# Patient Record
Sex: MALE | Race: BLACK OR AFRICAN AMERICAN | NOT HISPANIC OR LATINO | Employment: OTHER | URBAN - NONMETROPOLITAN AREA
[De-identification: names, ages, dates, MRNs, and addresses within clinical notes are randomized per-mention and may not be internally consistent; named-entity substitution may affect disease eponyms.]

---

## 2019-09-03 ENCOUNTER — TRANSCRIBE ORDERS (OUTPATIENT)
Dept: ADMINISTRATIVE | Facility: HOSPITAL | Age: 64
End: 2019-09-03

## 2019-09-03 DIAGNOSIS — R06.09 OTHER FORMS OF DYSPNEA: Primary | ICD-10-CM

## 2019-09-11 ENCOUNTER — HOSPITAL ENCOUNTER (OUTPATIENT)
Dept: CARDIOLOGY | Facility: HOSPITAL | Age: 64
Discharge: HOME | End: 2019-09-11
Admitting: FAMILY MEDICINE

## 2019-09-11 VITALS
BODY MASS INDEX: 37.13 KG/M2 | HEIGHT: 68 IN | DIASTOLIC BLOOD PRESSURE: 89 MMHG | WEIGHT: 245 LBS | SYSTOLIC BLOOD PRESSURE: 180 MMHG

## 2019-09-11 DIAGNOSIS — R06.09 OTHER FORMS OF DYSPNEA: ICD-10-CM

## 2019-09-11 PROCEDURE — 93306 TTE W/DOPPLER COMPLETE: CPT | Performed by: INTERNAL MEDICINE

## 2019-09-11 PROCEDURE — 93306 TTE W/DOPPLER COMPLETE: CPT

## 2019-09-12 LAB
BH CV ECHO MEAS - AO MAX PG (FULL): 4.9 MMHG
BH CV ECHO MEAS - AO MAX PG: 12.5 MMHG
BH CV ECHO MEAS - AO MEAN PG (FULL): 2 MMHG
BH CV ECHO MEAS - AO MEAN PG: 6 MMHG
BH CV ECHO MEAS - AO ROOT AREA (BSA CORRECTED): 1.2
BH CV ECHO MEAS - AO ROOT AREA: 5.7 CM^2
BH CV ECHO MEAS - AO ROOT DIAM: 2.7 CM
BH CV ECHO MEAS - AO V2 MAX: 177 CM/SEC
BH CV ECHO MEAS - AO V2 MEAN: 111 CM/SEC
BH CV ECHO MEAS - AO V2 VTI: 32.3 CM
BH CV ECHO MEAS - AVA(I,A): 2.6 CM^2
BH CV ECHO MEAS - AVA(I,D): 2.6 CM^2
BH CV ECHO MEAS - AVA(V,A): 2.2 CM^2
BH CV ECHO MEAS - AVA(V,D): 2.2 CM^2
BH CV ECHO MEAS - BSA(HAYCOCK): 2.4 M^2
BH CV ECHO MEAS - BSA: 2.2 M^2
BH CV ECHO MEAS - BZI_BMI: 37.3 KILOGRAMS/M^2
BH CV ECHO MEAS - BZI_METRIC_HEIGHT: 172.7 CM
BH CV ECHO MEAS - BZI_METRIC_WEIGHT: 111.1 KG
BH CV ECHO MEAS - EDV(CUBED): 64.5 ML
BH CV ECHO MEAS - EDV(MOD-SP4): 86.6 ML
BH CV ECHO MEAS - EDV(TEICH): 70.4 ML
BH CV ECHO MEAS - EF(CUBED): 78 %
BH CV ECHO MEAS - EF(MOD-SP4): 60.6 %
BH CV ECHO MEAS - EF(TEICH): 70.8 %
BH CV ECHO MEAS - ESV(CUBED): 14.2 ML
BH CV ECHO MEAS - ESV(MOD-SP4): 34.1 ML
BH CV ECHO MEAS - ESV(TEICH): 20.6 ML
BH CV ECHO MEAS - FS: 39.7 %
BH CV ECHO MEAS - IVS/LVPW: 1.6
BH CV ECHO MEAS - IVSD: 1.6 CM
BH CV ECHO MEAS - LAT PEAK E' VEL: 8.7 CM/SEC
BH CV ECHO MEAS - LV DIASTOLIC VOL/BSA (35-75): 38.9 ML/M^2
BH CV ECHO MEAS - LV MASS(C)D: 189.4 GRAMS
BH CV ECHO MEAS - LV MASS(C)DI: 85 GRAMS/M^2
BH CV ECHO MEAS - LV MAX PG: 7.7 MMHG
BH CV ECHO MEAS - LV MEAN PG: 4 MMHG
BH CV ECHO MEAS - LV SYSTOLIC VOL/BSA (12-30): 15.3 ML/M^2
BH CV ECHO MEAS - LV V1 MAX: 138.5 CM/SEC
BH CV ECHO MEAS - LV V1 MEAN: 83.9 CM/SEC
BH CV ECHO MEAS - LV V1 VTI: 29.4 CM
BH CV ECHO MEAS - LVIDD: 4 CM
BH CV ECHO MEAS - LVIDS: 2.4 CM
BH CV ECHO MEAS - LVLD AP4: 8.2 CM
BH CV ECHO MEAS - LVLS AP4: 6.7 CM
BH CV ECHO MEAS - LVOT AREA (M): 2.8 CM^2
BH CV ECHO MEAS - LVOT AREA: 2.8 CM^2
BH CV ECHO MEAS - LVOT DIAM: 1.9 CM
BH CV ECHO MEAS - LVPWD: 1 CM
BH CV ECHO MEAS - MED PEAK E' VEL: 8.05 CM/SEC
BH CV ECHO MEAS - MR MAX PG: 125.8 MMHG
BH CV ECHO MEAS - MR MAX VEL: 560.8 CM/SEC
BH CV ECHO MEAS - MR MEAN PG: 87.5 MMHG
BH CV ECHO MEAS - MR MEAN VEL: 445 CM/SEC
BH CV ECHO MEAS - MR VTI: 154 CM
BH CV ECHO MEAS - MV A MAX VEL: 136.5 CM/SEC
BH CV ECHO MEAS - MV AREA (1 DIAM): 5.7 CM^2
BH CV ECHO MEAS - MV DEC TIME: 0.17 SEC
BH CV ECHO MEAS - MV DIAM: 2.7 CM
BH CV ECHO MEAS - MV E MAX VEL: 154 CM/SEC
BH CV ECHO MEAS - MV E/A: 1.1
BH CV ECHO MEAS - MV FLOW AREA(1DIAM): 5.7 CM^2
BH CV ECHO MEAS - SI(AO): 82.9 ML/M^2
BH CV ECHO MEAS - SI(CUBED): 22.6 ML/M^2
BH CV ECHO MEAS - SI(LVOT): 37.4 ML/M^2
BH CV ECHO MEAS - SI(MOD-SP4): 23.6 ML/M^2
BH CV ECHO MEAS - SI(TEICH): 22.4 ML/M^2
BH CV ECHO MEAS - SV(AO): 184.6 ML
BH CV ECHO MEAS - SV(CUBED): 50.3 ML
BH CV ECHO MEAS - SV(LVOT): 83.4 ML
BH CV ECHO MEAS - SV(MOD-SP4): 52.5 ML
BH CV ECHO MEAS - SV(TEICH): 49.8 ML
BH CV ECHO MEAS - TR MAX VEL: 229 CM/SEC
BH CV ECHO MEASUREMENTS AVERAGE E/E' RATIO: 18.39
LEFT ATRIUM VOLUME INDEX: 44.4 ML/M2
LEFT ATRIUM VOLUME: 99.1 CM3

## 2019-09-16 ENCOUNTER — OFFICE VISIT (OUTPATIENT)
Dept: CARDIOLOGY | Facility: CLINIC | Age: 64
End: 2019-09-16

## 2019-09-16 VITALS
OXYGEN SATURATION: 99 % | WEIGHT: 234 LBS | BODY MASS INDEX: 35.46 KG/M2 | HEIGHT: 68 IN | HEART RATE: 91 BPM | DIASTOLIC BLOOD PRESSURE: 82 MMHG | SYSTOLIC BLOOD PRESSURE: 160 MMHG

## 2019-09-16 DIAGNOSIS — I10 BENIGN ESSENTIAL HTN: ICD-10-CM

## 2019-09-16 DIAGNOSIS — E78.2 MIXED HYPERLIPIDEMIA: ICD-10-CM

## 2019-09-16 DIAGNOSIS — E66.01 SEVERE OBESITY (BMI 35.0-39.9) WITH COMORBIDITY (HCC): ICD-10-CM

## 2019-09-16 DIAGNOSIS — E11.8 CONTROLLED TYPE 2 DIABETES MELLITUS WITH COMPLICATION, WITHOUT LONG-TERM CURRENT USE OF INSULIN (HCC): Primary | ICD-10-CM

## 2019-09-16 DIAGNOSIS — R06.09 DOE (DYSPNEA ON EXERTION): ICD-10-CM

## 2019-09-16 PROCEDURE — 99204 OFFICE O/P NEW MOD 45 MIN: CPT | Performed by: INTERNAL MEDICINE

## 2019-09-16 PROCEDURE — 93000 ELECTROCARDIOGRAM COMPLETE: CPT | Performed by: INTERNAL MEDICINE

## 2019-09-16 RX ORDER — FUROSEMIDE 40 MG/1
TABLET ORAL DAILY
COMMUNITY
Start: 2019-08-29

## 2019-09-16 RX ORDER — ERGOCALCIFEROL 1.25 MG/1
CAPSULE ORAL
COMMUNITY
Start: 2019-08-02

## 2019-09-16 RX ORDER — CARVEDILOL 25 MG/1
12.5 TABLET ORAL
COMMUNITY
Start: 2019-08-15

## 2019-09-16 RX ORDER — BLOOD SUGAR DIAGNOSTIC
STRIP MISCELLANEOUS
COMMUNITY
Start: 2019-07-18

## 2019-09-16 RX ORDER — ATORVASTATIN CALCIUM 20 MG/1
TABLET, FILM COATED ORAL
COMMUNITY

## 2019-09-16 RX ORDER — HYDROCHLOROTHIAZIDE 12.5 MG/1
CAPSULE, GELATIN COATED ORAL
COMMUNITY

## 2019-09-16 RX ORDER — AMLODIPINE BESYLATE 10 MG/1
TABLET ORAL
COMMUNITY

## 2019-09-16 RX ORDER — METFORMIN HYDROCHLORIDE 500 MG/1
500 TABLET, EXTENDED RELEASE ORAL 4 TIMES DAILY
COMMUNITY
Start: 2019-08-15

## 2019-09-16 RX ORDER — LOSARTAN POTASSIUM 100 MG/1
100 TABLET ORAL DAILY
Qty: 90 TABLET | Refills: 3 | Status: SHIPPED | OUTPATIENT
Start: 2019-09-16

## 2019-09-16 RX ORDER — LOSARTAN POTASSIUM 50 MG/1
50 TABLET ORAL DAILY
COMMUNITY
End: 2019-09-16 | Stop reason: SDUPTHER

## 2019-09-16 NOTE — PROGRESS NOTES
Mauro Patel  [id number]  1955  64 y.o.  male    Referring Provider: John Robertson MD    Reason for  Visit:  Initial visit for  shortness of breath   Essential Hypertension  Type 2 diabetes mellitus   Nonspecific STT changes on ECG      Subjective    Moderate exertional shortness of breath on exertion relieved with rest  No significant cough or wheezing  Going on for several months or longer    No palpitations  No associated chest pain  No significant pedal edema    No fever or chills  No significant expectoration    No hemoptysis  No presyncope or syncope       BP elevated at home 160s/90 s mm Hg    Abnormal Echo as below        History of present illness:  Mauro Patel is a 64 y.o. yo male with history of Essential Hypertension   Type 2 diabetes mellitus who presents today for   Chief Complaint   Patient presents with   • ESTABLISH CARE     RECENT ECHO   • Leg Swelling   .    History  Past Medical History:   Diagnosis Date   • Diabetes mellitus (CMS/HCC)    • Edema    • Hyperlipidemia    • Hypertension    ,   Past Surgical History:   Procedure Laterality Date   • NO PAST SURGERIES     ,   Family History   Problem Relation Age of Onset   • No Known Problems Mother    • No Known Problems Father    ,   Social History     Tobacco Use   • Smoking status: Never Smoker   • Smokeless tobacco: Never Used   Substance Use Topics   • Alcohol use: No     Frequency: Never   • Drug use: No   ,     Medications  Current Outpatient Medications   Medication Sig Dispense Refill   • ACCU-CHEK GUIDE test strip      • amLODIPine (NORVASC) 10 MG tablet amlodipine 10 mg tablet   Take 1 tablet every day by oral route.     • ASPIRIN 81 PO aspirin 81 mg tablet,delayed release   Take 1 tablet every day by oral route.     • atorvastatin (LIPITOR) 20 MG tablet atorvastatin 20 mg tablet   Take 1 tablet every day by oral route.     • carvedilol (COREG) 25 MG tablet 12.5 mg. BID     • furosemide (LASIX) 40 MG tablet Daily.     •  "hydrochlorothiazide (MICROZIDE) 12.5 MG capsule hydrochlorothiazide 12.5 mg capsule   Take 1 capsule every day by oral route.     • losartan (COZAAR) 100 MG tablet Take 1 tablet by mouth Daily. 90 tablet 3   • metFORMIN ER (GLUCOPHAGE-XR) 500 MG 24 hr tablet Take 500 mg by mouth 4 (Four) Times a Day.     • Potassium Chloride Aneta ER (K-DUR PO) 10 mEq Daily.     • vitamin D (ERGOCALCIFEROL) 62088 units capsule capsule        No current facility-administered medications for this visit.        Allergies:  Duravent dm  [phenylephrine-dm-gg]    Review of Systems  Review of Systems   Constitution: Negative.   HENT: Negative.    Eyes: Negative.    Cardiovascular: Positive for dyspnea on exertion. Negative for chest pain, claudication, cyanosis, irregular heartbeat, leg swelling, near-syncope, orthopnea, palpitations, paroxysmal nocturnal dyspnea and syncope.   Respiratory: Negative.    Endocrine: Negative.    Hematologic/Lymphatic: Negative.    Skin: Negative.    Gastrointestinal: Negative for anorexia.   Genitourinary: Negative.    Neurological: Negative.    Psychiatric/Behavioral: Negative.        Objective     Physical Exam:  /82   Pulse 91   Ht 172.7 cm (68\")   Wt 106 kg (234 lb)   SpO2 99%   BMI 35.58 kg/m²     Physical Exam   Constitutional: He appears well-developed.   HENT:   Head: Normocephalic.   Neck: Normal carotid pulses and no JVD present. No tracheal tenderness present. Carotid bruit is not present. No tracheal deviation and no edema present.   Cardiovascular: Regular rhythm, normal heart sounds and normal pulses.   Pulmonary/Chest: Effort normal. No stridor.   Abdominal: Soft. He exhibits no distension. There is no hepatosplenomegaly. There is no tenderness.   Neurological: He is alert. He has normal strength. No cranial nerve deficit or sensory deficit.   Skin: Skin is warm.   Psychiatric: He has a normal mood and affect. His speech is normal and behavior is normal.       Results " Review:      Results for orders placed during the hospital encounter of 09/11/19   Adult Transthoracic Echo Complete W/ Cont if Necessary Per Protocol    Narrative · Left ventricular systolic function is normal. Estimated EF appears to be   in the range of 61 - 65%.  · Left ventricular diastolic dysfunction.  · Left ventricular wall thickness is consistent with mild to moderate   septal asymmetric hypertrophy.  · Mild mitral valve regurgitation is present              ECG 12 Lead  Date/Time: 9/16/2019 10:27 AM  Performed by: Kofi Meade MD  Authorized by: Kofi Meade MD   Comparison: not compared with previous ECG   Rhythm: sinus rhythm  Rate: normal  Conduction: conduction normal  ST Segments: ST segments normal  T Waves: T waves normal  QRS axis: normal  Other findings: non-specific ST-T wave changes    Clinical impression: abnormal EKG            Assessment/Plan   Mauro was seen today for establish care and leg swelling.    Diagnoses and all orders for this visit:    Controlled type 2 diabetes mellitus with complication, without long-term current use of insulin (CMS/[facility])    Benign essential HTN    Severe obesity (BMI 35.0-39.9) with comorbidity (CMS/HCC)    Mixed hyperlipidemia    MUNGUIA (dyspnea on exertion)  -     Adult Stress Echo W/ Cont or Stress Agent if Necessary Per Protocol; Future    Other orders  -     ECG 12 Lead  -     losartan (COZAAR) 100 MG tablet; Take 1 tablet by mouth Daily.           Plan    Requested Prescriptions     Signed Prescriptions Disp Refills   • losartan (COZAAR) 100 MG tablet 90 tablet 3     Sig: Take 1 tablet by mouth Daily.      Orders Placed This Encounter   Procedures   • ECG 12 Lead     This order was created via procedure documentation   • Adult Stress Echo W/ Cont or Stress Agent if Necessary Per Protocol     Standing Status:   Future     Standing Expiration Date:   9/15/2020     Order Specific Question:   What stress agent will be used?     Answer:   Dobutamine     Order  Specific Question:   Reason for Dobutamine?     Answer:   Unable to Exercise     Order Specific Question:   Reason for exam?     Answer:   Dyspnea     Order Specific Question:   Reason for exam?     Answer:   Abnormal EKG    Cannot run on treadmill due to significant  shortness of breath     Recommend evaluation for obstructive sleep apnea given snoring and daytime somnolence and fatigue by primary provider  In addition has body habitus including oopharyngeal crowding increasing the likelihood of obstructive sleep apnea   ____________________________________________________________________________________________________________________________________________  Health maintenance and recommendations      Low salt/ HTN/ Heart healthy carbohydrate restricted cardiac diet   The patient is advised to reduce or avoid caffeine or other cardiac stimulants.     Minimize or avoid  NSAID-type medications        Monitor for any signs of bleeding including red or dark stools. Fall precautions.        Advised staying uptodate with immunizations per established standard guidelines.      Offered to give patient  a copy of my notes       Questions were encouraged, asked and answered to the patient's  understanding and satisfaction. Questions if any regarding current medications and side effects, need for refills and importance of compliance to medications stressed.    Reviewed available prior notes, consults, prior visits, laboratory findings, radiology and cardiology relevant reports. Updated chart as applicable. I have reviewed the patient's medical history in detail and updated the computerized patient record as relevant.      Updated patient regarding any new or relevant abnormalities on review of records or any new findings on physical exam. Mentioned to patient about purpose of visit and desirable health short and long term goals and objectives.    Primary to monitor CBC CMP Lipid panel and TSH as  applicable    ___________________________________________________________________________________________________________________________________________          Return in about 6 weeks (around 10/28/2019).

## 2019-09-25 ENCOUNTER — HOSPITAL ENCOUNTER (OUTPATIENT)
Dept: CARDIOLOGY | Facility: HOSPITAL | Age: 64
Discharge: HOME | End: 2019-09-25
Admitting: INTERNAL MEDICINE

## 2019-09-25 VITALS
BODY MASS INDEX: 35.42 KG/M2 | HEART RATE: 101 BPM | WEIGHT: 233.69 LBS | DIASTOLIC BLOOD PRESSURE: 73 MMHG | SYSTOLIC BLOOD PRESSURE: 168 MMHG | HEIGHT: 68 IN

## 2019-09-25 DIAGNOSIS — R06.09 DOE (DYSPNEA ON EXERTION): ICD-10-CM

## 2019-09-25 LAB
BH CV STRESS BP STAGE 1: NORMAL
BH CV STRESS BP STAGE 2: NORMAL
BH CV STRESS DOSE DOBUTAMINE STAGE 1: 10
BH CV STRESS DOSE DOBUTAMINE STAGE 2: 20
BH CV STRESS DURATION MIN STAGE 1: 3
BH CV STRESS DURATION MIN STAGE 2: 3
BH CV STRESS DURATION SEC STAGE 1: 0
BH CV STRESS DURATION SEC STAGE 2: 0
BH CV STRESS ECHO POST STRESS EJECTION FRACTION EF: 65 %
BH CV STRESS HR STAGE 1: 111
BH CV STRESS HR STAGE 2: 130
BH CV STRESS PROTOCOL 1: NORMAL
BH CV STRESS RECOVERY BP: NORMAL MMHG
BH CV STRESS RECOVERY HR: 105 BPM
BH CV STRESS STAGE 1: 1
BH CV STRESS STAGE 2: 2
LV EF 2D ECHO EST: 55 %
MAXIMAL PREDICTED HEART RATE: 157 BPM
PERCENT MAX PREDICTED HR: 82.8 %
STRESS BASELINE BP: NORMAL MMHG
STRESS BASELINE HR: 103 BPM
STRESS PERCENT HR: 97 %
STRESS POST EXERCISE DUR MIN: 6 MIN
STRESS POST EXERCISE DUR SEC: 0 SEC
STRESS POST PEAK BP: NORMAL MMHG
STRESS POST PEAK HR: 130 BPM
STRESS TARGET HR: 133 BPM

## 2019-09-25 PROCEDURE — 25010000002 PERFLUTREN 6.52 MG/ML SUSPENSION: Performed by: INTERNAL MEDICINE

## 2019-09-25 PROCEDURE — 93018 CV STRESS TEST I&R ONLY: CPT | Performed by: INTERNAL MEDICINE

## 2019-09-25 PROCEDURE — 93352 ADMIN ECG CONTRAST AGENT: CPT | Performed by: INTERNAL MEDICINE

## 2019-09-25 PROCEDURE — 93017 CV STRESS TEST TRACING ONLY: CPT

## 2019-09-25 PROCEDURE — 93350 STRESS TTE ONLY: CPT | Performed by: INTERNAL MEDICINE

## 2019-09-25 PROCEDURE — 93350 STRESS TTE ONLY: CPT

## 2019-09-25 PROCEDURE — 25010000003 DOBUTAMINE PER 250 MG: Performed by: INTERNAL MEDICINE

## 2019-09-25 RX ORDER — DOBUTAMINE HYDROCHLORIDE 100 MG/100ML
10-50 INJECTION INTRAVENOUS CONTINUOUS
Status: DISCONTINUED | OUTPATIENT
Start: 2019-09-25 | End: 2019-09-26 | Stop reason: HOSPADM

## 2019-09-25 RX ADMIN — PERFLUTREN 8.48 MG: 6.52 INJECTION, SUSPENSION INTRAVENOUS at 09:11

## 2019-09-25 RX ADMIN — Medication 10 MCG/KG/MIN: at 09:12

## 2019-10-29 ENCOUNTER — OFFICE VISIT (OUTPATIENT)
Dept: CARDIOLOGY | Facility: CLINIC | Age: 64
End: 2019-10-29

## 2019-10-29 VITALS
HEART RATE: 68 BPM | SYSTOLIC BLOOD PRESSURE: 120 MMHG | OXYGEN SATURATION: 100 % | WEIGHT: 215 LBS | HEIGHT: 68 IN | BODY MASS INDEX: 32.58 KG/M2 | DIASTOLIC BLOOD PRESSURE: 74 MMHG

## 2019-10-29 DIAGNOSIS — R06.09 DOE (DYSPNEA ON EXERTION): ICD-10-CM

## 2019-10-29 DIAGNOSIS — E11.8 CONTROLLED TYPE 2 DIABETES MELLITUS WITH COMPLICATION, WITHOUT LONG-TERM CURRENT USE OF INSULIN (HCC): ICD-10-CM

## 2019-10-29 DIAGNOSIS — E78.2 MIXED HYPERLIPIDEMIA: ICD-10-CM

## 2019-10-29 DIAGNOSIS — I10 BENIGN ESSENTIAL HTN: ICD-10-CM

## 2019-10-29 DIAGNOSIS — E66.01 SEVERE OBESITY (BMI 35.0-39.9) WITH COMORBIDITY (HCC): Primary | ICD-10-CM

## 2019-10-29 PROCEDURE — 99214 OFFICE O/P EST MOD 30 MIN: CPT | Performed by: INTERNAL MEDICINE

## 2019-10-29 NOTE — PROGRESS NOTES
Mauro Patel  [id number]  1955  64 y.o.  male    Referring Provider: John Robertson MD    Reason for  Visit: Here for follow up after cardiac testing after initial   visit for  shortness of breath   Essential Hypertension  Type 2 diabetes mellitus   Nonspecific STT changes on ECG  Cardiac workup test results as below   Low risk stress test with normal LVEF        Subjective      Feels better overall as during last visit  No new events or complaints since last visit   Overall the patient feels no major change from baseline symptoms   Similar symptoms as during last visit      Moderate exertional shortness of breath on exertion relieved with rest  No significant cough or wheezing  Going on for several months or longer    No palpitations  No associated chest pain  No significant pedal edema    No fever or chills  No significant expectoration    No hemoptysis  No presyncope or syncope     Now BP well controlled at home.       Blood sugars well controlled at home     History of present illness:  Mauro Patel is a 64 y.o. yo male with history of Essential Hypertension   Type 2 diabetes mellitus who presents today for   Chief Complaint   Patient presents with   • Shortness of Breath     6 WK FU/ RESULTS    .    History  Past Medical History:   Diagnosis Date   • Diabetes mellitus (CMS/HCC)    • Edema    • Hyperlipidemia    • Hypertension    ,   Past Surgical History:   Procedure Laterality Date   • NO PAST SURGERIES     ,   History reviewed. No pertinent family history.,   Social History     Tobacco Use   • Smoking status: Never Smoker   • Smokeless tobacco: Never Used   Substance Use Topics   • Alcohol use: No     Frequency: Never   • Drug use: No   ,     Medications  Current Outpatient Medications   Medication Sig Dispense Refill   • ACCU-CHEK GUIDE test strip      • amLODIPine (NORVASC) 10 MG tablet amlodipine 10 mg tablet   Take 1 tablet every day by oral route.     • ASPIRIN 81 PO aspirin 81 mg  "tablet,delayed release   Take 1 tablet every day by oral route.     • atorvastatin (LIPITOR) 20 MG tablet atorvastatin 20 mg tablet   Take 1 tablet every day by oral route.     • carvedilol (COREG) 25 MG tablet 12.5 mg. BID     • furosemide (LASIX) 40 MG tablet Daily.     • hydrochlorothiazide (MICROZIDE) 12.5 MG capsule hydrochlorothiazide 12.5 mg capsule   Take 1 capsule every day by oral route.     • losartan (COZAAR) 100 MG tablet Take 1 tablet by mouth Daily. 90 tablet 3   • metFORMIN ER (GLUCOPHAGE-XR) 500 MG 24 hr tablet Take 500 mg by mouth 4 (Four) Times a Day.     • Potassium Chloride Aneta ER (K-DUR PO) 10 mEq Daily.     • vitamin D (ERGOCALCIFEROL) 09218 units capsule capsule        No current facility-administered medications for this visit.        Allergies:  Duravent dm  [phenylephrine-dm-gg]    Review of Systems  Review of Systems   Constitution: Negative.   HENT: Negative.    Eyes: Negative.    Cardiovascular: Positive for dyspnea on exertion. Negative for chest pain, claudication, cyanosis, irregular heartbeat, leg swelling, near-syncope, orthopnea, palpitations, paroxysmal nocturnal dyspnea and syncope.   Respiratory: Negative.    Endocrine: Negative.    Hematologic/Lymphatic: Negative.    Skin: Negative.    Gastrointestinal: Negative for anorexia.   Genitourinary: Negative.    Neurological: Negative.    Psychiatric/Behavioral: Negative.        Objective     Physical Exam:  /74   Pulse 68   Ht 172.7 cm (67.99\")   Wt 97.5 kg (215 lb)   SpO2 100%   BMI 32.70 kg/m²     Physical Exam   Constitutional: He appears well-developed.   HENT:   Head: Normocephalic.   Neck: Normal carotid pulses and no JVD present. No tracheal tenderness present. Carotid bruit is not present. No tracheal deviation and no edema present.   Cardiovascular: Regular rhythm, normal heart sounds and normal pulses.   Pulmonary/Chest: Effort normal. No stridor.   Abdominal: Soft. He exhibits no distension. There is no " hepatosplenomegaly. There is no tenderness.   Neurological: He is alert. He has normal strength. No cranial nerve deficit or sensory deficit.   Skin: Skin is warm.   Psychiatric: He has a normal mood and affect. His speech is normal and behavior is normal.       Results Review:      Results for orders placed during the hospital encounter of 19   Adult Stress Echo W/ Cont or Stress Agent if Necessary Per Protocol    Narrative · Estimated EF = 55%.  · Left ventricular systolic function is normal.  · Low risk stress test for stress induced myocardial ischemia             Mauro Patel   Echocardiogram   Order# [id number]   [place] physician: Anshul Alcantar MD Ordering physician: John Robertson MD Study date: 19   Patient Information     Patient Name  Mauro Patel MRN  [id number] Sex  Male  (Age)  1955 (64 y.o.)   Sedation Narrator Report     Sedation Narrator Report   Interpretation Summary     · Left ventricular systolic function is normal. Estimated EF appears to be in the range of 61 - 65%.  · Left ventricular diastolic dysfunction.  · Left ventricular wall thickness is consistent with mild to moderate septal asymmetric hypertrophy.  · Mild mitral valve regurgitation is present             Procedures    Assessment/Plan   Mauro was seen today for shortness of breath.    Diagnoses and all orders for this visit:    Severe obesity (BMI 35.0-39.9) with comorbidity (CMS/HCC)    Mixed hyperlipidemia    MUNGUIA (dyspnea on exertion)    Controlled type 2 diabetes mellitus with complication, without long-term current use of insulin (CMS/HCC)    Benign essential HTN           Plan      He declined EREN workup    The current medical regimen is effective;  continue present plan and medications.     Keep A1c less than 7 Primary to monitor  Keep LDL below 70 mg/dl. Monitor liver and renal functions.   Monitor CBC, CMP, TSH (as indicated) and Lipid Panel by primary      Discussed results of latest  cardiac testing with patient       ____________________________________________________________________________________________________________________________________________  Health maintenance and recommendations      Similar recommendations as last visit       Offered to give patient  a copy of my notes       Questions were encouraged, asked and answered to the patient's  understanding and satisfaction. Questions if any regarding current medications and side effects, need for refills and importance of compliance to medications stressed.    Reviewed available prior notes, consults, prior visits, laboratory findings, radiology and cardiology relevant reports. Updated chart as applicable. I have reviewed the patient's medical history in detail and updated the computerized patient record as relevant.      Updated patient regarding any new or relevant abnormalities on review of records or any new findings on physical exam. Mentioned to patient about purpose of visit and desirable health short and long term goals and objectives.    Primary to monitor CBC CMP Lipid panel and TSH as applicable    ___________________________________________________________________________________________________________________________________________          Return in about 6 months (around 4/29/2020).

## 2019-12-12 ENCOUNTER — HOSPITAL ENCOUNTER (OUTPATIENT)
Dept: LAB | Age: 64
Discharge: HOME | End: 2019-12-12
Payer: COMMERCIAL

## 2019-12-12 ENCOUNTER — HOSPITAL ENCOUNTER (OUTPATIENT)
Dept: ULTRASOUND IMAGING | Age: 64
Discharge: HOME | End: 2019-12-12
Payer: COMMERCIAL

## 2019-12-12 DIAGNOSIS — N18.30 CKD (CHRONIC KIDNEY DISEASE) STAGE 3, GFR 30-59 ML/MIN (HCC): ICD-10-CM

## 2019-12-12 LAB
ALBUMIN SERPL-MCNC: 3.5 G/DL (ref 3.5–5.2)
ALP BLD-CCNC: 145 U/L (ref 40–130)
ALT SERPL-CCNC: 5 U/L (ref 5–41)
ANION GAP SERPL CALCULATED.3IONS-SCNC: 14 MMOL/L (ref 7–19)
ANISOCYTOSIS: ABNORMAL
AST SERPL-CCNC: 8 U/L (ref 5–40)
BACTERIA: NEGATIVE /HPF
BASOPHILS ABSOLUTE: 0.1 K/UL (ref 0–0.2)
BASOPHILS RELATIVE PERCENT: 0.7 % (ref 0–1)
BILIRUB SERPL-MCNC: <0.2 MG/DL (ref 0.2–1.2)
BILIRUBIN URINE: NEGATIVE
BLOOD, URINE: NEGATIVE
BUN BLDV-MCNC: 54 MG/DL (ref 8–23)
CALCIUM SERPL-MCNC: 9.6 MG/DL (ref 8.8–10.2)
CHLORIDE BLD-SCNC: 107 MMOL/L (ref 98–111)
CLARITY: ABNORMAL
CO2: 19 MMOL/L (ref 22–29)
COLOR: YELLOW
CREAT SERPL-MCNC: 2 MG/DL (ref 0.5–1.2)
CREATININE URINE: 115.9 MG/DL (ref 4.2–622)
EOSINOPHILS ABSOLUTE: 0.2 K/UL (ref 0–0.6)
EOSINOPHILS RELATIVE PERCENT: 1.9 % (ref 0–5)
EPITHELIAL CELLS, UA: 3 /HPF (ref 0–5)
FERRITIN: 102.1 NG/ML (ref 30–400)
FOLATE: 7 NG/ML (ref 4.5–32.2)
GFR NON-AFRICAN AMERICAN: 34
GLUCOSE BLD-MCNC: 114 MG/DL (ref 74–109)
GLUCOSE URINE: NEGATIVE MG/DL
HCT VFR BLD CALC: 25.8 % (ref 42–52)
HEMOGLOBIN: 7.9 G/DL (ref 14–18)
HYALINE CASTS: 5 /HPF (ref 0–8)
HYPOCHROMIA: ABNORMAL
IMMATURE GRANULOCYTES #: 0.3 K/UL
IRON: 41 UG/DL (ref 59–158)
KETONES, URINE: NEGATIVE MG/DL
LEUKOCYTE ESTERASE, URINE: ABNORMAL
LYMPHOCYTES ABSOLUTE: 1.9 K/UL (ref 1.1–4.5)
LYMPHOCYTES RELATIVE PERCENT: 16.8 % (ref 20–40)
MAGNESIUM: 2.1 MG/DL (ref 1.6–2.4)
MCH RBC QN AUTO: 27.5 PG (ref 27–31)
MCHC RBC AUTO-ENTMCNC: 30.6 G/DL (ref 33–37)
MCV RBC AUTO: 89.9 FL (ref 80–94)
MONOCYTES ABSOLUTE: 0.7 K/UL (ref 0–0.9)
MONOCYTES RELATIVE PERCENT: 6.5 % (ref 0–10)
NEUTROPHILS ABSOLUTE: 8.1 K/UL (ref 1.5–7.5)
NEUTROPHILS RELATIVE PERCENT: 71.5 % (ref 50–65)
NITRITE, URINE: NEGATIVE
OVALOCYTES: ABNORMAL
PARATHYROID HORMONE INTACT: 41.7 PG/ML (ref 15–65)
PDW BLD-RTO: 15.3 % (ref 11.5–14.5)
PH UA: 5.5 (ref 5–8)
PHOSPHORUS: 5.3 MG/DL (ref 2.5–4.5)
PLATELET # BLD: 484 K/UL (ref 130–400)
PLATELET SLIDE REVIEW: ABNORMAL
PMV BLD AUTO: 9.4 FL (ref 9.4–12.4)
POLYCHROMASIA: ABNORMAL
POTASSIUM SERPL-SCNC: 4.4 MMOL/L (ref 3.5–5)
PROTEIN PROTEIN: 140 MG/DL (ref 15–45)
PROTEIN UA: 100 MG/DL
RBC # BLD: 2.87 M/UL (ref 4.7–6.1)
RBC UA: 1 /HPF (ref 0–4)
RETICULOCYTE ABSOLUTE COUNT: 0.03 M/UL (ref 0.03–0.12)
RETICULOCYTE COUNT PCT: 1.19 % (ref 0.5–1.5)
SODIUM BLD-SCNC: 140 MMOL/L (ref 136–145)
SODIUM URINE: 41 MMOL/L
SPECIFIC GRAVITY UA: 1.01 (ref 1–1.03)
TOTAL IRON BINDING CAPACITY: 197 UG/DL (ref 250–400)
TOTAL PROTEIN: 8.3 G/DL (ref 6.6–8.7)
URIC ACID, SERUM: 3.8 MG/DL (ref 3.4–7)
UROBILINOGEN, URINE: 0.2 E.U./DL
VITAMIN B-12: 1429 PG/ML (ref 211–946)
VITAMIN D 25-HYDROXY: 28.2 NG/ML
WBC # BLD: 11.3 K/UL (ref 4.8–10.8)
WBC UA: 25 /HPF (ref 0–5)

## 2019-12-12 PROCEDURE — 76770 US EXAM ABDO BACK WALL COMP: CPT

## 2019-12-13 LAB
ANA IGG, ELISA: NORMAL
C3 COMPLEMENT: 181 MG/DL (ref 88–201)
C4 COMPLEMENT: 58 MG/DL (ref 10–40)

## 2019-12-14 LAB
ANCA IFA: NORMAL
MYELOPEROXIDASE AB: 0 AU/ML (ref 0–19)
SERINE PROTEASE 3 AB: 3 AU/ML (ref 0–19)

## 2019-12-17 LAB
+IMM: ABNORMAL
ALBUMIN SERPL-MCNC: 3.15 G/DL (ref 3.75–5.01)
ALPHA-1-GLOBULIN: 0.59 G/DL (ref 0.19–0.46)
ALPHA-2-GLOBULIN: 1.15 G/DL (ref 0.48–1.05)
BETA GLOBULIN: 1.22 G/DL (ref 0.48–1.1)
GAMMA GLOBULIN: 1.68 G/DL (ref 0.62–1.51)
IGA: 539 MG/DL (ref 68–408)
IGG: 1800 MG/DL (ref 768–1632)
IGM: 52 MG/DL (ref 35–263)
KAPPA FREE LIGHT CHAINS QNT: 16.9 MG/DL (ref 0.33–1.94)
KAPPA/LAMBDA FREE LIGHT CHAIN RATIO: 3.66 (ref 0.26–1.65)
LAMBDA FREE LIGHT CHAINS QNT: 4.62 MG/DL (ref 0.57–2.63)
SPE/IFE INTERPRETATION: ABNORMAL
TOTAL PROTEIN: 7.8 G/DL (ref 6.3–8.2)

## 2020-01-15 NOTE — PROGRESS NOTES
OP HEMATOLOGY/ONCOLOGY CONSULTATION      Pt Name: Shae Band: 1955  MRN: [id number]  Referring provider: No ref. provider found   PCP: Inga Aguiar MD      Date of evaluation: 1/16/2020   History Obtained From:  patient, spouse, electronic medical record    CHIEF COMPLAINT:    Chief Complaint   Patient presents with    Other     CKD     HISTORY OF PRESENT ILLNESS:    Manuel Holcomb is a 62 y.o.  male referred by Dr. Srinivasan Ardon for assistance in evaluation and treatment of anemia. [place] has stage III chronic renal failure, most likely from diabetes. CBC on 12/12/2019 revealed a WBC of 11.3 with 71.5% PMN, Hgb 7.9, MCV 89.9, PLT of 484,000. He reports he did not get a transfusion. CMP 12/12/2019 revealed crt 2 with GFR 34, calcium normal at 9.6, alkaline phosphatase 145 ()    Serology 12/12/2019  Serum Fe - 41  TIBC - 197  Fe sat -   Ferritin - 102. B12 - 1429  Folate - 7    Retic -1.19% with absolute 0.92358    Serology 12/17/2019  SPEP -polyclonal increase in IgG and IgA, no monoclonal protein seen on immunofixation  QI -IgG 1800 ([phone]), IgA 539 (), IgM 52  Free serum light chains - kappa 16.9, lambda 4.62, K/L ratio 3.66    He denies any transfusions whatsoever. He denies any family history of blood dyscrasias, sickle cell etc.    He is taking ferrous sulfate daily and says he has been taking it for \"a long time\". He denies any shortness of breath whatsoever, somnolence, fatigue. He is diabetic and his metformin was recently discontinued because of his renal function, he is on Levemir and other medication may be started soon. He does have hypertension and is controlled with medication. Lisinopril was added recently to help with renal protection. He has osteoarthritis and has chronic arthralgias. He does have a history of gout and is on allopurinol.       Past Medical History:   Diagnosis Date    Diabetes mellitus ([address]     Essential hypertension     Gout     History of colonic polyps     Hyperlipidemia     Osteoarthritis        Past Surgical History:   Procedure Laterality Date    COLONOSCOPY           Current Outpatient Medications:     ASPIRIN 81 PO, aspirin 81 mg tablet,delayed release  Take 1 tablet every day by oral route., Disp: , Rfl:     allopurinol (ZYLOPRIM) 300 MG tablet, allopurinol 300 mg tablet, Disp: , Rfl:     amLODIPine (NORVASC) 10 MG tablet, amlodipine 10 mg tablet, Disp: , Rfl:     atorvastatin (LIPITOR) 20 MG tablet, atorvastatin 20 mg tablet, Disp: , Rfl:     carvedilol (COREG) 25 MG tablet, carvedilol 25 mg tablet, Disp: , Rfl:     potassium chloride (MICRO-K) 10 MEQ extended release capsule, potassium chloride ER 10 mEq tablet,extended release  Take 1 tablet every day by oral route., Disp: , Rfl:     magnesium oxide (MAG-OX) 140 MG CAPS, Magnesium-Oxide 400 mg tablet  Take 1 tablet every day by oral route., Disp: , Rfl:     ferrous gluconate (FERGON) 225 (27 Fe) MG tablet, ferrous gluconate 324 mg (38 mg iron) tablet  Take 1 tablet twice a day by oral route., Disp: , Rfl:     insulin detemir (LEVEMIR) 100 UNIT/ML injection vial, Inject 30 Units into the skin nightly, Disp: , Rfl:     lisinopril (PRINIVIL;ZESTRIL) 40 MG tablet, Take 40 mg by mouth daily, Disp: , Rfl:     SODIUM BICARBONATE PO, Take 650 mg by mouth, Disp: , Rfl:     hydrochlorothiazide (MICROZIDE) 12.5 MG capsule, Take 12.5 mg by mouth daily, Disp: , Rfl:     furosemide (LASIX) 40 MG tablet, Take 40 mg by mouth 2 times daily, Disp: , Rfl:      Allergies   Allergen Reactions    Phenylephrine-Dm-Gg Rash       Social History     Tobacco Use    Smoking status: Not on file   Substance Use Topics    Alcohol use: Not on file    Drug use: Not on file       Family History   Problem Relation Age of Onset    Other Neg Hx         He denies any family history of blood dyscrasias.        Subjective   REVIEW OF SYSTEMS:   Review of Systems Abdominal:      General: Bowel sounds are normal. There is no distension. Palpations: Abdomen is soft. There is no mass. Tenderness: There is no tenderness. Musculoskeletal: Normal range of motion. General: No tenderness or deformity. Skin:     Findings: No erythema or rash. Neurological:      Mental Status: He is alert and oriented to person, place, and time. Psychiatric:         Thought Content: Thought content normal.             VISIT DIAGNOSES  1. Normocytic hypochromic anemia        ASSESSMENT/PLAN:    1. Normocytic hypochromic anemia  2. Stage III chronic renal failure    Marika Mcnulty has stage III chronic renal failure, most likely from diabetes. CBC on 12/12/2019 revealed a WBC of 11.3 with 71.5% PMN, Hgb 7.9, MCV 89.9, PLT of 484,000. He reports he did not get a transfusion. CMP 12/12/2019 revealed crt 2 with GFR 34, calcium normal at 9.6, alkaline phosphatase 145 ()    Serology 12/12/2019  Serum Fe - 41  TIBC - 197  Fe sat -   Ferritin - 102. B12 - 1429  Folate - 7    Retic -1.19% with absolute 0.34635    Serology 12/17/2019  SPEP -polyclonal increase in IgG and IgA, no monoclonal protein seen on immunofixation  QI -IgG 1800 ([phone]), IgA 539 (), IgM 52  Free serum light chains - kappa 16.9, lambda 4.62, K/L ratio 3.66    He denies any transfusions whatsoever. He denies any family history of blood dyscrasias, sickle cell etc.    He is taking ferrous sulfate daily and says he has been taking it for \"a long time\". Serology above is reviewed. He appears to have some polyclonal abnormalities without any monoclonal proteins seen on immunofixation on SPEP. CBC today reveals a WBC of 8.51 with a relatively normal differential, Hgb 9.6, MCV 91, MCHC 30.7, PLT of 290,000. He continues taking ferrous sulfate daily. He reports he had a colonoscopy Dr. Chaim Harrington around 3 to 4 years ago with only one polyp removed.   He denies any bleeding at this time whatsoever. Iron levels above were actually okay. I did discuss anemia associated with chronic renal failure, erythropoietin physiology with Citlalli Chopra and his wife today. Citlalli Chopra reports that he does not have any issue with fatigue, shortness of breath with exertion, falling asleep easily. I discussed the role of erythropoietin to decrease transfusion need and to improve quality of life. He does not have any symptoms from his mild anemia at present. Further serology will be requested and I did discuss bone marrow aspiration and biopsy that will be recommended, he is reluctant at present. Hemoglobin today has improved from a value of 7.9 on 12/12/2019 up to 9.6 today without any transfusions being given. Orders Placed This Encounter   Procedures    Erythropoietin    Copper, Serum    Zinc    Haptoglobin    Lactate Dehydrogenase        Return in about 6 weeks (around 2/27/2020) [facility].       Kiki Grady PA-C  11:13 AM  1/16/2020

## 2020-01-16 ENCOUNTER — HOSPITAL ENCOUNTER (OUTPATIENT)
Dept: INFUSION THERAPY | Age: 65
Discharge: HOME | End: 2020-01-16
Payer: COMMERCIAL

## 2020-01-16 ENCOUNTER — OFFICE VISIT (OUTPATIENT)
Dept: HEMATOLOGY | Age: 65
End: 2020-01-16
Payer: COMMERCIAL

## 2020-01-16 VITALS
SYSTOLIC BLOOD PRESSURE: 140 MMHG | OXYGEN SATURATION: 99 % | HEART RATE: 83 BPM | BODY MASS INDEX: 32.71 KG/M2 | DIASTOLIC BLOOD PRESSURE: 74 MMHG | HEIGHT: 68 IN | WEIGHT: 215.8 LBS

## 2020-01-16 DIAGNOSIS — D50.9 NORMOCYTIC HYPOCHROMIC ANEMIA: ICD-10-CM

## 2020-01-16 PROCEDURE — 83615 LACTATE (LD) (LDH) ENZYME: CPT

## 2020-01-16 PROCEDURE — 85025 COMPLETE CBC W/AUTO DIFF WBC: CPT

## 2020-01-16 PROCEDURE — 99203 OFFICE O/P NEW LOW 30 MIN: CPT | Performed by: PHYSICIAN ASSISTANT

## 2020-01-16 PROCEDURE — 99211 OFF/OP EST MAY X REQ PHY/QHP: CPT

## 2020-01-16 RX ORDER — CARVEDILOL 25 MG/1
TABLET ORAL
COMMUNITY
Start: 2019-08-15

## 2020-01-16 RX ORDER — ATORVASTATIN CALCIUM 20 MG/1
TABLET, FILM COATED ORAL
COMMUNITY

## 2020-01-16 RX ORDER — LISINOPRIL 40 MG/1
40 TABLET ORAL DAILY
Status: ON HOLD | COMMUNITY
End: 2020-10-02 | Stop reason: HOSPADM

## 2020-01-16 RX ORDER — POTASSIUM CHLORIDE 750 MG/1
CAPSULE, EXTENDED RELEASE ORAL
Status: ON HOLD | COMMUNITY
End: 2020-10-02 | Stop reason: HOSPADM

## 2020-01-16 RX ORDER — AMLODIPINE BESYLATE 10 MG/1
TABLET ORAL
COMMUNITY

## 2020-01-16 RX ORDER — HYDROCHLOROTHIAZIDE 12.5 MG/1
12.5 CAPSULE, GELATIN COATED ORAL DAILY
Status: ON HOLD | COMMUNITY
End: 2020-10-22 | Stop reason: HOSPADM

## 2020-01-16 RX ORDER — ALLOPURINOL 300 MG/1
TABLET ORAL
COMMUNITY

## 2020-01-16 RX ORDER — FUROSEMIDE 40 MG/1
40 TABLET ORAL 2 TIMES DAILY
COMMUNITY
End: 2020-10-20

## 2020-01-16 ASSESSMENT — ENCOUNTER SYMPTOMS
EYE ITCHING: 0
VOICE CHANGE: 0
COUGH: 0
COLOR CHANGE: 0
NAUSEA: 0
PHOTOPHOBIA: 0
VOMITING: 0
TROUBLE SWALLOWING: 0
DIARRHEA: 0
SHORTNESS OF BREATH: 0
BLOOD IN STOOL: 0
WHEEZING: 0
SORE THROAT: 0
CONSTIPATION: 0
BACK PAIN: 0
ABDOMINAL DISTENTION: 0
EYE DISCHARGE: 0
ABDOMINAL PAIN: 0

## 2020-02-28 NOTE — PROGRESS NOTES
Progress Note      Pt Name: Kylah Morales: 1955  MRN: [id number]    Date of evaluation: 3/2/2020  History Obtained From:  patient, spouse, electronic medical record    CHIEF COMPLAINT:    Chief Complaint   Patient presents with    Anemia     Normocytic hypochromic anemia     Current active problems    HISTORY OF PRESENT ILLNESS:    Elena Brooks is a 62 y.o.  male with normocytic normochromic anemia and stage III chronic renal failure here to follow-up on his initial serology. He denies any new medical issues, hospitalizations, transfusions. He denies any overt fatigue, sleeping, chest pain, overt dyspnea with exertion. He is continuing on ferrous sulfate 3 times a day and tolerates it well with minimal loose stools without fecal incontinence. He is diabetic and reports that his blood sugars have been doing well. He reports that his renal function has been stable. He does have hypertension and his blood pressure has been stable on his current medication-Norvasc, lisinopril. His osteoarthritis and chronic arthralgias are stable. He has a history of gout but is on allopurinol without any exacerbation. HEMATOLOGY HISTORY:  Fadia Saenz was seen in initial hematology consultation on 2020 referred by Dr. Tom Robb for assistance in evaluation and treatment of anemia.     Fadia Saenz has stage III chronic renal failure, most likely from diabetes. CBC on 2019 revealed a WBC of 11.3 with 71.5% PMN, Hgb 7.9, MCV 89.9, PLT of 484,000.   He reports he did not get a transfusion.     CMP 2019 revealed crt 2 with GFR 34, calcium normal at 9.6, alkaline phosphatase 145 ()     Serology 2019  Serum Fe - 41  TIBC - 197  Fe sat -   Ferritin - 102  B12 - 1429  Folate - 7     Retic -1.19% with absolute 3.80852     Serology 2019  SPEP -polyclonal increase in IgG and IgA, no monoclonal protein seen on immunofixation  QI - IgG 1800 ([phone]), IgA 539 (), IgM sores, nosebleeds, sore throat, trouble swallowing and voice change. Eyes: Negative for photophobia, discharge and itching. Respiratory: Negative for cough, shortness of breath and wheezing. Cardiovascular: Negative for chest pain, palpitations and leg swelling. Gastrointestinal: Negative for abdominal distention, abdominal pain, blood in stool, constipation, diarrhea, nausea and vomiting. Endocrine: Negative for cold intolerance, heat intolerance, polydipsia and polyuria. Genitourinary: Negative for difficulty urinating, dysuria, hematuria and urgency. Musculoskeletal: Positive for arthralgias. Negative for back pain, joint swelling and myalgias. Skin: Negative for color change and rash. Neurological: Negative for dizziness, tremors, seizures, syncope and light-headedness. Hematological: Negative for adenopathy. Does not bruise/bleed easily. Psychiatric/Behavioral: Negative for behavioral problems and suicidal ideas. The patient is not nervous/anxious. All other systems reviewed and are negative. Objective   BP (!) 142/78   Pulse 83   Ht 5' 8\" (1.727 m)   Wt 223 lb (101.2 kg)   SpO2 99%   BMI 33.91 kg/m²     PHYSICAL EXAM:  Physical Exam  Vitals signs reviewed. Constitutional:       General: He is not in acute distress. Appearance: He is well-developed and overweight. HENT:      Head: Normocephalic and atraumatic. Nose: Nose normal.   Eyes:      General: No scleral icterus. Conjunctiva/sclera: Conjunctivae normal.   Neck:      Vascular: No JVD. Trachea: No tracheal deviation. Cardiovascular:      Rate and Rhythm: Normal rate and regular rhythm. Heart sounds: Normal heart sounds. No murmur. Pulmonary:      Effort: Pulmonary effort is normal. No respiratory distress. Breath sounds: Normal breath sounds. No wheezing. Abdominal:      General: Bowel sounds are normal. There is no distension. Palpations: Abdomen is soft. There is no mass.

## 2020-03-02 ENCOUNTER — OFFICE VISIT (OUTPATIENT)
Dept: HEMATOLOGY | Age: 65
End: 2020-03-02
Payer: COMMERCIAL

## 2020-03-02 ENCOUNTER — HOSPITAL ENCOUNTER (OUTPATIENT)
Dept: INFUSION THERAPY | Age: 65
Discharge: HOME | End: 2020-03-02
Payer: COMMERCIAL

## 2020-03-02 VITALS
BODY MASS INDEX: 33.8 KG/M2 | DIASTOLIC BLOOD PRESSURE: 78 MMHG | OXYGEN SATURATION: 99 % | SYSTOLIC BLOOD PRESSURE: 142 MMHG | HEART RATE: 83 BPM | HEIGHT: 68 IN | WEIGHT: 223 LBS

## 2020-03-02 DIAGNOSIS — D50.9 NORMOCYTIC HYPOCHROMIC ANEMIA: ICD-10-CM

## 2020-03-02 PROCEDURE — 83540 ASSAY OF IRON: CPT

## 2020-03-02 PROCEDURE — 83550 IRON BINDING TEST: CPT

## 2020-03-02 PROCEDURE — 85025 COMPLETE CBC W/AUTO DIFF WBC: CPT

## 2020-03-02 PROCEDURE — 82728 ASSAY OF FERRITIN: CPT

## 2020-03-02 PROCEDURE — 99212 OFFICE O/P EST SF 10 MIN: CPT

## 2020-03-02 PROCEDURE — 36415 COLL VENOUS BLD VENIPUNCTURE: CPT

## 2020-03-02 PROCEDURE — 99213 OFFICE O/P EST LOW 20 MIN: CPT | Performed by: PHYSICIAN ASSISTANT

## 2020-03-02 SDOH — HEALTH STABILITY: MENTAL HEALTH: HOW OFTEN DO YOU HAVE A DRINK CONTAINING ALCOHOL?: NEVER

## 2020-03-02 ASSESSMENT — ENCOUNTER SYMPTOMS
BLOOD IN STOOL: 0
WHEEZING: 0
EYE ITCHING: 0
NAUSEA: 0
VOMITING: 0
EYE DISCHARGE: 0
ABDOMINAL PAIN: 0
ABDOMINAL DISTENTION: 0
PHOTOPHOBIA: 0
DIARRHEA: 0
BACK PAIN: 0
VOICE CHANGE: 0
CONSTIPATION: 0
COLOR CHANGE: 0
COUGH: 0
TROUBLE SWALLOWING: 0
SORE THROAT: 0
SHORTNESS OF BREATH: 0

## 2020-03-02 NOTE — PATIENT INSTRUCTIONS
Patient Education        epoetin dulce  Pronunciation:  e DRE e tin AL fa  Brand:  Epogen, Procrit, Retacrit  What is the most important information I should know about epoetin dulce? This medicine can cause serious side effects, including heart attack or stroke. Epoetin dulce may also speed up tumor growth, or shorten remission or survival time in some people. Talk with your doctor about the risks and benefits of using epoetin dulce. You should not use this medicine if you have uncontrolled high blood pressure, or if you have ever had pure red cell aplasia (PRCA, a type of anemia) caused by using epoetin dulce or darbepoetin dulce. Call your doctor at once if you have signs of a blood clot: sudden numbness or weakness, problems with vision or speech, chest pain, trouble breathing, pain or cold feeling in an arm or leg. What is epoetin dulce? Epoetin dulce is a man-made form of a protein that helps your body produce red blood cells. This protein may be reduced when you have kidney failure or use certain medications. When fewer red blood cells are produced, you can develop a condition called anemia. Epoetin dulce is used to treat anemia caused by chemotherapy or chronic kidney disease, or anemia caused by taking zidovudine to treat HIV (human immunodeficiency virus). Epoetin dulce is also used to reduce the need for red blood cell transfusions in people having certain types of surgery. Epoetin dulce may also be used for purposes not listed in this medication guide. What should I discuss with my healthcare provider before using epoetin dulce? You should not use this medication if you are allergic to epoetin dulce or darbepoetin dulce, or if:  · you have untreated or uncontrolled high blood pressure;  · you have had pure red cell aplasia (PRCA, a type of anemia) after using darbepoetin dulce or epoetin dulce; or  · you use an epoetin dulce multi-dose vial and you are pregnant or breast-feeding.   Do not use epoetin dulce from you have questions about a medical condition or this instruction, always ask your healthcare professional. [place] [address] any warranty or liability for your use of this information.

## 2020-03-04 PROBLEM — D50.9 IRON (FE) DEFICIENCY ANEMIA: Status: ACTIVE | Noted: 2020-03-04

## 2020-03-04 RX ORDER — SODIUM CHLORIDE 0.9 % (FLUSH) 0.9 %
5 SYRINGE (ML) INJECTION PRN
Status: CANCELLED | OUTPATIENT
Start: 2020-03-10

## 2020-03-04 RX ORDER — SODIUM CHLORIDE 9 MG/ML
INJECTION, SOLUTION INTRAVENOUS CONTINUOUS
Status: CANCELLED | OUTPATIENT
Start: 2020-03-10

## 2020-03-04 RX ORDER — METHYLPREDNISOLONE SODIUM SUCCINATE 125 MG/2ML
125 INJECTION, POWDER, LYOPHILIZED, FOR SOLUTION INTRAMUSCULAR; INTRAVENOUS ONCE
Status: CANCELLED | OUTPATIENT
Start: 2020-03-10

## 2020-03-04 RX ORDER — SODIUM CHLORIDE 0.9 % (FLUSH) 0.9 %
10 SYRINGE (ML) INJECTION PRN
Status: CANCELLED | OUTPATIENT
Start: 2020-03-10

## 2020-03-04 RX ORDER — DIPHENHYDRAMINE HYDROCHLORIDE 50 MG/ML
50 INJECTION INTRAMUSCULAR; INTRAVENOUS ONCE
Status: CANCELLED | OUTPATIENT
Start: 2020-03-10

## 2020-03-04 RX ORDER — HEPARIN SODIUM (PORCINE) LOCK FLUSH IV SOLN 100 UNIT/ML 100 UNIT/ML
500 SOLUTION INTRAVENOUS PRN
Status: CANCELLED | OUTPATIENT
Start: 2020-03-10

## 2020-03-04 RX ORDER — EPINEPHRINE 1 MG/ML
0.3 INJECTION, SOLUTION, CONCENTRATE INTRAVENOUS PRN
Status: CANCELLED | OUTPATIENT
Start: 2020-03-10

## 2020-03-12 ENCOUNTER — HOSPITAL ENCOUNTER (OUTPATIENT)
Dept: INFUSION THERAPY | Age: 65
Setting detail: INFUSION SERIES
Discharge: HOME | End: 2020-03-12
Payer: COMMERCIAL

## 2020-03-12 VITALS
DIASTOLIC BLOOD PRESSURE: 76 MMHG | SYSTOLIC BLOOD PRESSURE: 152 MMHG | HEART RATE: 80 BPM | TEMPERATURE: 98.3 F | RESPIRATION RATE: 18 BRPM | OXYGEN SATURATION: 99 %

## 2020-03-12 DIAGNOSIS — D50.9 IRON DEFICIENCY ANEMIA, UNSPECIFIED IRON DEFICIENCY ANEMIA TYPE: Primary | ICD-10-CM

## 2020-03-12 PROCEDURE — 6360000002 HC RX W HCPCS

## 2020-03-12 PROCEDURE — 96365 THER/PROPH/DIAG IV INF INIT: CPT

## 2020-03-12 PROCEDURE — 2580000003 HC RX 258

## 2020-03-12 RX ORDER — SODIUM CHLORIDE 9 MG/ML
INJECTION, SOLUTION INTRAVENOUS CONTINUOUS
Status: CANCELLED | OUTPATIENT
Start: 2020-03-19

## 2020-03-12 RX ORDER — SODIUM CHLORIDE 0.9 % (FLUSH) 0.9 %
10 SYRINGE (ML) INJECTION PRN
Status: CANCELLED | OUTPATIENT
Start: 2020-03-19

## 2020-03-12 RX ORDER — EPINEPHRINE 1 MG/ML
0.3 INJECTION, SOLUTION, CONCENTRATE INTRAVENOUS PRN
Status: CANCELLED | OUTPATIENT
Start: 2020-03-19

## 2020-03-12 RX ORDER — DIPHENHYDRAMINE HYDROCHLORIDE 50 MG/ML
50 INJECTION INTRAMUSCULAR; INTRAVENOUS ONCE
Status: CANCELLED | OUTPATIENT
Start: 2020-03-19

## 2020-03-12 RX ORDER — SODIUM CHLORIDE 9 MG/ML
INJECTION, SOLUTION INTRAVENOUS CONTINUOUS
Status: ACTIVE | OUTPATIENT
Start: 2020-03-12 | End: 2020-03-12

## 2020-03-12 RX ORDER — HEPARIN SODIUM (PORCINE) LOCK FLUSH IV SOLN 100 UNIT/ML 100 UNIT/ML
500 SOLUTION INTRAVENOUS PRN
Status: CANCELLED | OUTPATIENT
Start: 2020-03-19

## 2020-03-12 RX ORDER — METHYLPREDNISOLONE SODIUM SUCCINATE 125 MG/2ML
125 INJECTION, POWDER, LYOPHILIZED, FOR SOLUTION INTRAMUSCULAR; INTRAVENOUS ONCE
Status: CANCELLED | OUTPATIENT
Start: 2020-03-19

## 2020-03-12 RX ORDER — SODIUM CHLORIDE 0.9 % (FLUSH) 0.9 %
5 SYRINGE (ML) INJECTION PRN
Status: CANCELLED | OUTPATIENT
Start: 2020-03-19

## 2020-03-12 RX ADMIN — FERUMOXYTOL 510 MG: 510 INJECTION INTRAVENOUS at 11:43

## 2020-03-12 RX ADMIN — SODIUM CHLORIDE: 9 INJECTION, SOLUTION INTRAVENOUS at 11:43

## 2020-03-19 ENCOUNTER — HOSPITAL ENCOUNTER (OUTPATIENT)
Dept: INFUSION THERAPY | Age: 65
Setting detail: INFUSION SERIES
Discharge: HOME | End: 2020-03-19
Payer: COMMERCIAL

## 2020-03-19 VITALS
RESPIRATION RATE: 18 BRPM | DIASTOLIC BLOOD PRESSURE: 75 MMHG | HEART RATE: 74 BPM | TEMPERATURE: 98 F | SYSTOLIC BLOOD PRESSURE: 150 MMHG

## 2020-03-19 DIAGNOSIS — D50.9 IRON DEFICIENCY ANEMIA, UNSPECIFIED IRON DEFICIENCY ANEMIA TYPE: Primary | ICD-10-CM

## 2020-03-19 PROCEDURE — 96365 THER/PROPH/DIAG IV INF INIT: CPT

## 2020-03-19 PROCEDURE — 2580000003 HC RX 258

## 2020-03-19 PROCEDURE — 6360000002 HC RX W HCPCS

## 2020-03-19 RX ORDER — METHYLPREDNISOLONE SODIUM SUCCINATE 125 MG/2ML
125 INJECTION, POWDER, LYOPHILIZED, FOR SOLUTION INTRAMUSCULAR; INTRAVENOUS ONCE
Status: CANCELLED | OUTPATIENT
Start: 2020-03-19

## 2020-03-19 RX ORDER — SODIUM CHLORIDE 0.9 % (FLUSH) 0.9 %
10 SYRINGE (ML) INJECTION PRN
Status: CANCELLED | OUTPATIENT
Start: 2020-03-19

## 2020-03-19 RX ORDER — SODIUM CHLORIDE 9 MG/ML
INJECTION, SOLUTION INTRAVENOUS CONTINUOUS
Status: ACTIVE | OUTPATIENT
Start: 2020-03-19 | End: 2020-03-19

## 2020-03-19 RX ORDER — SODIUM CHLORIDE 9 MG/ML
INJECTION, SOLUTION INTRAVENOUS CONTINUOUS
Status: CANCELLED | OUTPATIENT
Start: 2020-03-19

## 2020-03-19 RX ORDER — EPINEPHRINE 1 MG/ML
0.3 INJECTION, SOLUTION, CONCENTRATE INTRAVENOUS PRN
Status: CANCELLED | OUTPATIENT
Start: 2020-03-19

## 2020-03-19 RX ORDER — HEPARIN SODIUM (PORCINE) LOCK FLUSH IV SOLN 100 UNIT/ML 100 UNIT/ML
500 SOLUTION INTRAVENOUS PRN
Status: CANCELLED | OUTPATIENT
Start: 2020-03-19

## 2020-03-19 RX ORDER — SODIUM CHLORIDE 0.9 % (FLUSH) 0.9 %
5 SYRINGE (ML) INJECTION PRN
Status: CANCELLED | OUTPATIENT
Start: 2020-03-19

## 2020-03-19 RX ORDER — DIPHENHYDRAMINE HYDROCHLORIDE 50 MG/ML
50 INJECTION INTRAMUSCULAR; INTRAVENOUS ONCE
Status: CANCELLED | OUTPATIENT
Start: 2020-03-19

## 2020-03-19 RX ADMIN — FERUMOXYTOL 510 MG: 510 INJECTION INTRAVENOUS at 11:17

## 2020-10-01 ENCOUNTER — NURSE TRIAGE (OUTPATIENT)
Dept: CALL CENTER | Facility: HOSPITAL | Age: 65
End: 2020-10-01

## 2020-10-01 ENCOUNTER — HOSPITAL ENCOUNTER (INPATIENT)
Age: 65
LOS: 1 days | Discharge: HOME | DRG: 683 | End: 2020-10-02
Attending: EMERGENCY MEDICINE | Admitting: FAMILY MEDICINE
Payer: COMMERCIAL

## 2020-10-01 PROBLEM — N17.9 ACUTE KIDNEY INJURY SUPERIMPOSED ON CHRONIC KIDNEY DISEASE (HCC): Status: ACTIVE | Noted: 2020-10-01

## 2020-10-01 PROBLEM — N18.9 ACUTE KIDNEY INJURY SUPERIMPOSED ON CHRONIC KIDNEY DISEASE (HCC): Status: ACTIVE | Noted: 2020-10-01

## 2020-10-01 LAB
ALBUMIN SERPL-MCNC: 3.9 G/DL (ref 3.5–5.2)
ALP BLD-CCNC: 160 U/L (ref 40–130)
ALT SERPL-CCNC: 19 U/L (ref 5–41)
ANION GAP SERPL CALCULATED.3IONS-SCNC: 11 MMOL/L (ref 7–19)
ANION GAP SERPL CALCULATED.3IONS-SCNC: 12 MMOL/L (ref 7–19)
ANION GAP SERPL CALCULATED.3IONS-SCNC: 7 MMOL/L (ref 7–19)
AST SERPL-CCNC: 15 U/L (ref 5–40)
BACTERIA: NEGATIVE /HPF
BASOPHILS ABSOLUTE: 0.1 K/UL (ref 0–0.2)
BASOPHILS RELATIVE PERCENT: 0.7 % (ref 0–1)
BILIRUB SERPL-MCNC: <0.2 MG/DL (ref 0.2–1.2)
BILIRUBIN URINE: NEGATIVE
BLOOD, URINE: NEGATIVE
BUN BLDV-MCNC: 67 MG/DL (ref 8–23)
BUN BLDV-MCNC: 70 MG/DL (ref 8–23)
BUN BLDV-MCNC: 71 MG/DL (ref 8–23)
CALCIUM SERPL-MCNC: 10 MG/DL (ref 8.8–10.2)
CALCIUM SERPL-MCNC: 10.2 MG/DL (ref 8.8–10.2)
CALCIUM SERPL-MCNC: 9.7 MG/DL (ref 8.8–10.2)
CHLORIDE BLD-SCNC: 105 MMOL/L (ref 98–111)
CHLORIDE BLD-SCNC: 106 MMOL/L (ref 98–111)
CHLORIDE BLD-SCNC: 110 MMOL/L (ref 98–111)
CLARITY: CLEAR
CO2: 20 MMOL/L (ref 22–29)
CO2: 22 MMOL/L (ref 22–29)
CO2: 22 MMOL/L (ref 22–29)
COLOR: YELLOW
CREAT SERPL-MCNC: 2.5 MG/DL (ref 0.5–1.2)
CREAT SERPL-MCNC: 2.6 MG/DL (ref 0.5–1.2)
CREAT SERPL-MCNC: 2.7 MG/DL (ref 0.5–1.2)
CREATININE URINE: 62.5 MG/DL (ref 4.2–622)
CRYSTALS, UA: ABNORMAL /HPF
EKG P AXIS: 60 DEGREES
EKG P-R INTERVAL: 190 MS
EKG Q-T INTERVAL: 348 MS
EKG QRS DURATION: 80 MS
EKG QTC CALCULATION (BAZETT): 383 MS
EKG T AXIS: 52 DEGREES
EOSINOPHIL,URINE: NORMAL
EOSINOPHILS ABSOLUTE: 0.5 K/UL (ref 0–0.6)
EOSINOPHILS RELATIVE PERCENT: 6.2 % (ref 0–5)
EPITHELIAL CELLS, UA: 0 /HPF (ref 0–5)
GFR AFRICAN AMERICAN: 29
GFR AFRICAN AMERICAN: 30
GFR AFRICAN AMERICAN: 32
GFR NON-AFRICAN AMERICAN: 24
GFR NON-AFRICAN AMERICAN: 25
GFR NON-AFRICAN AMERICAN: 26
GLUCOSE BLD-MCNC: 197 MG/DL (ref 70–99)
GLUCOSE BLD-MCNC: 215 MG/DL (ref 74–109)
GLUCOSE BLD-MCNC: 218 MG/DL (ref 74–109)
GLUCOSE BLD-MCNC: 233 MG/DL (ref 70–99)
GLUCOSE BLD-MCNC: 268 MG/DL (ref 74–109)
GLUCOSE URINE: 500 MG/DL
HCT VFR BLD CALC: 30.5 % (ref 42–52)
HEMOGLOBIN: 9.8 G/DL (ref 14–18)
HYALINE CASTS: 1 /HPF (ref 0–8)
IMMATURE GRANULOCYTES #: 0.1 K/UL
INR BLD: 1.11 (ref 0.88–1.18)
KETONES, URINE: NEGATIVE MG/DL
LEUKOCYTE ESTERASE, URINE: NEGATIVE
LYMPHOCYTES ABSOLUTE: 1.9 K/UL (ref 1.1–4.5)
LYMPHOCYTES RELATIVE PERCENT: 24.9 % (ref 20–40)
MCH RBC QN AUTO: 29.4 PG (ref 27–31)
MCHC RBC AUTO-ENTMCNC: 32.1 G/DL (ref 33–37)
MCV RBC AUTO: 91.6 FL (ref 80–94)
MONOCYTES ABSOLUTE: 0.6 K/UL (ref 0–0.9)
MONOCYTES RELATIVE PERCENT: 7.7 % (ref 0–10)
NEUTROPHILS ABSOLUTE: 4.6 K/UL (ref 1.5–7.5)
NEUTROPHILS RELATIVE PERCENT: 59.6 % (ref 50–65)
NITRITE, URINE: NEGATIVE
OSMOLALITY URINE: 389 MOSM/KG (ref 250–1200)
PDW BLD-RTO: 13.7 % (ref 11.5–14.5)
PERFORMED ON: ABNORMAL
PERFORMED ON: ABNORMAL
PH UA: 5 (ref 5–8)
PLATELET # BLD: 238 K/UL (ref 130–400)
PMV BLD AUTO: 10.7 FL (ref 9.4–12.4)
POTASSIUM REFLEX MAGNESIUM: 5.8 MMOL/L (ref 3.5–5)
POTASSIUM REFLEX MAGNESIUM: 6.7 MMOL/L (ref 3.5–5)
POTASSIUM SERPL-SCNC: 5.8 MMOL/L (ref 3.5–5)
PROTEIN UA: 100 MG/DL
PROTHROMBIN TIME: 14.3 SEC (ref 12–14.6)
RBC # BLD: 3.33 M/UL (ref 4.7–6.1)
RBC UA: 1 /HPF (ref 0–4)
SODIUM BLD-SCNC: 137 MMOL/L (ref 136–145)
SODIUM BLD-SCNC: 139 MMOL/L (ref 136–145)
SODIUM BLD-SCNC: 139 MMOL/L (ref 136–145)
SODIUM URINE: 84 MMOL/L
SPECIFIC GRAVITY UA: 1.01 (ref 1–1.03)
TOTAL PROTEIN: 7.9 G/DL (ref 6.6–8.7)
UROBILINOGEN, URINE: 0.2 E.U./DL
WBC # BLD: 7.6 K/UL (ref 4.8–10.8)
WBC UA: 1 /HPF (ref 0–5)

## 2020-10-01 PROCEDURE — 2580000003 HC RX 258: Performed by: EMERGENCY MEDICINE

## 2020-10-01 PROCEDURE — 2500000003 HC RX 250 WO HCPCS: Performed by: INTERNAL MEDICINE

## 2020-10-01 PROCEDURE — 2580000003 HC RX 258: Performed by: INTERNAL MEDICINE

## 2020-10-01 PROCEDURE — 82570 ASSAY OF URINE CREATININE: CPT

## 2020-10-01 PROCEDURE — 6370000000 HC RX 637 (ALT 250 FOR IP): Performed by: INTERNAL MEDICINE

## 2020-10-01 PROCEDURE — 96375 TX/PRO/DX INJ NEW DRUG ADDON: CPT

## 2020-10-01 PROCEDURE — 6370000000 HC RX 637 (ALT 250 FOR IP): Performed by: FAMILY MEDICINE

## 2020-10-01 PROCEDURE — 99999 PR OFFICE/OUTPT VISIT,PROCEDURE ONLY: CPT | Performed by: EMERGENCY MEDICINE

## 2020-10-01 PROCEDURE — 1210000000 HC MED SURG R&B

## 2020-10-01 PROCEDURE — 81001 URINALYSIS AUTO W/SCOPE: CPT

## 2020-10-01 PROCEDURE — 93005 ELECTROCARDIOGRAM TRACING: CPT | Performed by: NURSE PRACTITIONER

## 2020-10-01 PROCEDURE — 85610 PROTHROMBIN TIME: CPT

## 2020-10-01 PROCEDURE — 85025 COMPLETE CBC W/AUTO DIFF WBC: CPT

## 2020-10-01 PROCEDURE — 6360000002 HC RX W HCPCS: Performed by: EMERGENCY MEDICINE

## 2020-10-01 PROCEDURE — 87205 SMEAR GRAM STAIN: CPT

## 2020-10-01 PROCEDURE — 2500000003 HC RX 250 WO HCPCS: Performed by: EMERGENCY MEDICINE

## 2020-10-01 PROCEDURE — 6360000002 HC RX W HCPCS: Performed by: FAMILY MEDICINE

## 2020-10-01 PROCEDURE — 84300 ASSAY OF URINE SODIUM: CPT

## 2020-10-01 PROCEDURE — 83935 ASSAY OF URINE OSMOLALITY: CPT

## 2020-10-01 PROCEDURE — 96374 THER/PROPH/DIAG INJ IV PUSH: CPT

## 2020-10-01 PROCEDURE — 99282 EMERGENCY DEPT VISIT SF MDM: CPT

## 2020-10-01 PROCEDURE — 36415 COLL VENOUS BLD VENIPUNCTURE: CPT

## 2020-10-01 PROCEDURE — 80053 COMPREHEN METABOLIC PANEL: CPT

## 2020-10-01 PROCEDURE — 6370000000 HC RX 637 (ALT 250 FOR IP): Performed by: EMERGENCY MEDICINE

## 2020-10-01 PROCEDURE — 6360000002 HC RX W HCPCS: Performed by: INTERNAL MEDICINE

## 2020-10-01 PROCEDURE — 2580000003 HC RX 258: Performed by: FAMILY MEDICINE

## 2020-10-01 PROCEDURE — 99281 EMR DPT VST MAYX REQ PHY/QHP: CPT

## 2020-10-01 PROCEDURE — 82947 ASSAY GLUCOSE BLOOD QUANT: CPT

## 2020-10-01 RX ORDER — NICOTINE POLACRILEX 4 MG
15 LOZENGE BUCCAL PRN
Status: DISCONTINUED | OUTPATIENT
Start: 2020-10-01 | End: 2020-10-01

## 2020-10-01 RX ORDER — SODIUM CHLORIDE, SODIUM LACTATE, POTASSIUM CHLORIDE, CALCIUM CHLORIDE 600; 310; 30; 20 MG/100ML; MG/100ML; MG/100ML; MG/100ML
1000 INJECTION, SOLUTION INTRAVENOUS ONCE
Status: COMPLETED | OUTPATIENT
Start: 2020-10-01 | End: 2020-10-01

## 2020-10-01 RX ORDER — DEXTROSE MONOHYDRATE 25 G/50ML
12.5 INJECTION, SOLUTION INTRAVENOUS PRN
Status: DISCONTINUED | OUTPATIENT
Start: 2020-10-01 | End: 2020-10-02 | Stop reason: HOSPADM

## 2020-10-01 RX ORDER — DEXTROSE MONOHYDRATE 50 MG/ML
100 INJECTION, SOLUTION INTRAVENOUS PRN
Status: DISCONTINUED | OUTPATIENT
Start: 2020-10-01 | End: 2020-10-02 | Stop reason: HOSPADM

## 2020-10-01 RX ORDER — DEXTROSE MONOHYDRATE 25 G/50ML
12.5 INJECTION, SOLUTION INTRAVENOUS PRN
Status: DISCONTINUED | OUTPATIENT
Start: 2020-10-01 | End: 2020-10-01

## 2020-10-01 RX ORDER — INSULIN GLARGINE 100 [IU]/ML
25 INJECTION, SOLUTION SUBCUTANEOUS NIGHTLY
Status: DISCONTINUED | OUTPATIENT
Start: 2020-10-01 | End: 2020-10-02 | Stop reason: HOSPADM

## 2020-10-01 RX ORDER — NICOTINE POLACRILEX 4 MG
15 LOZENGE BUCCAL PRN
Status: DISCONTINUED | OUTPATIENT
Start: 2020-10-01 | End: 2020-10-02 | Stop reason: HOSPADM

## 2020-10-01 RX ORDER — FERROUS GLUCONATE 324(37.5)
300 TABLET ORAL 2 TIMES DAILY
Status: DISCONTINUED | OUTPATIENT
Start: 2020-10-01 | End: 2020-10-02 | Stop reason: HOSPADM

## 2020-10-01 RX ORDER — SODIUM BICARBONATE 650 MG/1
650 TABLET ORAL 3 TIMES DAILY
Status: DISCONTINUED | OUTPATIENT
Start: 2020-10-01 | End: 2020-10-02

## 2020-10-01 RX ORDER — SODIUM CHLORIDE 0.9 % (FLUSH) 0.9 %
10 SYRINGE (ML) INJECTION EVERY 12 HOURS SCHEDULED
Status: DISCONTINUED | OUTPATIENT
Start: 2020-10-01 | End: 2020-10-02 | Stop reason: HOSPADM

## 2020-10-01 RX ORDER — ALLOPURINOL 300 MG/1
300 TABLET ORAL DAILY
Status: DISCONTINUED | OUTPATIENT
Start: 2020-10-01 | End: 2020-10-02 | Stop reason: HOSPADM

## 2020-10-01 RX ORDER — AMLODIPINE BESYLATE 10 MG/1
10 TABLET ORAL DAILY
Status: DISCONTINUED | OUTPATIENT
Start: 2020-10-02 | End: 2020-10-02 | Stop reason: HOSPADM

## 2020-10-01 RX ORDER — DEXTROSE MONOHYDRATE 25 G/50ML
25 INJECTION, SOLUTION INTRAVENOUS ONCE
Status: COMPLETED | OUTPATIENT
Start: 2020-10-01 | End: 2020-10-01

## 2020-10-01 RX ORDER — HYDRALAZINE HYDROCHLORIDE 20 MG/ML
5 INJECTION INTRAMUSCULAR; INTRAVENOUS EVERY 6 HOURS PRN
Status: DISCONTINUED | OUTPATIENT
Start: 2020-10-01 | End: 2020-10-02 | Stop reason: HOSPADM

## 2020-10-01 RX ORDER — DEXTROSE MONOHYDRATE 50 MG/ML
100 INJECTION, SOLUTION INTRAVENOUS PRN
Status: DISCONTINUED | OUTPATIENT
Start: 2020-10-01 | End: 2020-10-01

## 2020-10-01 RX ORDER — ACETAMINOPHEN 325 MG/1
650 TABLET ORAL EVERY 6 HOURS PRN
Status: DISCONTINUED | OUTPATIENT
Start: 2020-10-01 | End: 2020-10-02 | Stop reason: HOSPADM

## 2020-10-01 RX ORDER — ONDANSETRON 2 MG/ML
4 INJECTION INTRAMUSCULAR; INTRAVENOUS EVERY 6 HOURS PRN
Status: DISCONTINUED | OUTPATIENT
Start: 2020-10-01 | End: 2020-10-02 | Stop reason: HOSPADM

## 2020-10-01 RX ORDER — ATORVASTATIN CALCIUM 20 MG/1
20 TABLET, FILM COATED ORAL NIGHTLY
Status: DISCONTINUED | OUTPATIENT
Start: 2020-10-01 | End: 2020-10-02 | Stop reason: HOSPADM

## 2020-10-01 RX ORDER — SODIUM CHLORIDE 0.9 % (FLUSH) 0.9 %
10 SYRINGE (ML) INJECTION PRN
Status: DISCONTINUED | OUTPATIENT
Start: 2020-10-01 | End: 2020-10-02 | Stop reason: HOSPADM

## 2020-10-01 RX ORDER — FUROSEMIDE 10 MG/ML
20 INJECTION INTRAMUSCULAR; INTRAVENOUS 2 TIMES DAILY
Status: DISCONTINUED | OUTPATIENT
Start: 2020-10-01 | End: 2020-10-02

## 2020-10-01 RX ORDER — ACETAMINOPHEN 650 MG/1
650 SUPPOSITORY RECTAL EVERY 6 HOURS PRN
Status: DISCONTINUED | OUTPATIENT
Start: 2020-10-01 | End: 2020-10-02 | Stop reason: HOSPADM

## 2020-10-01 RX ORDER — HYDRALAZINE HYDROCHLORIDE 50 MG/1
50 TABLET, FILM COATED ORAL EVERY 12 HOURS SCHEDULED
Status: DISCONTINUED | OUTPATIENT
Start: 2020-10-01 | End: 2020-10-02

## 2020-10-01 RX ORDER — SODIUM POLYSTYRENE SULFONATE 15 G/60ML
30 SUSPENSION ORAL; RECTAL ONCE
Status: COMPLETED | OUTPATIENT
Start: 2020-10-01 | End: 2020-10-01

## 2020-10-01 RX ORDER — PROMETHAZINE HYDROCHLORIDE 25 MG/1
12.5 TABLET ORAL EVERY 6 HOURS PRN
Status: DISCONTINUED | OUTPATIENT
Start: 2020-10-01 | End: 2020-10-02 | Stop reason: HOSPADM

## 2020-10-01 RX ORDER — HEPARIN SODIUM 5000 [USP'U]/ML
5000 INJECTION, SOLUTION INTRAVENOUS; SUBCUTANEOUS EVERY 8 HOURS SCHEDULED
Status: DISCONTINUED | OUTPATIENT
Start: 2020-10-01 | End: 2020-10-02 | Stop reason: HOSPADM

## 2020-10-01 RX ADMIN — SODIUM BICARBONATE: 84 INJECTION, SOLUTION INTRAVENOUS at 22:32

## 2020-10-01 RX ADMIN — INSULIN LISPRO 1 UNITS: 100 INJECTION, SOLUTION INTRAVENOUS; SUBCUTANEOUS at 22:27

## 2020-10-01 RX ADMIN — ATORVASTATIN CALCIUM 20 MG: 20 TABLET, FILM COATED ORAL at 21:19

## 2020-10-01 RX ADMIN — DEXTROSE MONOHYDRATE 25 G: 25 INJECTION, SOLUTION INTRAVENOUS at 12:03

## 2020-10-01 RX ADMIN — Medication 10 ML: at 22:28

## 2020-10-01 RX ADMIN — INSULIN HUMAN 10 UNITS: 100 INJECTION, SOLUTION PARENTERAL at 12:03

## 2020-10-01 RX ADMIN — SODIUM BICARBONATE: 84 INJECTION, SOLUTION INTRAVENOUS at 13:20

## 2020-10-01 RX ADMIN — INSULIN GLARGINE 25 UNITS: 100 INJECTION, SOLUTION SUBCUTANEOUS at 22:27

## 2020-10-01 RX ADMIN — CALCIUM GLUCONATE 1 G: 98 INJECTION, SOLUTION INTRAVENOUS at 13:20

## 2020-10-01 RX ADMIN — FERROUS GLUCONATE TAB 324 MG (37.5 MG ELEMENTAL IRON) 324 MG: 324 (37.5 FE) TAB at 21:19

## 2020-10-01 RX ADMIN — SODIUM BICARBONATE 650 MG: 650 TABLET ORAL at 21:19

## 2020-10-01 RX ADMIN — ALLOPURINOL 300 MG: 300 TABLET ORAL at 18:08

## 2020-10-01 RX ADMIN — INSULIN HUMAN 10 UNITS: 100 INJECTION, SOLUTION PARENTERAL at 18:39

## 2020-10-01 RX ADMIN — SODIUM CHLORIDE, POTASSIUM CHLORIDE, SODIUM LACTATE AND CALCIUM CHLORIDE 1000 ML: 600; 310; 30; 20 INJECTION, SOLUTION INTRAVENOUS at 12:03

## 2020-10-01 RX ADMIN — HYDRALAZINE HYDROCHLORIDE 50 MG: 50 TABLET, FILM COATED ORAL at 21:19

## 2020-10-01 RX ADMIN — HEPARIN SODIUM 5000 UNITS: 5000 INJECTION INTRAVENOUS; SUBCUTANEOUS at 14:08

## 2020-10-01 RX ADMIN — DEXTROSE MONOHYDRATE 25 G: 500 INJECTION PARENTERAL at 18:39

## 2020-10-01 RX ADMIN — INSULIN LISPRO 5 UNITS: 100 INJECTION, SOLUTION INTRAVENOUS; SUBCUTANEOUS at 17:20

## 2020-10-01 RX ADMIN — FUROSEMIDE 20 MG: 10 INJECTION, SOLUTION INTRAMUSCULAR; INTRAVENOUS at 18:08

## 2020-10-01 RX ADMIN — SODIUM BICARBONATE 650 MG: 650 TABLET ORAL at 18:09

## 2020-10-01 RX ADMIN — HEPARIN SODIUM 5000 UNITS: 5000 INJECTION INTRAVENOUS; SUBCUTANEOUS at 21:18

## 2020-10-01 RX ADMIN — SODIUM POLYSTYRENE SULFONATE 30 G: 15 SUSPENSION ORAL; RECTAL at 13:20

## 2020-10-01 RX ADMIN — INSULIN LISPRO 2 UNITS: 100 INJECTION, SOLUTION INTRAVENOUS; SUBCUTANEOUS at 17:20

## 2020-10-01 ASSESSMENT — ENCOUNTER SYMPTOMS
TROUBLE SWALLOWING: 0
SHORTNESS OF BREATH: 0
VOMITING: 0
COLOR CHANGE: 0
NAUSEA: 0
SORE THROAT: 0
WHEEZING: 0
COUGH: 0

## 2020-10-01 NOTE — TELEPHONE ENCOUNTER
0350 Ade with [facility] called with critical potassium of 7.0 drawn on 09/30/2020 at 0952.  0355 Call placed to Dr. Robertson.  0359 Dr. Robertson returned call and was notified of potassium level of 7.0 drawn on 09/30/2020 at 0952.

## 2020-10-01 NOTE — CONSULTS
Renal Consult Note    Thank you to requesting provider: Dr Marylen Foy, for asking us to see Patricia Jordan    Reason for consultation:  Hyperkalemia    Chief Complaint:  Abnormal labs    History of Presenting Illness      Patient is a 59 yo pleasant  man who has HTN, DM2, and CKD. He has seen Dr Eugene Alejandro in the past. He failed to follow up. He was in his regular state of health until he had labs for his PCP. His creat was 2 (Dec 2019) and it has risen to 2.7. He was severely hyperkalemic at 6.7. Patient was subsequently admitted for JOSEE, CKD stage 3 and hyperkalemia. Renal service was consulted for that purpose. Patient denies using K suppl. He admitted poor control of his blood sugars. He has no change in his urine habits and also denied NSAIDs use. Patient admitted frequent intake of french fries. He has no dysuria.     Past Medical/Surgical History      Active Ambulatory Problems     Diagnosis Date Noted    Iron (Fe) deficiency anemia 03/04/2020     Resolved Ambulatory Problems     Diagnosis Date Noted    No Resolved Ambulatory Problems     Past Medical History:   Diagnosis Date    Chronic kidney disease     Diabetes mellitus ([facility] [address]     Essential hypertension     Gout     History of colonic polyps     Hyperlipidemia     Osteoarthritis     Other disorders of kidney and ureter in diseases classified elsewhere          Review of Systems     Constitutional:  No weight loss, no fever/chills  Eyes:  No eye pain, no eye redness  Cardiovascular:  No chest pain, no worsening of edema  Respiratory:  No hemoptysis, no stidor  Gastrointestinal:  No blood in stool, no n/v, no diarrhea  Genitoruinary:  No hematuria, no difficulty with urination  Musculoskeletal:  No joint swelling, no redness  Integumentary:  No Rash, no itching  Neurological:  No focal weakness, No new sensory deficit  Psychiatric:  No depression, no confusion  Endocrine:  No polyuria, no polydipsia       Medications        Current Facility-Administered Medications:     sodium bicarbonate 50 mEq in dextrose 5 % 1,000 mL infusion, , Intravenous, Continuous, Be Cruz MD, Last Rate: 100 mL/hr at 10/01/20 1320    sodium chloride flush 0.9 % injection 10 mL, 10 mL, Intravenous, 2 times per day, Wendy Gale MD    sodium chloride flush 0.9 % injection 10 mL, 10 mL, Intravenous, PRN, Wendy Gale MD    acetaminophen (TYLENOL) tablet 650 mg, 650 mg, Oral, Q6H PRN **OR** acetaminophen (TYLENOL) suppository 650 mg, 650 mg, Rectal, Q6H PRN, Wendy Gale MD    promethazine (PHENERGAN) tablet 12.5 mg, 12.5 mg, Oral, Q6H PRN **OR** ondansetron (ZOFRAN) injection 4 mg, 4 mg, Intravenous, Q6H PRN, Wendy Gale MD    heparin (porcine) injection 5,000 Units, 5,000 Units, Subcutaneous, 3 times per day, Wendy Gale MD, 5,000 Units at 10/01/20 1408    glucose (GLUTOSE) 40 % oral gel 15 g, 15 g, Oral, PRN, Wendy Gale MD    dextrose 50 % IV solution, 12.5 g, Intravenous, PRN, Wendy Gale MD    glucagon (rDNA) injection 1 mg, 1 mg, Intramuscular, PRN, Wendy Gale MD    dextrose 5 % solution, 100 mL/hr, Intravenous, PRN, Wendy Gale MD    insulin glargine (LANTUS) injection vial 25 Units, 25 Units, Subcutaneous, Nightly, Wendy Gale MD    insulin lispro (HUMALOG) injection vial 0.05 Units/kg, 0.05 Units/kg, Subcutaneous, TID JOSIAS, Wendy Gale MD    insulin lispro (HUMALOG) injection vial 0-6 Units, 0-6 Units, Subcutaneous, TID Wendy MD    insulin lispro (HUMALOG) injection vial 0-3 Units, 0-3 Units, Subcutaneous, Nightly, Wendy Gale MD    hydrALAZINE (APRESOLINE) injection 5 mg, 5 mg, Intravenous, Q6H PRN, Wendy Gale MD  Outpatient Medications Marked as Taking for the 10/1/20 encounter [facility] Encounter)   Medication Sig Dispense Refill    ASPIRIN 81 PO aspirin 81 mg tablet,delayed release   Take 1 tablet every day by oral route.       allopurinol (ZYLOPRIM) 300 MG tablet allopurinol 300 mg tablet      amLODIPine (NORVASC) 10 MG tablet amlodipine 10 mg tablet      atorvastatin (LIPITOR) 20 MG tablet atorvastatin 20 mg tablet      carvedilol (COREG) 25 MG tablet carvedilol 25 mg tablet      potassium chloride (MICRO-K) 10 MEQ extended release capsule potassium chloride ER 10 mEq tablet,extended release   Take 1 tablet every day by oral route.  magnesium oxide (MAG-OX) 140 MG CAPS Magnesium-Oxide 400 mg tablet   Take 1 tablet every day by oral route.  ferrous gluconate (FERGON) 225 (27 Fe) MG tablet ferrous gluconate 324 mg (38 mg iron) tablet   Take 1 tablet twice a day by oral route.  insulin detemir (LEVEMIR) 100 UNIT/ML injection vial Inject 30 Units into the skin nightly      lisinopril (PRINIVIL;ZESTRIL) 40 MG tablet Take 40 mg by mouth daily      SODIUM BICARBONATE PO Take 650 mg by mouth      hydrochlorothiazide (MICROZIDE) 12.5 MG capsule Take 12.5 mg by mouth daily      furosemide (LASIX) 40 MG tablet Take 40 mg by mouth 2 times daily         Allergies   [id number]    Family History       Family History   Problem Relation Age of Onset    Other Neg Hx         He denies any family history of blood dyscrasias. Family history negative for kidney disease.      Social History      Social History     Socioeconomic History    Marital status:      Spouse name: None    Number of children: None    Years of education: None    Highest education level: None   Occupational History    None   Social Needs    Financial resource strain: None    Food insecurity     Worry: None     Inability: None    Transportation needs     Medical: None     Non-medical: None   Tobacco Use    Smoking status: Never Smoker    Smokeless tobacco: Never Used   Substance and Sexual Activity    Alcohol use: Never     Frequency: Never    Drug use: None    Sexual activity: None   Lifestyle    Physical activity     Days per week: None     Minutes per session: None    Stress: None   Relationships    Social connections     Talks on phone: None     Gets together: None     Attends Restorationism service: None     Active member of club or organization: None     Attends meetings of clubs or organizations: None     Relationship status: None    Intimate partner violence     Fear of current or ex partner: None     Emotionally abused: None     Physically abused: None     Forced sexual activity: None   Other Topics Concern    None   Social History Narrative    None       Physical Exam     Blood pressure (!) 196/82, pulse 81, temperature 98.6 °F (37 °C), temperature source Temporal, resp. rate 18, SpO2 98 %. General:  NAD, A+Ox3, ill-appearing, normal body habitus  HEENT:  PERRL, EOMI  Neck:  Supple, normal range of movement  Chest:  CTAB, good respiratory effort, good air movement  CV:  RRR, no murmurs  Abdomen:  NTND, soft, +BS, no hepatosplenomegaly  Extremities:  No peripheral edema  Neurological:  Moving all four extremities  Lymphatics:  No palpable lymph nodes   Skin:  No rash, no jaundice  Psychiatric:  Normal insight and judgement, good recall    Data     Recent Labs     10/01/20  1102   WBC 7.6   HGB 9.8*   HCT 30.5*   MCV 91.6        Recent Labs     10/01/20  1102      K 6.7*      CO2 20*   GLUCOSE 268*   BUN 71*   CREATININE 2.7*   LABGLOM 24*   GFRAA 29*       Assessment:  1. Hyperkalemia  2. JOSEE (likely progression of his CKD)  3. CKD stage 3  4. Type 2 DM with renal disease  5. Hypertension (poorly controlled)  6. Metabolic acidosis  7. Anemia of CKD      Plan:  · Agree with urgent medical treatment for hyperkalemia and IV fluids. Will add oral alkali. Patient might have had progression of his CKD due to uncontrolled diabetes and hypertension. Will check uric acid level and PTH. Avoid nephrotoxins. Will add hydralazine. If serum K continues to be elevated, will consider adding Lokelma or Veltassa.       Thank you for asking us to participate in the management of your patient, please do not hesitate to contact me for any concerns regarding my recommendations as outlined above.    -----------------------------  Electronically signed by Maria E Montoya MD on 10/1/20 at 3:30 PM CDT

## 2020-10-01 NOTE — PLAN OF CARE
Problem: Infection:  Goal: Will remain free from infection  Description: Will remain free from infection  Outcome: Ongoing     Problem: Safety:  Goal: Free from accidental physical injury  Description: Free from accidental physical injury  Outcome: Ongoing  Goal: Free from intentional harm  Description: Free from intentional harm  Outcome: Ongoing     Problem: Daily Care:  Goal: Daily care needs are met  Description: Daily care needs are met  Outcome: Ongoing     Problem: Pain:  Goal: Patient's pain/discomfort is manageable  Description: Patient's pain/discomfort is manageable  Outcome: Ongoing     Problem: Skin Integrity:  Goal: Skin integrity will stabilize  Description: Skin integrity will stabilize  Outcome: Ongoing     Problem: Discharge Planning:  Goal: Patients continuum of care needs are met  Description: Patients continuum of care needs are met  Outcome: Ongoing     Problem: Fluid Volume:  Goal: Ability to achieve a balanced intake and output will improve  Description: Ability to achieve a balanced intake and output will improve  Outcome: Ongoing     Problem: Physical Regulation:  Goal: Ability to maintain clinical measurements within normal limits will improve  Description: Ability to maintain clinical measurements within normal limits will improve  Outcome: Ongoing  Goal: Will show no signs and symptoms of electrolyte imbalance  Description: Will show no signs and symptoms of electrolyte imbalance  Outcome: Ongoing     Problem: Falls - Risk of:  Goal: Will remain free from falls  Description: Will remain free from falls  Outcome: Ongoing  Goal: Absence of physical injury  Description: Absence of physical injury  Outcome: Ongoing

## 2020-10-01 NOTE — PROGRESS NOTES
Bala Francisco arrived to room # 318. Presented with: JOSEE, hyperkalemia  Mental Status: Patient is oriented, alert and coherent. Vitals:    10/01/20 1335   BP: (!) 196/82   Pulse: 81   Resp: 18   Temp: 98.6 °F (37 °C)   SpO2: 98%     Patient safety contract and falls prevention contract reviewed with patient Yes. Oriented Patient and Family to room. Call light within reach. Yes.   Needs, issues or concerns expressed at this time: no.      Electronically signed by Get Meyer RN on 10/1/2020 at 1:39 PM

## 2020-10-01 NOTE — PROGRESS NOTES
4 Eyes Skin Assessment    Kinga Gomez is being assessed upon: Admission    I agree that Blaze Malik, along with Amada Gonzalez (either 2 RN's or 1 LPN and 1 RN) have performed a thorough Head to Toe Skin Assessment on the patient. ALL assessment sites listed below have been assessed. Areas assessed by both nurses:     [x]   Head, Face, and Ears   [x]   Shoulders, Back, and Chest  [x]   Arms, Elbows, and Hands   [x]   Coccyx, Sacrum, and Ischium  [x]   Legs, Feet, and Heels    Does the Patient have Skin Breakdown?  No    Royer Prevention initiated: No  Wound Care Orders initiated: No    WOC nurse consulted for Pressure Injury (Stage 3,4, Unstageable, DTI, NWPT, and Complex wounds) and New or Established Ostomies: NA        Primary Nurse eSignature: Yamilet Rincon RN on 10/1/2020 at 1:39 PM      Co-Signer eSignature: Electronically signed by Trish Terrell RN on 10/1/20 at 2:22 PM CDT

## 2020-10-01 NOTE — H&P
HISTORY AND PHYSICAL             Date: 10/1/2020        Patient Name: Cheryl Barr     YOB: 1955      Age:  60 y.o. Chief Complaint     Chief Complaint   Patient presents with    Other     sent here per Dr Leslie Garnica for high protein levels from labs drawn yesterday        History Obtained From   patient, electronic medical record, emergency room provider    History of Present Illness   Patient is a 50-year-old -American male known past medical history of CKD stage III, type 2 diabetes insulin-dependent, hypertension, hyperlipidemia, history of gout presenting to the emergency room on advice of primary care provider due to elevated potassium level, repeat lab values in the emergency room revealed patient to have acute kidney injury superimposed on chronic CKD stage III, accompanied by hyperkalemia. Noted to T waves on EKG in the emergency room. Patient accompanied by spouse at the bedside states he is truly taken better concern for his diabetic diet, denies taking any over-the-counter NSAID medications, usually takes Tylenol. Has limited his intake of soda. No acute distress, no recent illnesses nor antibiotic use. No sick contacts, no skin breakdown currently. Corrective measures for potassium is been ordered, patient to be admitted to medical floor telemetry monitoring, nephrology consultation and ongoing medical treatment. Patient currently denying any recent changes in urinary habits, headache, change in vision, chest pain, abdominal pain, nausea vomiting, fevers or chills.     Past Medical History     Past Medical History:   Diagnosis Date    Diabetes mellitus ([address]     Essential hypertension     Gout     History of colonic polyps     Hyperlipidemia     Osteoarthritis         Past Surgical History     Past Surgical History:   Procedure Laterality Date    COLONOSCOPY          Medications Prior to Admission     Prior to Admission medications    Medication Sig Start Date End Date Taking? Authorizing Provider   ASPIRIN 81 PO aspirin 81 mg tablet,delayed release   Take 1 tablet every day by oral route. Historical Provider, MD   allopurinol (ZYLOPRIM) 300 MG tablet allopurinol 300 mg tablet    Historical Provider, MD   amLODIPine (NORVASC) 10 MG tablet amlodipine 10 mg tablet    Historical Provider, MD   atorvastatin (LIPITOR) 20 MG tablet atorvastatin 20 mg tablet    Historical Provider, MD   carvedilol (COREG) 25 MG tablet carvedilol 25 mg tablet 8/15/19   Historical Provider, MD   potassium chloride (MICRO-K) 10 MEQ extended release capsule potassium chloride ER 10 mEq tablet,extended release   Take 1 tablet every day by oral route. Historical Provider, MD   magnesium oxide (MAG-OX) 140 MG CAPS Magnesium-Oxide 400 mg tablet   Take 1 tablet every day by oral route. Historical Provider, MD   ferrous gluconate (FERGON) 225 (27 Fe) MG tablet ferrous gluconate 324 mg (38 mg iron) tablet   Take 1 tablet twice a day by oral route.     Historical Provider, MD   insulin detemir (LEVEMIR) 100 UNIT/ML injection vial Inject 30 Units into the skin nightly    Historical Provider, MD   lisinopril (PRINIVIL;ZESTRIL) 40 MG tablet Take 40 mg by mouth daily    Historical Provider, MD   SODIUM BICARBONATE PO Take 650 mg by mouth    Historical Provider, MD   hydrochlorothiazide (MICROZIDE) 12.5 MG capsule Take 12.5 mg by mouth daily    Historical Provider, MD   furosemide (LASIX) 40 MG tablet Take 40 mg by mouth 2 times daily    Historical Provider, MD        Allergies   Phenylephrine-dm-gg    Social History     Social History     Tobacco History     Smoking Status  Never Smoker    Smokeless Tobacco Use  Never Used          Alcohol History     Alcohol Use Status  Never          Drug Use     Drug Use Status  Not Asked          Sexual Activity     Sexually Active  Not Asked                Family History     Family History   Problem Relation Age of Onset    Other Neg Hx         He denies any family history of blood dyscrasias. Review of Systems   Review of Systems   Constitutional: Negative for activity change, fatigue and fever. HENT: Negative for sore throat and trouble swallowing. Respiratory: Negative for cough, shortness of breath and wheezing. Cardiovascular: Positive for palpitations. Negative for chest pain and leg swelling. Gastrointestinal: Negative for nausea and vomiting. Genitourinary: Negative for difficulty urinating. Skin: Negative for color change, rash and wound. Neurological: Negative for weakness, light-headedness and headaches. Psychiatric/Behavioral: Negative for agitation and confusion. Physical Exam   BP (!) 181/86   Pulse 84   Resp 16   SpO2 97%     Physical Exam  Vitals signs reviewed. Constitutional:       General: He is not in acute distress. Appearance: He is not ill-appearing or toxic-appearing. HENT:      Head: Normocephalic and atraumatic. Nose: Nose normal.   Eyes:      General:         Right eye: No discharge. Left eye: No discharge. Conjunctiva/sclera: Conjunctivae normal.   Cardiovascular:      Rate and Rhythm: Normal rate and regular rhythm. Pulmonary:      Effort: Pulmonary effort is normal.      Breath sounds: No wheezing or rales. Abdominal:      General: Bowel sounds are normal.      Tenderness: There is no abdominal tenderness. There is no guarding or rebound. Musculoskeletal: Normal range of motion. Right lower leg: No edema. Left lower leg: No edema. Skin:     General: Skin is warm. Capillary Refill: Capillary refill takes less than 2 seconds. Neurological:      General: No focal deficit present. Mental Status: He is alert and oriented to person, place, and time. Mental status is at baseline.    Psychiatric:         Mood and Affect: Mood normal.         Labs      Recent Results (from the past 24 hour(s))   CBC Auto Differential    Collection Time: 10/01/20 11:02 AM   Result Value Ref Range    WBC 7.6 4.8 - 10.8 K/uL    RBC 3.33 (L) 4.70 - 6.10 M/uL    Hemoglobin 9.8 (L) 14.0 - 18.0 g/dL    Hematocrit 30.5 (L) 42.0 - 52.0 %    MCV 91.6 80.0 - 94.0 fL    MCH 29.4 27.0 - 31.0 pg    MCHC 32.1 (L) 33.0 - 37.0 g/dL    RDW 13.7 11.5 - 14.5 %    Platelets 944 391 - 721 K/uL    MPV 10.7 9.4 - 12.4 fL    Neutrophils % 59.6 50.0 - 65.0 %    Lymphocytes % 24.9 20.0 - 40.0 %    Monocytes % 7.7 0.0 - 10.0 %    Eosinophils % 6.2 (H) 0.0 - 5.0 %    Basophils % 0.7 0.0 - 1.0 %    Neutrophils Absolute 4.6 1.5 - 7.5 K/uL    Immature Granulocytes # 0.1 K/uL    Lymphocytes Absolute 1.9 1.1 - 4.5 K/uL    Monocytes Absolute 0.60 0.00 - 0.90 K/uL    Eosinophils Absolute 0.50 0.00 - 0.60 K/uL    Basophils Absolute 0.10 0.00 - 0.20 K/uL   Comprehensive Metabolic Panel w/ Reflex to MG    Collection Time: 10/01/20 11:02 AM   Result Value Ref Range    Sodium 137 136 - 145 mmol/L    Potassium reflex Magnesium 6.7 (HH) 3.5 - 5.0 mmol/L    Chloride 110 98 - 111 mmol/L    CO2 20 (L) 22 - 29 mmol/L    Anion Gap 7 7 - 19 mmol/L    Glucose 268 (H) 74 - 109 mg/dL    BUN 71 (H) 8 - 23 mg/dL    CREATININE 2.7 (H) 0.5 - 1.2 mg/dL    GFR Non-African American 24 (A) >60    GFR  29 (L) >59    Calcium 9.7 8.8 - 10.2 mg/dL    Total Protein 7.9 6.6 - 8.7 g/dL    Alb 3.9 3.5 - 5.2 g/dL    Total Bilirubin <0.2 0.2 - 1.2 mg/dL    Alkaline Phosphatase 160 (H) 40 - 130 U/L    ALT 19 5 - 41 U/L    AST 15 5 - 40 U/L   Protime-INR    Collection Time: 10/01/20 11:02 AM   Result Value Ref Range    Protime 14.3 12.0 - 14.6 sec    INR 1.11 0.88 - 1.18   Urinalysis Reflex to Culture    Collection Time: 10/01/20 11:50 AM    Specimen: Urine, clean catch   Result Value Ref Range    Color, UA YELLOW Straw/Yellow    Clarity, UA Clear Clear    Glucose, Ur 500 (A) Negative mg/dL    Bilirubin Urine Negative Negative    Ketones, Urine Negative Negative mg/dL    Specific Gravity, UA 1.012 1.005 - 1.030    Blood, Urine Negative Negative    pH, UA 5.0 5.0 - 8.0    Protein,  (A) Negative mg/dL    Urobilinogen, Urine 0.2 <2.0 E.U./dL    Nitrite, Urine Negative Negative    Leukocyte Esterase, Urine Negative Negative   Microscopic Urinalysis    Collection Time: 10/01/20 11:50 AM   Result Value Ref Range    Bacteria, UA NEGATIVE (A) None Seen /HPF    Crystals, UA NEG (A) None Seen /HPF    Hyaline Casts, UA 1 0 - 8 /HPF    WBC, UA 1 0 - 5 /HPF    RBC, UA 1 0 - 4 /HPF    Epithelial Cells, UA 0 0 - 5 /HPF        Imaging/Diagnostics Last 24 Hours   No results found.     Assessment      Hospital Problems           Last Modified POA    Acute kidney injury superimposed on chronic kidney disease ([facility] [address] 10/1/2020 Yes          Plan     Acute kidney injury superimposed on CKD stage III 30 to 59 mL/min/hyperkalemia, peak T waves on EKG  -Corrective measures hyperkalemia in place  -We will recheck BMP this evening  -Nephrology consultation has been placed, appreciate recommendations  -Avoid nephrotoxic agents, renally dose medications  -We will hold lisinopril therapy currently  -Urinalysis reveals 500 glucose, protein 100  -Continue to monitor and correct electrolytes PRN      Type 2 diabetes insulin-dependent  -Chronic condition, hold oral antihyperglycemic's  -Continue with basal Lantus dosing, pre-meal insulin, sliding scale insulin  -Accu-Cheks q. before meals at bedtime, hypoglycemic protocol    Essential hypertension-chronic condition, continue routine medications,, hold lisinopril therapy currently, routine vitals    Dyslipidemia   --Chronic condition, continue with statin therapy    History of gout-continue allopurinol    Anemia of chronic disease-review of electronic medical record records reveals recent work-up by hematology services, no evidence seen of hemolytic process, patient given option for bone marrow aspiration/biopsy however declined.  -We will continue to monitor daily CBC, transfuse for hemoglobin less than 7      Consultations Ordered:  IP CONSULT TO NEPHROLOGY    EMR Dragon/Transcription disclaimer:   Much of this encounter note is an electronic transcription/translation of spoken language to printed text.  The electronic translation of spoken language may permit erroneous, or at times, nonsensical words or phrases to be inadvertently transcribed; although attempts have made to review the note for such errors, some may still exist.    Electronically signed by   Aisha Mckenzie   Internal Medicine Hospitalist  On 10/1/2020  At 12:35 PM

## 2020-10-01 NOTE — ED PROVIDER NOTES
[facility] 3 DIRK/VAS/MED  eMERGENCY dEPARTMENT eNCOUnter      Pt Name: Saeed Millard  MRN: [id number]  Birthdate 1955  Date of evaluation: 10/1/2020  Provider: SUE Chan    CHIEF COMPLAINT       Chief Complaint   Patient presents with    Other     sent here per Dr Richard Weinstein for high protein levels from labs drawn yesterday         HISTORY OF PRESENT ILLNESS   (Location/Symptom, Timing/Onset, Context/Setting, Quality, Duration, Modifying Factors, Severity)  Note limiting factors. Saeed iMllard is a 60 y.o. male who presents to the emergency department after a routine office visit with his pcp. Pt has chronic anemia and CKD as well as diabetes. Sent because protein levels were high. Pt denies any fatigue or SOB. No chest pain. Urinating fine. SAys his blood sugars have been higher than usual.     The history is provided by the patient. Other   This is a new problem. Pertinent negatives include no chest pain and no shortness of breath. He has tried nothing for the symptoms. Nursing Notes were reviewed. REVIEW OF SYSTEMS    (2-9 systems for level 4, 10 or more for level 5)     Review of Systems   Constitutional: Negative for fatigue and fever. Respiratory: Negative for shortness of breath. Cardiovascular: Negative for chest pain. Neurological: Negative for weakness. Except as noted above the remainder of the review ofsystems was reviewed and negative.        PAST MEDICAL HISTORY     Past Medical History:   Diagnosis Date    Chronic kidney disease     Diabetes mellitus ([address]     Essential hypertension     Gout     History of colonic polyps     Hyperlipidemia     Osteoarthritis     Other disorders of kidney and ureter in diseases classified elsewhere          SURGICAL HISTORY       Past Surgical History:   Procedure Laterality Date    COLONOSCOPY           CURRENT MEDICATIONS       Current Discharge Medication List      CONTINUE these medications which have NOT CHANGED Details   ASPIRIN 81 PO aspirin 81 mg tablet,delayed release   Take 1 tablet every day by oral route. allopurinol (ZYLOPRIM) 300 MG tablet allopurinol 300 mg tablet      amLODIPine (NORVASC) 10 MG tablet amlodipine 10 mg tablet      atorvastatin (LIPITOR) 20 MG tablet atorvastatin 20 mg tablet      carvedilol (COREG) 25 MG tablet carvedilol 25 mg tablet      potassium chloride (MICRO-K) 10 MEQ extended release capsule potassium chloride ER 10 mEq tablet,extended release   Take 1 tablet every day by oral route.      magnesium oxide (MAG-OX) 140 MG CAPS Magnesium-Oxide 400 mg tablet   Take 1 tablet every day by oral route. ferrous gluconate (FERGON) 225 (27 Fe) MG tablet ferrous gluconate 324 mg (38 mg iron) tablet   Take 1 tablet twice a day by oral route. insulin detemir (LEVEMIR) 100 UNIT/ML injection vial Inject 30 Units into the skin nightly      lisinopril (PRINIVIL;ZESTRIL) 40 MG tablet Take 40 mg by mouth daily      SODIUM BICARBONATE PO Take 650 mg by mouth      hydrochlorothiazide (MICROZIDE) 12.5 MG capsule Take 12.5 mg by mouth daily      furosemide (LASIX) 40 MG tablet Take 40 mg by mouth 2 times daily             ALLERGIES     Phenylephrine-dm-gg    FAMILY HISTORY       Family History   Problem Relation Age of Onset    Other Neg Hx         He denies any family history of blood dyscrasias.           SOCIAL HISTORY       Social History     Socioeconomic History    Marital status:      Spouse name: None    Number of children: None    Years of education: None    Highest education level: None   Occupational History    None   Social Needs    Financial resource strain: None    Food insecurity     Worry: None     Inability: None    Transportation needs     Medical: None     Non-medical: None   Tobacco Use    Smoking status: Never Smoker    Smokeless tobacco: Never Used   Substance and Sexual Activity    Alcohol use: Never     Frequency: Never    Drug use: None    Sexual Hematocrit 30.5 (*)     MCHC 32.1 (*)     Eosinophils % 6.2 (*)     All other components within normal limits   COMPREHENSIVE METABOLIC PANEL W/ REFLEX TO MG FOR LOW K - Abnormal; Notable for the following components:    Potassium reflex Magnesium 6.7 (*)     CO2 20 (*)     Glucose 268 (*)     BUN 71 (*)     CREATININE 2.7 (*)     GFR Non- 24 (*)     GFR African American 29 (*)     Alkaline Phosphatase 160 (*)     All other components within normal limits    Narrative:     CALL  [facility] tel. ,  Chemistry results called to and read back by GAURANG Griffin in ED, 10/01/2020  11:39, by NORA   URINE RT REFLEX TO CULTURE - Abnormal; Notable for the following components:    Glucose, Ur 500 (*)     Protein,  (*)     All other components within normal limits   MICROSCOPIC URINALYSIS - Abnormal; Notable for the following components:    Bacteria, UA NEGATIVE (*)     Crystals, UA NEG (*)     All other components within normal limits   PROTIME-INR   BASIC METABOLIC PANEL   BASIC METABOLIC PANEL W/ REFLEX TO MG FOR LOW K   BASIC METABOLIC PANEL W/ REFLEX TO MG FOR LOW K   POCT GLUCOSE   POCT GLUCOSE     EKG sinus rhythm 80 with some peaked T waves in the anterior leads read at 1151 by Dr. Clay Stearns and DIFFERENTIAL DIAGNOSIS/MDM:   Vitals:    Vitals:    10/01/20 0901 10/01/20 1335   BP: (!) 181/86 (!) 196/82   Pulse: 84 81   Resp: 16 18   Temp:  98.6 °F (37 °C)   TempSrc:  Temporal   SpO2: 97% 98%           MDM Dr. Dr. Fariba Ambrosio the hospitalist for admission for patient      CONSULTS:  [address] [place] [address] Avenue:  Unless otherwise noted below, none     Procedures    FINAL IMPRESSION      1. Hyperkalemia    2. Acute on chronic renal insufficiency          DISPOSITION/PLAN   DISPOSITION Admitted    PATIENT REFERRED TO:  No follow-up provider specified.     DISCHARGE MEDICATIONS:  Current Discharge Medication List             (Please notethat portions of this note were completed with a voice recognition program.  Efforts were made to edit the dictations but occasionally words are mis-transcribed.)    SUE Cross (electronically signed)          SUE Cross  10/01/20 0604

## 2020-10-01 NOTE — ED NOTES
Hospitalist contacted.      Trang Lcoo  10/01/20 9438 Patient Education     Uncertain Causes of Diarrhea (Adult)    Diarrhea is when stools are loose and watery. This can be caused by:  · Viral infections  · Bacterial infections  · Food poisoning  · Parasites  · Irritable bowel syndrome (IBS)  · Inflammatory bowel diseases such as ulcerative colitis, Crohn's disease, and celiac disease  · Food intolerance, such as to lactose, the sugar found in milk and milk products  · Reaction to medicines like antibiotics, laxatives, cancer drugs, and antacids  Along with diarrhea, you may also have:  · Abdominal pain and cramping  · Nausea and vomiting  · Loss of bowel control  · Fever and chills  · Bloody stools  In some cases, antibiotics may help to treat diarrhea. You may have a stool sample test. This is done to see what is causing your diarrhea, and if antibiotics will help treat it. The results of a stool sample test may take up to 2 days. The healthcare provider may not give you antibiotics until he or she has the stool test results.  Diarrhea can cause dehydration. This is the loss of too much water and other fluids from the body. When this occurs, body fluid must be replaced. This can be done with oral rehydration solutions. Oral rehydration solutions are available at drugstores and grocery stores without a prescription.  Home care  Follow all instructions given by your healthcare provider. Rest at home for the next 24 hours, or until you feel better. Avoid caffeine, tobacco, and alcohol. These can make diarrhea, cramping, and pain worse.  If taking medicines:  · Don’t take over-the-counter diarrhea or nausea medicines unless your healthcare provider tells you to.  · You may use acetaminophen or NSAID medicines like ibuprofen or naproxen to reduce pain and fever. Don’t use these if you have chronic liver or kidney disease, or ever had a stomach ulcer or gastrointestinal bleeding. Don't use NSAID medicines if you are already taking one for another condition (like  arthritis) or are on daily aspirin therapy (such as for heart disease or after a stroke). Talk with your healthcare provider first.  · If antibiotics were prescribed, be sure you take them until they are finished. Don’t stop taking them even when you feel better. Antibiotics must be taken as a full course.  To prevent the spread of illness:  · Remember that washing with soap and water and using alcohol-based  is the best way to prevent the spread of infection.  · Clean the toilet after each use.  · Wash your hands before eating.  · Wash your hands before and after preparing food. Keep in mind that people with diarrhea or vomiting should not prepare food for others.  · Wash your hands after using cutting boards, countertops, and knives that have been in contact with raw foods.  · Wash and then peel fruits and vegetables.  · Keep uncooked meats away from cooked and ready-to-eat foods.  · Use a food thermometer when cooking. Cook poultry to at least 165°F (74°C). Cook ground meat (beef, veal, pork, lamb) to at least 160°F (71°C). Cook fresh beef, veal, lamb, and pork to at least 145°F (63°C).  · Don’t eat raw or undercooked eggs (poached or jacquelyn side up), poultry, meat, or unpasteurized milk and juices.  Food and drinks  The main goal while treating vomiting or diarrhea is to prevent dehydration. This is done by taking small amounts of liquids often.  · Keep in mind that liquids are more important than food right now.  · Drink only small amounts of liquids at a time.  · Don’t force yourself to eat, especially if you are having cramping, vomiting, or diarrhea. Don’t eat large amounts at a time, even if you are hungry.  · If you eat, avoid fatty, greasy, spicy, or fried foods.  · Don’t eat dairy foods or drink milk if you have diarrhea. These can make diarrhea worse.  During the first 24 hours you can try:  · Oral rehydration solutions. Do not use sports drinks. They have too much sugar and not enough  electrolytes.  · Soft drinks without caffeine  · Ginger ale  · Water (plain or flavored)  · Decaf tea or coffee  · Clear broth, consommé, or bouillon  · Gelatin, popsicles, or frozen fruit juice bars  The second 24 hours, if you are feeling better, you can add:  · Hot cereal, plain toast, bread, rolls, or crackers  · Plain noodles, rice, mashed potatoes, chicken noodle soup, or rice soup  · Unsweetened canned fruit (no pineapple)  · Bananas  As you recover:  · Limit fat intake to less than 15 grams per day. Don’t eat margarine, butter, oils, mayonnaise, sauces, gravies, fried foods, peanut butter, meat, poultry, or fish.  · Limit fiber. Don’t eat raw or cooked vegetables, fresh fruits except bananas, or bran cereals.  · Limit caffeine and chocolate.  · Limit dairy.  · Don’t use spices or seasonings except salt.  · Go back to your normal diet over time, as you feel better and your symptoms improve.  · If the symptoms come back, go back to a simple diet or clear liquids.  Follow-up care  Follow up with your healthcare provider, or as advised. If a stool sample was taken or cultures were done, call the healthcare provider for the results as instructed.  Call 911  Call 911 if you have any of these symptoms:  · Trouble breathing  · Confusion  · Extreme drowsiness or trouble walking  · Loss of consciousness  · Rapid heart rate  · Chest pain  · Stiff neck  · Seizure  When to seek medical advice  Call your healthcare provider right away if any of these occur:  · Abdominal pain that gets worse  · Constant lower right abdominal pain  · Continued vomiting and inability to keep liquids down  · Diarrhea more than 5 times a day  · Blood in vomit or stool  · Dark urine or no urine for 8 hours, dry mouth and tongue, tiredness, weakness, or dizziness  · Drowsiness  · New rash  · You don’t get better in 2 to 3 days  · Fever of 100.4°F (38°C) or higher that doesn’t get lower with medicine  © 6337-3958 [employer] [address]  [place] [address]. All rights reserved. This information is not intended as a substitute for professional medical care. Always follow your healthcare professional's instructions.        Diet as tolerated avoiding any dairy products or fried or spicy foods. Return stool sample when you have collected it. Drink plenty of water. Good handwashing and try to use 1 specific toilet until the test results are back.          Patient Education   Home  Back    Clostridium difficile and C. difficile toxin testing  Also known as:C. difficile; C. diff  Formal name:Clostridium difficile culture; C. difficile toxin, A and B; C. difficile cytotoxin assay; Glutamate dehydrogenase test  Related tests: Stool Culture, O&P  At a Glance  Why Get Tested?  To detect the presence of an infection caused by toxin-producing Clostridium difficile bacteria.  When to Get Tested?  When a person has mild, moderate, or severe diarrhea that persists for several days with abdominal pain, loss of appetite, and fever, following antibiotic therapy.  Sample Required?  A fresh or refrigerated liquid or unformed stool sample that has not been contaminated with urine or water.  Test Preparation Needed?  None.  The Test Sample  What is being tested?  These tests detect an infection of the digestive tract by toxin-producing Clostridium difficile. These are bacteria that may be present as part of the normal bacterial nader in the digestive tract; they are present in up to 65% of healthy infants and 3% of healthy adults. Sometimes, when broad-spectrum antibiotics are used to treat infections, the balance of the normal nader in the digestive tract is disrupted. Normal bacterial nader that are susceptible to the antibiotic are eliminated from the digestive tract, while C. difficile that are resistant to the antibiotic remain and begin to overgrow, or new types (strains) of C. difficile are acquired.  The strain of C. difficile that is present  may produce two toxins -- toxin A and toxin B. The resulting combination of decreased normal nader, overgrowth of C. difficile, and toxin production can damage the lining of the lower portion of the digestive tract (colon) and lead to severe inflammation of the colon and prolonged diarrhea. Dead tissue, fibrin, and numerous white blood cells can form a lining over the surface of the inflamed bowel (a pseudomembrane), a condition which is referred to as pseudomembranous colitis.  Production of toxin by C. difficile is the most common cause of diarrhea in people who develop diarrheal symptoms while hospitalized. C. difficile toxin is detected in the stools of up to 20 to 30% of those with antibiotic-associated diarrhea and greater than 95% of those with pseudomembranous colitis. While the organism is frequently carried by infants, it does not usually cause diarrhea in this population. The risk of being symptomatic increases with age and increases in those who have weakened immune systems, have acute or chronic colon conditions, have been previously affected by C. difficile, or who have had recent gastrointestinal surgery or chemotherapy. C. difficile-associated diarrhea usually occurs in people who have been taking antibiotics for several days, but it can also occur several weeks after treatment is completed.  C. difficile-associated disease is a spectrum of illness ranging from mild diarrhea to a more severe colitis, or to toxic megacolon or perforated bowel, which can result in sepsis and death. Signs and symptoms may include frequent loose stools, abdominal pain and cramps, nausea, fever, dehydration, fatigue, and high white blood cell count (leukocytosis). Treatment typically involves discontinuing use of the original antibiotic and administering specific oral antibiotic therapy to which the C. difficile is susceptible. Most people improve as the normal nader re-establish, but about 12-24% of those affected may  have a second episode within 2 months.  How is the sample collected for testing?  A fresh liquid or unformed stool sample is collected in a sterile container. The stool sample should not be contaminated with urine or water. Once it has been collected, the stool should be taken to the laboratory immediately or refrigerated and taken to the lab as soon as possible.  Is any test preparation needed to ensure the quality of the sample?  No test preparation is needed.  The Test  How is it used?  Clostridium difficile toxin tests are used to diagnose antibiotic-associated diarrhea and other conditions and complications caused by toxin-producing C. difficile. Conditions resulting from this infection include pseudomembranous colitis in which dead tissue, fibrous protein, and numerous white blood cells form a lining over the surface of the inflamed bowel, toxic megacolon, and perforated bowel. Testing may also be ordered to detect recurrent disease.  There are a number of tests that are available to detect the infection and to determine if the strain that is present produces toxin. Some tests are very sensitive but take some days to complete, while other tests are rapid (several hours) but are not considered to be very sensitive or specific. Therefore, a combination of tests may be used to help make a diagnosis.  Some of the commonly used tests include:  · C. difficile toxin B,or toxins A and B, by enzyme immunoassay (EIA) tests are some of the most common tests used by laboratories.  Results are typically available within 1 to 4 hours. Though these tests are rapid and widely available, they are not sensitive enough to detect many infections; they miss up to 30% of cases. Therefore, they are not recommended for use by some professional organizations.  · A glutamate dehydrogenase (GDH) test detects an antigen that is produced in high amounts by C. difficile, both toxin and non-toxin producing strains.  It may be used as a  first step to rule out an infection with C. difficile, but it should not be used alone. Since it is not very specific for toxin-producing C. difficile, it is often used in combination with a test for toxin by EIA or a cytotoxicity culture.  · Tissue culture to detect the C. difficile toxin is a test that looks for the effects of the cytotoxin on human cells grown in culture. It is a more sensitive method to detect toxin, but it requires 24 to 48 hours to get the test result.  · A relatively new molecular PCR (polymerase chain reaction) test can rapidly detect the C. difficile toxin B gene (tcdB) in a stool sample.  This test is sensitive but is not widely available.  · Toxigenic stool culture, which requires growing the bacteria in a culture and detecting the presence of the toxins) is the most sensitive test for C. difficile,and it is still considered to be the gold standard. However, it can take 2 to 3 days for results. A culture will also not distinguish between C. difficile colonization and overgrowth/infection.  Currently, there is no one test that is rapid, widely available, and sufficiently sensitive and specific. Until the development of such a test, the Society for Healthcare Epidemiology of [place] (SHEA) and the Infectious Diseases Society of [place] (IDSA) recommend a two-step testing process:  · Perform an initial screen on stool samples using a test for glutamate dehydrogenase (GDH). This test is considered to be very sensitive.  · Follow up positive screening results with a specific test for the toxin or the gene that codes for the toxin.  When is it ordered?  Tests for C. difficile toxin may be ordered when a hospitalized patient has frequent watery stools, abdominal pain, fever, and/or nausea during or following a course of antibiotics or following a recent gastrointestinal surgery. It may be ordered when someone develops these symptoms within 6-8 weeks after taking antibiotics, several days after  chemotherapy, or when a person has a chronic gastrointestinal disorder that the doctor suspects is being worsened by a C. difficile infection. Tests for C. difficile toxin may be ordered to help diagnose the cause of diarrhea when no other discernable cause (such as parasites or pathogenic bacteria) has been detected.  When a person treated for antibiotic-associated diarrhea or colitis relapses and symptoms re-emerge, C. difficile toxin testing may be ordered to confirm the presence of the toxin. Testing should not be  ordered to monitor the effectiveness of treatment, or on those who are asymptomatic. A subsiding of symptoms (diarrhea ceases and stools are formed) indicates a cure from infection.  What does the test result mean?  If tests for C. difficile toxin are positive, it is likely that the person's diarrhea and related symptoms are due to an overgrowth of toxin-producing C. difficile.  Negative test results may mean that the diarrhea and other symptoms are being caused by something other than C. difficile. Since the toxin breaks down at room temperature within 2 hours, a negative result may also indicate that the sample was not transported, stored, or processed promptly.   If there is a concern that a stool specimen has not been collected and processed properly, a repeat test may be performed.  Is there anything else I should know?  If a person has positive C. difficile test results, the doctor will typically discontinue any antibiotics that the person is taking and prescribe an appropriate treatment of oral antibiotic, such as metronidazole or vancomycin to eliminate the C. difficile bacteria.  An endoscopic procedure can be used to diagnose C. difficile colitis. A specialist (gastroenterologist) can examine the colon and biopsy any characteristic pseudomembranous lesions that may be present.  Common Questions  What else can cause diarrhea?  Diarrhea can be due to a pathogenic bacterial infection (commonly  caused by Salmonella, Shigella, Campylobacter or E. coli), a viral infection, a parasitic infection, food intolerance, certain medications, chronic bowel disorders such as irritable bowel syndrome (IBS), or malabsorption disorders (such as celiac disease). Diarrhea may also be caused or worsened by psychological stresses.  Why must the stool sample be fresh?  For C. difficile toxin testing, the sample must be fresh because the toxin will break down in one to two hours and may result in a false negative test.  Why shouldn't I take an over the counter anti-diarrhea medicine when I have diarrhea caused by C. difficile?  Anti-diarrhea medication can slow down the passage of stool through the digestive tract, increasing the length of time that the colon is exposed to the toxin and increasing tissue damage and inflammation.  Once I've had a C. difficile infection, can I be re-infected?  Yes, if symptoms reoccur shortly after the initial infection,  it is generally a case of recurrence of overgrowth and toxin production by the same strain rather than an infection with a new strain; this happens because the normal nader has not reestablished itself fully yet. A person who has had C. difficile diarrhea may also be at an increased risk of developing a new infection with future courses of antibiotics.  Are some antibiotics more likely to cause antibiotic-related diarrhea?  Almost any antibiotic may lead to diarrhea since the drugs alter the normal population of good bacteria in the bowel. Broad-spectrum antibiotics, which kill many different types of bacteria, are more likely to wipe out normal bowel nader and allow C. difficile to overgrow and produce toxin.  Content last reviewed in July 2011.  this page was last modified in October 2011.    ©0518-6433 [employer]

## 2020-10-02 VITALS
WEIGHT: 228.5 LBS | SYSTOLIC BLOOD PRESSURE: 141 MMHG | TEMPERATURE: 97.9 F | OXYGEN SATURATION: 97 % | RESPIRATION RATE: 18 BRPM | DIASTOLIC BLOOD PRESSURE: 78 MMHG | BODY MASS INDEX: 34.74 KG/M2 | HEART RATE: 81 BPM

## 2020-10-02 LAB
ALBUMIN SERPL-MCNC: 3.7 G/DL (ref 3.5–5.2)
ALP BLD-CCNC: 151 U/L (ref 40–130)
ALT SERPL-CCNC: 15 U/L (ref 5–41)
ANION GAP SERPL CALCULATED.3IONS-SCNC: 12 MMOL/L (ref 7–19)
AST SERPL-CCNC: 13 U/L (ref 5–40)
BILIRUB SERPL-MCNC: <0.2 MG/DL (ref 0.2–1.2)
BUN BLDV-MCNC: 60 MG/DL (ref 8–23)
CALCIUM SERPL-MCNC: 8.9 MG/DL (ref 8.8–10.2)
CHLORIDE BLD-SCNC: 102 MMOL/L (ref 98–111)
CO2: 20 MMOL/L (ref 22–29)
CREAT SERPL-MCNC: 2.3 MG/DL (ref 0.5–1.2)
GFR AFRICAN AMERICAN: 35
GFR NON-AFRICAN AMERICAN: 29
GLUCOSE BLD-MCNC: 216 MG/DL (ref 74–109)
GLUCOSE BLD-MCNC: 278 MG/DL (ref 70–99)
GLUCOSE BLD-MCNC: 286 MG/DL (ref 70–99)
HCT VFR BLD CALC: 29.8 % (ref 42–52)
HEMOGLOBIN: 9.7 G/DL (ref 14–18)
MCH RBC QN AUTO: 29.4 PG (ref 27–31)
MCHC RBC AUTO-ENTMCNC: 32.6 G/DL (ref 33–37)
MCV RBC AUTO: 90.3 FL (ref 80–94)
PARATHYROID HORMONE INTACT: 40.9 PG/ML (ref 15–65)
PDW BLD-RTO: 13.7 % (ref 11.5–14.5)
PERFORMED ON: ABNORMAL
PERFORMED ON: ABNORMAL
PLATELET # BLD: 251 K/UL (ref 130–400)
PMV BLD AUTO: 11.5 FL (ref 9.4–12.4)
POTASSIUM REFLEX MAGNESIUM: 5 MMOL/L (ref 3.5–5)
RBC # BLD: 3.3 M/UL (ref 4.7–6.1)
SODIUM BLD-SCNC: 134 MMOL/L (ref 136–145)
TOTAL PROTEIN: 7 G/DL (ref 6.6–8.7)
URIC ACID, SERUM: 3.3 MG/DL (ref 3.4–7)
WBC # BLD: 8 K/UL (ref 4.8–10.8)

## 2020-10-02 PROCEDURE — 85027 COMPLETE CBC AUTOMATED: CPT

## 2020-10-02 PROCEDURE — 36415 COLL VENOUS BLD VENIPUNCTURE: CPT

## 2020-10-02 PROCEDURE — 2580000003 HC RX 258: Performed by: INTERNAL MEDICINE

## 2020-10-02 PROCEDURE — 2500000003 HC RX 250 WO HCPCS: Performed by: INTERNAL MEDICINE

## 2020-10-02 PROCEDURE — 6370000000 HC RX 637 (ALT 250 FOR IP): Performed by: FAMILY MEDICINE

## 2020-10-02 PROCEDURE — 6360000002 HC RX W HCPCS: Performed by: INTERNAL MEDICINE

## 2020-10-02 PROCEDURE — 84550 ASSAY OF BLOOD/URIC ACID: CPT

## 2020-10-02 PROCEDURE — 82947 ASSAY GLUCOSE BLOOD QUANT: CPT

## 2020-10-02 PROCEDURE — 80053 COMPREHEN METABOLIC PANEL: CPT

## 2020-10-02 PROCEDURE — 6370000000 HC RX 637 (ALT 250 FOR IP): Performed by: INTERNAL MEDICINE

## 2020-10-02 PROCEDURE — 83970 ASSAY OF PARATHORMONE: CPT

## 2020-10-02 PROCEDURE — 6360000002 HC RX W HCPCS: Performed by: FAMILY MEDICINE

## 2020-10-02 RX ORDER — CARVEDILOL 25 MG/1
25 TABLET ORAL 2 TIMES DAILY WITH MEALS
Status: DISCONTINUED | OUTPATIENT
Start: 2020-10-02 | End: 2020-10-02 | Stop reason: HOSPADM

## 2020-10-02 RX ORDER — SODIUM BICARBONATE 650 MG/1
1300 TABLET ORAL 3 TIMES DAILY
Status: DISCONTINUED | OUTPATIENT
Start: 2020-10-02 | End: 2020-10-02 | Stop reason: HOSPADM

## 2020-10-02 RX ORDER — HYDRALAZINE HYDROCHLORIDE 50 MG/1
50 TABLET, FILM COATED ORAL 2 TIMES DAILY
Qty: 90 TABLET | Refills: 3 | Status: SHIPPED | OUTPATIENT
Start: 2020-10-02

## 2020-10-02 RX ORDER — SODIUM BICARBONATE 650 MG/1
1300 TABLET ORAL 3 TIMES DAILY
Qty: 180 TABLET | Refills: 0 | Status: SHIPPED | OUTPATIENT
Start: 2020-10-02 | End: 2020-11-01

## 2020-10-02 RX ORDER — HYDRALAZINE HYDROCHLORIDE 50 MG/1
50 TABLET, FILM COATED ORAL EVERY 8 HOURS SCHEDULED
Status: DISCONTINUED | OUTPATIENT
Start: 2020-10-02 | End: 2020-10-02 | Stop reason: HOSPADM

## 2020-10-02 RX ADMIN — SODIUM BICARBONATE 650 MG: 650 TABLET ORAL at 08:16

## 2020-10-02 RX ADMIN — AMLODIPINE BESYLATE 10 MG: 10 TABLET ORAL at 08:16

## 2020-10-02 RX ADMIN — INSULIN LISPRO 10 UNITS: 100 INJECTION, SOLUTION INTRAVENOUS; SUBCUTANEOUS at 08:18

## 2020-10-02 RX ADMIN — FUROSEMIDE 20 MG: 10 INJECTION, SOLUTION INTRAMUSCULAR; INTRAVENOUS at 08:16

## 2020-10-02 RX ADMIN — ALLOPURINOL 300 MG: 300 TABLET ORAL at 08:16

## 2020-10-02 RX ADMIN — INSULIN LISPRO 3 UNITS: 100 INJECTION, SOLUTION INTRAVENOUS; SUBCUTANEOUS at 08:21

## 2020-10-02 RX ADMIN — SODIUM BICARBONATE: 84 INJECTION, SOLUTION INTRAVENOUS at 07:29

## 2020-10-02 RX ADMIN — HEPARIN SODIUM 5000 UNITS: 5000 INJECTION INTRAVENOUS; SUBCUTANEOUS at 06:14

## 2020-10-02 RX ADMIN — HYDRALAZINE HYDROCHLORIDE 50 MG: 50 TABLET, FILM COATED ORAL at 08:16

## 2020-10-02 RX ADMIN — FERROUS GLUCONATE TAB 324 MG (37.5 MG ELEMENTAL IRON) 324 MG: 324 (37.5 FE) TAB at 08:16

## 2020-10-02 RX ADMIN — CARVEDILOL 25 MG: 25 TABLET, FILM COATED ORAL at 08:16

## 2020-10-02 NOTE — DISCHARGE SUMMARY
Discharge Summary  Date:10/2/2020        Patient Ana Richmond     YOB: 1955     Age:65 y.o. Admit Date:10/1/2020   Admission Condition:poor   Discharged Condition:fair  Discharge Date: 10/02/20     Discharge Diagnoses   Active Problems:    Acute kidney injury superimposed on chronic kidney disease ([facility], [place] [address]  Resolved Problems:    * No resolved hospital problems. [facility] AND [facility] Stay   Narrative of Hospital Course: Patient was admitted 10/1, presented to the emergency room after abnormal lab value revealed hyperkalemia for primary care provider's office. Upon arrival noted to have potassium elevated at 6.7, corrective modalities placed such as insulin dextrose, sodium bicarb, Kayexalate, this is trended down to 5.0 on date of discharge 10/2/2020. Nephrology services were consulted patient placed on bicarb fluids. Hydralazine addition to medical regimen for hypertension which has been uncontrolled, lisinopril has been discontinued. Increased dosing of bicarb p.o. upon DC. Patient to follow-up with primary care provider in 1 to 2 weeks for hospital discharge as well as ongoing coordination of care, discontinue potassium supplementation until lab values have been rechecked. Follow-up with renal services as scheduled. Education modalities attached after visit summary. Consultants:   IP CONSULT TO NEPHROLOGY    Time Spent on Discharge:  45  minutes were spent in patient examination, evaluation, counseling as well as medication reconciliation, prescriptions for required medications, discharge plan and follow up.       Surgeries/Procedures Performed:       Treatments:   analgesia: acetaminophen, cardiac meds: Amlodipine, Lipitor, Coreg, hydralazine, anticoagulation: heparin, insulin: Humalog, respiratory therapy: O2 and electrolyte stabilization    Significant Diagnostic Studies:   Recent Labs:  CBC:   Lab Results   Component Value Date    WBC 8.0 10/02/2020    RBC 3.30 10/02/2020    HGB 9.7 10/02/2020    HCT 29.8 10/02/2020    MCV 90.3 10/02/2020    MCH 29.4 10/02/2020    MCHC 32.6 10/02/2020    RDW 13.7 10/02/2020     10/02/2020     BMP:    Lab Results   Component Value Date    GLUCOSE 216 10/02/2020     10/02/2020    K 5.0 10/02/2020     10/02/2020    CO2 20 10/02/2020    ANIONGAP 12 10/02/2020    BUN 60 10/02/2020    CREATININE 2.3 10/02/2020    CALCIUM 8.9 10/02/2020    LABGLOM 29 10/02/2020    GFRAA 35 10/02/2020     HFP:    Lab Results   Component Value Date    PROT 7.0 10/02/2020     CMP:    Lab Results   Component Value Date    GLUCOSE 216 10/02/2020     10/02/2020    K 5.0 10/02/2020     10/02/2020    CO2 20 10/02/2020    BUN 60 10/02/2020    CREATININE 2.3 10/02/2020    ANIONGAP 12 10/02/2020    ALKPHOS 151 10/02/2020    ALT 15 10/02/2020    AST 13 10/02/2020    BILITOT <0.2 10/02/2020    LABALBU 3.7 10/02/2020    LABGLOM 29 10/02/2020    GFRAA 35 10/02/2020    PROT 7.0 10/02/2020    CALCIUM 8.9 10/02/2020     PT/INR:    Lab Results   Component Value Date    PROTIME 14.3 10/01/2020    INR 1.11 10/01/2020     PTT: No results found for: APTT  FLP:  No results found for: CHOL, TRIG, HDL  U/A:    Lab Results   Component Value Date    COLORU YELLOW 10/01/2020    SPECGRAV 1.012 10/01/2020    PHUR 5.0 10/01/2020    PROTEINU 100 10/01/2020    GLUCOSEU 500 10/01/2020    KETUA Negative 10/01/2020    BILIRUBINUR Negative 10/01/2020    UROBILINOGEN 0.2 10/01/2020    NITRU Negative 10/01/2020    LEUKOCYTESUR Negative 10/01/2020     TSH:  No results found for: TSH    Radiology Last 7 Days:  No results found. Physical Exam  Vitals signs reviewed. Constitutional:       General: He is not in acute distress. Appearance: He is not ill-appearing or toxic-appearing. HENT:      Head: Normocephalic and atraumatic. Nose: Nose normal.   Eyes:      General:         Right eye: No discharge. Left eye: No discharge.       Conjunctiva/sclera: Conjunctivae normal. Cardiovascular:      Rate and Rhythm: Normal rate and regular rhythm. Pulmonary:      Effort: Pulmonary effort is normal.      Breath sounds: No wheezing or rales. Abdominal:      General: Bowel sounds are normal.      Tenderness: There is no abdominal tenderness. There is no guarding or rebound. Musculoskeletal: Normal range of motion. Right lower leg: No edema. Left lower leg: No edema. Skin:     General: Skin is warm. Capillary Refill: Capillary refill takes less than 2 seconds. Neurological:      General: No focal deficit present. Mental Status: He is alert and oriented to person, place, and time. Mental status is at baseline.    Psychiatric:         Mood and Affect: Mood normal.        Discharge Plan   Disposition: Home    Provider Follow-Up:   Radha Marques MD  [address] [place] [address] [phone] [id number]  [phone]    Go on 10/9/2020  Hospital follow-up appointment on 10/9/20 @ 11:00am.     SUE Álvarez  [address]    Go on 10/15/2020  Follow-up appointmemt on 10/15/20 @ 3:20pm.          Patient Instructions   Diet: diabetic diet    Activity: activity as tolerated      Discharge Medications         Medication List      START taking these medications    hydrALAZINE 50 MG tablet  Commonly known as:  APRESOLINE  Take 1 tablet by mouth 2 times daily        CHANGE how you take these medications    sodium bicarbonate 650 MG tablet  Take 2 tablets by mouth 3 times daily  What changed:    · medication strength  · how much to take  · when to take this        CONTINUE taking these medications    allopurinol 300 MG tablet  Commonly known as:  ZYLOPRIM     amLODIPine 10 MG tablet  Commonly known as:  NORVASC     ASPIRIN 81 PO     atorvastatin 20 MG tablet  Commonly known as:  LIPITOR     carvedilol 25 MG tablet  Commonly known as:  COREG     ferrous gluconate 225 (27 Fe) MG tablet  Commonly known as:  [employer]

## 2020-10-02 NOTE — PROGRESS NOTES
Nephrology ([facility]) Progress Note    Patient:  Erwin Graves  YOB: 1955  Date of Service: 10/2/2020  MRN: Juel Holstein: [de-identified]   Primary Care Physician: Noah Peralta MD  Advance Directive: Full Code  Admit Date: 10/1/2020       Hospital Day: 1  Referring Provider: Renetta Guadalupe MD    Patient Seen, Chart, Consults notes, Labs, Radiology studies reviewed. Subjective:  Patient is a 59 yo pleasant  man who has HTN, DM2, and CKD. He has seen Dr Taylor Castro in the past. He failed to follow up. He was in his regular state of health until he had labs for his PCP. His creat was 2 (Dec 2019) and it has risen to 2.7. He was severely hyperkalemic at 6.7. Patient was subsequently admitted for JOSEE, CKD stage 3 and hyperkalemia. Renal service was consulted for that purpose. Patient denies using K suppl. He admitted poor control of his blood sugars. He has no change in his urine habits and also denied NSAIDs use. Patient admitted frequent intake of french fries. He has no dysuria. Today, he was doing well with no new complaints.      Allergies:  [id number]    Medicines:  Current Facility-Administered Medications   Medication Dose Route Frequency Provider Last Rate Last Dose    insulin lispro (HUMALOG) injection vial 10 Units  10 Units Subcutaneous TID JOSIAS Guadalupe MD   10 Units at 10/02/20 0818    carvedilol (COREG) tablet 25 mg  25 mg Oral BID JOSIAS Guadalupe MD   25 mg at 10/02/20 0816    sodium bicarbonate 50 mEq in dextrose 5 % 1,000 mL infusion   Intravenous Continuous Rosalina Haley  mL/hr at 10/02/20 0729      sodium chloride flush 0.9 % injection 10 mL  10 mL Intravenous 2 times per day Renetta Guadalupe MD   10 mL at 10/01/20 2228    sodium chloride flush 0.9 % injection 10 mL  10 mL Intravenous PRN Renetta Guadalupe MD        acetaminophen (TYLENOL) tablet 650 mg  650 mg Oral Q6H PRN Renetta Guadalupe MD Or    acetaminophen (TYLENOL) suppository 650 mg  650 mg Rectal Q6H PRN Olga Lidia Lui MD        promethazine (PHENERGAN) tablet 12.5 mg  12.5 mg Oral Q6H PRN Olga Lidia Lui MD        Or    [facility] injection 4 mg  4 mg Intravenous Q6H PRN Olga Lidia Lui MD        heparin (porcine) injection 5,000 Units  5,000 Units Subcutaneous 3 times per day Olga Lidia Lui MD   5,000 Units at 10/02/20 8399    insulin glargine (LANTUS) injection vial 25 Units  25 Units Subcutaneous Nightly Olga Lidia Lui MD   25 Units at 10/01/20 2227    insulin lispro (HUMALOG) injection vial 0-6 Units  0-6 Units Subcutaneous TID WC Olga Lidia Lui MD   3 Units at 10/02/20 3388    insulin lispro (HUMALOG) injection vial 0-3 Units  0-3 Units Subcutaneous Nightly Olga Lidia Lui MD   1 Units at 10/01/20 2227    hydrALAZINE (APRESOLINE) injection 5 mg  5 mg Intravenous Q6H PRN Olga Lidia Lui MD        sodium bicarbonate tablet 650 mg  650 mg Oral TID Catrina Baldwin MD   650 mg at 10/02/20 0816    furosemide (LASIX) injection 20 mg  20 mg Intravenous BID Catrina Baldwin MD   20 mg at 10/02/20 0816    hydrALAZINE (APRESOLINE) tablet 50 mg  50 mg Oral 2 times per day Catrina Baldwin MD   50 mg at 10/02/20 0816    allopurinol (ZYLOPRIM) tablet 300 mg  300 mg Oral Daily Olga Lidia Lui MD   300 mg at 10/02/20 0816    amLODIPine (NORVASC) tablet 10 mg  10 mg Oral Daily Olga Lidia Lui MD   10 mg at 10/02/20 0816    atorvastatin (LIPITOR) tablet 20 mg  20 mg Oral Nightly Olga Lidia Lui MD   20 mg at 10/01/20 2119    ferrous gluconate 324 (37.5 Fe) MG tablet 324 mg  324 mg Oral BID Olga Lidia Lui MD   324 mg at 10/02/20 0816    glucose (GLUTOSE) 40 % oral gel 15 g  15 g Oral PRN Olga Lidia Lui MD        dextrose 50 % IV solution  12.5 g Intravenous PRN Olga Lidia Lui MD        glucagon (rDNA) injection 1 mg  1 mg Intramuscular PRN Olga Lidia Lui MD        dextrose 5 % solution  100 mL/hr Intravenous PRN Suyapa Lawrence MD           Past Medical History:  Past Medical History:   Diagnosis Date    Chronic kidney disease     Diabetes mellitus ([facility] [address]     Essential hypertension     Gout     History of colonic polyps     Hyperlipidemia     Osteoarthritis     Other disorders of kidney and ureter in diseases classified elsewhere        Past Surgical History:  Past Surgical History:   Procedure Laterality Date    COLONOSCOPY         Family History  Family History   Problem Relation Age of Onset    Other Neg Hx         He denies any family history of blood dyscrasias.        Social History  Social History     Socioeconomic History    Marital status:      Spouse name: Not on file    Number of children: Not on file    Years of education: Not on file    Highest education level: Not on file   Occupational History    Not on file   Social Needs    Financial resource strain: Not on file    Food insecurity     Worry: Not on file     Inability: Not on file    Transportation needs     Medical: Not on file     Non-medical: Not on file   Tobacco Use    Smoking status: Never Smoker    Smokeless tobacco: Never Used   Substance and Sexual Activity    Alcohol use: Never     Frequency: Never    Drug use: Not on file    Sexual activity: Not on file   Lifestyle    Physical activity     Days per week: Not on file     Minutes per session: Not on file    Stress: Not on file   Relationships    Social connections     Talks on phone: Not on file     Gets together: Not on file     Attends [employer] service: Not on file     Active member of club or organization: Not on file     Attends meetings of clubs or organizations: Not on file     Relationship status: Not on file    Intimate partner violence     Fear of current or ex partner: Not on file     Emotionally abused: Not on file     Physically abused: Not on file     Forced sexual activity: Not on file   Other Topics Concern    Not on file   Social History Narrative    Not on file         Review of Systems:  History obtained from chart review and the patient  General ROS: No fever or chills  Respiratory ROS: No cough, shortness of breath, wheezing  Cardiovascular ROS: no chest pain or dyspnea on exertion  Gastrointestinal ROS: No abdominal pain or melena  Genito-Urinary ROS: No dysuria or hematuria  Musculoskeletal ROS: No joint pain or swelling         Objective:  Blood pressure (!) 172/86, pulse 88, temperature 97.4 °F (36.3 °C), temperature source Temporal, resp. rate 18, weight 228 lb 8 oz (103.6 kg), SpO2 97 %. Intake/Output Summary (Last 24 hours) at 10/2/2020 1012  Last data filed at 10/2/2020 0955  Gross per 24 hour   Intake 1498 ml   Output 1325 ml   Net 173 ml     General: alert and oriented x3   Chest:  clear to auscultation bilaterally without respiratory distress  CVS: regular rate and rhythm  Abdominal: soft, nontender, normal bowel sounds  Extremities: no cyanosis or edema  Skin: warm and dry without rash    Labs:  BMP:   Recent Labs     10/01/20  1102 10/01/20  1606 10/02/20  0419    139  139 134*   K 6.7* 5.8*  5.8* 5.0    105  106 102   CO2 20* 22  22 20*   BUN 71* 70*  67* 60*   CREATININE 2.7* 2.5*  2.6* 2.3*   CALCIUM 9.7 10.0  10.2 8.9     CBC:   Recent Labs     10/01/20  1102 10/02/20  0419   WBC 7.6 8.0   HGB 9.8* 9.7*   HCT 30.5* 29.8*   MCV 91.6 90.3    251     LIVER PROFILE:   Recent Labs     10/01/20  1102 10/02/20  0419   AST 15 13   ALT 19 15   BILITOT <0.2 <0.2   ALKPHOS 160* 151*     PT/INR:   Recent Labs     10/01/20  1102   PROTIME 14.3   INR 1.11     APTT: No results for input(s): APTT in the last 72 hours. BNP:  No results for input(s): BNP in the last 72 hours. Ionized Calcium:No results for input(s): IONCA in the last 72 hours. Magnesium:No results for input(s): MG in the last 72 hours. Phosphorus:No results for input(s): PHOS in the last 72 hours.   HgbA1C: No results for input(s): LABA1C in the last 72 hours. Hepatic:   Recent Labs     10/01/20  1102 10/02/20  0419   ALKPHOS 160* 151*   ALT 19 15   AST 15 13   PROT 7.9 7.0   BILITOT <0.2 <0.2   LABALBU 3.9 3.7     Lactic Acid: No results for input(s): LACTA in the last 72 hours. Troponin: No results for input(s): CKTOTAL, CKMB, TROPONINT in the last 72 hours. ABGs: No results for input(s): PH, PCO2, PO2, HCO3, O2SAT in the last 72 hours. CRP:  No results for input(s): CRP in the last 72 hours. Sed Rate:  No results for input(s): SEDRATE in the last 72 hours. Cultures:   No results for input(s): CULTURE in the last 72 hours. Radiology reports as per the Radiologist  Radiology: No results found. Assessment     1. Hyperkalemia- better  2. JOSEE (likely progression of his CKD)- irmproving  3. CKD stage 3  4. Type 2 DM with renal disease  5. Hypertension (poorly controlled)  6. Metabolic acidosis  7. Anemia of CKD      Plan:  Continue hydration by mouth. Follow low K diet, Continue sodium bicarbonate. [id number] Mabel Galvin for discharge form renal standpoint. Follow up at the renal clinic.

## 2020-10-02 NOTE — PLAN OF CARE
Problem: Infection:  Goal: Will remain free from infection  Description: Will remain free from infection  10/2/2020 0230 by Marcella Holder RN  Outcome: Ongoing  10/1/2020 1402 by Theresa Barber RN  Outcome: Ongoing     Problem: Safety:  Goal: Free from accidental physical injury  Description: Free from accidental physical injury  10/2/2020 0230 by Marcella Holder RN  Outcome: Ongoing  10/1/2020 1402 by Theersa Barber RN  Outcome: Ongoing  Goal: Free from intentional harm  Description: Free from intentional harm  10/2/2020 0230 by Marcella Holder RN  Outcome: Ongoing  10/1/2020 1402 by Theresa Barber RN  Outcome: Ongoing     Problem: Daily Care:  Goal: Daily care needs are met  Description: Daily care needs are met  10/2/2020 0230 by Marcella Holder RN  Outcome: Ongoing  10/1/2020 1402 by Theresa Barber RN  Outcome: Ongoing     Problem: Pain:  Goal: Patient's pain/discomfort is manageable  Description: Patient's pain/discomfort is manageable  10/2/2020 0230 by Marcella Holder RN  Outcome: Ongoing  10/1/2020 1402 by Theresa Barber RN  Outcome: Ongoing     Problem: Skin Integrity:  Goal: Skin integrity will stabilize  Description: Skin integrity will stabilize  10/2/2020 0230 by Marcella Holder RN  Outcome: Ongoing  10/1/2020 1402 by Theresa Barber RN  Outcome: Ongoing     Problem: Discharge Planning:  Goal: Patients continuum of care needs are met  Description: Patients continuum of care needs are met  10/2/2020 0230 by Marcella Holder RN  Outcome: Ongoing  10/1/2020 1402 by Theresa Barber RN  Outcome: Ongoing     Problem: Fluid Volume:  Goal: Ability to achieve a balanced intake and output will improve  Description: Ability to achieve a balanced intake and output will improve  10/2/2020 0230 by Marcella Holder RN  Outcome: Ongoing  10/1/2020 1402 by Theresa Barber RN  Outcome: Ongoing     Problem: Physical Regulation:  Goal: Ability to maintain clinical measurements within normal limits

## 2020-10-20 ENCOUNTER — HOSPITAL ENCOUNTER (INPATIENT)
Age: 65
LOS: 2 days | Discharge: HOME | DRG: 683 | End: 2020-10-22
Attending: PEDIATRICS | Admitting: INTERNAL MEDICINE
Payer: COMMERCIAL

## 2020-10-20 PROBLEM — N17.9 AKI (ACUTE KIDNEY INJURY) (HCC): Status: ACTIVE | Noted: 2020-10-20

## 2020-10-20 LAB
ALBUMIN SERPL-MCNC: 4.1 G/DL (ref 3.5–5.2)
ALP BLD-CCNC: 128 U/L (ref 40–130)
ALT SERPL-CCNC: 8 U/L (ref 5–41)
ANION GAP SERPL CALCULATED.3IONS-SCNC: 11 MMOL/L (ref 7–19)
AST SERPL-CCNC: 10 U/L (ref 5–40)
BACTERIA: NEGATIVE /HPF
BASOPHILS ABSOLUTE: 0.1 K/UL (ref 0–0.2)
BASOPHILS RELATIVE PERCENT: 0.5 % (ref 0–1)
BILIRUB SERPL-MCNC: 0.3 MG/DL (ref 0.2–1.2)
BILIRUBIN URINE: NEGATIVE
BLOOD, URINE: NEGATIVE
BUN BLDV-MCNC: 79 MG/DL (ref 8–23)
CALCIUM SERPL-MCNC: 9.7 MG/DL (ref 8.8–10.2)
CHLORIDE BLD-SCNC: 91 MMOL/L (ref 98–111)
CLARITY: CLEAR
CO2: 31 MMOL/L (ref 22–29)
COLOR: YELLOW
CREAT SERPL-MCNC: 4 MG/DL (ref 0.5–1.2)
CREATININE URINE: 91 MG/DL (ref 4.2–622)
CRYSTALS, UA: ABNORMAL /HPF
EOSINOPHIL,URINE: NORMAL
EOSINOPHILS ABSOLUTE: 0.5 K/UL (ref 0–0.6)
EOSINOPHILS RELATIVE PERCENT: 5.6 % (ref 0–5)
EPITHELIAL CELLS, UA: 0 /HPF (ref 0–5)
GFR AFRICAN AMERICAN: 18
GFR NON-AFRICAN AMERICAN: 15
GLUCOSE BLD-MCNC: 249 MG/DL (ref 70–99)
GLUCOSE BLD-MCNC: 254 MG/DL (ref 74–109)
GLUCOSE URINE: NEGATIVE MG/DL
HBA1C MFR BLD: 10.7 % (ref 4–6)
HCT VFR BLD CALC: 29.2 % (ref 42–52)
HEMOGLOBIN: 9.7 G/DL (ref 14–18)
HYALINE CASTS: 1 /HPF (ref 0–8)
IMMATURE GRANULOCYTES #: 0.1 K/UL
KETONES, URINE: NEGATIVE MG/DL
LEUKOCYTE ESTERASE, URINE: NEGATIVE
LYMPHOCYTES ABSOLUTE: 2.1 K/UL (ref 1.1–4.5)
LYMPHOCYTES RELATIVE PERCENT: 21.9 % (ref 20–40)
MCH RBC QN AUTO: 30 PG (ref 27–31)
MCHC RBC AUTO-ENTMCNC: 33.2 G/DL (ref 33–37)
MCV RBC AUTO: 90.4 FL (ref 80–94)
MONOCYTES ABSOLUTE: 0.8 K/UL (ref 0–0.9)
MONOCYTES RELATIVE PERCENT: 8.2 % (ref 0–10)
NEUTROPHILS ABSOLUTE: 5.9 K/UL (ref 1.5–7.5)
NEUTROPHILS RELATIVE PERCENT: 63.3 % (ref 50–65)
NITRITE, URINE: NEGATIVE
OSMOLALITY URINE: 300 MOSM/KG (ref 250–1200)
PARATHYROID HORMONE INTACT: 93.1 PG/ML (ref 15–65)
PDW BLD-RTO: 13.5 % (ref 11.5–14.5)
PERFORMED ON: ABNORMAL
PH UA: 7.5 (ref 5–8)
PLATELET # BLD: 292 K/UL (ref 130–400)
PMV BLD AUTO: 10.1 FL (ref 9.4–12.4)
POTASSIUM REFLEX MAGNESIUM: 4.4 MMOL/L (ref 3.5–5)
PROTEIN PROTEIN: 39 MG/DL (ref 15–45)
PROTEIN UA: 30 MG/DL
RBC # BLD: 3.23 M/UL (ref 4.7–6.1)
RBC UA: 0 /HPF (ref 0–4)
SODIUM BLD-SCNC: 133 MMOL/L (ref 136–145)
SODIUM URINE: 61 MMOL/L
SPECIFIC GRAVITY UA: 1.01 (ref 1–1.03)
TOTAL PROTEIN: 8 G/DL (ref 6.6–8.7)
UREA NITROGEN, UR: 438 MG/DL
URIC ACID, SERUM: 4.6 MG/DL (ref 3.4–7)
UROBILINOGEN, URINE: 1 E.U./DL
VITAMIN D 25-HYDROXY: 26.6 NG/ML
WBC # BLD: 9.4 K/UL (ref 4.8–10.8)
WBC UA: 0 /HPF (ref 0–5)

## 2020-10-20 PROCEDURE — 87205 SMEAR GRAM STAIN: CPT

## 2020-10-20 PROCEDURE — 84300 ASSAY OF URINE SODIUM: CPT

## 2020-10-20 PROCEDURE — 1210000000 HC MED SURG R&B

## 2020-10-20 PROCEDURE — 99283 EMERGENCY DEPT VISIT LOW MDM: CPT

## 2020-10-20 PROCEDURE — 82570 ASSAY OF URINE CREATININE: CPT

## 2020-10-20 PROCEDURE — 82947 ASSAY GLUCOSE BLOOD QUANT: CPT

## 2020-10-20 PROCEDURE — 84156 ASSAY OF PROTEIN URINE: CPT

## 2020-10-20 PROCEDURE — 2580000003 HC RX 258: Performed by: INTERNAL MEDICINE

## 2020-10-20 PROCEDURE — 83036 HEMOGLOBIN GLYCOSYLATED A1C: CPT

## 2020-10-20 PROCEDURE — 83970 ASSAY OF PARATHORMONE: CPT

## 2020-10-20 PROCEDURE — 83935 ASSAY OF URINE OSMOLALITY: CPT

## 2020-10-20 PROCEDURE — 84540 ASSAY OF URINE/UREA-N: CPT

## 2020-10-20 PROCEDURE — 6370000000 HC RX 637 (ALT 250 FOR IP): Performed by: INTERNAL MEDICINE

## 2020-10-20 PROCEDURE — 82306 VITAMIN D 25 HYDROXY: CPT

## 2020-10-20 PROCEDURE — 36415 COLL VENOUS BLD VENIPUNCTURE: CPT

## 2020-10-20 PROCEDURE — 84550 ASSAY OF BLOOD/URIC ACID: CPT

## 2020-10-20 PROCEDURE — 85025 COMPLETE CBC W/AUTO DIFF WBC: CPT

## 2020-10-20 PROCEDURE — 81001 URINALYSIS AUTO W/SCOPE: CPT

## 2020-10-20 PROCEDURE — 80053 COMPREHEN METABOLIC PANEL: CPT

## 2020-10-20 PROCEDURE — 6360000002 HC RX W HCPCS: Performed by: INTERNAL MEDICINE

## 2020-10-20 PROCEDURE — 99999 PR OFFICE/OUTPT VISIT,PROCEDURE ONLY: CPT | Performed by: PEDIATRICS

## 2020-10-20 RX ORDER — ONDANSETRON 2 MG/ML
4 INJECTION INTRAMUSCULAR; INTRAVENOUS EVERY 6 HOURS PRN
Status: DISCONTINUED | OUTPATIENT
Start: 2020-10-20 | End: 2020-10-22 | Stop reason: HOSPADM

## 2020-10-20 RX ORDER — PROMETHAZINE HYDROCHLORIDE 25 MG/1
12.5 TABLET ORAL EVERY 6 HOURS PRN
Status: DISCONTINUED | OUTPATIENT
Start: 2020-10-20 | End: 2020-10-22 | Stop reason: HOSPADM

## 2020-10-20 RX ORDER — SODIUM CHLORIDE 0.9 % (FLUSH) 0.9 %
10 SYRINGE (ML) INJECTION EVERY 12 HOURS SCHEDULED
Status: DISCONTINUED | OUTPATIENT
Start: 2020-10-20 | End: 2020-10-22 | Stop reason: HOSPADM

## 2020-10-20 RX ORDER — AMLODIPINE BESYLATE 10 MG/1
10 TABLET ORAL DAILY
Status: DISCONTINUED | OUTPATIENT
Start: 2020-10-21 | End: 2020-10-22 | Stop reason: HOSPADM

## 2020-10-20 RX ORDER — HYDRALAZINE HYDROCHLORIDE 50 MG/1
50 TABLET, FILM COATED ORAL 2 TIMES DAILY
Status: DISCONTINUED | OUTPATIENT
Start: 2020-10-20 | End: 2020-10-22 | Stop reason: HOSPADM

## 2020-10-20 RX ORDER — INSULIN GLARGINE 100 [IU]/ML
25 INJECTION, SOLUTION SUBCUTANEOUS NIGHTLY
Status: DISCONTINUED | OUTPATIENT
Start: 2020-10-20 | End: 2020-10-22 | Stop reason: HOSPADM

## 2020-10-20 RX ORDER — POTASSIUM CHLORIDE 20 MEQ/1
40 TABLET, EXTENDED RELEASE ORAL PRN
Status: DISCONTINUED | OUTPATIENT
Start: 2020-10-20 | End: 2020-10-22 | Stop reason: HOSPADM

## 2020-10-20 RX ORDER — POTASSIUM CHLORIDE 7.45 MG/ML
10 INJECTION INTRAVENOUS PRN
Status: DISCONTINUED | OUTPATIENT
Start: 2020-10-20 | End: 2020-10-22 | Stop reason: HOSPADM

## 2020-10-20 RX ORDER — HEPARIN SODIUM 5000 [USP'U]/ML
5000 INJECTION, SOLUTION INTRAVENOUS; SUBCUTANEOUS EVERY 8 HOURS SCHEDULED
Status: DISCONTINUED | OUTPATIENT
Start: 2020-10-20 | End: 2020-10-22 | Stop reason: HOSPADM

## 2020-10-20 RX ORDER — DEXTROSE MONOHYDRATE 50 MG/ML
100 INJECTION, SOLUTION INTRAVENOUS PRN
Status: DISCONTINUED | OUTPATIENT
Start: 2020-10-20 | End: 2020-10-22 | Stop reason: HOSPADM

## 2020-10-20 RX ORDER — SODIUM CHLORIDE 9 MG/ML
INJECTION, SOLUTION INTRAVENOUS CONTINUOUS
Status: ACTIVE | OUTPATIENT
Start: 2020-10-20 | End: 2020-10-21

## 2020-10-20 RX ORDER — DEXTROSE MONOHYDRATE 25 G/50ML
12.5 INJECTION, SOLUTION INTRAVENOUS PRN
Status: DISCONTINUED | OUTPATIENT
Start: 2020-10-20 | End: 2020-10-22 | Stop reason: HOSPADM

## 2020-10-20 RX ORDER — ACETAMINOPHEN 325 MG/1
650 TABLET ORAL EVERY 6 HOURS PRN
Status: DISCONTINUED | OUTPATIENT
Start: 2020-10-20 | End: 2020-10-22 | Stop reason: HOSPADM

## 2020-10-20 RX ORDER — ACETAMINOPHEN 650 MG/1
650 SUPPOSITORY RECTAL EVERY 6 HOURS PRN
Status: DISCONTINUED | OUTPATIENT
Start: 2020-10-20 | End: 2020-10-22 | Stop reason: HOSPADM

## 2020-10-20 RX ORDER — CARVEDILOL 12.5 MG/1
12.5 TABLET ORAL 2 TIMES DAILY
Status: DISCONTINUED | OUTPATIENT
Start: 2020-10-20 | End: 2020-10-22 | Stop reason: HOSPADM

## 2020-10-20 RX ORDER — ATORVASTATIN CALCIUM 20 MG/1
20 TABLET, FILM COATED ORAL NIGHTLY
Status: DISCONTINUED | OUTPATIENT
Start: 2020-10-20 | End: 2020-10-22 | Stop reason: HOSPADM

## 2020-10-20 RX ORDER — NICOTINE POLACRILEX 4 MG
15 LOZENGE BUCCAL PRN
Status: DISCONTINUED | OUTPATIENT
Start: 2020-10-20 | End: 2020-10-22 | Stop reason: HOSPADM

## 2020-10-20 RX ORDER — SODIUM CHLORIDE 0.9 % (FLUSH) 0.9 %
10 SYRINGE (ML) INJECTION PRN
Status: DISCONTINUED | OUTPATIENT
Start: 2020-10-20 | End: 2020-10-22 | Stop reason: HOSPADM

## 2020-10-20 RX ADMIN — Medication 25 UNITS: at 23:43

## 2020-10-20 RX ADMIN — CARVEDILOL 12.5 MG: 12.5 TABLET, FILM COATED ORAL at 23:42

## 2020-10-20 RX ADMIN — INSULIN LISPRO 2 UNITS: 100 INJECTION, SOLUTION INTRAVENOUS; SUBCUTANEOUS at 23:43

## 2020-10-20 RX ADMIN — ATORVASTATIN CALCIUM 20 MG: 20 TABLET, FILM COATED ORAL at 23:42

## 2020-10-20 RX ADMIN — Medication 10 ML: at 23:47

## 2020-10-20 RX ADMIN — HEPARIN SODIUM 5000 UNITS: 5000 INJECTION INTRAVENOUS; SUBCUTANEOUS at 23:42

## 2020-10-20 RX ADMIN — HYDRALAZINE HYDROCHLORIDE 50 MG: 50 TABLET, FILM COATED ORAL at 23:42

## 2020-10-20 RX ADMIN — SODIUM CHLORIDE: 9 INJECTION, SOLUTION INTRAVENOUS at 23:52

## 2020-10-20 ASSESSMENT — ENCOUNTER SYMPTOMS
VOMITING: 0
NAUSEA: 0
EYE DISCHARGE: 0
COLOR CHANGE: 0
SHORTNESS OF BREATH: 0
RHINORRHEA: 0
COUGH: 0
ABDOMINAL PAIN: 0
BACK PAIN: 0

## 2020-10-20 NOTE — ED PROVIDER NOTES
[facility]  eMERGENCY dEPARTMENT eNCOUnter      Pt Name: Kinga Gomez  MRN: [id number]  Birthdate 1955  Date of evaluation: 10/20/2020  Provider: Dodie Zaldivar MD    [address] [place] [address]       Chief Complaint   Patient presents with    Abnormal Lab     Called by Juan Pride to come in after abnormal labs         HISTORY OF PRESENT ILLNESS   (Location/Symptom, Timing/Onset,Context/Setting, Quality, Duration, Modifying Factors, Severity)  Note limiting factors. Kinga Gomez is a 60 y.o. male who presents to the emergency department with abnormal labs. Patient states he had blood drawn yesterday at Dr. Aurelia Lesches office. Patient states that he sees the nephrologist secondary to chronic kidney disease. Patient and wife give history. They state that Dr. Juan Pride called patient today and told him to come to the emergency department because his kidney function was worsened. Patient states he is urinating without difficulty. Patient denies other symptoms. Patient was not told specific numbers of abnormal labs. Patient states that he is very careful to take an adequate amount of fluids daily. HPI    NursingNotes were reviewed. REVIEW OF SYSTEMS    (2-9 systems for level 4, 10 or more for level 5)     Review of Systems   Constitutional: Negative for chills and fever. HENT: Negative for congestion and rhinorrhea. Eyes: Negative for discharge. Respiratory: Negative for cough and shortness of breath. Cardiovascular: Negative for chest pain and palpitations. Gastrointestinal: Negative for abdominal pain, nausea and vomiting. Genitourinary: Negative for difficulty urinating and dysuria. Musculoskeletal: Negative for back pain and neck pain. Skin: Negative for color change and pallor. Neurological: Negative for syncope and light-headedness. Psychiatric/Behavioral: Negative for agitation and confusion. All other systems reviewed and are negative.            PAST MEDICALHISTORY     Past Medical History:   Diagnosis Date    Chronic kidney disease     Diabetes mellitus ([facility] [address]     Essential hypertension     Gout     History of colonic polyps     Hyperlipidemia     Osteoarthritis     Other disorders of kidney and ureter in diseases classified elsewhere          SURGICAL HISTORY       Past Surgical History:   Procedure Laterality Date    COLONOSCOPY           CURRENT MEDICATIONS     Previous Medications    ALLOPURINOL (ZYLOPRIM) 300 MG TABLET    allopurinol 300 mg tablet    AMLODIPINE (NORVASC) 10 MG TABLET    amlodipine 10 mg tablet    ASPIRIN 81 PO    aspirin 81 mg tablet,delayed release   Take 1 tablet every day by oral route. ATORVASTATIN (LIPITOR) 20 MG TABLET    atorvastatin 20 mg tablet    CARVEDILOL (COREG) 25 MG TABLET    carvedilol 25 mg tablet    FERROUS GLUCONATE (FERGON) 225 (27 FE) MG TABLET    ferrous gluconate 324 mg (38 mg iron) tablet   Take 1 tablet twice a day by oral route. HYDRALAZINE (APRESOLINE) 50 MG TABLET    Take 1 tablet by mouth 2 times daily    HYDROCHLOROTHIAZIDE (MICROZIDE) 12.5 MG CAPSULE    Take 12.5 mg by mouth daily    INSULIN DETEMIR (LEVEMIR) 100 UNIT/ML INJECTION VIAL    Inject 30 Units into the skin nightly    MAGNESIUM OXIDE (MAG-OX) 140 MG CAPS    Magnesium-Oxide 400 mg tablet   Take 1 tablet every day by oral route. SODIUM BICARBONATE 650 MG TABLET    Take 2 tablets by mouth 3 times daily       ALLERGIES     Phenylephrine-dm-gg    FAMILY HISTORY       Family History   Problem Relation Age of Onset    Other Neg Hx         He denies any family history of blood dyscrasias.           SOCIAL HISTORY       Social History     Socioeconomic History    Marital status:      Spouse name: Not on file    Number of children: Not on file    Years of education: Not on file    Highest education level: Not on file   Occupational History    Not on file   Social Needs    Financial resource strain: Not on file   [employer] insecurity     Worry: Not on file     Inability: Not on file    Transportation needs     Medical: Not on file     Non-medical: Not on file   Tobacco Use    Smoking status: Never Smoker    Smokeless tobacco: Never Used   Substance and Sexual Activity    Alcohol use: Never     Frequency: Never    Drug use: Never    Sexual activity: Not on file   Lifestyle    Physical activity     Days per week: Not on file     Minutes per session: Not on file    Stress: Not on file   Relationships    Social connections     Talks on phone: Not on file     Gets together: Not on file     Attends Congregational service: Not on file     Active member of club or organization: Not on file     Attends meetings of clubs or organizations: Not on file     Relationship status: Not on file    Intimate partner violence     Fear of current or ex partner: Not on file     Emotionally abused: Not on file     Physically abused: Not on file     Forced sexual activity: Not on file   Other Topics Concern    Not on file   Social History Narrative    Not on file       SCREENINGS             PHYSICAL EXAM    (up to 7 for level 4, 8 or more for level 5)     ED Triage Vitals [10/20/20 1439]   BP Temp Temp Source Pulse Resp SpO2 Height Weight   (!) 140/70 98.7 °F (37.1 °C) Temporal 83 16 98 % 5' 8\" (1.727 m) 228 lb (103.4 kg)       Physical Exam  Vitals signs and nursing note reviewed. Constitutional:       General: He is not in acute distress. Appearance: Normal appearance. He is obese. HENT:      Head: Normocephalic and atraumatic. Right Ear: External ear normal.      Left Ear: External ear normal.      Nose: Nose normal.      Mouth/Throat:      Mouth: Mucous membranes are moist.      Pharynx: Oropharynx is clear. No oropharyngeal exudate. Eyes:      General: No scleral icterus. Conjunctiva/sclera: Conjunctivae normal.      Pupils: Pupils are equal, round, and reactive to light. Neck:      Musculoskeletal: Neck supple.  No neck rigidity. Cardiovascular:      Rate and Rhythm: Normal rate and regular rhythm. Pulses: Normal pulses. Heart sounds: Normal heart sounds. Pulmonary:      Effort: Pulmonary effort is normal.      Breath sounds: Normal breath sounds. Abdominal:      General: Bowel sounds are normal.      Palpations: Abdomen is soft. Tenderness: There is no abdominal tenderness. There is no guarding. Musculoskeletal:         General: No tenderness or deformity. Skin:     General: Skin is warm and dry. Capillary Refill: Capillary refill takes less than 2 seconds. Coloration: Skin is not jaundiced. Neurological:      General: No focal deficit present. Mental Status: He is alert and oriented to person, place, and time. Mental status is at baseline.       Coordination: Coordination normal.   Psychiatric:         Mood and Affect: Mood normal.         Behavior: Behavior normal.         DIAGNOSTIC RESULTS     RADIOLOGY:  Non-plain film images such as CT, Ultrasound and MRI are read by the radiologist. Plain radiographic images are visualized and preliminarily interpreted bythe emergency physician with the below findings:          No orders to display           LABS:  Labs Reviewed   CBC WITH AUTO DIFFERENTIAL - Abnormal; Notable for the following components:       Result Value    RBC 3.23 (*)     Hemoglobin 9.7 (*)     Hematocrit 29.2 (*)     Eosinophils % 5.6 (*)     All other components within normal limits   COMPREHENSIVE METABOLIC PANEL W/ REFLEX TO MG FOR LOW K - Abnormal; Notable for the following components:    Sodium 133 (*)     Chloride 91 (*)     CO2 31 (*)     Glucose 254 (*)     BUN 79 (*)     CREATININE 4.0 (*)     GFR Non- 15 (*)     GFR  18 (*)     All other components within normal limits   URINE RT REFLEX TO CULTURE - Abnormal; Notable for the following components:    Protein, UA 30 (*)     All other components within normal limits   MICROSCOPIC URINALYSIS - Abnormal; Notable for the following components:    Bacteria, UA NEGATIVE (*)     Crystals, UA NEG (*)     All other components within normal limits       All other labs were within normal range or not returned as of this dictation. EMERGENCY DEPARTMENT COURSE and DIFFERENTIAL DIAGNOSIS/MDM:   Vitals:    Vitals:    10/20/20 1439 10/20/20 1552 10/20/20 1700 10/20/20 1733   BP: (!) 140/70 136/68 (!) 164/73 (!) 161/70   Pulse: 83 88  84   Resp: 16 16  16   Temp: 98.7 °F (37.1 °C)      TempSrc: Temporal      SpO2: 98% 98% 95% 93%   Weight: 228 lb (103.4 kg)      Height: 5' 8\" (1.727 m)          MDM  67-year-old male with history of chronic kidney disease presents secondary to worsening kidney disease. Patient's nephrologist, Dr. Lester Perez called and told patient to come to the hospital.  Lab results reviewed. Discussed with Dr. Juju Kaba, hospitalist, who will admit patient for further evaluation and treatment. CONSULTS:  IP CONSULT TO SOCIAL WORK  IP CONSULT TO NEPHROLOGY    PROCEDURES:  Unless otherwise noted below, none     Procedures    FINAL IMPRESSION      1. Acute renal failure superimposed on chronic kidney disease, unspecified CKD stage, unspecified acute renal failure type [facility])          DISPOSITION/PLAN   DISPOSITION Admitted 10/20/2020 06:09:21 PM      PATIENT REFERRED TO:  No follow-up provider specified.     DISCHARGE MEDICATIONS:  New Prescriptions    No medications on file          (Please note that portions of this note were completed with a voice recognition program.  Efforts were made to edit thedictations but occasionally words are mis-transcribed.)    Pepper Villa MD (electronically signed)  Attending Emergency Physician          Pepper Villa MD  10/20/20 0131

## 2020-10-20 NOTE — H&P
[facility]      History & Physical    [id number]  PCP: Valdez Lu MD    Date of Admission:10/20/2020    Patient:  Patricia Jordan  MRN: [id number]    Date of Service: Pt seen/examined on 10/20/2020 and Admitted to Inpatient with expected LOS greater than two midnights due to medical therapy. CHIEF COMPLAINT:     Chief Complaint   Patient presents with    Abnormal Lab     Called by Eugene Alejandro to come in after abnormal labs       History Obtained From:  patient, electronic medical record  Primary Care Physician: Valdez Lu MD    HISTORY OF PRESENT ILLNESS:    Mr. Ingrid Christianson, a 60 y.o. male with a history of DMT2, CKD, HLD, HTN, sent by his nephrologist, on account of worsening renal function. Patient denies any symptoms at this time. No changes seen medications or diet. No recent travels. Currently not on any chronic NSAIDs. No changes in urination. Initial work-up significant for creatinine of 4.0. Patient admitted for further work-up and management. PAST MEDICAL & SURGICAL HISTORY    Past Medical History:      Diagnosis Date    Chronic kidney disease     Diabetes mellitus ([address]     Essential hypertension     Gout     History of colonic polyps     Hyperlipidemia     Osteoarthritis     Other disorders of kidney and ureter in diseases classified elsewhere          Past Surgical History:      Procedure Laterality Date    COLONOSCOPY            SOCIAL & FAMILY HISTORY:    Social History:   TOBACCO:   reports that he has never smoked. He has never used smokeless tobacco.  ETOH:   reports no history of alcohol use. Family History:       Problem Relation Age of Onset    Other Neg Hx         He denies any family history of blood dyscrasias. MEDICATIONS:    Medications Prior to Admission:    Prior to Admission medications    Medication Sig Start Date End Date Taking?  Authorizing Provider   sodium bicarbonate 650 MG tablet Take 2 tablets by mouth 3 times daily 10/2/20 11/1/20 Yes Christopher Sow MD   hydrALAZINE (APRESOLINE) 50 MG tablet Take 1 tablet by mouth 2 times daily 10/2/20  Yes Christopher Sow MD   ASPIRIN 81 PO aspirin 81 mg tablet,delayed release   Take 1 tablet every day by oral route. Yes Historical Provider, MD   amLODIPine (NORVASC) 10 MG tablet amlodipine 10 mg tablet   Yes Historical Provider, MD   atorvastatin (LIPITOR) 20 MG tablet atorvastatin 20 mg tablet   Yes Historical Provider, MD   carvedilol (COREG) 25 MG tablet carvedilol 25 mg tablet 8/15/19  Yes Historical Provider, MD   magnesium oxide (MAG-OX) 140 MG CAPS Magnesium-Oxide 400 mg tablet   Take 1 tablet every day by oral route. Yes Historical Provider, MD   ferrous gluconate (FERGON) 225 (27 Fe) MG tablet ferrous gluconate 324 mg (38 mg iron) tablet   Take 1 tablet twice a day by oral route. Yes Historical Provider, MD   insulin detemir (LEVEMIR) 100 UNIT/ML injection vial Inject 30 Units into the skin nightly   Yes Historical Provider, MD   hydrochlorothiazide (MICROZIDE) 12.5 MG capsule Take 12.5 mg by mouth daily   Yes Historical Provider, MD   allopurinol (ZYLOPRIM) 300 MG tablet allopurinol 300 mg tablet    Historical Provider, MD        ALLERGIES:    Allergies:  Phenylephrine-dm-gg       REVIEW OF SYSTEMS :    Review of Systems  14 point review of systems is negative except as specifically addressed above. PHYSICAL EXAM:    Physical Exam:    Vitals:   BP (!) 167/76   Pulse 88   Temp 97.8 °F (36.6 °C) (Temporal)   Resp 16   Ht 5' 8\" (1.727 m)   Wt 228 lb (103.4 kg)   SpO2 97%   BMI 34.67 kg/m²     Physical Exam  Vitals signs and nursing note reviewed. Constitutional:       General: He is not in acute distress. Appearance: Normal appearance. He is not ill-appearing, toxic-appearing or diaphoretic. HENT:      Head: Normocephalic and atraumatic.       Right Ear: External ear normal.      Left Ear: External ear normal.      Nose: Nose normal. No congestion or rhinorrhea. Eyes:      General: No scleral icterus. Right eye: No discharge. Left eye: No discharge. Extraocular Movements: Extraocular movements intact. Conjunctiva/sclera: Conjunctivae normal.      Pupils: Pupils are equal, round, and reactive to light. Neck:      Musculoskeletal: Normal range of motion and neck supple. No neck rigidity or muscular tenderness. Vascular: No carotid bruit. Cardiovascular:      Rate and Rhythm: Normal rate and regular rhythm. Pulses: Normal pulses. Heart sounds: Normal heart sounds. No murmur. No friction rub. No gallop. Pulmonary:      Effort: Pulmonary effort is normal. No respiratory distress. Breath sounds: Normal breath sounds. No stridor. No wheezing, rhonchi or rales. Chest:      Chest wall: No tenderness. Abdominal:      General: Bowel sounds are normal. There is no distension. Palpations: Abdomen is soft. Tenderness: There is no abdominal tenderness. There is no guarding or rebound. Musculoskeletal: Normal range of motion. General: No swelling, tenderness, deformity or signs of injury. Right lower leg: No edema. Left lower leg: No edema. Skin:     General: Skin is warm and dry. Capillary Refill: Capillary refill takes less than 2 seconds. Coloration: Skin is not jaundiced or pale. Findings: No bruising, erythema, lesion or rash. Neurological:      General: No focal deficit present. Mental Status: He is alert and oriented to person, place, and time. Cranial Nerves: No cranial nerve deficit. Sensory: No sensory deficit. Motor: No weakness. Coordination: Coordination normal.   Psychiatric:         Mood and Affect: Mood normal.         Behavior: Behavior normal.         Thought Content: Thought content normal.         Judgment: Judgment normal.               DIAGNOSTIC STUDIES:    I have reviewedLaboratory and Imaging data report today.     Recent Labs     10/20/20  1550   WBC 9.4   HGB 9.7*        Recent Labs     10/20/20  1550   *   K 4.4   CL 91*   CO2 31*   BUN 79*   CREATININE 4.0*   GLUCOSE 254*   AST 10   ALT 8   BILITOT 0.3   ALKPHOS 128     Troponin T: No results for input(s): TROPONINI in the last 72 hours. Pro-BNP: No results found for: BNP  Lactate: No results found for: LACTA      ABGs: No results found for: PHART, PO2ART, GUW7SPG  INR: No results for input(s): INR in the last 72 hours. TSH: No results found for: TSH, TSHREFLEX  URINALYSIS:  Recent Labs     10/20/20  1642   COLORU YELLOW   PHUR 7.5   WBCUA 0   RBCUA 0   BACTERIA NEGATIVE*   CLARITYU Clear   SPECGRAV 1.008   LEUKOCYTESUR Negative   UROBILINOGEN 1.0   BILIRUBINUR Negative   BLOODU Negative   GLUCOSEU Negative          RADIOLOGY / IMAGING REPORTS:     No results found. ASSESSMENT / IMPRESSION & PLAN:          Hospital Problems           Last Modified POA    * (Principal) JOSEE (acute kidney injury) ([facility] [address] 10/20/2020 Yes    Hyperlipidemia 10/20/2020 Yes    Essential hypertension 10/20/2020 Yes    Diabetes mellitus ([facility] [address] 10/20/2020 Yes    Chronic kidney disease 10/20/2020 Yes          Principal Problem:    JOSEE (acute kidney injury) ([facility] [address]  Active Problems:    Hyperlipidemia    Essential hypertension    Diabetes mellitus ([facility] [address]    Chronic kidney disease  Resolved Problems:    * No resolved hospital problems.  *      JOSEE on CKD stage III  · -Creatinine level on presentation 4.0  · -IV hydration    -Urine studies  · --Eosinophils, Urine  · --Urea nitrogen, Urine  · --Creatinine, urine  · --Sodium, urine    · -Renal ultrasound complete  · -Check serum uric acid level  · -Check serum intact PTH level   · -Check vitamin D 25-hydroxy  · -Avoid hypotension and nephrotoxins  · -Nephrology consult  ·     History of insulin-dependent Diabetes Mellitus II   Uncontrolled   Hemoglobin A1C      Inpatient Regimens to include;  o - Insulin Glargine (Lantus) 25 units subcu nightly  o - Insulin Lispro (Humalog) 7 units subcu pre-meal 3 times a day  o - Insulin Lispro (Humalog) on a Medium dose sliding scale   Monitor POC glucose, and adjust insulin regimen accordingly based on daily insulin requirement. Continue management of other chronic medical conditions - Please see orders above         CONSULTS:    IP CONSULT TO SOCIAL WORK  IP CONSULT TO NEPHROLOGY        INPATIENT CHECKLIST:      Nutrition: DIET CARDIAC;    Prophylaxis Orders:   VTE - Heparin     CODE STATUS: Full Code  ISOLATION:       DISCHARGE PLAN: tbd     Total face-to-face time spent with this patient, time spent reviewing medical records, and in coordination of care with the emergency department physician, nursing staff, in the examination, evaluation/assessment, counseling, review of medications and plan, was more than 50 mins . Electronically signed by   Kvng Esteban MD, MPH  Internal Medicine Hospitalist   10/20/2020 7:48 PM      EMR Dragon/Transcription disclaimer:   Much of this encounter note is an electronic transcription/translation of spoken language to printed text.  The electronic translation of spoken language may permit erroneous, or at times, nonsensical words or phrases to be inadvertently transcribed; although attempts have made to review the note for such errors, some may still exist.

## 2020-10-21 ENCOUNTER — APPOINTMENT (OUTPATIENT)
Dept: ULTRASOUND IMAGING | Age: 65
DRG: 683 | End: 2020-10-21
Payer: COMMERCIAL

## 2020-10-21 LAB
ANION GAP SERPL CALCULATED.3IONS-SCNC: 13 MMOL/L (ref 7–19)
BASOPHILS ABSOLUTE: 0.1 K/UL (ref 0–0.2)
BASOPHILS RELATIVE PERCENT: 0.6 % (ref 0–1)
BUN BLDV-MCNC: 81 MG/DL (ref 8–23)
CALCIUM SERPL-MCNC: 9.1 MG/DL (ref 8.8–10.2)
CHLORIDE BLD-SCNC: 97 MMOL/L (ref 98–111)
CO2: 25 MMOL/L (ref 22–29)
CREAT SERPL-MCNC: 3.7 MG/DL (ref 0.5–1.2)
EOSINOPHILS ABSOLUTE: 0.5 K/UL (ref 0–0.6)
EOSINOPHILS RELATIVE PERCENT: 5.9 % (ref 0–5)
GFR AFRICAN AMERICAN: 20
GFR NON-AFRICAN AMERICAN: 17
GLUCOSE BLD-MCNC: 102 MG/DL (ref 70–99)
GLUCOSE BLD-MCNC: 163 MG/DL (ref 70–99)
GLUCOSE BLD-MCNC: 174 MG/DL (ref 74–109)
GLUCOSE BLD-MCNC: 188 MG/DL (ref 70–99)
GLUCOSE BLD-MCNC: 260 MG/DL (ref 70–99)
HCT VFR BLD CALC: 25.7 % (ref 42–52)
HEMOGLOBIN: 8.3 G/DL (ref 14–18)
IMMATURE GRANULOCYTES #: 0 K/UL
LYMPHOCYTES ABSOLUTE: 2 K/UL (ref 1.1–4.5)
LYMPHOCYTES RELATIVE PERCENT: 24.7 % (ref 20–40)
MAGNESIUM: 2.2 MG/DL (ref 1.6–2.4)
MCH RBC QN AUTO: 29.3 PG (ref 27–31)
MCHC RBC AUTO-ENTMCNC: 32.3 G/DL (ref 33–37)
MCV RBC AUTO: 90.8 FL (ref 80–94)
MONOCYTES ABSOLUTE: 0.7 K/UL (ref 0–0.9)
MONOCYTES RELATIVE PERCENT: 8 % (ref 0–10)
NEUTROPHILS ABSOLUTE: 5 K/UL (ref 1.5–7.5)
NEUTROPHILS RELATIVE PERCENT: 60.3 % (ref 50–65)
PDW BLD-RTO: 13.4 % (ref 11.5–14.5)
PERFORMED ON: ABNORMAL
PHOSPHORUS: 3.9 MG/DL (ref 2.5–4.5)
PLATELET # BLD: 256 K/UL (ref 130–400)
PMV BLD AUTO: 11.1 FL (ref 9.4–12.4)
POTASSIUM REFLEX MAGNESIUM: 3.9 MMOL/L (ref 3.5–5)
RBC # BLD: 2.83 M/UL (ref 4.7–6.1)
SODIUM BLD-SCNC: 135 MMOL/L (ref 136–145)
VITAMIN D 25-HYDROXY: 26.5 NG/ML
WBC # BLD: 8.3 K/UL (ref 4.8–10.8)

## 2020-10-21 PROCEDURE — 2580000003 HC RX 258: Performed by: INTERNAL MEDICINE

## 2020-10-21 PROCEDURE — 36415 COLL VENOUS BLD VENIPUNCTURE: CPT

## 2020-10-21 PROCEDURE — 1210000000 HC MED SURG R&B

## 2020-10-21 PROCEDURE — 6360000002 HC RX W HCPCS: Performed by: INTERNAL MEDICINE

## 2020-10-21 PROCEDURE — 83735 ASSAY OF MAGNESIUM: CPT

## 2020-10-21 PROCEDURE — 84100 ASSAY OF PHOSPHORUS: CPT

## 2020-10-21 PROCEDURE — 6370000000 HC RX 637 (ALT 250 FOR IP): Performed by: INTERNAL MEDICINE

## 2020-10-21 PROCEDURE — 76770 US EXAM ABDO BACK WALL COMP: CPT

## 2020-10-21 PROCEDURE — 82306 VITAMIN D 25 HYDROXY: CPT

## 2020-10-21 PROCEDURE — 82947 ASSAY GLUCOSE BLOOD QUANT: CPT

## 2020-10-21 PROCEDURE — 85025 COMPLETE CBC W/AUTO DIFF WBC: CPT

## 2020-10-21 PROCEDURE — 80048 BASIC METABOLIC PNL TOTAL CA: CPT

## 2020-10-21 RX ADMIN — INSULIN LISPRO 2 UNITS: 100 INJECTION, SOLUTION INTRAVENOUS; SUBCUTANEOUS at 08:47

## 2020-10-21 RX ADMIN — SODIUM CHLORIDE: 9 INJECTION, SOLUTION INTRAVENOUS at 11:12

## 2020-10-21 RX ADMIN — HEPARIN SODIUM 5000 UNITS: 5000 INJECTION INTRAVENOUS; SUBCUTANEOUS at 23:35

## 2020-10-21 RX ADMIN — CARVEDILOL 12.5 MG: 12.5 TABLET, FILM COATED ORAL at 23:38

## 2020-10-21 RX ADMIN — INSULIN LISPRO 1 UNITS: 100 INJECTION, SOLUTION INTRAVENOUS; SUBCUTANEOUS at 23:34

## 2020-10-21 RX ADMIN — CARVEDILOL 12.5 MG: 12.5 TABLET, FILM COATED ORAL at 08:47

## 2020-10-21 RX ADMIN — Medication 10 ML: at 08:49

## 2020-10-21 RX ADMIN — HEPARIN SODIUM 5000 UNITS: 5000 INJECTION INTRAVENOUS; SUBCUTANEOUS at 06:18

## 2020-10-21 RX ADMIN — INSULIN LISPRO 6 UNITS: 100 INJECTION, SOLUTION INTRAVENOUS; SUBCUTANEOUS at 12:18

## 2020-10-21 RX ADMIN — Medication 10 ML: at 23:50

## 2020-10-21 RX ADMIN — ATORVASTATIN CALCIUM 20 MG: 20 TABLET, FILM COATED ORAL at 23:38

## 2020-10-21 RX ADMIN — Medication 25 UNITS: at 23:37

## 2020-10-21 RX ADMIN — INSULIN LISPRO 7 UNITS: 100 INJECTION, SOLUTION INTRAVENOUS; SUBCUTANEOUS at 12:18

## 2020-10-21 RX ADMIN — HEPARIN SODIUM 5000 UNITS: 5000 INJECTION INTRAVENOUS; SUBCUTANEOUS at 14:07

## 2020-10-21 RX ADMIN — AMLODIPINE BESYLATE 10 MG: 10 TABLET ORAL at 08:47

## 2020-10-21 RX ADMIN — HYDRALAZINE HYDROCHLORIDE 50 MG: 50 TABLET, FILM COATED ORAL at 23:38

## 2020-10-21 RX ADMIN — HYDRALAZINE HYDROCHLORIDE 50 MG: 50 TABLET, FILM COATED ORAL at 08:47

## 2020-10-21 ASSESSMENT — PAIN SCALES - GENERAL: PAINLEVEL_OUTOF10: 0

## 2020-10-21 NOTE — CONSULTS
Renal Consult Note    Thank you to requesting provider: Dr Esvin Pereyra, for asking us to see Kassandraotilio Davis    Reason for consultation:  JOSEE    Chief Complaint:  Abnormal labs    History of Presenting Illness      Mr. Tad Panchal, a 60 y.o. male with a history of DMT2, CKD, HLD, HTN, sent from the nephrology office for worsening renal function. His baseline serum creatinine ranges 2-2.7 and it was found at 4. Patient denied any recent change in meds, has no nausea, vomiting or diarrhea. He also denies change to his urine habits. He denies NSAIDs use.       Past Medical/Surgical History      Active Ambulatory Problems     Diagnosis Date Noted    Iron (Fe) deficiency anemia 03/04/2020    Acute kidney injury superimposed on chronic kidney disease ([facility] [address] 10/01/2020     Resolved Ambulatory Problems     Diagnosis Date Noted    No Resolved Ambulatory Problems     Past Medical History:   Diagnosis Date    Chronic kidney disease     Diabetes mellitus ([facility] [address]     Essential hypertension     Gout     History of colonic polyps     Hyperlipidemia     Osteoarthritis     Other disorders of kidney and ureter in diseases classified elsewhere          Review of Systems     Constitutional:  No weight loss, no fever/chills  Eyes:  No eye pain, no eye redness  Cardiovascular:  No chest pain, no worsening of edema  Respiratory:  No hemoptysis, no stidor  Gastrointestinal:  No blood in stool, no n/v, no diarrhea  Genitoruinary:  No hematuria, no difficulty with urination  Musculoskeletal:  No joint swelling, no redness  Integumentary:  No Rash, no itching  Neurological:  No focal weakness, No new sensory deficit  Psychiatric:  No depression, no confusion  Endocrine:  No polyuria, no polydipsia       Medications        Current Facility-Administered Medications:     amLODIPine (NORVASC) tablet 10 mg, 10 mg, Oral, Daily, Patricia Bonds MD, 10 mg at 10/21/20 0847    atorvastatin (LIPITOR) tablet 20 mg, 20 mg, Oral, Nightly, Loyd Manzano Betsy Oconnor MD, 20 mg at 10/20/20 2342    carvedilol (COREG) tablet 12.5 mg, 12.5 mg, Oral, BID, Guicho Messina MD, 12.5 mg at 10/21/20 0847    hydrALAZINE (APRESOLINE) tablet 50 mg, 50 mg, Oral, BID, Guicho Messina MD, 50 mg at 10/21/20 0847    sodium chloride flush 0.9 % injection 10 mL, 10 mL, Intravenous, 2 times per day, Guicho Messina MD, 10 mL at 10/21/20 0849    sodium chloride flush 0.9 % injection 10 mL, 10 mL, Intravenous, PRN, Guicho Messina MD    potassium chloride (KLOR-CON M) extended release tablet 40 mEq, 40 mEq, Oral, PRN **OR** potassium bicarb-citric acid (EFFER-K) effervescent tablet 40 mEq, 40 mEq, Oral, PRN **OR** potassium chloride 10 mEq/100 mL IVPB (Peripheral Line), 10 mEq, Intravenous, PRN, Guicho Messina MD    acetaminophen (TYLENOL) tablet 650 mg, 650 mg, Oral, Q6H PRN **OR** acetaminophen (TYLENOL) suppository 650 mg, 650 mg, Rectal, Q6H PRN, Guicho Messina MD    magnesium hydroxide (MILK OF MAGNESIA) 400 MG/5ML suspension 30 mL, 30 mL, Oral, Daily PRN, Guicho Messina MD    promethazine (PHENERGAN) tablet 12.5 mg, 12.5 mg, Oral, Q6H PRN **OR** ondansetron (ZOFRAN) injection 4 mg, 4 mg, Intravenous, Q6H PRN, Guicho Messina MD    glucose (GLUTOSE) 40 % oral gel 15 g, 15 g, Oral, PRN, Guicho Messina MD    dextrose 50 % IV solution, 12.5 g, Intravenous, PRN, Guicho Messina MD    glucagon (rDNA) injection 1 mg, 1 mg, Intramuscular, PRN, Guicho Messina MD    dextrose 5 % solution, 100 mL/hr, Intravenous, PRN, Guicho Messina MD    0.9 % sodium chloride infusion, , Intravenous, Continuous, Guicho Messina MD, Last Rate: 100 mL/hr at 10/20/20 2352    heparin (porcine) injection 5,000 Units, 5,000 Units, Subcutaneous, 3 times per day, Guicho Messina MD, 5,000 Units at 10/21/20 0618    insulin glargine (LANTUS) injection vial 25 Units, 25 Units, Subcutaneous, Nightly, Guicho Messina MD, 25 Units at 10/20/20 2343    insulin lispro (HUMALOG) injection vial 7 Units, 7 Units, Subcutaneous, TID Joaquín FERRARA MD    insulin lispro (HUMALOG) injection vial 0-12 Units, 0-12 Units, Subcutaneous, TID Joaquín FERRARA MD, 2 Units at 10/21/20 0847    insulin lispro (HUMALOG) injection vial 0-6 Units, 0-6 Units, Subcutaneous, Nightly, Joaquín Bray MD, 2 Units at 10/20/20 2343  Outpatient Medications Marked as Taking for the 10/20/20 encounter [facility] Encounter)   Medication Sig Dispense Refill    sodium bicarbonate 650 MG tablet Take 2 tablets by mouth 3 times daily 180 tablet 0    hydrALAZINE (APRESOLINE) 50 MG tablet Take 1 tablet by mouth 2 times daily 90 tablet 3    ASPIRIN 81 PO aspirin 81 mg tablet,delayed release   Take 1 tablet every day by oral route.  amLODIPine (NORVASC) 10 MG tablet amlodipine 10 mg tablet      atorvastatin (LIPITOR) 20 MG tablet atorvastatin 20 mg tablet      carvedilol (COREG) 25 MG tablet carvedilol 25 mg tablet      magnesium oxide (MAG-OX) 140 MG CAPS Magnesium-Oxide 400 mg tablet   Take 1 tablet every day by oral route.  ferrous gluconate (FERGON) 225 (27 Fe) MG tablet ferrous gluconate 324 mg (38 mg iron) tablet   Take 1 tablet twice a day by oral route.  insulin detemir (LEVEMIR) 100 UNIT/ML injection vial Inject 30 Units into the skin nightly      hydrochlorothiazide (MICROZIDE) 12.5 MG capsule Take 12.5 mg by mouth daily         Allergies   Phenylephrine-dm-gg    Family History       Family History   Problem Relation Age of Onset    Other Neg Hx         He denies any family history of blood dyscrasias. Family history negative for kidney disease.      Social History      Social History     Socioeconomic History    Marital status:      Spouse name: None    Number of children: None    Years of education: None    Highest education level: None   Occupational History    None   Social Needs    Financial resource strain: None   [place] insecurity     Worry: None     Inability: None    Transportation needs     Medical: None     Non-medical: None   Tobacco Use    Smoking status: Never Smoker    Smokeless tobacco: Never Used   Substance and Sexual Activity    Alcohol use: Never     Frequency: Never    Drug use: Never    Sexual activity: None   Lifestyle    Physical activity     Days per week: None     Minutes per session: None    Stress: None   Relationships    Social connections     Talks on phone: None     Gets together: None     Attends Jainism service: None     Active member of club or organization: None     Attends meetings of clubs or organizations: None     Relationship status: None    Intimate partner violence     Fear of current or ex partner: None     Emotionally abused: None     Physically abused: None     Forced sexual activity: None   Other Topics Concern    None   Social History Narrative    None       Physical Exam     Blood pressure (!) 152/71, pulse 82, temperature 97.7 °F (36.5 °C), resp. rate 18, height 5' 8\" (1.727 m), weight 228 lb (103.4 kg), SpO2 96 %. General:  NAD, A+Ox3, ill-appearing, normal body habitus  HEENT:  PERRL, EOMI  Neck:  Supple, normal range of movement  Chest:  CTAB, good respiratory effort, good air movement  CV:  RRR, no murmurs or rubs  Abdomen:  NTND, soft, +BS, no hepatosplenomegaly  Extremities:  No peripheral edema  Neurological:  Moving all four extremities,  Lymphatics:  No palpable lymph nodes   Skin:  No rash, no jaundice  Psychiatric:  Normal insight and judgement, good recall    Data     Recent Labs     10/20/20  1550 10/21/20  0427   WBC 9.4 8.3   HGB 9.7* 8.3*   HCT 29.2* 25.7*   MCV 90.4 90.8    256     Recent Labs     10/20/20  1550 10/21/20  0427   * 135*   K 4.4 3.9   CL 91* 97*   CO2 31* 25   GLUCOSE 254* 174*   PHOS  --  3.9   MG  --  2.2   BUN 79* 81*   CREATININE 4.0* 3.7*   LABGLOM 15* 17*   GFRAA 18* 20*       Assessment:  1.  JOSEE (pre renal azotemia vs ATN)  2. CKD stage 3b  3. Hyponatremia  4. Type 2 DM weith renal disease  5. Hypertension  6. Anemia of CKD    Plan:  · I agree with gentle IV hydration for now. Hold sodium bicarbonate. Obtain a workup for anemia. Will check uric acid and PTH levels. Avoid hypotension and nephrotoxins.      Thank you for asking us to participate in the management of your patient, please do not hesitate to contact me for any concerns regarding my recommendations as outlined above.    -----------------------------  Electronically signed by Roseline Callejas MD on 10/21/20 at 9:43 AM CDT

## 2020-10-21 NOTE — CARE COORDINATION
Date / Time of Evaluation: 10/21/2020 4:57 PM  Assessment Completed by: Saira Davis    Patient Admission Status: Inpatient [101]      Current PCP:  Abdirahman Donato MD  PCP verified? yes    Initial Assessment Completed at bedside with:  Pt and spouse, at bedside    Emergency Contacts:  Extended Emergency Contact Information  Primary Emergency Contact: Amrita Blankenship  [facility] [address] Phone: [phone]  Relation: Spouse    Advance Directives: Code Status:  Full Code    Financial:  Payor: Lincoln Tejeda / Plan: Melia Eason / Product Type: *No Product type* /     Pre-Cert required for SNF:  yes    Have Long Term Care Insurance:  no    [facility] [address] [address]  Phone: [phone] Fax: [phone]      Potential assistance purchasing medications? no    ADLS:  Support System:  Spouse/Significant Other    Current Home Environment:  Home with spouse  Steps:  no    Plans to RETURN to current housing: Yes  Barriers to RETURNING to current housing:  none    Currently ACTIVE with Home Health CARE:  no  [address]:  no    DME Provider:  none    Had HOME OXYGEN prior to admission:  no      Active with HD/PD prior to admission: no  Nephrologist:  no  HD Center:  no    Transition Plan:  pta home with spouse; Readmit; prev hosp 10/1-2/2020 ([facility])    Transportation PLAN for Discharge:  spouse    Factors facilitating achievement of predicted outcomes: Pt has support from family/spouse    Barriers to discharge:  none      Additional CM/SW Notes: Pt and spouse very pleasant; cooperative; plan is to return home; no current [place] or DME          Ender and/or his family were provided with choice of provider.         Timothy Oates [address] [place] [address]  Care Management Department  Ph:  [phone]   Fax: [phone]  Electronically signed by BARBARA Oates on 10/21/2020 at 4:58 PM

## 2020-10-21 NOTE — PROGRESS NOTES
[facility]    Patient:  Jaydon Davis  YOB: 1955  Date of Service: 10/21/2020  MRN: Jhoana Emmanuel: [de-identified]   Primary Care Physician: Allan Damian MD  Advance Directive: Full Code  Admit Date: 10/20/2020       Hospital Day: 1      CHIEF COMPLAINT:     Chief Complaint   Patient presents with    Abnormal Lab     Called by Don More to come in after abnormal labs       10/21/2020 7:02 AM  Subjective / Interval History:   10/21/2020  Doing well. Creatinine slightly improved. Laying comfortably in bed in no apparent acute distress. Denies any acute complaints or distress at this time. Family at bedside. No acute changes or acute overnight event reported. Brief History:   Mr. Tad Panchal, a 60 y.o. male with a history of DMT2, CKD, HLD, HTN, sent by his nephrologist, on account of worsening renal function.     Patient denies any symptoms at this time. No changes seen medications or diet. No recent travels. Currently not on any chronic NSAIDs. No changes in urination.     Initial work-up significant for creatinine of 4.0.     Patient admitted for further work-up and management. Review of Systems:   Review of Systems  ROS: 14 point review of systems is negative except as specifically addressed above. DIET CARDIAC;     Intake/Output Summary (Last 24 hours) at 10/21/2020 2068  Last data filed at 10/20/2020 2055  Gross per 24 hour   Intake --   Output 100 ml   Net -100 ml       Medications:   dextrose      sodium chloride 100 mL/hr at 10/20/20 2352     Current Facility-Administered Medications   Medication Dose Route Frequency Provider Last Rate Last Dose    amLODIPine (NORVASC) tablet 10 mg  10 mg Oral Daily Patricia Bonds MD        atorvastatin (LIPITOR) tablet 20 mg  20 mg Oral Nightly Patricia Bonds MD   20 mg at 10/20/20 2342    carvedilol (COREG) tablet 12.5 mg  12.5 mg Oral BID Patricia Bonds MD   12.5 mg at 10/20/20 2342    hydrALAZINE (APRESOLINE) tablet 50 mg  50 mg Oral BID Wicho Pacheco MD   50 mg at 10/20/20 2342    sodium chloride flush 0.9 % injection 10 mL  10 mL Intravenous 2 times per day Wicho Pacheco MD   10 mL at 10/20/20 2347    sodium chloride flush 0.9 % injection 10 mL  10 mL Intravenous PRN Wicho Pacheco MD        potassium chloride (KLOR-CON M) extended release tablet 40 mEq  40 mEq Oral PRN Wicho Pacheco MD        Or    potassium bicarb-citric acid (EFFER-K) effervescent tablet 40 mEq  40 mEq Oral PRN Wicho Pacheco MD        Or    potassium chloride 10 mEq/100 mL IVPB (Peripheral Line)  10 mEq Intravenous PRN Wicho Pacheco MD        acetaminophen (TYLENOL) tablet 650 mg  650 mg Oral Q6H PRN Wicho Pacheco MD        Or   [facility] acetaminophen (TYLENOL) suppository 650 mg  650 mg Rectal Q6H PRN Wicho Pacheco MD        magnesium hydroxide (MILK OF MAGNESIA) 400 MG/5ML suspension 30 mL  30 mL Oral Daily PRN Wicho Pacheco MD        promethazine (PHENERGAN) tablet 12.5 mg  12.5 mg Oral Q6H PRN Wicho Pacheco MD        Or    [facility]ron [facility] injection 4 mg  4 mg Intravenous Q6H PRN Wicho Pacheco MD        glucose (GLUTOSE) 40 % oral gel 15 g  15 g Oral PRN Wicho Pacheco MD        dextrose 50 % IV solution  12.5 g Intravenous PRN Wicho Pacheco MD        glucagon (rDNA) injection 1 mg  1 mg Intramuscular PRN Wicho Pacheco MD        dextrose 5 % solution  100 mL/hr Intravenous PRN Wicho Pacheco MD        0.9 % sodium chloride infusion   Intravenous Continuous Wicho Pacheco  mL/hr at 10/20/20 2352      heparin (porcine) injection 5,000 Units  5,000 Units Subcutaneous 3 times per day Wicho Pacheco MD   5,000 Units at 10/21/20 0618    insulin glargine (LANTUS) injection vial 25 Units  25 Units Subcutaneous Nightly Wicho Pacheco MD   25 Units at 10/20/20 2343    insulin lispro (HUMALOG) injection vial 7 Units  7 Units Subcutaneous TID  Meg Wilson MD        insulin lispro (HUMALOG) injection vial 0-12 Units  0-12 Units Subcutaneous TID  Meg Wilson MD        insulin lispro (HUMALOG) injection vial 0-6 Units  0-6 Units Subcutaneous Nightly Meg Wilson MD   2 Units at 10/20/20 2343         dextrose      sodium chloride 100 mL/hr at 10/20/20 2352      amLODIPine  10 mg Oral Daily    atorvastatin  20 mg Oral Nightly    carvedilol  12.5 mg Oral BID    hydrALAZINE  50 mg Oral BID    sodium chloride flush  10 mL Intravenous 2 times per day    heparin (porcine)  5,000 Units Subcutaneous 3 times per day    insulin glargine  25 Units Subcutaneous Nightly    insulin lispro  7 Units Subcutaneous TID     insulin lispro  0-12 Units Subcutaneous TID     insulin lispro  0-6 Units Subcutaneous Nightly     sodium chloride flush, potassium chloride **OR** potassium alternative oral replacement **OR** potassium chloride, acetaminophen **OR** acetaminophen, magnesium hydroxide, promethazine **OR** ondansetron, glucose, dextrose, glucagon (rDNA), dextrose  DIET CARDIAC;       Labs:   CBC with DIFF:  Recent Labs     10/20/20  1550 10/21/20  0427   WBC 9.4 8.3   RBC 3.23* 2.83*   HGB 9.7* 8.3*   HCT 29.2* 25.7*   MCV 90.4 90.8   MCH 30.0 29.3   MCHC 33.2 32.3*   RDW 13.5 13.4    256   MPV 10.1 11.1   NEUTOPHILPCT 63.3 60.3   LYMPHOPCT 21.9 24.7   MONOPCT 8.2 8.0   EOSRELPCT 5.6* 5.9*   BASOPCT 0.5 0.6   NEUTROABS 5.9 5.0   LYMPHSABS 2.1 2.0   MONOSABS 0.80 0.70   EOSABS 0.50 0.50   BASOSABS 0.10 0.10       CMP/BMP:  Recent Labs     10/20/20  1550 10/21/20  0427   * 135*   K 4.4 3.9   CL 91* 97*   CO2 31* 25   ANIONGAP 11 13   GLUCOSE 254* 174*   BUN 79* 81*   CREATININE 4.0* 3.7*   LABGLOM 15* 17*   CALCIUM 9.7 9.1   PROT 8.0  --    LABALBU 4.1  --    BILITOT 0.3  --    ALKPHOS 128  --    ALT 8  --    AST 10  --          CRP:  No results for input(s): CRP in the last 72 normal    in size, shape, contour and position. The cortical thickness is    normal, with preservation of the corticomedullary differentiation. The    central echo complex is compact with no evidence for hydronephrosis. No nephrolithiasis or abnormal perinephric fluid collections are seen. No hydroureter. Scanning through the pelvis reveals the bladder to be mildly distended    with anechoic urine. The wall thickness and contour are normal. There    is no surrounding ascites.         Impression    1. Unremarkable renal ultrasound. Signed by Dr Kierra Vora on 10/21/2020 7:18 AM                 Objective:   Vitals:   BP (!) 142/72   Pulse 85   Temp 97.6 °F (36.4 °C) (Temporal)   Resp 16   Ht 5' 8\" (1.727 m)   Wt 228 lb (103.4 kg)   SpO2 94%   BMI 34.67 kg/m²       Patient Vitals for the past 24 hrs:   BP Temp Temp src Pulse Resp SpO2 Height Weight   10/21/20 0050 (!) 142/72 97.6 °F (36.4 °C) Temporal 85 16 94 % -- --   10/20/20 1918 (!) 167/76 97.8 °F (36.6 °C) Temporal 88 16 97 % -- --   10/20/20 1830 (!) 166/69 -- -- 78 17 95 % -- --   10/20/20 1800 (!) 153/66 -- -- -- -- 95 % -- --   10/20/20 1733 (!) 161/70 -- -- 84 16 93 % -- --   10/20/20 1700 (!) 164/73 -- -- -- -- 95 % -- --   10/20/20 1552 136/68 -- -- 88 16 98 % -- --   10/20/20 1439 (!) 140/70 98.7 °F (37.1 °C) Temporal 83 16 98 % 5' 8\" (1.727 m) 228 lb (103.4 kg)       24HR INTAKE/OUTPUT:      Intake/Output Summary (Last 24 hours) at 10/21/2020 4351  Last data filed at 10/20/2020 2055  Gross per 24 hour   Intake --   Output 100 ml   Net -100 ml       Physical Exam  Vitals signs and nursing note reviewed. Constitutional:       General: He is not in acute distress. Appearance: Normal appearance. He is not ill-appearing, toxic-appearing or diaphoretic. HENT:      Head: Normocephalic and atraumatic. Right Ear: External ear normal.      Left Ear: External ear normal.      Nose: Nose normal. No congestion or rhinorrhea.    Eyes: General: No scleral icterus. Right eye: No discharge. Left eye: No discharge. Extraocular Movements: Extraocular movements intact. Conjunctiva/sclera: Conjunctivae normal.      Pupils: Pupils are equal, round, and reactive to light. Neck:      Musculoskeletal: Normal range of motion and neck supple. No neck rigidity or muscular tenderness. Vascular: No carotid bruit. Cardiovascular:      Rate and Rhythm: Normal rate and regular rhythm. Pulses: Normal pulses. Heart sounds: Normal heart sounds. No murmur. No friction rub. No gallop. Pulmonary:      Effort: Pulmonary effort is normal. No respiratory distress. Breath sounds: Normal breath sounds. No stridor. No wheezing, rhonchi or rales. Chest:      Chest wall: No tenderness. Abdominal:      General: Bowel sounds are normal. There is no distension. Tenderness: There is no abdominal tenderness. There is no guarding or rebound. Musculoskeletal: Normal range of motion. General: No swelling, tenderness, deformity or signs of injury. Right lower leg: No edema. Left lower leg: No edema. Skin:     General: Skin is warm and dry. Capillary Refill: Capillary refill takes less than 2 seconds. Coloration: Skin is not jaundiced or pale. Findings: No bruising, erythema, lesion or rash. Neurological:      General: No focal deficit present. Mental Status: He is alert and oriented to person, place, and time. Cranial Nerves: No cranial nerve deficit. Sensory: No sensory deficit. Motor: No weakness. Coordination: Coordination normal.   Psychiatric:         Mood and Affect: Mood normal.         Behavior: Behavior normal.         Thought Content:  Thought content normal.         Judgment: Judgment normal.         Assessment/plan:     Patient Active Problem List    Diagnosis Date Noted    [facility] 10/[facility]2020     Priority: High    Hyperlipidemia     Essential hypertension     [facility]     Chronic kidney disease     Acute kidney injury superimposed on chronic kidney disease ([facility] 10/01/2020    Iron (Fe) deficiency anemia 03/04/2020       Hospital Problems           Last Modified POA    * (Principal) JOSEE (acute kidney injury) ([facility] 10/20/2020 Yes    Hyperlipidemia 10/20/2020 Yes    Essential hypertension 10/20/2020 Yes    Diabetes mellitus ([facility] 10/20/2020 Yes    Chronic kidney disease 10/20/2020 Yes          Principal Problem:    JOSEE (acute kidney injury) ([facility]  Active Problems:    Hyperlipidemia    Essential hypertension    Diabetes mellitus ([facility]    Chronic kidney disease  Resolved Problems:    * No resolved hospital problems. *        JOSEE on CKD stage III  · -Creatinine level on presentation 4.0  · -Creatinine improving  · -IV hydration  · -Renal ultrasound complete (10/21/2020): Impression:Unremarkable renal ultrasound  · -Avoid hypotension and nephrotoxins  · -Nephrology consulted  · Further management as per nephrology recommendation.       History of insulin-dependent Diabetes Mellitus II  · Uncontrolled  · Hemoglobin A1C: 10.7% (10/20/2020)     · Inpatient Regimens to include;  ? - Insulin Glargine (Lantus) 25 units subcu nightly  ? - Insulin Lispro (Humalog) 7 units subcu pre-meal 3 times a day  ? - Insulin Lispro (Humalog) on a Medium dose sliding scale  · Monitor POC glucose, and adjust insulin regimen accordingly based on daily insulin requirement.     Continue management of other chronic medical conditions - see above and orders. Advance Directive: Full Code    DIET CARDIAC;     DVT prophylaxis: Heparin    Consults Made:   IP CONSULT TO SOCIAL WORK  IP CONSULT TO NEPHROLOGY    Discharge planning: tbd    Time Spent is 25 mins in the examination, evaluation, counseling and review of medications, assessment and plan.      Electronically signed by   Christopher Burnett MD, MPH,   Internal Medicine

## 2020-10-21 NOTE — PROGRESS NOTES
4 Eyes Skin Assessment    Logan Coffey is being assessed upon: Admission    I agree that  Must, along with Jose Gallardo (either 2 RN's or 1 LPN and 1 RN) have performed a thorough Head to Toe Skin Assessment on the patient. ALL assessment sites listed below have been assessed. Areas assessed by both nurses:     [x]   Head, Face, and Ears   [x]   Shoulders, Back, and Chest  [x]   Arms, Elbows, and Hands   [x]   Coccyx, Sacrum, and Ischium  [x]   Legs, Feet, and Heels    Does the Patient have Skin Breakdown?  No    Royer Prevention initiated: No  Wound Care Orders initiated: No    [facility] nurse consulted for Pressure Injury (Stage 3,4, Unstageable, DTI, NWPT, and Complex wounds) and New or Established Ostomies: No        Primary Nurse eSignature: Hector Saenz RN on 10/21/2020 at 5:23 AM      Co-Signer eSignature: Electronically signed by Alpa Au RN on 10/21/20 at 6:09 AM CDT

## 2020-10-22 VITALS
WEIGHT: 228 LBS | HEART RATE: 82 BPM | SYSTOLIC BLOOD PRESSURE: 141 MMHG | HEIGHT: 68 IN | BODY MASS INDEX: 34.56 KG/M2 | OXYGEN SATURATION: 95 % | RESPIRATION RATE: 16 BRPM | DIASTOLIC BLOOD PRESSURE: 63 MMHG | TEMPERATURE: 97.4 F

## 2020-10-22 LAB
ANION GAP SERPL CALCULATED.3IONS-SCNC: 12 MMOL/L (ref 7–19)
BASOPHILS ABSOLUTE: 0 K/UL (ref 0–0.2)
BASOPHILS RELATIVE PERCENT: 0.5 % (ref 0–1)
BUN BLDV-MCNC: 75 MG/DL (ref 8–23)
CALCIUM SERPL-MCNC: 9 MG/DL (ref 8.8–10.2)
CHLORIDE BLD-SCNC: 101 MMOL/L (ref 98–111)
CO2: 24 MMOL/L (ref 22–29)
CREAT SERPL-MCNC: 3.4 MG/DL (ref 0.5–1.2)
EOSINOPHILS ABSOLUTE: 0.4 K/UL (ref 0–0.6)
EOSINOPHILS RELATIVE PERCENT: 4.5 % (ref 0–5)
FOLATE: 9 NG/ML (ref 4.5–32.2)
GFR AFRICAN AMERICAN: 22
GFR NON-AFRICAN AMERICAN: 18
GLUCOSE BLD-MCNC: 108 MG/DL (ref 70–99)
GLUCOSE BLD-MCNC: 153 MG/DL (ref 74–109)
GLUCOSE BLD-MCNC: 161 MG/DL (ref 70–99)
HCT VFR BLD CALC: 25.8 % (ref 42–52)
HEMOGLOBIN: 8.2 G/DL (ref 14–18)
IMMATURE GRANULOCYTES #: 0.1 K/UL
IRON SATURATION: 40 % (ref 14–50)
IRON: 68 UG/DL (ref 59–158)
LYMPHOCYTES ABSOLUTE: 2.3 K/UL (ref 1.1–4.5)
LYMPHOCYTES RELATIVE PERCENT: 27 % (ref 20–40)
MCH RBC QN AUTO: 29.6 PG (ref 27–31)
MCHC RBC AUTO-ENTMCNC: 31.8 G/DL (ref 33–37)
MCV RBC AUTO: 93.1 FL (ref 80–94)
MONOCYTES ABSOLUTE: 0.8 K/UL (ref 0–0.9)
MONOCYTES RELATIVE PERCENT: 8.9 % (ref 0–10)
NEUTROPHILS ABSOLUTE: 5 K/UL (ref 1.5–7.5)
NEUTROPHILS RELATIVE PERCENT: 58.3 % (ref 50–65)
PDW BLD-RTO: 13.5 % (ref 11.5–14.5)
PERFORMED ON: ABNORMAL
PERFORMED ON: ABNORMAL
PLATELET # BLD: 268 K/UL (ref 130–400)
PMV BLD AUTO: 10.7 FL (ref 9.4–12.4)
POTASSIUM REFLEX MAGNESIUM: 4.3 MMOL/L (ref 3.5–5)
RBC # BLD: 2.77 M/UL (ref 4.7–6.1)
SODIUM BLD-SCNC: 137 MMOL/L (ref 136–145)
TOTAL IRON BINDING CAPACITY: 171 UG/DL (ref 250–400)
URIC ACID, SERUM: 4.5 MG/DL (ref 3.4–7)
VITAMIN B-12: 1247 PG/ML (ref 211–946)
WBC # BLD: 8.6 K/UL (ref 4.8–10.8)

## 2020-10-22 PROCEDURE — 85025 COMPLETE CBC W/AUTO DIFF WBC: CPT

## 2020-10-22 PROCEDURE — 82746 ASSAY OF FOLIC ACID SERUM: CPT

## 2020-10-22 PROCEDURE — 6370000000 HC RX 637 (ALT 250 FOR IP): Performed by: INTERNAL MEDICINE

## 2020-10-22 PROCEDURE — 82947 ASSAY GLUCOSE BLOOD QUANT: CPT

## 2020-10-22 PROCEDURE — 80048 BASIC METABOLIC PNL TOTAL CA: CPT

## 2020-10-22 PROCEDURE — 83540 ASSAY OF IRON: CPT

## 2020-10-22 PROCEDURE — 83550 IRON BINDING TEST: CPT

## 2020-10-22 PROCEDURE — 84550 ASSAY OF BLOOD/URIC ACID: CPT

## 2020-10-22 PROCEDURE — 82607 VITAMIN B-12: CPT

## 2020-10-22 PROCEDURE — 36415 COLL VENOUS BLD VENIPUNCTURE: CPT

## 2020-10-22 PROCEDURE — 2580000003 HC RX 258: Performed by: INTERNAL MEDICINE

## 2020-10-22 PROCEDURE — 6360000002 HC RX W HCPCS: Performed by: INTERNAL MEDICINE

## 2020-10-22 RX ADMIN — CARVEDILOL 12.5 MG: 12.5 TABLET, FILM COATED ORAL at 08:54

## 2020-10-22 RX ADMIN — AMLODIPINE BESYLATE 10 MG: 10 TABLET ORAL at 08:54

## 2020-10-22 RX ADMIN — Medication 10 ML: at 08:54

## 2020-10-22 RX ADMIN — INSULIN LISPRO 2 UNITS: 100 INJECTION, SOLUTION INTRAVENOUS; SUBCUTANEOUS at 12:58

## 2020-10-22 RX ADMIN — HYDRALAZINE HYDROCHLORIDE 50 MG: 50 TABLET, FILM COATED ORAL at 08:54

## 2020-10-22 RX ADMIN — HEPARIN SODIUM 5000 UNITS: 5000 INJECTION INTRAVENOUS; SUBCUTANEOUS at 06:27

## 2020-10-22 RX ADMIN — INSULIN LISPRO 7 UNITS: 100 INJECTION, SOLUTION INTRAVENOUS; SUBCUTANEOUS at 12:57

## 2020-10-22 NOTE — PROGRESS NOTES
Nephrology ([facility]) Progress Note    Patient:  Erwin Graves  YOB: 1955  Date of Service: 10/22/2020  MRN: Juel Holstein: [de-identified]   Primary Care Physician: Noah Peralta MD  Advance Directive: Full Code  Admit Date: 10/20/2020       Hospital Day: 2  Referring Provider: Keven Rojas MD    Patient Seen, Chart, Consults notes, Labs, Radiology studies reviewed. Subjective:  Mr. Jayla Ortega 60 y. o. male with a history of DMT2, CKD, HLD, HTN, sent from the nephrology office for worsening renal function. His baseline serum creatinine ranges 2-2.7 and it was found at 4. Patient denied any recent change in meds, has no nausea, vomiting or diarrhea. He also denies change to his urine habits. He denies NSAIDs use. Today, patient offers no new complaints.      Allergies:  [id number]    Medicines:  Current Facility-Administered Medications   Medication Dose Route Frequency Provider Last Rate Last Dose    amLODIPine (NORVASC) tablet 10 mg  10 mg Oral Daily Keven Rojas MD   10 mg at 10/22/20 0854    atorvastatin (LIPITOR) tablet 20 mg  20 mg Oral Nightly Keven Rojas MD   20 mg at 10/21/20 2338    carvedilol (COREG) tablet 12.5 mg  12.5 mg Oral BID Keven Rojas MD   12.5 mg at 10/22/20 7033    hydrALAZINE (APRESOLINE) tablet 50 mg  50 mg Oral BID Keven Rojas MD   50 mg at 10/22/20 2223    sodium chloride flush 0.9 % injection 10 mL  10 mL Intravenous 2 times per day Keven Rojas MD   10 mL at 10/22/20 0854    sodium chloride flush 0.9 % injection 10 mL  10 mL Intravenous PRN Keven Rojas MD        potassium chloride (KLOR-CON M) extended release tablet 40 mEq  40 mEq Oral PRN Keven Rojas MD        Or    potassium bicarb-citric acid (EFFER-K) effervescent tablet 40 mEq  40 mEq Oral PRN Keven Rojas MD        Or    potassium chloride 10 mEq/100 mL IVPB (Peripheral Line)  10 mEq Intravenous PRN Tabatha Chavez MD        acetaminophen (TYLENOL) tablet 650 mg  650 mg Oral Q6H PRN Tabatha Chavez MD        Or    acetaminophen (TYLENOL) suppository 650 mg  650 mg Rectal Q6H PRN Tabatha Chavez MD        magnesium hydroxide (MILK OF MAGNESIA) 400 MG/5ML suspension 30 mL  30 mL Oral Daily PRN Tabatha Chavez MD        promethazine (PHENERGAN) tablet 12.5 mg  12.5 mg Oral Q6H PRN Tabatha Chavez MD        Or    ondansetron [facility]) injection 4 mg  4 mg Intravenous Q6H PRN Tabatha Chavez MD        glucose (GLUTOSE) 40 % oral gel 15 g  15 g Oral PRN Tabatha Chavez MD        dextrose 50 % IV solution  12.5 g Intravenous PRN Tabatha Chavez MD        glucagon (rDNA) injection 1 mg  1 mg Intramuscular PRN Tabatha Chavez MD        dextrose 5 % solution  100 mL/hr Intravenous PRN Tabatha Chavez MD        heparin (porcine) injection 5,000 Units  5,000 Units Subcutaneous 3 times per day Tabatha Chavez MD   5,000 Units at 10/22/20 7319    insulin glargine (LANTUS) injection vial 25 Units  25 Units Subcutaneous Nightly Tabatha Chavez MD   25 Units at 10/21/20 2337    insulin lispro (HUMALOG) injection vial 7 Units  7 Units Subcutaneous TID JOSIAS Chavez MD   7 Units at 10/21/20 1218    insulin lispro (HUMALOG) injection vial 0-12 Units  0-12 Units Subcutaneous TID JOSIAS Chavez MD   6 Units at 10/21/20 1218    insulin lispro (HUMALOG) injection vial 0-6 Units  0-6 Units Subcutaneous Nightly Tabatha Chavez MD   1 Units at 10/21/20 2334       Past Medical History:  Past Medical History:   Diagnosis Date    Chronic kidney disease     Diabetes mellitus ([address]     Essential hypertension     Gout     History of colonic polyps     Hyperlipidemia     Osteoarthritis     Other disorders of kidney and ureter in diseases classified elsewhere        Past Surgical History:  Past Surgical History:   Procedure Laterality Date    COLONOSCOPY         Family History  Family History   Problem Relation Age of Onset    Other Neg Hx         He denies any family history of blood dyscrasias.        Social History  Social History     Socioeconomic History    Marital status:      Spouse name: Not on file    Number of children: Not on file    Years of education: Not on file    Highest education level: Not on file   Occupational History    Not on file   Social Needs    Financial resource strain: Not on file    Food insecurity     Worry: Not on file     Inability: Not on file    Transportation needs     Medical: Not on file     Non-medical: Not on file   Tobacco Use    Smoking status: Never Smoker    Smokeless tobacco: Never Used   Substance and Sexual Activity    Alcohol use: Never     Frequency: Never    Drug use: Never    Sexual activity: Not on file   Lifestyle    Physical activity     Days per week: Not on file     Minutes per session: Not on file    Stress: Not on file   Relationships    Social connections     Talks on phone: Not on file     Gets together: Not on file     Attends Restoration service: Not on file     Active member of club or organization: Not on file     Attends meetings of clubs or organizations: Not on file     Relationship status: Not on file    Intimate partner violence     Fear of current or ex partner: Not on file     Emotionally abused: Not on file     Physically abused: Not on file     Forced sexual activity: Not on file   Other Topics Concern    Not on file   Social History Narrative    Not on file         Review of Systems:  History obtained from chart review and the patient  General ROS: No fever or chills  Respiratory ROS: No cough, shortness of breath, wheezing  Cardiovascular ROS: no chest pain or dyspnea on exertion  Gastrointestinal ROS: No abdominal pain or melena  Genito-Urinary ROS: No dysuria or hematuria  Musculoskeletal ROS: No joint pain or swelling         Objective:  Blood pressure (!) 141/63, pulse 82, temperature 97.4 °F (36.3 °C), temperature source Temporal, resp. rate 16, height 5' 8\" (1.727 m), weight 228 lb (103.4 kg), SpO2 95 %. Intake/Output Summary (Last 24 hours) at 10/22/2020 1050  Last data filed at 10/22/2020 0800  Gross per 24 hour   Intake 690 ml   Output --   Net 690 ml     General: alert and oriented x3   Chest:  clear to auscultation bilaterally without respiratory distress  CVS: regular rate and rhythm  Abdominal: soft, nontender, normal bowel sounds  Extremities: no cyanosis or edema  Skin: warm and dry without rash    Labs:  BMP:   Recent Labs     10/20/20  1550 10/21/20  0427 10/22/20  0245   * 135* 137   K 4.4 3.9 4.3   CL 91* 97* 101   CO2 31* 25 24   PHOS  --  3.9  --    BUN 79* 81* 75*   CREATININE 4.0* 3.7* 3.4*   CALCIUM 9.7 9.1 9.0     CBC:   Recent Labs     10/20/20  1550 10/21/20  0427 10/22/20  0245   WBC 9.4 8.3 8.6   HGB 9.7* 8.3* 8.2*   HCT 29.2* 25.7* 25.8*   MCV 90.4 90.8 93.1    256 268     LIVER PROFILE:   Recent Labs     10/20/20  1550   AST 10   ALT 8   BILITOT 0.3   ALKPHOS 128     PT/INR: No results for input(s): PROTIME, INR in the last 72 hours. APTT: No results for input(s): APTT in the last 72 hours. BNP:  No results for input(s): BNP in the last 72 hours. Ionized Calcium:No results for input(s): IONCA in the last 72 hours. Magnesium:  Recent Labs     10/21/20  0427   MG 2.2     Phosphorus:  Recent Labs     10/21/20  0427   PHOS 3.9     HgbA1C:   Recent Labs     10/20/20  1932   LABA1C 10.7*     Hepatic:   Recent Labs     10/20/20  1550   ALKPHOS 128   ALT 8   AST 10   PROT 8.0   BILITOT 0.3   LABALBU 4.1     Lactic Acid: No results for input(s): LACTA in the last 72 hours. Troponin: No results for input(s): CKTOTAL, CKMB, TROPONINT in the last 72 hours. ABGs: No results for input(s): PH, PCO2, PO2, HCO3, O2SAT in the last 72 hours. CRP:  No results for input(s): CRP in the last 72 hours.   Sed Rate:  No results for input(s): SEDRATE in the last 72 hours. Cultures:   No results for input(s): CULTURE in the last 72 hours. Radiology reports as per the Radiologist  Radiology: Us Renal Complete    Result Date: 10/21/2020  EXAMINATION: US RENAL COMPLETE 10/21/2020 7:15 AM HISTORY: US RENAL COMPLETE 10/21/2020 5:50 AM HISTORY: JOSEE COMPARISON: None  TECHNIQUE: Multiple longitudinal and transverse realtime sonographic images of the kidneys and urinary bladder are obtained. FINDINGS: The right kidney measures 6 x 6.1 x 10.9 cm. The right kidney is normal in size, shape, contour and position. The cortical thickness is normal, with preservation of the corticomedullary differentiation. The central echo complex is compact with no evidence for hydronephrosis. No nephrolithiasis or abnormal perinephric fluid collections are seen. No hydroureter. The left kidney measures 5.3 x 5.6 x 9.4 cm. The left kidney is normal in size, shape, contour and position. The cortical thickness is normal, with preservation of the corticomedullary differentiation. The central echo complex is compact with no evidence for hydronephrosis. No nephrolithiasis or abnormal perinephric fluid collections are seen. No hydroureter. Scanning through the pelvis reveals the bladder to be mildly distended with anechoic urine. The wall thickness and contour are normal. There is no surrounding ascites. 1. Unremarkable renal ultrasound. Signed by Dr Nellie Rosario on 10/21/2020 7:18 AM       Assessment     1. JOSEE (pre renal azotemia)  2. CKD stage 3b  3. Hyponatremia  4. Type 2 DM weith renal disease  5. Hypertension  6. Anemia of CKD      Plan:  May continue hydration by mouth. Follow up at the renal clinic after discharge.

## 2020-10-22 NOTE — DISCHARGE SUMMARY
hours.  INR: No results for input(s): INR in the last 72 hours. ABGs: No results found for: PHART, PO2ART, XVN3CEX  UA:  Recent Labs     10/20/20  1642   COLORU YELLOW   PHUR 7.5   WBCUA 0   RBCUA 0   BACTERIA NEGATIVE*   CLARITYU Clear   SPECGRAV 1.008   LEUKOCYTESUR Negative   UROBILINOGEN 1.0   BILIRUBINUR Negative   BLOODU Negative   GLUCOSEU Negative         Culture Results:    No results for input(s): CXSURG in the last 720 hours. Blood Culture Recent: No results for input(s): BC in the last 720 hours. Cultures:   No results for input(s): CULTURE in the last 72 hours. No results for input(s): BC, April  in the last 72 hours. No results for input(s): CXSURG in the last 72 hours. Recent Labs     10/21/20  0427   MG 2.2   PHOS 3.9     Recent Labs     10/20/20  1550   AST 10   ALT 8   BILITOT 0.3   ALKPHOS 128           Significant Diagnostic Studies:   Us Renal Complete    Result Date: 10/21/2020  EXAMINATION: US RENAL COMPLETE 10/21/2020 7:15 AM HISTORY: US RENAL COMPLETE 10/21/2020 5:50 AM HISTORY: JOSEE COMPARISON: None  TECHNIQUE: Multiple longitudinal and transverse realtime sonographic images of the kidneys and urinary bladder are obtained. FINDINGS: The right kidney measures 6 x 6.1 x 10.9 cm. The right kidney is normal in size, shape, contour and position. The cortical thickness is normal, with preservation of the corticomedullary differentiation. The central echo complex is compact with no evidence for hydronephrosis. No nephrolithiasis or abnormal perinephric fluid collections are seen. No hydroureter. The left kidney measures 5.3 x 5.6 x 9.4 cm. The left kidney is normal in size, shape, contour and position. The cortical thickness is normal, with preservation of the corticomedullary differentiation. The central echo complex is compact with no evidence for hydronephrosis. No nephrolithiasis or abnormal perinephric fluid collections are seen. No hydroureter.  Scanning through the pelvis reveals the bladder to be mildly distended with anechoic urine. The wall thickness and contour are normal. There is no surrounding ascites. 1. Unremarkable renal ultrasound. Signed by Dr Marquez Gant on 10/21/2020 7:18 AM          Hospital Course:   Mr. Kendra Workman 60 y. o. male with a history of DMT2, CKD, HLD, HTN, sent by his nephrologist, on account of worsening renal function.     Patient denies any symptoms at this time.  No changes seen medications or diet.  No recent travels.  Currently not on any chronic NSAIDs.  No changes in urination.     Initial work-up significant for creatinine of 4.0.     Patient admitted for further work-up and management.     JOSEE on CKD stage III  · -Likely prerenal azotemia. · Creatinine level on presentation 4.0  · -Creatinine improved  · Cr: 3.4  · -IV hydration  · -Renal ultrasound complete (10/21/2020): Impression:Unremarkable renal ultrasound  · -Avoided hypotension and nephrotoxins  · -Seen by Nephrology   · Okay for discharge from nephrology standpoint  · Encourage p.o. intake on discharge. · Patient to follow-up with nephrology outpatient.        History of insulin-dependent Diabetes Mellitus II  · Uncontrolled  · Hemoglobin A1C: 10.7% (10/20/2020)     · Inpatient Regimens to included;  ? - Insulin Glargine (Lantus) 25 units subcu nightly  ? - Insulin Lispro (Humalog) 7 units subcu pre-meal 3 times a day  ? - Insulin Lispro (Humalog) on a Medium dose sliding scale  · Monitored POC glucose, and adjusted insulin regimen accordingly based on daily insulin requirement. · Resume home diabetic regimen upon discharge.     Continued management of other chronic medical conditions     Physical Exam:  Vital Signs: BP (!) 141/63   Pulse 82   Temp 97.4 °F (36.3 °C) (Temporal)   Resp 16   Ht 5' 8\" (1.727 m)   Wt 228 lb (103.4 kg)   SpO2 95%   BMI 34.67 kg/m²   Physical Exam  Vitals signs and nursing note reviewed.    Constitutional:       General: He is not in acute and time. Cranial Nerves: No cranial nerve deficit. Sensory: No sensory deficit. Motor: No weakness. Coordination: Coordination normal.   Psychiatric:         Mood and Affect: Mood normal.         Behavior: Behavior normal.         Thought Content: Thought content normal.         Judgment: Judgment normal.         Discharge Medications:       Gary Sherman   Home Medication Instructions [facility]    Printed on:10/22/20 4061   Medication Information                      allopurinol (ZYLOPRIM) 300 MG tablet  allopurinol 300 mg tablet             amLODIPine (NORVASC) 10 MG tablet  amlodipine 10 mg tablet             ASPIRIN 81 PO  aspirin 81 mg tablet,delayed release   Take 1 tablet every day by oral route. atorvastatin (LIPITOR) 20 MG tablet  atorvastatin 20 mg tablet             carvedilol (COREG) 25 MG tablet  carvedilol 25 mg tablet             ferrous gluconate (FERGON) 225 (27 Fe) MG tablet  ferrous gluconate 324 mg (38 mg iron) tablet   Take 1 tablet twice a day by oral route. hydrALAZINE (APRESOLINE) 50 MG tablet  Take 1 tablet by mouth 2 times daily             insulin detemir (LEVEMIR) 100 UNIT/ML injection vial  Inject 30 Units into the skin nightly             magnesium oxide (MAG-OX) 140 MG CAPS  Magnesium-Oxide 400 mg tablet   Take 1 tablet every day by oral route.             sodium bicarbonate 650 MG tablet  Take 2 tablets by mouth 3 times daily                 Discharge Instructions: Follow up with Alberta Fermin MD in 7 days. Take medications as directed. Resume activity as tolerated.     Diet: DIET CARDIAC;        DISCHARGE STATUS:    Condition: Good  Disposition: Patient is medically stable and will be discharged Home    Extended Emergency Contact Information  Primary Emergency Contact: SHAANJIN  Address: [place] [address], [address] [place] [address]. [address] Phone: [phone]  Relation: Spouse       Time Spent

## 2020-10-22 NOTE — PROGRESS NOTES
[facility] Progress Note    Patient:  Taj Gutierrez  YOB: 1955  Date of Service: 10/22/2020  MRN: [id number] Roles: [de-identified]   Primary Care Physician: Balbina Floyd MD  Advance Directive: Full Code  Admit Date: 10/20/2020       Hospital Day: 2      CHIEF COMPLAINT:     Chief Complaint   Patient presents with    Abnormal Lab     Called by Elliott Askew to come in after abnormal labs       10/22/2020 7:52 AM  Subjective / Interval History:   ***    10/21/2020  Doing well. Creatinine slightly improved. Laying comfortably in bed in no apparent acute distress. Denies any acute complaints or distress at this time. Family at bedside. No acute changes or acute overnight event reported. Brief History:   Mr. Valdemar Badillo, a 60 y.o. male with a history of DMT2, CKD, HLD, HTN, sent by his nephrologist, on account of worsening renal function.     Patient denies any symptoms at this time. No changes seen medications or diet. No recent travels. Currently not on any chronic NSAIDs. No changes in urination.     Initial work-up significant for creatinine of 4.0.     Patient admitted for further work-up and management. Review of Systems:   Review of Systems  ROS: 14 point review of systems is negative except as specifically addressed above. DIET CARDIAC;     Intake/Output Summary (Last 24 hours) at 10/22/2020 0752  Last data filed at 10/21/2020 2246  Gross per 24 hour   Intake 1247 ml   Output --   Net 1247 ml       Medications:   dextrose       Current Facility-Administered Medications   Medication Dose Route Frequency Provider Last Rate Last Dose    amLODIPine (NORVASC) tablet 10 mg  10 mg Oral Daily Bony Moe MD   10 mg at 10/21/20 0847    atorvastatin (LIPITOR) tablet 20 mg  20 mg Oral Nightly Bony Moe MD   20 mg at 10/21/20 2338    carvedilol (COREG) tablet 12.5 mg  12.5 mg Oral BID Bony Moe MD   12.5 mg at 10/21/20 2338    hydrALAZINE (APRESOLINE) tablet 50 mg  50 mg Oral BID Guicho Messina MD   50 mg at 10/21/20 2338    sodium chloride flush 0.9 % injection 10 mL  10 mL Intravenous 2 times per day Guicho Messina MD   10 mL at 10/21/20 2350    sodium chloride flush 0.9 % injection 10 mL  10 mL Intravenous PRN Guicho Messina MD        potassium chloride (KLOR-CON M) extended release tablet 40 mEq  40 mEq Oral PRN Guicho Messina MD        Or    potassium bicarb-citric acid (EFFER-K) effervescent tablet 40 mEq  40 mEq Oral PRN Guicho Messina MD        Or    potassium chloride 10 mEq/100 mL IVPB (Peripheral Line)  10 mEq Intravenous PRN Guicho Messina MD        acetaminophen (TYLENOL) tablet 650 mg  650 mg Oral Q6H PRN Guicho Messina MD        Or   Lilyan Montane acetaminophen (TYLENOL) suppository 650 mg  650 mg Rectal Q6H PRN Guicho Messina MD        magnesium hydroxide (MILK OF MAGNESIA) 400 MG/5ML suspension 30 mL  30 mL Oral Daily PRN Guicho Messina MD        promethazine (PHENERGAN) tablet 12.5 mg  12.5 mg Oral Q6H PRN Guicho Messnia MD        Or    ondansetron [facility]) injection 4 mg  4 mg Intravenous Q6H PRN Guicho Messina MD        glucose (GLUTOSE) 40 % oral gel 15 g  15 g Oral PRN Guicho Messina MD        dextrose 50 % IV solution  12.5 g Intravenous PRN Guicho Messina MD        glucagon (rDNA) injection 1 mg  1 mg Intramuscular PRN Guicho Messina MD        dextrose 5 % solution  100 mL/hr Intravenous PRN Guicho Messina MD        heparin (porcine) injection 5,000 Units  5,000 Units Subcutaneous 3 times per day Guicho Messina MD   5,000 Units at 10/22/20 4524    insulin glargine (LANTUS) injection vial 25 Units  25 Units Subcutaneous Nightly Guicho Messina MD   25 Units at 10/21/20 2337    insulin lispro (HUMALOG) injection vial 7 Units  7 Units Subcutaneous TID WC Guicho Messina MD   7 Units at 10/21/20 1218    insulin lispro (HUMALOG) injection vial 0-12 Units  0-12 Units Subcutaneous TID  Pako Gómez MD   6 Units at 10/21/20 1218    insulin lispro (HUMALOG) injection vial 0-6 Units  0-6 Units Subcutaneous Nightly Pako Gómez MD   1 Units at 10/21/20 2334         dextrose        amLODIPine  10 mg Oral Daily    atorvastatin  20 mg Oral Nightly    carvedilol  12.5 mg Oral BID    hydrALAZINE  50 mg Oral BID    sodium chloride flush  10 mL Intravenous 2 times per day    heparin (porcine)  5,000 Units Subcutaneous 3 times per day    insulin glargine  25 Units Subcutaneous Nightly    insulin lispro  7 Units Subcutaneous TID     insulin lispro  0-12 Units Subcutaneous TID WC    insulin lispro  0-6 Units Subcutaneous Nightly     sodium chloride flush, potassium chloride **OR** potassium alternative oral replacement **OR** potassium chloride, acetaminophen **OR** acetaminophen, magnesium hydroxide, promethazine **OR** ondansetron, glucose, dextrose, glucagon (rDNA), dextrose  DIET CARDIAC;       Labs:   CBC with DIFF:  Recent Labs     10/20/20  1550 10/21/20  0427 10/22/20  0245   WBC 9.4 8.3 8.6   RBC 3.23* 2.83* 2.77*   HGB 9.7* 8.3* 8.2*   HCT 29.2* 25.7* 25.8*   MCV 90.4 90.8 93.1   MCH 30.0 29.3 29.6   MCHC 33.2 32.3* 31.8*   RDW 13.5 13.4 13.5    256 268   MPV 10.1 11.1 10.7   NEUTOPHILPCT 63.3 60.3 58.3   LYMPHOPCT 21.9 24.7 27.0   MONOPCT 8.2 8.0 8.9   EOSRELPCT 5.6* 5.9* 4.5   BASOPCT 0.5 0.6 0.5   NEUTROABS 5.9 5.0 5.0   LYMPHSABS 2.1 2.0 2.3   MONOSABS 0.80 0.70 0.80   EOSABS 0.50 0.50 0.40   BASOSABS 0.10 0.10 0.00       CMP/BMP:  Recent Labs     10/20/20  1550 10/21/20  0427 10/22/20  0245   * 135* 137   K 4.4 3.9 4.3   CL 91* 97* 101   CO2 31* 25 24   ANIONGAP 11 13 12   GLUCOSE 254* 174* 153*   BUN 79* 81* 75*   CREATININE 4.0* 3.7* 3.4*   LABGLOM 15* 17* 18*   CALCIUM 9.7 9.1 9.0   PROT 8.0  --   --    LABALBU 4.1  --   --    BILITOT 0.3  --   --    ALKPHOS 128  --   --    ALT 8  --   --    AST 10  -- --          CRP:  No results for input(s): CRP in the last 72 hours. Sed Rate:  No results for input(s): SEDRATE in the last 72 hours. HgBA1c:  No components found for: HGBA1C  FLP:  No results found for: TRIG, HDL, LDLCALC, LDLDIRECT, LABVLDL  TSH:  No results found for: TSH  Troponin T: No results for input(s): TROPONINI in the last 72 hours. Pro-BNP: No results for input(s): BNP in the last 72 hours. INR: No results for input(s): INR in the last 72 hours. ABGs: No results found for: PHART, PO2ART, FMS3BHW  UA:  Recent Labs     10/20/20  1642   COLORU YELLOW   PHUR 7.5   WBCUA 0   RBCUA 0   BACTERIA NEGATIVE*   CLARITYU Clear   SPECGRAV 1.008   LEUKOCYTESUR Negative   UROBILINOGEN 1.0   BILIRUBINUR Negative   BLOODU Negative   GLUCOSEU Negative         Culture Results:    No results for input(s): CXSURG in the last 720 hours. Blood Culture Recent: No results for input(s): BC in the last 720 hours. Cultures:   No results for input(s): CULTURE in the last 72 hours. No results for input(s): BC, Winda Rockwell in the last 72 hours. No results for input(s): CXSURG in the last 72 hours. Recent Labs     10/21/20  0427   MG 2.2   PHOS 3.9     Recent Labs     10/20/20  1550   AST 10   ALT 8   BILITOT 0.3   ALKPHOS 128         RAD:   Narrative    EXAMINATION: US RENAL COMPLETE 10/21/2020 7:15 AM    HISTORY: US RENAL COMPLETE 10/21/2020 5:50 AM    HISTORY: JOSEE    COMPARISON: None      TECHNIQUE: Multiple longitudinal and transverse realtime sonographic    images of the kidneys and urinary bladder are obtained. FINDINGS:    The right kidney measures 6 x 6.1 x 10.9 cm. The right kidney is    normal in size, shape, contour and position. The cortical thickness is    normal, with preservation of the corticomedullary differentiation. The    central echo complex is compact with no evidence for hydronephrosis. No nephrolithiasis or abnormal perinephric fluid collections are seen. No hydroureter.     The left kidney measures 5.3 x 5.6 x 9.4 cm. The left kidney is normal    in size, shape, contour and position. The cortical thickness is    normal, with preservation of the corticomedullary differentiation. The    central echo complex is compact with no evidence for hydronephrosis. No nephrolithiasis or abnormal perinephric fluid collections are seen. No hydroureter. Scanning through the pelvis reveals the bladder to be mildly distended    with anechoic urine. The wall thickness and contour are normal. There    is no surrounding ascites.         Impression    1. Unremarkable renal ultrasound. Signed by Dr Kiet Cabrera on 10/21/2020 7:18 AM                 Objective:   Vitals:   BP (!) 141/63   Pulse 82   Temp 97.4 °F (36.3 °C) (Temporal)   Resp 16   Ht 5' 8\" (1.727 m)   Wt 228 lb (103.4 kg)   SpO2 95%   BMI 34.67 kg/m²       Patient Vitals for the past 24 hrs:   BP Temp Temp src Pulse Resp SpO2   10/22/20 0642 (!) 141/63 97.4 °F (36.3 °C) Temporal 82 16 95 %   10/22/20 0116 (!) 130/58 96.4 °F (35.8 °C) Temporal 87 18 92 %   10/21/20 1859 (!) 172/76 97.1 °F (36.2 °C) Temporal 88 18 92 %   10/21/20 1223 (!) 149/69 97.7 °F (36.5 °C) -- 82 16 95 %       24HR INTAKE/OUTPUT:      Intake/Output Summary (Last 24 hours) at 10/22/2020 3193  Last data filed at 10/21/2020 2246  Gross per 24 hour   Intake 1247 ml   Output --   Net 1247 ml       Physical Exam  Vitals signs and nursing note reviewed. Constitutional:       General: He is not in acute distress. Appearance: Normal appearance. He is not ill-appearing, toxic-appearing or diaphoretic. HENT:      Head: Normocephalic and atraumatic. Right Ear: External ear normal.      Left Ear: External ear normal.      Nose: Nose normal. No congestion or rhinorrhea. Eyes:      General: No scleral icterus. Right eye: No discharge. Left eye: No discharge. Extraocular Movements: Extraocular movements intact.       Conjunctiva/sclera: Conjunctivae normal.      Pupils: Pupils are equal, round, and reactive to light. Neck:      Musculoskeletal: Normal range of motion and neck supple. No neck rigidity or muscular tenderness. Vascular: No carotid bruit. Cardiovascular:      Rate and Rhythm: Normal rate and regular rhythm. Pulses: Normal pulses. Heart sounds: Normal heart sounds. No murmur. No friction rub. No gallop. Pulmonary:      Effort: Pulmonary effort is normal. No respiratory distress. Breath sounds: Normal breath sounds. No stridor. No wheezing, rhonchi or rales. Chest:      Chest wall: No tenderness. Abdominal:      General: Bowel sounds are normal. There is no distension. Tenderness: There is no abdominal tenderness. There is no guarding or rebound. Musculoskeletal: Normal range of motion. General: No swelling, tenderness, deformity or signs of injury. Right lower leg: No edema. Left lower leg: No edema. Skin:     General: Skin is warm and dry. Capillary Refill: Capillary refill takes less than 2 seconds. Coloration: Skin is not jaundiced or pale. Findings: No bruising, erythema, lesion or rash. Neurological:      General: No focal deficit present. Mental Status: He is alert and oriented to person, place, and time. Cranial Nerves: No cranial nerve deficit. Sensory: No sensory deficit. Motor: No weakness. Coordination: Coordination normal.   Psychiatric:         Mood and Affect: Mood normal.         Behavior: Behavior normal.         Thought Content:  Thought content normal.         Judgment: Judgment normal.         Assessment/plan:     Patient Active Problem List    Diagnosis Date Noted    JOSEE (acute kidney injury) ([facility] [address] 10/20/2020     Priority: High    Hyperlipidemia     Essential hypertension     Diabetes mellitus ([facility] [address]     Chronic kidney disease     Acute kidney injury superimposed on chronic kidney disease ([facility] [address] 10/01/2020    Iron

## 2021-03-28 ENCOUNTER — IMMUNIZATION (OUTPATIENT)
Age: 66
End: 2021-03-28
Payer: COMMERCIAL

## 2021-03-28 PROCEDURE — 91300 COVID-19, PFIZER VACCINE 30MCG/0.3ML DOSE: CPT | Performed by: FAMILY MEDICINE

## 2021-03-28 PROCEDURE — 0001A COVID-19, PFIZER VACCINE 30MCG/0.3ML DOSE: CPT | Performed by: FAMILY MEDICINE

## 2021-04-18 ENCOUNTER — IMMUNIZATION (OUTPATIENT)
Age: 66
End: 2021-04-18
Payer: MEDICARE

## 2021-04-18 PROCEDURE — 0002A PR IMM ADMN SARSCOV2 30MCG/0.3ML DIL RECON 2ND DOSE: CPT | Performed by: FAMILY MEDICINE

## 2021-04-18 PROCEDURE — 91300 COVID-19, PFIZER VACCINE 30MCG/0.3ML DOSE: CPT | Performed by: FAMILY MEDICINE

## 2021-06-14 ENCOUNTER — HOSPITAL ENCOUNTER (OUTPATIENT)
Dept: GENERAL RADIOLOGY | Age: 66
Discharge: HOME | End: 2021-06-14
Payer: MEDICARE

## 2021-06-14 DIAGNOSIS — R33.9 RETENTION OF URINE, UNSPECIFIED: ICD-10-CM

## 2021-06-14 PROCEDURE — 74018 RADEX ABDOMEN 1 VIEW: CPT

## 2021-10-04 ENCOUNTER — TELEPHONE (OUTPATIENT)
Dept: UROLOGY | Facility: CLINIC | Age: 66
End: 2021-10-04

## 2021-10-04 NOTE — TELEPHONE ENCOUNTER
Called and spoke to dr walker's office and asked that they fax any other PSA results for this pt.

## 2022-10-10 ENCOUNTER — TRANSCRIBE ORDERS (OUTPATIENT)
Dept: ADMINISTRATIVE | Facility: HOSPITAL | Age: 67
End: 2022-10-10

## 2022-10-10 DIAGNOSIS — R06.00 DYSPNEA, UNSPECIFIED TYPE: Primary | ICD-10-CM

## 2022-12-22 ENCOUNTER — TRANSCRIBE ORDERS (OUTPATIENT)
Dept: ADMINISTRATIVE | Facility: HOSPITAL | Age: 67
End: 2022-12-22

## 2022-12-22 DIAGNOSIS — R22.41 LOCALIZED SWELLING, MASS AND LUMP, LOWER LIMB, RIGHT: Primary | ICD-10-CM

## 2022-12-27 ENCOUNTER — HOSPITAL ENCOUNTER (OUTPATIENT)
Dept: ULTRASOUND IMAGING | Facility: HOSPITAL | Age: 67
Discharge: HOME | End: 2022-12-27
Admitting: FAMILY MEDICINE

## 2022-12-27 DIAGNOSIS — R22.41 LOCALIZED SWELLING, MASS AND LUMP, LOWER LIMB, RIGHT: ICD-10-CM

## 2022-12-27 PROCEDURE — 93971 EXTREMITY STUDY: CPT | Performed by: SURGERY

## 2022-12-27 PROCEDURE — 93971 EXTREMITY STUDY: CPT

## 2022-12-29 ENCOUNTER — TRANSCRIBE ORDERS (OUTPATIENT)
Dept: ADMINISTRATIVE | Facility: HOSPITAL | Age: 67
End: 2022-12-29
Payer: MEDICARE

## 2022-12-29 DIAGNOSIS — I20.8 ANGINA DECUBITUS: Primary | ICD-10-CM

## 2023-01-06 ENCOUNTER — HOSPITAL ENCOUNTER (OUTPATIENT)
Dept: CARDIOLOGY | Facility: HOSPITAL | Age: 68
Discharge: HOME | End: 2023-01-06
Payer: MEDICARE

## 2023-01-06 VITALS
HEIGHT: 69 IN | WEIGHT: 189.6 LBS | HEART RATE: 79 BPM | SYSTOLIC BLOOD PRESSURE: 110 MMHG | BODY MASS INDEX: 28.08 KG/M2 | DIASTOLIC BLOOD PRESSURE: 85 MMHG

## 2023-01-06 PROCEDURE — 93017 CV STRESS TEST TRACING ONLY: CPT

## 2023-01-06 PROCEDURE — 93018 CV STRESS TEST I&R ONLY: CPT | Performed by: INTERNAL MEDICINE

## 2023-01-06 PROCEDURE — A9502 TC99M TETROFOSMIN: HCPCS | Performed by: FAMILY MEDICINE

## 2023-01-06 PROCEDURE — 0 TECHNETIUM TETROFOSMIN KIT: Performed by: FAMILY MEDICINE

## 2023-01-06 PROCEDURE — 78452 HT MUSCLE IMAGE SPECT MULT: CPT

## 2023-01-06 PROCEDURE — 78452 HT MUSCLE IMAGE SPECT MULT: CPT | Performed by: INTERNAL MEDICINE

## 2023-01-06 PROCEDURE — 25010000002 REGADENOSON 0.4 MG/5ML SOLUTION: Performed by: INTERNAL MEDICINE

## 2023-01-06 RX ADMIN — TETROFOSMIN 1 DOSE: 1.38 INJECTION, POWDER, LYOPHILIZED, FOR SOLUTION INTRAVENOUS at 07:40

## 2023-01-06 RX ADMIN — REGADENOSON 0.4 MG: 0.08 INJECTION, SOLUTION INTRAVENOUS at 08:56

## 2023-01-06 RX ADMIN — TETROFOSMIN 1 DOSE: 1.38 INJECTION, POWDER, LYOPHILIZED, FOR SOLUTION INTRAVENOUS at 08:58

## 2023-01-09 LAB
BH CV NUCLEAR PRIOR STUDY: 3
BH CV REST NUCLEAR ISOTOPE DOSE: 10.3 MCI
BH CV STRESS BP STAGE 1: NORMAL
BH CV STRESS COMMENTS STAGE 1: NORMAL
BH CV STRESS DOSE REGADENOSON STAGE 1: 0.4
BH CV STRESS DURATION MIN STAGE 1: 0
BH CV STRESS DURATION SEC STAGE 1: 10
BH CV STRESS HR STAGE 1: 95
BH CV STRESS NUCLEAR ISOTOPE DOSE: 34 MCI
BH CV STRESS PROTOCOL 1: NORMAL
BH CV STRESS RECOVERY BP: NORMAL MMHG
BH CV STRESS RECOVERY HR: 86 BPM
BH CV STRESS STAGE 1: 1
LV EF NUC BP: 62 %
MAXIMAL PREDICTED HEART RATE: 154 BPM
PERCENT MAX PREDICTED HR: 61.69 %
STRESS BASELINE BP: NORMAL MMHG
STRESS BASELINE HR: 79 BPM
STRESS PERCENT HR: 73 %
STRESS POST EXERCISE DUR MIN: 0 MIN
STRESS POST EXERCISE DUR SEC: 10 SEC
STRESS POST PEAK BP: NORMAL MMHG
STRESS POST PEAK HR: 95 BPM
STRESS TARGET HR: 131 BPM

## 2023-05-15 ENCOUNTER — NURSE TRIAGE (OUTPATIENT)
Dept: CALL CENTER | Facility: HOSPITAL | Age: 68
End: 2023-05-15
Payer: MEDICARE

## 2023-05-15 NOTE — TELEPHONE ENCOUNTER
"    Reason for Disposition  • [1] Caller requests to speak ONLY to PCP AND [2] NON-URGENT question    Additional Information  • Negative: Lab calling with strep throat test results and triager can call in prescription  • Negative: Lab calling with urinalysis test results and triager can call in prescription  • Negative: Medication questions  • Negative: Medication renewal and refill questions  • Negative: Pre-operative or pre-procedural questions  • Negative: ED call to PCP (i.e., primary care provider; doctor, NP, or PA)  • Negative: Doctor (or NP/PA) call to PCP  • Negative: Call about patient who is currently hospitalized  • Negative: Lab or radiology calling with CRITICAL test results  • Negative: [1] Follow-up call from patient regarding patient's clinical status AND [2] information urgent  • Negative: [1] Caller requests to speak ONLY to PCP AND [2] URGENT question  • Negative: [1] Caller requests to speak to PCP now AND [2] won't tell us reason for call  (Exception: If 10 pm to 6 am, caller must first discuss reason for the call.)  • Negative: Notification of hospital admission  • Negative: Notification of death  • Negative: Caller requesting lab results  (Exception: Routine or non-urgent lab result.)  • Negative: Lab or radiology calling with test results  • Negative: [1] Follow-up call from patient regarding patient's clinical status AND [2] information NON-URGENT    Answer Assessment - Initial Assessment Questions  1. REASON FOR CALL or QUESTION: \"What is your reason for calling today?\" or \"How can I best  help you?\" or \"What question do you have that I can help answer?\"      Wishes to speak with DR. Robertson's office, declined assistance from the nurse call center.   2. CALLER: Document the source of call. (e.g., laboratory, patient).      Patient is the caller    Protocols used: PCP CALL - NO TRIAGE-ADULT-      "

## 2024-02-14 ENCOUNTER — APPOINTMENT (OUTPATIENT)
Dept: CARDIOLOGY | Facility: HOSPITAL | Age: 69
End: 2024-02-14
Payer: MEDICARE

## 2024-02-14 ENCOUNTER — HOSPITAL ENCOUNTER (INPATIENT)
Facility: HOSPITAL | Age: 69
LOS: 7 days | Discharge: HOME | End: 2024-02-21
Attending: EMERGENCY MEDICINE | Admitting: HOSPITALIST
Payer: MEDICARE

## 2024-02-14 ENCOUNTER — APPOINTMENT (OUTPATIENT)
Dept: ULTRASOUND IMAGING | Facility: HOSPITAL | Age: 69
End: 2024-02-14
Payer: MEDICARE

## 2024-02-14 ENCOUNTER — APPOINTMENT (OUTPATIENT)
Dept: GENERAL RADIOLOGY | Facility: HOSPITAL | Age: 69
End: 2024-02-14
Payer: MEDICARE

## 2024-02-14 DIAGNOSIS — D64.9 CHRONIC ANEMIA: ICD-10-CM

## 2024-02-14 DIAGNOSIS — N18.9 CHRONIC RENAL IMPAIRMENT, UNSPECIFIED CKD STAGE: ICD-10-CM

## 2024-02-14 DIAGNOSIS — I50.9 ACUTE CONGESTIVE HEART FAILURE, UNSPECIFIED HEART FAILURE TYPE: ICD-10-CM

## 2024-02-14 DIAGNOSIS — I16.0 HYPERTENSIVE URGENCY: Primary | ICD-10-CM

## 2024-02-14 PROBLEM — N17.9 ACUTE RENAL FAILURE: Status: ACTIVE | Noted: 2024-02-14

## 2024-02-14 LAB
ALBUMIN SERPL-MCNC: 3.2 G/DL (ref 3.5–5.2)
ALBUMIN/GLOB SERPL: 0.8 G/DL
ALP SERPL-CCNC: 119 U/L (ref 39–117)
ALT SERPL W P-5'-P-CCNC: 6 U/L (ref 1–41)
ANION GAP SERPL CALCULATED.3IONS-SCNC: 11 MMOL/L (ref 5–15)
AST SERPL-CCNC: 12 U/L (ref 1–40)
BASOPHILS # BLD AUTO: 0.05 10*3/MM3 (ref 0–0.2)
BASOPHILS NFR BLD AUTO: 0.7 % (ref 0–1.5)
BH CV ECHO MEAS - AO MAX PG: 28.3 MMHG
BH CV ECHO MEAS - AO MEAN PG: 15 MMHG
BH CV ECHO MEAS - AO ROOT DIAM: 2.5 CM
BH CV ECHO MEAS - AO V2 MAX: 266 CM/SEC
BH CV ECHO MEAS - AO V2 VTI: 43 CM
BH CV ECHO MEAS - AVA(I,D): 1.68 CM2
BH CV ECHO MEAS - EDV(CUBED): 153.1 ML
BH CV ECHO MEAS - EDV(MOD-SP4): 78.5 ML
BH CV ECHO MEAS - EF(MOD-SP4): 61.1 %
BH CV ECHO MEAS - ESV(CUBED): 22.2 ML
BH CV ECHO MEAS - ESV(MOD-SP4): 30.5 ML
BH CV ECHO MEAS - FS: 47.5 %
BH CV ECHO MEAS - IVS/LVPW: 0.82 CM
BH CV ECHO MEAS - IVSD: 1.07 CM
BH CV ECHO MEAS - LA DIMENSION: 4.8 CM
BH CV ECHO MEAS - LAT PEAK E' VEL: 7.9 CM/SEC
BH CV ECHO MEAS - LV DIASTOLIC VOL/BSA (35-75): 38.7 CM2
BH CV ECHO MEAS - LV MASS(C)D: 256 GRAMS
BH CV ECHO MEAS - LV MAX PG: 9.1 MMHG
BH CV ECHO MEAS - LV MEAN PG: 5 MMHG
BH CV ECHO MEAS - LV SYSTOLIC VOL/BSA (12-30): 15 CM2
BH CV ECHO MEAS - LV V1 MAX: 151 CM/SEC
BH CV ECHO MEAS - LV V1 VTI: 25.5 CM
BH CV ECHO MEAS - LVIDD: 5.4 CM
BH CV ECHO MEAS - LVIDS: 2.8 CM
BH CV ECHO MEAS - LVOT AREA: 2.8 CM2
BH CV ECHO MEAS - LVOT DIAM: 1.9 CM
BH CV ECHO MEAS - LVPWD: 1.3 CM
BH CV ECHO MEAS - MED PEAK E' VEL: 7.7 CM/SEC
BH CV ECHO MEAS - MR MAX PG: 162.8 MMHG
BH CV ECHO MEAS - MR MAX VEL: 638 CM/SEC
BH CV ECHO MEAS - MR MEAN PG: 99.5 MMHG
BH CV ECHO MEAS - MR MEAN VEL: 464.5 CM/SEC
BH CV ECHO MEAS - MR VTI: 165.5 CM
BH CV ECHO MEAS - MV A MAX VEL: 126 CM/SEC
BH CV ECHO MEAS - MV DEC TIME: 0.15 SEC
BH CV ECHO MEAS - MV E MAX VEL: 155 CM/SEC
BH CV ECHO MEAS - MV E/A: 1.23
BH CV ECHO MEAS - RAP SYSTOLE: 5 MMHG
BH CV ECHO MEAS - RVSP: 36.4 MMHG
BH CV ECHO MEAS - SI(MOD-SP4): 23.6 ML/M2
BH CV ECHO MEAS - SV(LVOT): 72.3 ML
BH CV ECHO MEAS - SV(MOD-SP4): 48 ML
BH CV ECHO MEAS - TAPSE (>1.6): 2.49 CM
BH CV ECHO MEAS - TR MAX PG: 31.4 MMHG
BH CV ECHO MEAS - TR MAX VEL: 280 CM/SEC
BH CV ECHO MEASUREMENTS AVERAGE E/E' RATIO: 19.87
BILIRUB SERPL-MCNC: 0.4 MG/DL (ref 0–1.2)
BUN SERPL-MCNC: 52 MG/DL (ref 8–23)
BUN/CREAT SERPL: 15.5 (ref 7–25)
CALCIUM SPEC-SCNC: 8.8 MG/DL (ref 8.6–10.5)
CHLORIDE SERPL-SCNC: 104 MMOL/L (ref 98–107)
CO2 SERPL-SCNC: 27 MMOL/L (ref 22–29)
CREAT SERPL-MCNC: 3.35 MG/DL (ref 0.76–1.27)
D DIMER PPP FEU-MCNC: 1.36 MCGFEU/ML (ref 0–0.67)
DEPRECATED RDW RBC AUTO: 49.9 FL (ref 37–54)
EGFRCR SERPLBLD CKD-EPI 2021: 19.3 ML/MIN/1.73
EOSINOPHIL # BLD AUTO: 0.17 10*3/MM3 (ref 0–0.4)
EOSINOPHIL NFR BLD AUTO: 2.2 % (ref 0.3–6.2)
ERYTHROCYTE [DISTWIDTH] IN BLOOD BY AUTOMATED COUNT: 14.4 % (ref 12.3–15.4)
FLUAV RNA RESP QL NAA+PROBE: NOT DETECTED
FLUBV RNA RESP QL NAA+PROBE: NOT DETECTED
GEN 5 2HR TROPONIN T REFLEX: 78 NG/L
GLOBULIN UR ELPH-MCNC: 4.1 GM/DL
GLUCOSE BLDC GLUCOMTR-MCNC: 227 MG/DL (ref 70–130)
GLUCOSE SERPL-MCNC: 165 MG/DL (ref 65–99)
HCT VFR BLD AUTO: 26.8 % (ref 37.5–51)
HGB BLD-MCNC: 8.5 G/DL (ref 13–17.7)
IMM GRANULOCYTES # BLD AUTO: 0.03 10*3/MM3 (ref 0–0.05)
IMM GRANULOCYTES NFR BLD AUTO: 0.4 % (ref 0–0.5)
IRON 24H UR-MRATE: 29 MCG/DL (ref 59–158)
IRON SATN MFR SERPL: 14 % (ref 20–50)
LEFT ATRIUM VOLUME INDEX: 38.2 ML/M2
LEFT ATRIUM VOLUME: 77.5 ML
LYMPHOCYTES # BLD AUTO: 1.71 10*3/MM3 (ref 0.7–3.1)
LYMPHOCYTES NFR BLD AUTO: 22.5 % (ref 19.6–45.3)
MCH RBC QN AUTO: 30.2 PG (ref 26.6–33)
MCHC RBC AUTO-ENTMCNC: 31.7 G/DL (ref 31.5–35.7)
MCV RBC AUTO: 95.4 FL (ref 79–97)
MONOCYTES # BLD AUTO: 0.58 10*3/MM3 (ref 0.1–0.9)
MONOCYTES NFR BLD AUTO: 7.6 % (ref 5–12)
NEUTROPHILS NFR BLD AUTO: 5.07 10*3/MM3 (ref 1.7–7)
NEUTROPHILS NFR BLD AUTO: 66.6 % (ref 42.7–76)
NRBC BLD AUTO-RTO: 0 /100 WBC (ref 0–0.2)
NT-PROBNP SERPL-MCNC: 8179 PG/ML (ref 0–900)
PLATELET # BLD AUTO: 221 10*3/MM3 (ref 140–450)
PMV BLD AUTO: 9.5 FL (ref 6–12)
POTASSIUM SERPL-SCNC: 3.9 MMOL/L (ref 3.5–5.2)
PROT SERPL-MCNC: 7.3 G/DL (ref 6–8.5)
PTH-INTACT SERPL-MCNC: 96 PG/ML (ref 15–65)
RBC # BLD AUTO: 2.81 10*6/MM3 (ref 4.14–5.8)
RSV RNA RESP QL NAA+PROBE: NOT DETECTED
SARS-COV-2 RNA RESP QL NAA+PROBE: NOT DETECTED
SODIUM SERPL-SCNC: 142 MMOL/L (ref 136–145)
TIBC SERPL-MCNC: 215 MCG/DL (ref 298–536)
TRANSFERRIN SERPL-MCNC: 144 MG/DL (ref 200–360)
TROPONIN T DELTA: 1 NG/L
TROPONIN T SERPL HS-MCNC: 77 NG/L
URATE SERPL-MCNC: 6.7 MG/DL (ref 3.4–7)
WBC NRBC COR # BLD AUTO: 7.61 10*3/MM3 (ref 3.4–10.8)

## 2024-02-14 PROCEDURE — 85379 FIBRIN DEGRADATION QUANT: CPT | Performed by: EMERGENCY MEDICINE

## 2024-02-14 PROCEDURE — 25010000002 ENOXAPARIN PER 10 MG: Performed by: FAMILY MEDICINE

## 2024-02-14 PROCEDURE — 93306 TTE W/DOPPLER COMPLETE: CPT

## 2024-02-14 PROCEDURE — 25810000003 SODIUM CHLORIDE 0.9 % SOLUTION: Performed by: INTERNAL MEDICINE

## 2024-02-14 PROCEDURE — 83880 ASSAY OF NATRIURETIC PEPTIDE: CPT | Performed by: EMERGENCY MEDICINE

## 2024-02-14 PROCEDURE — 87637 SARSCOV2&INF A&B&RSV AMP PRB: CPT | Performed by: EMERGENCY MEDICINE

## 2024-02-14 PROCEDURE — 83970 ASSAY OF PARATHORMONE: CPT | Performed by: INTERNAL MEDICINE

## 2024-02-14 PROCEDURE — 25810000003 SODIUM CHLORIDE 0.9 % SOLUTION: Performed by: EMERGENCY MEDICINE

## 2024-02-14 PROCEDURE — 82948 REAGENT STRIP/BLOOD GLUCOSE: CPT

## 2024-02-14 PROCEDURE — 83540 ASSAY OF IRON: CPT | Performed by: INTERNAL MEDICINE

## 2024-02-14 PROCEDURE — 76775 US EXAM ABDO BACK WALL LIM: CPT

## 2024-02-14 PROCEDURE — 84466 ASSAY OF TRANSFERRIN: CPT | Performed by: INTERNAL MEDICINE

## 2024-02-14 PROCEDURE — 93005 ELECTROCARDIOGRAM TRACING: CPT | Performed by: EMERGENCY MEDICINE

## 2024-02-14 PROCEDURE — 85025 COMPLETE CBC W/AUTO DIFF WBC: CPT | Performed by: EMERGENCY MEDICINE

## 2024-02-14 PROCEDURE — 25010000002 HYDRALAZINE PER 20 MG: Performed by: FAMILY MEDICINE

## 2024-02-14 PROCEDURE — 84550 ASSAY OF BLOOD/URIC ACID: CPT | Performed by: INTERNAL MEDICINE

## 2024-02-14 PROCEDURE — 25810000003 SODIUM CHLORIDE 0.9 % SOLUTION: Performed by: FAMILY MEDICINE

## 2024-02-14 PROCEDURE — 71045 X-RAY EXAM CHEST 1 VIEW: CPT

## 2024-02-14 PROCEDURE — 93010 ELECTROCARDIOGRAM REPORT: CPT | Performed by: INTERNAL MEDICINE

## 2024-02-14 PROCEDURE — 63710000001 INSULIN LISPRO (HUMAN) PER 5 UNITS: Performed by: FAMILY MEDICINE

## 2024-02-14 PROCEDURE — 36415 COLL VENOUS BLD VENIPUNCTURE: CPT

## 2024-02-14 PROCEDURE — 80053 COMPREHEN METABOLIC PANEL: CPT | Performed by: EMERGENCY MEDICINE

## 2024-02-14 PROCEDURE — 99285 EMERGENCY DEPT VISIT HI MDM: CPT

## 2024-02-14 PROCEDURE — 87040 BLOOD CULTURE FOR BACTERIA: CPT | Performed by: EMERGENCY MEDICINE

## 2024-02-14 PROCEDURE — 84484 ASSAY OF TROPONIN QUANT: CPT | Performed by: EMERGENCY MEDICINE

## 2024-02-14 PROCEDURE — 93306 TTE W/DOPPLER COMPLETE: CPT | Performed by: EMERGENCY MEDICINE

## 2024-02-14 PROCEDURE — 25010000002 NICARDIPINE 2.5 MG/ML SOLUTION: Performed by: FAMILY MEDICINE

## 2024-02-14 PROCEDURE — 25010000002 NICARDIPINE 2.5 MG/ML SOLUTION: Performed by: EMERGENCY MEDICINE

## 2024-02-14 RX ORDER — CHLORHEXIDINE GLUCONATE 500 MG/1
1 CLOTH TOPICAL ONCE
Status: DISCONTINUED | OUTPATIENT
Start: 2024-02-14 | End: 2024-02-16

## 2024-02-14 RX ORDER — TAMSULOSIN HYDROCHLORIDE 0.4 MG/1
1 CAPSULE ORAL DAILY
COMMUNITY

## 2024-02-14 RX ORDER — INSULIN LISPRO 100 [IU]/ML
2-7 INJECTION, SOLUTION INTRAVENOUS; SUBCUTANEOUS
Status: DISCONTINUED | OUTPATIENT
Start: 2024-02-14 | End: 2024-02-21 | Stop reason: HOSPADM

## 2024-02-14 RX ORDER — ROSUVASTATIN CALCIUM 10 MG/1
10 TABLET, COATED ORAL NIGHTLY
COMMUNITY

## 2024-02-14 RX ORDER — BISACODYL 10 MG
10 SUPPOSITORY, RECTAL RECTAL DAILY PRN
Status: DISCONTINUED | OUTPATIENT
Start: 2024-02-14 | End: 2024-02-21 | Stop reason: HOSPADM

## 2024-02-14 RX ORDER — CARVEDILOL 6.25 MG/1
12.5 TABLET ORAL 2 TIMES DAILY
Status: DISCONTINUED | OUTPATIENT
Start: 2024-02-14 | End: 2024-02-14

## 2024-02-14 RX ORDER — CLONIDINE HYDROCHLORIDE 0.1 MG/1
0.1 TABLET ORAL ONCE
Status: COMPLETED | OUTPATIENT
Start: 2024-02-14 | End: 2024-02-14

## 2024-02-14 RX ORDER — SODIUM BICARBONATE 650 MG/1
1300 TABLET ORAL 3 TIMES DAILY
COMMUNITY

## 2024-02-14 RX ORDER — ATORVASTATIN CALCIUM 10 MG/1
20 TABLET, FILM COATED ORAL DAILY
Status: DISCONTINUED | OUTPATIENT
Start: 2024-02-14 | End: 2024-02-14

## 2024-02-14 RX ORDER — SODIUM CHLORIDE 0.9 % (FLUSH) 0.9 %
10 SYRINGE (ML) INJECTION AS NEEDED
Status: DISCONTINUED | OUTPATIENT
Start: 2024-02-14 | End: 2024-02-21 | Stop reason: HOSPADM

## 2024-02-14 RX ORDER — AMLODIPINE BESYLATE 10 MG/1
10 TABLET ORAL
Status: DISCONTINUED | OUTPATIENT
Start: 2024-02-14 | End: 2024-02-14

## 2024-02-14 RX ORDER — BISACODYL 5 MG/1
5 TABLET, DELAYED RELEASE ORAL DAILY PRN
Status: DISCONTINUED | OUTPATIENT
Start: 2024-02-14 | End: 2024-02-21 | Stop reason: HOSPADM

## 2024-02-14 RX ORDER — CARVEDILOL 25 MG/1
25 TABLET ORAL 2 TIMES DAILY
Status: DISCONTINUED | OUTPATIENT
Start: 2024-02-14 | End: 2024-02-21 | Stop reason: HOSPADM

## 2024-02-14 RX ORDER — FERROUS SULFATE 325(65) MG
325 TABLET ORAL 2 TIMES DAILY WITH MEALS
COMMUNITY

## 2024-02-14 RX ORDER — ROSUVASTATIN CALCIUM 10 MG/1
10 TABLET, COATED ORAL NIGHTLY
Status: DISCONTINUED | OUTPATIENT
Start: 2024-02-14 | End: 2024-02-21 | Stop reason: HOSPADM

## 2024-02-14 RX ORDER — LISINOPRIL 40 MG/1
40 TABLET ORAL DAILY
COMMUNITY
End: 2024-02-21 | Stop reason: HOSPADM

## 2024-02-14 RX ORDER — ONDANSETRON 2 MG/ML
4 INJECTION INTRAMUSCULAR; INTRAVENOUS EVERY 6 HOURS PRN
Status: DISCONTINUED | OUTPATIENT
Start: 2024-02-14 | End: 2024-02-19 | Stop reason: SDUPTHER

## 2024-02-14 RX ORDER — DEXTROSE MONOHYDRATE 25 G/50ML
25 INJECTION, SOLUTION INTRAVENOUS
Status: DISCONTINUED | OUTPATIENT
Start: 2024-02-14 | End: 2024-02-21 | Stop reason: HOSPADM

## 2024-02-14 RX ORDER — INSULIN LISPRO 100 [IU]/ML
5 INJECTION, SOLUTION INTRAVENOUS; SUBCUTANEOUS 2 TIMES DAILY
COMMUNITY

## 2024-02-14 RX ORDER — SODIUM CHLORIDE 9 MG/ML
75 INJECTION, SOLUTION INTRAVENOUS CONTINUOUS
Status: DISCONTINUED | OUTPATIENT
Start: 2024-02-14 | End: 2024-02-15

## 2024-02-14 RX ORDER — TADALAFIL 5 MG/1
5 TABLET ORAL DAILY PRN
COMMUNITY
End: 2024-02-21 | Stop reason: HOSPADM

## 2024-02-14 RX ORDER — SODIUM CHLORIDE 0.9 % (FLUSH) 0.9 %
10 SYRINGE (ML) INJECTION AS NEEDED
Status: DISCONTINUED | OUTPATIENT
Start: 2024-02-14 | End: 2024-02-19 | Stop reason: SDUPTHER

## 2024-02-14 RX ORDER — HYDRALAZINE HYDROCHLORIDE 20 MG/ML
10 INJECTION INTRAMUSCULAR; INTRAVENOUS EVERY 4 HOURS PRN
Status: DISCONTINUED | OUTPATIENT
Start: 2024-02-14 | End: 2024-02-21 | Stop reason: HOSPADM

## 2024-02-14 RX ORDER — FINERENONE 10 MG/1
10 TABLET, FILM COATED ORAL DAILY
COMMUNITY
End: 2024-02-21 | Stop reason: HOSPADM

## 2024-02-14 RX ORDER — POLYETHYLENE GLYCOL 3350 17 G/17G
17 POWDER, FOR SOLUTION ORAL DAILY PRN
Status: DISCONTINUED | OUTPATIENT
Start: 2024-02-14 | End: 2024-02-21 | Stop reason: HOSPADM

## 2024-02-14 RX ORDER — SODIUM CHLORIDE 9 MG/ML
40 INJECTION, SOLUTION INTRAVENOUS AS NEEDED
Status: DISCONTINUED | OUTPATIENT
Start: 2024-02-14 | End: 2024-02-21 | Stop reason: HOSPADM

## 2024-02-14 RX ORDER — ENOXAPARIN SODIUM 100 MG/ML
30 INJECTION SUBCUTANEOUS DAILY
Status: DISCONTINUED | OUTPATIENT
Start: 2024-02-14 | End: 2024-02-21 | Stop reason: HOSPADM

## 2024-02-14 RX ORDER — NICOTINE POLACRILEX 4 MG
15 LOZENGE BUCCAL
Status: DISCONTINUED | OUTPATIENT
Start: 2024-02-14 | End: 2024-02-21 | Stop reason: HOSPADM

## 2024-02-14 RX ORDER — ALLOPURINOL 100 MG/1
100 TABLET ORAL DAILY
COMMUNITY
End: 2024-02-21 | Stop reason: HOSPADM

## 2024-02-14 RX ORDER — SODIUM CHLORIDE 0.9 % (FLUSH) 0.9 %
10 SYRINGE (ML) INJECTION EVERY 12 HOURS SCHEDULED
Status: DISCONTINUED | OUTPATIENT
Start: 2024-02-14 | End: 2024-02-21 | Stop reason: HOSPADM

## 2024-02-14 RX ORDER — CHLORHEXIDINE GLUCONATE 500 MG/1
1 CLOTH TOPICAL EVERY 24 HOURS
Status: DISCONTINUED | OUTPATIENT
Start: 2024-02-15 | End: 2024-02-16

## 2024-02-14 RX ORDER — AMOXICILLIN 250 MG
2 CAPSULE ORAL 2 TIMES DAILY
Status: DISCONTINUED | OUTPATIENT
Start: 2024-02-14 | End: 2024-02-21 | Stop reason: HOSPADM

## 2024-02-14 RX ORDER — MAGNESIUM OXIDE 400 MG/1
400 TABLET ORAL DAILY
COMMUNITY

## 2024-02-14 RX ORDER — ASPIRIN 81 MG/1
81 TABLET ORAL DAILY
Status: DISCONTINUED | OUTPATIENT
Start: 2024-02-14 | End: 2024-02-21 | Stop reason: HOSPADM

## 2024-02-14 RX ORDER — LABETALOL HYDROCHLORIDE 5 MG/ML
10 INJECTION, SOLUTION INTRAVENOUS EVERY 4 HOURS PRN
Status: DISCONTINUED | OUTPATIENT
Start: 2024-02-14 | End: 2024-02-21 | Stop reason: HOSPADM

## 2024-02-14 RX ADMIN — INSULIN LISPRO 3 UNITS: 100 INJECTION, SOLUTION INTRAVENOUS; SUBCUTANEOUS at 20:42

## 2024-02-14 RX ADMIN — Medication 10 ML: at 20:01

## 2024-02-14 RX ADMIN — HYDRALAZINE HYDROCHLORIDE 10 MG: 20 INJECTION INTRAMUSCULAR; INTRAVENOUS at 21:24

## 2024-02-14 RX ADMIN — NICARDIPINE HYDROCHLORIDE 10 MG/HR: 25 INJECTION, SOLUTION INTRAVENOUS at 13:54

## 2024-02-14 RX ADMIN — CARVEDILOL 25 MG: 25 TABLET, FILM COATED ORAL at 20:00

## 2024-02-14 RX ADMIN — ASPIRIN 81 MG: 81 TABLET, COATED ORAL at 15:29

## 2024-02-14 RX ADMIN — AMLODIPINE BESYLATE 10 MG: 10 TABLET ORAL at 15:29

## 2024-02-14 RX ADMIN — SODIUM CHLORIDE 75 ML/HR: 9 INJECTION, SOLUTION INTRAVENOUS at 20:02

## 2024-02-14 RX ADMIN — ENOXAPARIN SODIUM 30 MG: 30 INJECTION SUBCUTANEOUS at 15:30

## 2024-02-14 RX ADMIN — CLONIDINE HYDROCHLORIDE 0.1 MG: 0.1 TABLET ORAL at 09:28

## 2024-02-14 RX ADMIN — NICARDIPINE HYDROCHLORIDE 5 MG/HR: 25 INJECTION, SOLUTION INTRAVENOUS at 11:18

## 2024-02-14 RX ADMIN — ROSUVASTATIN CALCIUM 10 MG: 10 TABLET, COATED ORAL at 20:01

## 2024-02-14 RX ADMIN — ATORVASTATIN CALCIUM 20 MG: 10 TABLET, FILM COATED ORAL at 15:29

## 2024-02-14 RX ADMIN — DOCUSATE SODIUM 50 MG AND SENNOSIDES 8.6 MG 2 TABLET: 8.6; 5 TABLET, FILM COATED ORAL at 20:00

## 2024-02-14 RX ADMIN — Medication 10 ML: at 15:29

## 2024-02-14 RX ADMIN — Medication 1 APPLICATION: at 17:27

## 2024-02-14 NOTE — ED PROVIDER NOTES
Subjective   History of Present Illness  Patient is a 68-year-old male with a history of diabetes and hypertension who presents to the ER with shortness of air.  Patient states he has had shortness of air for the last week.  Patient states that his shortness of air got worse last night.  He has had a mild cough.  He denies any fever, chest pain, abdominal pain, nausea vomiting diarrhea, urinary changes, neurologic changes, leg pain or swelling.      Review of Systems   Constitutional: Negative.    HENT: Negative.     Eyes: Negative.    Respiratory:  Positive for cough and shortness of breath.    Cardiovascular: Negative.    Gastrointestinal: Negative.    Endocrine: Negative.    Genitourinary: Negative.    Musculoskeletal: Negative.    Skin: Negative.    Allergic/Immunologic: Negative.    Neurological: Negative.    Hematological: Negative.    Psychiatric/Behavioral: Negative.     All other systems reviewed and are negative.      Past Medical History:   Diagnosis Date    Diabetes mellitus     Edema     Hyperlipidemia     Hypertension        Allergies   Allergen Reactions    Duravent Dm  [Phenylephrine-Dm-Gg] Rash       Past Surgical History:   Procedure Laterality Date    NO PAST SURGERIES         No family history on file.    Social History     Socioeconomic History    Marital status:    Tobacco Use    Smoking status: Never    Smokeless tobacco: Never   Substance and Sexual Activity    Alcohol use: No    Drug use: No    Sexual activity: Defer           Objective   Physical Exam  Vitals and nursing note reviewed.   Constitutional:       Appearance: He is well-developed.   HENT:      Head: Normocephalic and atraumatic.   Eyes:      Conjunctiva/sclera: Conjunctivae normal.      Pupils: Pupils are equal, round, and reactive to light.   Cardiovascular:      Rate and Rhythm: Normal rate and regular rhythm.      Heart sounds: Normal heart sounds.   Pulmonary:      Effort: Pulmonary effort is normal.      Breath  sounds: Normal breath sounds.   Abdominal:      Palpations: Abdomen is soft.      Tenderness: There is no abdominal tenderness.   Musculoskeletal:         General: No deformity. Normal range of motion.      Cervical back: Normal range of motion.   Skin:     General: Skin is warm.      Comments: Trace edema bilateral lower extremities   Neurological:      Mental Status: He is alert and oriented to person, place, and time.   Psychiatric:         Behavior: Behavior normal.         Procedures           ED Course      EKG as interpreted by me: Normal sinus rhythm with a rate of 90 with a sinus arrhythmia, no acute ischemia or infarction      Lab Results (last 24 hours)       Procedure Component Value Units Date/Time    COVID-19, FLU A/B, RSV PCR 1 HR TAT - Swab, Nasopharynx [[id number]]  (Normal) Collected: 02/14/24 0912    Specimen: Swab from Nasopharynx Updated: 02/14/24 1032     COVID19 Not Detected     Influenza A PCR Not Detected     Influenza B PCR Not Detected     RSV, PCR Not Detected    Narrative:      Fact sheet for providers: https://www.fda.gov/media/210237/download    Fact sheet for patients: https://www.fda.gov/media/960379/download    Test performed by PCR.    CBC & Differential [[id number]]  (Abnormal) Collected: 02/14/24 0915    Specimen: Blood Updated: 02/14/24 0934    Narrative:      The following orders were created for panel order CBC & Differential.  Procedure                               Abnormality         Status                     ---------                               -----------         ------                     CBC Auto Differential[[id number]]        Abnormal            Final result                 Please view results for these tests on the individual orders.    Comprehensive Metabolic Panel [[id number]]  (Abnormal) Collected: 02/14/24 0915    Specimen: Blood Updated: 02/14/24 0959     Glucose 165 mg/dL      BUN 52 mg/dL      Creatinine 3.35 mg/dL      Sodium 142 mmol/L      Potassium 3.9  "mmol/L      Chloride 104 mmol/L      CO2 27.0 mmol/L      Calcium 8.8 mg/dL      Total Protein 7.3 g/dL      Albumin 3.2 g/dL      ALT (SGPT) 6 U/L      AST (SGOT) 12 U/L      Alkaline Phosphatase 119 U/L      Total Bilirubin 0.4 mg/dL      Globulin 4.1 gm/dL      A/G Ratio 0.8 g/dL      BUN/Creatinine Ratio 15.5     Anion Gap 11.0 mmol/L      eGFR 19.3 mL/min/1.73     Narrative:      GFR Normal >60  Chronic Kidney Disease <60  Kidney Failure <15      D-dimer, Quantitative [[id number]]  (Abnormal) Collected: 02/14/24 0915    Specimen: Blood Updated: 02/14/24 0943     D-Dimer, Quantitative 1.36 MCGFEU/mL     Narrative:      According to the assay 's published package insert, a normal (<0.50 MCGFEU/mL) D-dimer result in conjunction with a non-high clinical probability assessment, excludes deep vein thrombosis (DVT) and pulmonary embolism (PE) with high sensitivity.    D-dimer values increase with age and this can make VTE exclusion of an older population difficult. To address this, the [employer], based on best available evidence and recent guidelines, recommends that clinicians use age-adjusted D-dimer thresholds in patients greater than 50 years of age with: a) a low probability of PE who do not meet all Pulmonary Embolism Rule Out Criteria, or b) in those with intermediate probability of PE.   The formula for an age-adjusted D-dimer cut-off is \"age/100\".  For example, a 60 year old patient would have an age-adjusted cut-off of 0.60 MCGFEU/mL and an 80 year old 0.80 MCGFEU/mL.    BNP [[id number]]  (Abnormal) Collected: 02/14/24 0915    Specimen: Blood Updated: 02/14/24 0954     proBNP 8,179.0 pg/mL     Narrative:      This assay is used as an aid in the diagnosis of individuals suspected of having heart failure. It can be used as an aid in the diagnosis of acute decompensated heart failure (ADHF) in patients presenting with signs and symptoms of ADHF to the emergency department (ED). In " addition, NT-proBNP of <300 pg/mL indicates ADHF is not likely.    Age Range Result Interpretation  NT-proBNP Concentration (pg/mL:      <50             Positive            >450                   Gray                 300-450                    Negative             <300    50-75           Positive            >900                  Gray                300-900                  Negative            <300      >75             Positive            >1800                  Gray                300-1800                  Negative            <300    Blood Culture - Blood, Hand, Digit Left [[id number]] Collected: 02/14/24 0915    Specimen: Blood from Hand, Digit Left Updated: 02/14/24 0932    CBC Auto Differential [[id number]]  (Abnormal) Collected: 02/14/24 0915    Specimen: Blood Updated: 02/14/24 0934     WBC 7.61 10*3/mm3      RBC 2.81 10*6/mm3      Hemoglobin 8.5 g/dL      Hematocrit 26.8 %      MCV 95.4 fL      MCH 30.2 pg      MCHC 31.7 g/dL      RDW 14.4 %      RDW-SD 49.9 fl      MPV 9.5 fL      Platelets 221 10*3/mm3      Neutrophil % 66.6 %      Lymphocyte % 22.5 %      Monocyte % 7.6 %      Eosinophil % 2.2 %      Basophil % 0.7 %      Immature Grans % 0.4 %      Neutrophils, Absolute 5.07 10*3/mm3      Lymphocytes, Absolute 1.71 10*3/mm3      Monocytes, Absolute 0.58 10*3/mm3      Eosinophils, Absolute 0.17 10*3/mm3      Basophils, Absolute 0.05 10*3/mm3      Immature Grans, Absolute 0.03 10*3/mm3      nRBC 0.0 /100 WBC     High Sensitivity Troponin T [[id number]]  (Abnormal) Collected: 02/14/24 0915    Specimen: Blood Updated: 02/14/24 1113     HS Troponin T 77 ng/L     Narrative:      High Sensitive Troponin T Reference Range:  <14.0 ng/L- Negative Female for AMI  <22.0 ng/L- Negative Male for AMI  >=14 - Abnormal Female indicating possible myocardial injury.  >=22 - Abnormal Male indicating possible myocardial injury.   Clinicians would have to utilize clinical acumen, EKG, Troponin, and serial changes to determine if it  is an Acute Myocardial Infarction or myocardial injury due to an underlying chronic condition.         Blood Culture - Blood, Arm, Left [[id number]] Collected: 02/14/24 0924    Specimen: Blood from Arm, Left Updated: 02/14/24 0932           XR Chest 1 View   Final Result   Shallow inspiration, with mild cardiomegaly. No evidence of   overt CHF. No focal pulmonary infiltrates.       This report was signed and finalized on 2/14/2024 9:47 AM by Dr. Hemanth Barclay MD.                       Medical Decision Making  Patient is a 68-year-old male with a history of diabetes and hypertension who presents to the ER with shortness of air.  Patient states he has had shortness of air for the last week.  Patient states that his shortness of air got worse last night.  He has had a mild cough.  He denies any fever, chest pain, abdominal pain, nausea vomiting diarrhea, urinary changes, neurologic changes, leg pain or swelling.    Differential diagnosis: Pneumonia, CHF, viral syndrome    Patient arrived hypertensive.  He was given clonidine.  Blood pressure remained significantly elevated.  Patient was started on a Cardene drip.  Concern for hypertensive emergency.  Labs showed hyperglycemia, an elevated BUN and creatinine consistent with the patient's chronic renal insufficiency and chronic anemia.  D-dimer, BNP and troponin were also elevated.  CT PE protocol could not be performed due to the elevated creatinine and and decreased GFR.  Chest x-ray showed mild cardiomegaly but no evidence of pneumonia or pulmonary edema.  Workup most consistent with hypertensive emergency with chronic renal insufficiency and congestive heart failure.  I discussed the case with Dr. Maguire and Dr. Osborne with the hospitalist service and patient was admitted to Dr. Osborne's service for further treatment.                             Problems Addressed:  Acute congestive heart failure, unspecified heart failure type: complicated acute illness or  injury  Chronic anemia: chronic illness or injury  Chronic renal impairment, unspecified CKD stage: chronic illness or injury  Hypertensive urgency: complicated acute illness or injury    Amount and/or Complexity of Data Reviewed  Labs: ordered. Decision-making details documented in ED Course.  Radiology: ordered. Decision-making details documented in ED Course.  ECG/medicine tests: ordered. Decision-making details documented in ED Course.  Discussion of management or test interpretation with external provider(s): Dr Maguire and Dr Blanco with the hospitalist group    Risk  Prescription drug management.  Decision regarding hospitalization.        Final diagnoses:   Hypertensive urgency   Chronic renal impairment, unspecified CKD stage   Acute congestive heart failure, unspecified heart failure type   Chronic anemia       ED Disposition  ED Disposition       ED Disposition   Decision to Admit    Condition   --    Comment   Level of Care: Critical Care [6]   Diagnosis: Hypertensive urgency [944419]   Admitting Physician: ASHLEY BLANCO [[id number]]   Attending Physician: ASHLEY BLANCO [[id number]]   Certification: I Certify That Inpatient Hospital Services Are Medically Necessary For Greater Than 2 Midnights                 No follow-up provider specified.       Medication List      No changes were made to your prescriptions during this visit.            Zully Santamaria MD  02/14/24 [id number]

## 2024-02-14 NOTE — PAYOR COMM NOTE
"REF:   [id number]      [facility]  BETTY,  [phone]  OR  FAX  [phone]    Mauro Cat (68 y.o. Male)       Date of Birth   1955    Social Security Number       Address   [address] [place] [id number]    Home Phone   [phone]    MRN   [id number]       Yarsani   Judaism    Marital Status                               Admission Date   2/14/24    Admission Type   Emergency    Admitting Provider   David Osborne MD    Attending Provider   David Osborne MD    Department, Room/Bed   [facility] CARDIAC CARE, C011/1       Discharge Date       Discharge Disposition       Discharge Destination                                 Attending Provider: David Osborne MD    Allergies: Duravent Dm  [Phenylephrine-dm-gg]    Isolation: None   Infection: COVID (rule out) (02/14/24)   Code Status: CPR    Ht: 170.2 cm (67\")   Wt: 91.9 kg (202 lb 8 oz)    Admission Cmt: None   Principal Problem: Hypertensive urgency [I16.0]                   Active Insurance as of 2/14/2024       Primary Coverage       Payor Plan Insurance Group Employer/Plan Group    ANTHEM MEDICARE REPLACEMENT ANTHEM MEDICARE ADVANTAGE [id number]       Payor Plan Address Payor Plan Phone Number Payor Plan Fax Number Effective Dates    [address] [phone]  1/1/2022 - None Entered    [facility] [id number]         Subscriber Name Subscriber Birth Date Member ID       MAURO CAT 1955 [id number]                     Emergency Contacts        (Rel.) Home Phone Work Phone Mobile Phone    osbaldo cat (Spouse) [phone] -- --          2/14/2024 Event Details User   08:44 Patient arrival  Natasha Dunham   08:44 Acuity/Destination Acuity/Destination  Patient Acuity: 2  ED Destination: ED Beds Sal Conde RN   08:44:17 Arrival Complaint Chest Pain    08:44:57 Trigger for Triage Start  Sal Conde, RN   08:44:57 Triage Started  Sal Conde, RN " "  08:44:57 Chief Complaints Updated Chest Pain Sal Conde RN   08:45 Vitals Assessment  Natasha Dunham   08:45 Vital Signs  Vital Signs  Temp: 98.1 °F (36.7 °C)  Temp src: Oral  Heart Rate: 96  Heart Rate Source: Monitor  Resp: 16  Resp Rate Source: Visual  BP: 212/88 Abnormal   BP Location: Right arm  BP Method: Automatic  Patient Position: Sitting  BMI (Calculated): 31.7  Oxygen Therapy  SpO2: 95 %  Pulse Oximetry Type: Intermittent  Device (Oxygen Therapy): room air  Vitals Timer  Restart Vitals Timer: Yes  Height and Weight  Height: 170.2 cm (67\")  Height Method: Stated  Weight: 91.9 kg (202 lb 8 oz)  Weight Method: Standing scale  Other flowsheet entries  Ideal Body Weight k.1 Natasha Dunham     08:45 HPI HPI (Adult)  Stated Reason for Visit: Pt states that he started feeling SOA last night and he was unable to sleep due to it. Sal Conde RN     09:11 Vital Signs  Vital Signs  Heart Rate: 85 (Device Time: :11:00)  BP: 216/102 Abnormal  (Device Time: ::00)  Noninvasive MAP (mmHg): 132 (Device Time: ::00)  Oxygen Therapy  SpO2: 97 % (Device Time: ::00) Tasha Sierra RN     09:16 Vital Signs  Vital Signs  Heart Rate: 89 (Device Time: ::00)  BP: 215/99 Abnormal  (Device Time: :16:00)  Noninvasive MAP (mmHg): 133 (Device Time: :16:00)  Oxygen Therapy  SpO2: 97 % (Device Time: :16:00) Tasha Sierra RN     09:31 Vital Signs  Vital Signs  Heart Rate: 88 (Device Time: ::00)  BP: 225/93 Abnormal  (Device Time: ::00)  Noninvasive MAP (mmHg): 131 (Device Time: ::00)  Oxygen Therapy  SpO2: 97 % (Device Time: ::00) Tasha Sierra RN     09:32:25 Respiratory Respiratory (Adult)  Airway WDL: WDL  Respiratory WDL  Respiratory WDL: .WDL except; rhythm/pattern (patient reports only symptom he is experiencing is SOB. Appearance is relaxed with respirations even and nonlabored.)  Rhythm/Pattern, Respiratory: shortness of breath Tasha Sierra RN     11:17 Vital " Signs  Vital Signs  Heart Rate: 85 (Device Time: 11:17:00)  BP: 203/94 Abnormal  (Device Time: 11:17:00)  Noninvasive MAP (mmHg): 126 (Device Time: 11:17:00)  Oxygen Therapy  SpO2: 96 % (Device Time: 11:17:00) Tasha Sierra RN   11:18 Medication New Bag niCARdipine (CARDENE) 25 mg in 250 mL NS infusion kit - Dose: 5 mg/hr ; Rate: 50 mL/hr ; Route: Intravenous ; Line: Peripheral IV 02/14/24 0925 Left Antecubital ; Scheduled Time: 1113 Tasha Sierra RN     11:31 Vital Signs  Vital Signs  Heart Rate: 83 (Device Time: 11:31:00)  BP: 191/84 Abnormal  (Device Time: 11:31:00)  Noninvasive MAP (mmHg): 115 (Device Time: 11:31:00)  Oxygen Therapy  SpO2: 94 % (Device Time: 11:31:00) Tasha Sierra RN   11:33:27 ED Provider Notes Note filed at this time Zully Santamaria MD   11:33:28 ED Note Filed ED Prov Note filed by Zully Santamaria MD Rains, Allison C, MD   11:36 Medication Rate/Dose Change niCARdipine (CARDENE) 25 mg in 250 mL NS infusion kit - Dose: 10 mg/hr ; Rate: 100 mL/hr ; Route: Intravenous ; Line: Peripheral IV 02/14/24 0925 Left Antecubital ; Scheduled Time: 1027 Tasha Sierra RN   11:45 Sepsis Predictive Model Early Detection of Sepsis PA score  Early Detection of Sepsis PA score: 2.83 Inpatient, Batch Job   11:46 Vital Signs  Vital Signs  Heart Rate: 97 (Device Time: 11:46:00)  BP: 196/90 Abnormal  (Device Time: 11:46:00)  Noninvasive MAP (mmHg): 117 (Device Time: 11:46:00)  Oxygen Therapy  SpO2: 96 % (Device Time: 11:46:00) Tasha Sierra RN        History & Physical        David Osborne MD at 02/14/24 [facility]9              [facility] Medicine Services  HISTORY AND PHYSICAL    Date of Admission: 2/14/2024  Primary Care Physician: John Robertson MD    Subjective   Primary Historian: Patient.    Chief Complaint: Hypertensive urgency/acute renal failure    History of Present Illness  Patient is a 68-year-old black male presented to ER complaining of shortness of breath.   Patient has been having shortness of breath symptom for about a week off-and-on, is been constant for the last 3 days, progressively getting worse.  Patient denies any chest pain.Patient also complain of mild cough symptoms.  Patient denies any fever night sweats chills.  Upon evaluation patient blood pressure is 200/100, patient was given clonidine on resolved, currently requiring Cardene drip.  Creatinine is 3 .    Patient has a past medical history of stage IIIb renal failure, diabetes, edema, hyperlipidemia, hypertension.    Review of Systems   Constitutional:  Positive for activity change and appetite change. Negative for chills and fever.   HENT:  Negative for hearing loss, nosebleeds, tinnitus and trouble swallowing.    Eyes:  Negative for visual disturbance.   Respiratory:  Positive for cough and shortness of breath. Negative for chest tightness and wheezing.    Cardiovascular:  Negative for chest pain, palpitations and leg swelling.   Gastrointestinal:  Negative for abdominal distention, abdominal pain, blood in stool, constipation, diarrhea, nausea and vomiting.   Endocrine: Negative for cold intolerance, heat intolerance, polydipsia, polyphagia and polyuria.   Genitourinary:  Negative for decreased urine volume, difficulty urinating, dysuria, flank pain, frequency and hematuria.   Musculoskeletal:  Negative for arthralgias, joint swelling and myalgias.   Skin:  Negative for rash.   Allergic/Immunologic: Negative for immunocompromised state.   Neurological:  Positive for weakness. Negative for dizziness, syncope, light-headedness and headaches.   Hematological:  Negative for adenopathy. Does not bruise/bleed easily.   Psychiatric/Behavioral:  Negative for confusion and sleep disturbance. The patient is not nervous/anxious.         Otherwise complete ROS reviewed and negative except as mentioned in the HPI.    Past Medical History:   Past Medical History:   Diagnosis Date    Diabetes mellitus     Edema      Hyperlipidemia     Hypertension      Past Surgical History:  Past Surgical History:   Procedure Laterality Date    NO PAST SURGERIES       Social History:  reports that he has never smoked. He has never used smokeless tobacco. He reports that he does not drink alcohol and does not use drugs.    Family History: family history includes Diabetes in his mother; No Known Problems in his father.       Allergies:  Allergies   Allergen Reactions    Duravent Dm  [Phenylephrine-Dm-Gg] Rash       Medications:  Prior to Admission medications    Medication Sig Start Date End Date Taking? Authorizing Provider   ACCU-CHEK GUIDE test strip  7/18/19   Tristan Barkley MD   amLODIPine (NORVASC) 10 MG tablet amlodipine 10 mg tablet   Take 1 tablet every day by oral route.    Tristan Barkley MD   ASPIRIN 81 PO aspirin 81 mg tablet,delayed release   Take 1 tablet every day by oral route.    Tristan Barkley MD   atorvastatin (LIPITOR) 20 MG tablet atorvastatin 20 mg tablet   Take 1 tablet every day by oral route.    Tristan Barkley MD   carvedilol (COREG) 25 MG tablet 12.5 mg. BID 8/15/19   Tristan Barkley MD   furosemide (LASIX) 40 MG tablet Daily. 8/29/19   Tristan Barkley MD   hydrochlorothiazide (MICROZIDE) 12.5 MG capsule hydrochlorothiazide 12.5 mg capsule   Take 1 capsule every day by oral route.    Tristan Barkley MD   losartan (COZAAR) 100 MG tablet Take 1 tablet by mouth Daily. 9/16/19   Kofi Meade MD   metFORMIN ER (GLUCOPHAGE-XR) 500 MG 24 hr tablet Take 500 mg by mouth 4 (Four) Times a Day. 8/15/19   Tristan Barkley MD   Potassium Chloride Aneta ER (K-DUR PO) 10 mEq Daily.    Tristan Barkley MD   vitamin D (ERGOCALCIFEROL) 94348 units capsule capsule  8/2/19   Tristan Barkley MD     I have utilized all available immediate resources to obtain, update, or review the patient's current medications (including all prescriptions, over-the-counter products, herbals,  "cannabis/cannabidiol products, and vitamin/mineral/dietary (nutritional) supplements).    Objective     Vital Signs: BP (!) 196/90   Pulse 97   Temp 98.1 °F (36.7 °C) (Oral)   Resp 16   Ht 170.2 cm (67\")   Wt 91.9 kg (202 lb 8 oz)   SpO2 96%   BMI 31.72 kg/m²   Physical Exam  Vitals and nursing note reviewed.   HENT:      Head: Normocephalic.   Eyes:      Conjunctiva/sclera: Conjunctivae normal.      Pupils: Pupils are equal, round, and reactive to light.   Cardiovascular:      Rate and Rhythm: Normal rate and regular rhythm.      Heart sounds: Normal heart sounds.   Pulmonary:      Effort: No respiratory distress.      Comments: Diminished breath of bilateral, clear, on room air.  Patient no acute distress.  Abdominal:      General: Bowel sounds are normal. There is no distension.      Palpations: Abdomen is soft.      Tenderness: There is no abdominal tenderness.      Comments: Obesity .   Musculoskeletal:         General: No swelling.      Cervical back: Neck supple.   Skin:     General: Skin is warm and dry.      Capillary Refill: Capillary refill takes 2 to 3 seconds.      Findings: No rash.   Neurological:      General: No focal deficit present.      Mental Status: He is alert and oriented to person, place, and time.   Psychiatric:         Mood and Affect: Mood normal.         Behavior: Behavior normal.         Thought Content: Thought content normal.              Results Reviewed:  Lab Results (last 24 hours)       Procedure Component Value Units Date/Time    High Sensitivity Troponin T 2Hr [[id number]]  (Abnormal) Collected: 02/14/24 1126    Specimen: Blood Updated: 02/14/24 1201     HS Troponin T 78 ng/L      Troponin T Delta 1 ng/L     Narrative:      High Sensitive Troponin T Reference Range:  <14.0 ng/L- Negative Female for AMI  <22.0 ng/L- Negative Male for AMI  >=14 - Abnormal Female indicating possible myocardial injury.  >=22 - Abnormal Male indicating possible myocardial injury.   Clinicians " would have to utilize clinical acumen, EKG, Troponin, and serial changes to determine if it is an Acute Myocardial Infarction or myocardial injury due to an underlying chronic condition.         High Sensitivity Troponin T [[id number]]  (Abnormal) Collected: 02/14/24 0915    Specimen: Blood Updated: 02/14/24 1113     HS Troponin T 77 ng/L     Narrative:      High Sensitive Troponin T Reference Range:  <14.0 ng/L- Negative Female for AMI  <22.0 ng/L- Negative Male for AMI  >=14 - Abnormal Female indicating possible myocardial injury.  >=22 - Abnormal Male indicating possible myocardial injury.   Clinicians would have to utilize clinical acumen, EKG, Troponin, and serial changes to determine if it is an Acute Myocardial Infarction or myocardial injury due to an underlying chronic condition.         COVID-19, FLU A/B, RSV PCR 1 HR TAT - Swab, Nasopharynx [[id number]]  (Normal) Collected: 02/14/24 0912    Specimen: Swab from Nasopharynx Updated: 02/14/24 1032     COVID19 Not Detected     Influenza A PCR Not Detected     Influenza B PCR Not Detected     RSV, PCR Not Detected    Narrative:      Fact sheet for providers: https://www.fda.gov/media/889231/download    Fact sheet for patients: https://www.fda.gov/media/453580/download    Test performed by PCR.    Comprehensive Metabolic Panel [[id number]]  (Abnormal) Collected: 02/14/24 0915    Specimen: Blood Updated: 02/14/24 0959     Glucose 165 mg/dL      BUN 52 mg/dL      Creatinine 3.35 mg/dL      Sodium 142 mmol/L      Potassium 3.9 mmol/L      Chloride 104 mmol/L      CO2 27.0 mmol/L      Calcium 8.8 mg/dL      Total Protein 7.3 g/dL      Albumin 3.2 g/dL      ALT (SGPT) 6 U/L      AST (SGOT) 12 U/L      Alkaline Phosphatase 119 U/L      Total Bilirubin 0.4 mg/dL      Globulin 4.1 gm/dL      A/G Ratio 0.8 g/dL      BUN/Creatinine Ratio 15.5     Anion Gap 11.0 mmol/L      eGFR 19.3 mL/min/1.73     Narrative:      GFR Normal >60  Chronic Kidney Disease <60  Kidney Failure  "<15      BNP [[id number]]  (Abnormal) Collected: 02/14/24 0915    Specimen: Blood Updated: 02/14/24 0954     proBNP 8,179.0 pg/mL     Narrative:      This assay is used as an aid in the diagnosis of individuals suspected of having heart failure. It can be used as an aid in the diagnosis of acute decompensated heart failure (ADHF) in patients presenting with signs and symptoms of ADHF to the emergency department (ED). In addition, NT-proBNP of <300 pg/mL indicates ADHF is not likely.    Age Range Result Interpretation  NT-proBNP Concentration (pg/mL:      <50             Positive            >450                   Gray                 300-450                    Negative             <300    50-75           Positive            >900                  Gray                300-900                  Negative            <300      >75             Positive            >1800                  Gray                300-1800                  Negative            <300    D-dimer, Quantitative [[id number]]  (Abnormal) Collected: 02/14/24 0915    Specimen: Blood Updated: 02/14/24 0943     D-Dimer, Quantitative 1.36 MCGFEU/mL     Narrative:      According to the assay 's published package insert, a normal (<0.50 MCGFEU/mL) D-dimer result in conjunction with a non-high clinical probability assessment, excludes deep vein thrombosis (DVT) and pulmonary embolism (PE) with high sensitivity.    D-dimer values increase with age and this can make VTE exclusion of an older population difficult. To address this, the American College of Physicians, based on best available evidence and recent guidelines, recommends that clinicians use age-adjusted D-dimer thresholds in patients greater than 50 years of age with: a) a low probability of PE who do not meet all Pulmonary Embolism Rule Out Criteria, or b) in those with intermediate probability of PE.   The formula for an age-adjusted D-dimer cut-off is \"age/100\".  For example, a 60 year old " patient would have an age-adjusted cut-off of 0.60 MCGFEU/mL and an 80 year old 0.80 MCGFEU/mL.    CBC & Differential [[id number]]  (Abnormal) Collected: 02/14/24 0915    Specimen: Blood Updated: 02/14/24 0934    Narrative:      The following orders were created for panel order CBC & Differential.  Procedure                               Abnormality         Status                     ---------                               -----------         ------                     CBC Auto Differential[[id number]]        Abnormal            Final result                 Please view results for these tests on the individual orders.    CBC Auto Differential [[id number]]  (Abnormal) Collected: 02/14/24 0915    Specimen: Blood Updated: 02/14/24 0934     WBC 7.61 10*3/mm3      RBC 2.81 10*6/mm3      Hemoglobin 8.5 g/dL      Hematocrit 26.8 %      MCV 95.4 fL      MCH 30.2 pg      MCHC 31.7 g/dL      RDW 14.4 %      RDW-SD 49.9 fl      MPV 9.5 fL      Platelets 221 10*3/mm3      Neutrophil % 66.6 %      Lymphocyte % 22.5 %      Monocyte % 7.6 %      Eosinophil % 2.2 %      Basophil % 0.7 %      Immature Grans % 0.4 %      Neutrophils, Absolute 5.07 10*3/mm3      Lymphocytes, Absolute 1.71 10*3/mm3      Monocytes, Absolute 0.58 10*3/mm3      Eosinophils, Absolute 0.17 10*3/mm3      Basophils, Absolute 0.05 10*3/mm3      Immature Grans, Absolute 0.03 10*3/mm3      nRBC 0.0 /100 WBC     Blood Culture - Blood, Hand, Digit Left [[id number]] Collected: 02/14/24 0915    Specimen: Blood from Hand, Digit Left Updated: 02/14/24 0932    Blood Culture - Blood, Arm, Left [[id number]] Collected: 02/14/24 0924    Specimen: Blood from Arm, Left Updated: 02/14/24 0932          Imaging Results (Last 24 Hours)       Procedure Component Value Units Date/Time    XR Chest 1 View [[id number]] Collected: 02/14/24 0944     Updated: 02/14/24 0950    Narrative:      EXAMINATION: XR CHEST 1 VW- 2/14/2024 9:44 AM     HISTORY: Shortness of breath.     COMPARISON: There  are no correlative imaging studies for comparison.     REPORT:  The lungs are hypoaerated, no focal infiltrate or pulmonary  consolidation is identified. No pneumothorax or effusion is identified.  Mild cardiomegaly is present. The osseous structures show no acute  findings.       Impression:      Shallow inspiration, with mild cardiomegaly. No evidence of  overt CHF. No focal pulmonary infiltrates.     This report was signed and finalized on 2/14/2024 9:47 AM by Dr. Hemanth Barclay MD.             I have personally reviewed and interpreted the radiology studies and ECG obtained at time of admission.     Assessment / Plan   Assessment:   Active Hospital Problems    Diagnosis     **Hypertensive urgency     Acute on chronic renal failure     Controlled type 2 diabetes mellitus with complication, without long-term current use of insulin     Benign essential HTN     Mixed hyperlipidemia        Treatment Plan  The patient will be admitted to my service here at [facility].     Hypertensive urgency/hyperlipidemia/history of hypertension/cardiomegaly.  Continue Cardene drip.  Patient will need to go unit for now.  Norvasc.  Aspirin . Lipitor.  Coreg.  Hold Lasix due to renal failure.  Hold hydrochlorothiazide due to renal failure.  Hold losartan due to renal failure.  Hydralazine as needed.  Labetalol as needed.  Echocardiogram .    Acute on chronic renal failure stage IIIb..  Baseline creatinine 10/1/2020 2.5.  Creatinine today is 3.35.  Nephrology consult.    Ultrasound the kidneys.    Anemia.  Probably secondary to chronic renal failure.  No sign of GI bleed.    Shortness of breath.  Chest x-ray-Shallow inspiration, with mild cardiomegaly, No evidence of overt CHF. No focal pulmonary infiltrates.  Patient is on room air    Elevated D-dimer.  Unable to do CTA of the chest due to renal failure.  Will start prophylaxis Lovenox for now.  Patient denies any chest pain.    Diabetes.  Hold metformin due to renal  failure.  Low sliding scale for now.  Hemoglobin A1 C.    Nausea.  Zofran as needed.    Obesity.  BMI is 32.    Nutrition.  Cardiac/diabetic diet.    Blood cultures pending.  COVID-19-negative.  Flu screen negative.  RSV-negative.    Medical Decision Making  Number and Complexity of problems: Hypertensive urgency/acute on chronic renal failure/anemia/shortness of breath/diabetes  Differential Diagnosis: None    Conditions and Status        Condition is unchanged.     [facility] Data  External documents reviewed: ER notes  Cardiac tracing (EKG, telemetry) interpretation: Sinus  Radiology interpretation: Chest x-ray  Labs reviewed: Laboratory  Any tests that were considered but not ordered: Laboratory in AM     Decision rules/scores evaluated (example [id number], Wells, etc): None     Discussed with: Patient     Care Planning  Shared decision making: Patient and wife  Code status and discussions: Full code    Disposition  Social Determinants of Health that impact treatment or disposition: From home  Estimated length of stay is 2 to 5 days.     I confirmed that the patient's advanced care plan is present, code status is documented, and a surrogate decision maker is listed in the patient's medical record.     The patient's surrogate decision maker is wife.     The patient was seen and examined by me on 2/14/2024 at 1140.    Electronically signed by David Osborne MD, 02/14/24, 12:02 CST.              Electronically signed by David Osborne MD at 02/14/24 1204          Emergency Department Notes        Tasha Sierra, RN at 02/14/24 1154          Nursing report ED to floor  Mauro TAY Amanda  68 y.o.  male    HPI:   Chief Complaint   Patient presents with    Shortness of Breath       Admitting doctor:   David Osborne MD    Consulting provider(s):  Consults       No orders found from 1/16/2024 to 2/15/2024.             Admitting diagnosis:   The primary encounter diagnosis was Hypertensive urgency. Diagnoses of Chronic renal  impairment, unspecified CKD stage, Acute congestive heart failure, unspecified heart failure type, and Chronic anemia were also pertinent to this visit.    Code status:   Current Code Status       Date Active Code Status Order ID Comments User Context       Not on file            Allergies:   Duravent dm  [phenylephrine-dm-gg]    Intake and Output  No intake or output data in the 24 hours ending 02/14/24 1154    Weight:       02/14/24  0845   Weight: 91.9 kg (202 lb 8 oz)       Most recent vitals:   Vitals:    02/14/24 0931 02/14/24 1117 02/14/24 1131 02/14/24 1146   BP: (!) 225/93 (!) 203/94 (!) 191/84 (!) 196/90   BP Location:       Patient Position:       Pulse: 88 85 83 97   Resp:       Temp:       TempSrc:       SpO2: 97% 96% 94% 96%   Weight:       Height:         Oxygen Therapy: .    Active LDAs/IV Access:   Lines, Drains & Airways       Active LDAs       Name Placement date Placement time Site Days    Peripheral IV 02/14/24 0925 Left Antecubital 02/14/24  0925  Antecubital  less than 1                    Labs (abnormal labs have a star):   Labs Reviewed   COMPREHENSIVE METABOLIC PANEL - Abnormal; Notable for the following components:       Result Value    Glucose 165 (*)     BUN 52 (*)     Creatinine 3.35 (*)     Albumin 3.2 (*)     Alkaline Phosphatase 119 (*)     eGFR 19.3 (*)     All other components within normal limits    Narrative:     GFR Normal >60  Chronic Kidney Disease <60  Kidney Failure <15     D-DIMER, QUANTITATIVE - Abnormal; Notable for the following components:    D-Dimer, Quantitative 1.36 (*)     All other components within normal limits    Narrative:     According to the assay 's published package insert, a normal (<0.50 MCGFEU/mL) D-dimer result in conjunction with a non-high clinical probability assessment, excludes deep vein thrombosis (DVT) and pulmonary embolism (PE) with high sensitivity.    D-dimer values increase with age and this can make VTE exclusion of an older  "population difficult. To address this, the [employer], based on best available evidence and recent guidelines, recommends that clinicians use age-adjusted D-dimer thresholds in patients greater than 50 years of age with: a) a low probability of PE who do not meet all Pulmonary Embolism Rule Out Criteria, or b) in those with intermediate probability of PE.   The formula for an age-adjusted D-dimer cut-off is \"age/100\".  For example, a 60 year old patient would have an age-adjusted cut-off of 0.60 MCGFEU/mL and an 80 year old 0.80 MCGFEU/mL.   BNP (IN-HOUSE) - Abnormal; Notable for the following components:    proBNP 8,179.0 (*)     All other components within normal limits    Narrative:     This assay is used as an aid in the diagnosis of individuals suspected of having heart failure. It can be used as an aid in the diagnosis of acute decompensated heart failure (ADHF) in patients presenting with signs and symptoms of ADHF to the emergency department (ED). In addition, NT-proBNP of <300 pg/mL indicates ADHF is not likely.    Age Range Result Interpretation  NT-proBNP Concentration (pg/mL:      <50             Positive            >450                   Gray                 300-450                    Negative             <300    50-75           Positive            >900                  Gray                300-900                  Negative            <300      >75             Positive            >1800                  Gray                300-1800                  Negative            <300   CBC WITH AUTO DIFFERENTIAL - Abnormal; Notable for the following components:    RBC 2.81 (*)     Hemoglobin 8.5 (*)     Hematocrit 26.8 (*)     All other components within normal limits   TROPONIN - Abnormal; Notable for the following components:    HS Troponin T 77 (*)     All other components within normal limits    Narrative:     High Sensitive Troponin T Reference Range:  <14.0 ng/L- Negative Female for " AMI  <22.0 ng/L- Negative Male for AMI  >=14 - Abnormal Female indicating possible myocardial injury.  >=22 - Abnormal Male indicating possible myocardial injury.   Clinicians would have to utilize clinical acumen, EKG, Troponin, and serial changes to determine if it is an Acute Myocardial Infarction or myocardial injury due to an underlying chronic condition.        COVID-19/FLUA&B/RSV, NP SWAB IN TRANSPORT MEDIA 1 HR TAT - Normal    Narrative:     Fact sheet for providers: https://www.fda.gov/media/929912/download    Fact sheet for patients: https://www.fda.gov/media/896028/download    Test performed by PCR.   BLOOD CULTURE   BLOOD CULTURE   HIGH SENSITIVITIY TROPONIN T 2HR   CBC AND DIFFERENTIAL    Narrative:     The following orders were created for panel order CBC & Differential.  Procedure                               Abnormality         Status                     ---------                               -----------         ------                     CBC Auto Differential[[id number]]        Abnormal            Final result                 Please view results for these tests on the individual orders.       Meds given in ED:   Medications   sodium chloride 0.9 % flush 10 mL (has no administration in time range)   niCARdipine (CARDENE) 25 mg in 250 mL NS infusion kit (10 mg/hr Intravenous Rate/Dose Change 2/14/24 1136)   cloNIDine (CATAPRES) tablet 0.1 mg (0.1 mg Oral Given 2/14/24 0928)     niCARdipine, 5-15 mg/hr, Last Rate: 10 mg/hr (02/14/24 1136)         NIH Stroke Scale:       Isolation/Infection(s):  No active isolations   COVID (rule out)     COVID Testing  Collected .  Resulted .    Nursing report ED to floor:  Mental status: A/O x 4.  Ambulatory status: .  Precautions: .    ED nurse phone extentsion- ..      Electronically signed by Tasha Sierra RN at 02/14/24 9703       Zully Santamaria MD at 02/14/24 0914          Subjective   History of Present Illness  Patient is a 68-year-old male with a history  of diabetes and hypertension who presents to the ER with shortness of air.  Patient states he has had shortness of air for the last week.  Patient states that his shortness of air got worse last night.  He has had a mild cough.  He denies any fever, chest pain, abdominal pain, nausea vomiting diarrhea, urinary changes, neurologic changes, leg pain or swelling.      Review of Systems   Constitutional: Negative.    HENT: Negative.     Eyes: Negative.    Respiratory:  Positive for cough and shortness of breath.    Cardiovascular: Negative.    Gastrointestinal: Negative.    Endocrine: Negative.    Genitourinary: Negative.    Musculoskeletal: Negative.    Skin: Negative.    Allergic/Immunologic: Negative.    Neurological: Negative.    Hematological: Negative.    Psychiatric/Behavioral: Negative.     All other systems reviewed and are negative.      Past Medical History:   Diagnosis Date    Diabetes mellitus     Edema     Hyperlipidemia     Hypertension        Allergies   Allergen Reactions    Duravent Dm  [Phenylephrine-Dm-Gg] Rash       Past Surgical History:   Procedure Laterality Date    NO PAST SURGERIES         No family history on file.    Social History     Socioeconomic History    Marital status:    Tobacco Use    Smoking status: Never    Smokeless tobacco: Never   Substance and Sexual Activity    Alcohol use: No    Drug use: No    Sexual activity: Defer           Objective   Physical Exam  Vitals and nursing note reviewed.   Constitutional:       Appearance: He is well-developed.   HENT:      Head: Normocephalic and atraumatic.   Eyes:      Conjunctiva/sclera: Conjunctivae normal.      Pupils: Pupils are equal, round, and reactive to light.   Cardiovascular:      Rate and Rhythm: Normal rate and regular rhythm.      Heart sounds: Normal heart sounds.   Pulmonary:      Effort: Pulmonary effort is normal.      Breath sounds: Normal breath sounds.   Abdominal:      Palpations: Abdomen is soft.       Tenderness: There is no abdominal tenderness.   Musculoskeletal:         General: No deformity. Normal range of motion.      Cervical back: Normal range of motion.   Skin:     General: Skin is warm.      Comments: Trace edema bilateral lower extremities   Neurological:      Mental Status: He is alert and oriented to person, place, and time.   Psychiatric:         Behavior: Behavior normal.         Procedures          ED Course      EKG as interpreted by me: Normal sinus rhythm with a rate of 90 with a sinus arrhythmia, no acute ischemia or infarction      Lab Results (last 24 hours)       Procedure Component Value Units Date/Time    COVID-19, FLU A/B, RSV PCR 1 HR TAT - Swab, Nasopharynx [[id number]]  (Normal) Collected: 02/14/24 0912    Specimen: Swab from Nasopharynx Updated: 02/14/24 1032     COVID19 Not Detected     Influenza A PCR Not Detected     Influenza B PCR Not Detected     RSV, PCR Not Detected    Narrative:      Fact sheet for providers: https://www.fda.gov/media/630357/download    Fact sheet for patients: https://www.fda.gov/media/488541/download    Test performed by PCR.    CBC & Differential [[id number]]  (Abnormal) Collected: 02/14/24 0915    Specimen: Blood Updated: 02/14/24 0934    Narrative:      The following orders were created for panel order CBC & Differential.  Procedure                               Abnormality         Status                     ---------                               -----------         ------                     CBC Auto Differential[[id number]]        Abnormal            Final result                 Please view results for these tests on the individual orders.    Comprehensive Metabolic Panel [[id number]]  (Abnormal) Collected: 02/14/24 0915    Specimen: Blood Updated: 02/14/24 0959     Glucose 165 mg/dL      BUN 52 mg/dL      Creatinine 3.35 mg/dL      Sodium 142 mmol/L      Potassium 3.9 mmol/L      Chloride 104 mmol/L      CO2 27.0 mmol/L      Calcium 8.8 mg/dL      Total  "Protein 7.3 g/dL      Albumin 3.2 g/dL      ALT (SGPT) 6 U/L      AST (SGOT) 12 U/L      Alkaline Phosphatase 119 U/L      Total Bilirubin 0.4 mg/dL      Globulin 4.1 gm/dL      A/G Ratio 0.8 g/dL      BUN/Creatinine Ratio 15.5     Anion Gap 11.0 mmol/L      eGFR 19.3 mL/min/1.73     Narrative:      GFR Normal >60  Chronic Kidney Disease <60  Kidney Failure <15      D-dimer, Quantitative [[id number]]  (Abnormal) Collected: 02/14/24 0915    Specimen: Blood Updated: 02/14/24 0943     D-Dimer, Quantitative 1.36 MCGFEU/mL     Narrative:      According to the assay 's published package insert, a normal (<0.50 MCGFEU/mL) D-dimer result in conjunction with a non-high clinical probability assessment, excludes deep vein thrombosis (DVT) and pulmonary embolism (PE) with high sensitivity.    D-dimer values increase with age and this can make VTE exclusion of an older population difficult. To address this, the [employer], based on best available evidence and recent guidelines, recommends that clinicians use age-adjusted D-dimer thresholds in patients greater than 50 years of age with: a) a low probability of PE who do not meet all Pulmonary Embolism Rule Out Criteria, or b) in those with intermediate probability of PE.   The formula for an age-adjusted D-dimer cut-off is \"age/100\".  For example, a 60 year old patient would have an age-adjusted cut-off of 0.60 MCGFEU/mL and an 80 year old 0.80 MCGFEU/mL.    BNP [[id number]]  (Abnormal) Collected: 02/14/24 0915    Specimen: Blood Updated: 02/14/24 0954     proBNP 8,179.0 pg/mL     Narrative:      This assay is used as an aid in the diagnosis of individuals suspected of having heart failure. It can be used as an aid in the diagnosis of acute decompensated heart failure (ADHF) in patients presenting with signs and symptoms of ADHF to the emergency department (ED). In addition, NT-proBNP of <300 pg/mL indicates ADHF is not likely.    Age Range Result " Interpretation  NT-proBNP Concentration (pg/mL:      <50             Positive            >450                   Gray                 300-450                    Negative             <300    50-75           Positive            >900                  Gray                300-900                  Negative            <300      >75             Positive            >1800                  Gray                300-1800                  Negative            <300    Blood Culture - Blood, Hand, Digit Left [[id number]] Collected: 02/14/24 0915    Specimen: Blood from Hand, Digit Left Updated: 02/14/24 0932    CBC Auto Differential [[id number]]  (Abnormal) Collected: 02/14/24 0915    Specimen: Blood Updated: 02/14/24 0934     WBC 7.61 10*3/mm3      RBC 2.81 10*6/mm3      Hemoglobin 8.5 g/dL      Hematocrit 26.8 %      MCV 95.4 fL      MCH 30.2 pg      MCHC 31.7 g/dL      RDW 14.4 %      RDW-SD 49.9 fl      MPV 9.5 fL      Platelets 221 10*3/mm3      Neutrophil % 66.6 %      Lymphocyte % 22.5 %      Monocyte % 7.6 %      Eosinophil % 2.2 %      Basophil % 0.7 %      Immature Grans % 0.4 %      Neutrophils, Absolute 5.07 10*3/mm3      Lymphocytes, Absolute 1.71 10*3/mm3      Monocytes, Absolute 0.58 10*3/mm3      Eosinophils, Absolute 0.17 10*3/mm3      Basophils, Absolute 0.05 10*3/mm3      Immature Grans, Absolute 0.03 10*3/mm3      nRBC 0.0 /100 WBC     High Sensitivity Troponin T [[id number]]  (Abnormal) Collected: 02/14/24 0915    Specimen: Blood Updated: 02/14/24 1113     HS Troponin T 77 ng/L     Narrative:      High Sensitive Troponin T Reference Range:  <14.0 ng/L- Negative Female for AMI  <22.0 ng/L- Negative Male for AMI  >=14 - Abnormal Female indicating possible myocardial injury.  >=22 - Abnormal Male indicating possible myocardial injury.   Clinicians would have to utilize clinical acumen, EKG, Troponin, and serial changes to determine if it is an Acute Myocardial Infarction or myocardial injury due to an underlying chronic  condition.         Blood Culture - Blood, Arm, Left [[id number]] Collected: 02/14/24 0924    Specimen: Blood from Arm, Left Updated: 02/14/24 0932           XR Chest 1 View   Final Result   Shallow inspiration, with mild cardiomegaly. No evidence of   overt CHF. No focal pulmonary infiltrates.       This report was signed and finalized on 2/14/2024 9:47 AM by Dr. Hemanth Barclay MD.                       Medical Decision Making  Patient is a 68-year-old male with a history of diabetes and hypertension who presents to the ER with shortness of air.  Patient states he has had shortness of air for the last week.  Patient states that his shortness of air got worse last night.  He has had a mild cough.  He denies any fever, chest pain, abdominal pain, nausea vomiting diarrhea, urinary changes, neurologic changes, leg pain or swelling.    Differential diagnosis: Pneumonia, CHF, viral syndrome    Patient arrived hypertensive.  He was given clonidine.  Blood pressure remained significantly elevated.  Patient was started on a Cardene drip.  Concern for hypertensive emergency.  Labs showed hyperglycemia, an elevated BUN and creatinine consistent with the patient's chronic renal insufficiency and chronic anemia.  D-dimer, BNP and troponin were also elevated.  CT PE protocol could not be performed due to the elevated creatinine and and decreased GFR.  Chest x-ray showed mild cardiomegaly but no evidence of pneumonia or pulmonary edema.  Workup most consistent with hypertensive emergency with chronic renal insufficiency and congestive heart failure.  I discussed the case with Dr. Maguire and Dr. Osborne with the hospitalist service and patient was admitted to Dr. Osborne's service for further treatment.                             Problems Addressed:  Acute congestive heart failure, unspecified heart failure type: complicated acute illness or injury  Chronic anemia: chronic illness or injury  Chronic renal impairment, unspecified  CKD stage: chronic illness or injury  Hypertensive urgency: complicated acute illness or injury    Amount and/or Complexity of Data Reviewed  Labs: ordered. Decision-making details documented in ED Course.  Radiology: ordered. Decision-making details documented in ED Course.  ECG/medicine tests: ordered. Decision-making details documented in ED Course.  Discussion of management or test interpretation with external provider(s): Dr Maguire and Dr Blanco with the hospitalist group    Risk  Prescription drug management.  Decision regarding hospitalization.        Final diagnoses:   Hypertensive urgency   Chronic renal impairment, unspecified CKD stage   Acute congestive heart failure, unspecified heart failure type   Chronic anemia       ED Disposition  ED Disposition       ED Disposition   Decision to Admit    Condition   --    Comment   Level of Care: Critical Care [6]   Diagnosis: Hypertensive urgency [716643]   Admitting Physician: ASHLEY BLANCO [[id number]]   Attending Physician: ASHLEY BLANCO [[id number]]   Certification: I Certify That Inpatient Hospital Services Are Medically Necessary For Greater Than 2 Midnights                 No follow-up provider specified.       Medication List      No changes were made to your prescriptions during this visit.            Zully Santamaria MD  02/14/24 1133      Electronically signed by Zully Santamaria MD at 02/14/24 1133       Vital Signs (last day)       Date/Time Temp Temp src Pulse Resp BP Patient Position SpO2    02/14/24 1201 -- -- 97 -- 186/76 -- 96    02/14/24 1146 -- -- 97 -- 196/90 -- 96    02/14/24 1131 -- -- 83 -- 191/84 -- 94    02/14/24 1117 -- -- 85 -- 203/94 -- 96    02/14/24 0931 -- -- 88 -- 225/93 -- 97    02/14/24 0916 -- -- 89 -- 215/99 -- 97    02/14/24 0911 -- -- 85 -- 216/102 -- 97    02/14/24 0845 98.1 (36.7) Oral 96 16 212/88 Sitting 95          Oxygen Therapy (last day)       Date/Time SpO2 Device (Oxygen Therapy) Flow (L/min) Oxygen Concentration (%)  ETCO2 (mmHg)    02/14/24 1201 96 -- -- -- --    02/14/24 1146 96 -- -- -- --    02/14/24 1131 94 -- -- -- --    02/14/24 1117 96 -- -- -- --    02/14/24 0931 97 -- -- -- --    02/14/24 0916 97 -- -- -- --    02/14/24 0911 97 -- -- -- --    02/14/24 0845 95 room air -- -- --          Intake & Output (last day)       None          Facility-Administered Medications as of 2/14/2024   Medication Dose Route Frequency Provider Last Rate Last Admin    amLODIPine (NORVASC) tablet 10 mg  10 mg Oral Q24H David Osborne MD        aspirin EC tablet 81 mg  81 mg Oral Daily David Osborne MD        atorvastatin (LIPITOR) tablet 20 mg  20 mg Oral Daily David Osborne MD        sennosides-docusate (PERICOLACE) 8.6-50 MG per tablet 2 tablet  2 tablet Oral BID David Osborne MD        And    polyethylene glycol (MIRALAX) packet 17 g  17 g Oral Daily PRN David Osborne MD        And    bisacodyl (DULCOLAX) EC tablet 5 mg  5 mg Oral Daily PRN David Osborne MD        And    bisacodyl (DULCOLAX) suppository 10 mg  10 mg Rectal Daily PRN David Osborne MD        carvedilol (COREG) tablet 12.5 mg  12.5 mg Oral BID David Osborne MD        [COMPLETED] cloNIDine (CATAPRES) tablet 0.1 mg  0.1 mg Oral Once Zully Santamaria MD   0.1 mg at 02/14/24 0928    dextrose (D50W) (25 g/50 mL) IV injection 25 g  25 g Intravenous Q15 Min PRN David Osborne MD        dextrose (GLUTOSE) oral gel 15 g  15 g Oral Q15 Min PRN David Osborne MD        Enoxaparin Sodium (LOVENOX) syringe 30 mg  30 mg Subcutaneous Daily David Osborne MD        glucagon (GLUCAGEN) injection 1 mg  1 mg Intramuscular Q15 Min PRN David Osborne MD        hydrALAZINE (APRESOLINE) injection 10 mg  10 mg Intravenous Q4H PRN David Osborne MD        Insulin Lispro (humaLOG) injection 2-7 Units  2-7 Units Subcutaneous 4x Daily AC & at Bedtime David Osborne MD        labetalol (NORMODYNE,TRANDATE) injection 10 mg  10 mg Intravenous Q4H PRN David Osborne MD         niCARdipine (CARDENE) 25 mg in 250 mL NS infusion kit  5-15 mg/hr Intravenous Titrated David Osborne  mL/hr at 02/14/24 1136 10 mg/hr at 02/14/24 1136    ondansetron (ZOFRAN) injection 4 mg  4 mg Intravenous Q6H PRN David Osborne MD        sodium chloride 0.9 % flush 10 mL  10 mL Intravenous PRN David Osborne MD        sodium chloride 0.9 % flush 10 mL  10 mL Intravenous Q12H David Osborne MD        sodium chloride 0.9 % flush 10 mL  10 mL Intravenous PRN David Osborne MD        sodium chloride 0.9 % infusion 40 mL  40 mL Intravenous PRN David Osborne MD         Orders (last 24 hrs)        Start     Ordered    02/15/24 0600  CBC Auto Differential  Morning Draw         02/14/24 1255    02/15/24 0600  Comprehensive Metabolic Panel  Morning Draw         02/14/24 1255    02/15/24 0600  Hemoglobin A1c  Morning Draw         02/14/24 1255    02/15/24 0600  TSH  Morning Draw         02/14/24 1255    02/15/24 0600  Lipid Panel  Morning Draw         02/14/24 1255    02/14/24 2100  carvedilol (COREG) tablet 12.5 mg  2 Times Daily         02/14/24 1255    02/14/24 2100  sennosides-docusate (PERICOLACE) 8.6-50 MG per tablet 2 tablet  2 Times Daily        See Hyperspace for full Linked Orders Report.    02/14/24 1255    02/14/24 1800  Oral Care  2 Times Daily       02/14/24 1255    02/14/24 1730  Insulin Lispro (humaLOG) injection 2-7 Units  4 Times Daily Before Meals & Nightly         02/14/24 1255    02/14/24 1700  POC Glucose 4x Daily Before Meals & at Bedtime  4 Times Daily Before Meals & at Bedtime      Comments: Complete no more than 45 minutes prior to patient eating      02/14/24 1255    02/14/24 1600  Vital Signs  Every 4 Hours       02/14/24 1255    02/14/24 1345  amLODIPine (NORVASC) tablet 10 mg  Every 24 Hours Scheduled         02/14/24 1255    02/14/24 1345  aspirin EC tablet 81 mg  Daily         02/14/24 1255    02/14/24 1345  atorvastatin (LIPITOR) tablet 20 mg  Daily         02/14/24 1255     02/14/24 1345  sodium chloride 0.9 % flush 10 mL  Every 12 Hours Scheduled         02/14/24 1255    02/14/24 1345  Enoxaparin Sodium (LOVENOX) syringe 30 mg  Daily         02/14/24 1255    02/14/24 1300  Strict Intake & Output  Every Hour       02/14/24 1255    02/14/24 1256  Intake & Output  Every Shift       02/14/24 1255    02/14/24 1256  Weigh Patient  Once         02/14/24 1255    02/14/24 1256  Insert Peripheral IV  Once         02/14/24 1255    02/14/24 1256  Saline Lock & Maintain IV Access  Continuous         02/14/24 1255    02/14/24 1256  Daily Weights  Daily       02/14/24 1255    02/14/24 1256  Adult Transthoracic Echo Complete W/ Cont if Necessary Per Protocol  Once         02/14/24 1255    02/14/24 1256  US Renal Bilateral  1 Time Imaging         02/14/24 1255    02/14/24 1256  Diet: Regular/House Diet, Cardiac Diets, Diabetic Diets; Healthy Heart (2-3 Na+); Consistent Carbohydrate; Texture: Regular Texture (IDDSI 7); Fluid Consistency: Thin (IDDSI 0)  Diet Effective Now         02/14/24 1255    02/14/24 1256  Inpatient Nephrology Consult  Once        Specialty:  Nephrology  Provider:  (Not yet assigned)    02/14/24 1255    02/14/24 1255  polyethylene glycol (MIRALAX) packet 17 g  Daily PRN        See Hyperspace for full Linked Orders Report.    02/14/24 1255    02/14/24 1255  bisacodyl (DULCOLAX) EC tablet 5 mg  Daily PRN        See Hyperspace for full Linked Orders Report.    02/14/24 1255    02/14/24 1255  bisacodyl (DULCOLAX) suppository 10 mg  Daily PRN        See Hyperspace for full Linked Orders Report.    02/14/24 1255    02/14/24 1255  sodium chloride 0.9 % flush 10 mL  As Needed         02/14/24 1255    02/14/24 1255  sodium chloride 0.9 % infusion 40 mL  As Needed         02/14/24 1255    02/14/24 1255  ondansetron (ZOFRAN) injection 4 mg  Every 6 Hours PRN         02/14/24 1255    02/14/24 1255  dextrose (GLUTOSE) oral gel 15 g  Every 15 Minutes PRN         02/14/24 1255    02/14/24 1255   dextrose (D50W) (25 g/50 mL) IV injection 25 g  Every 15 Minutes PRN         02/14/24 1255    02/14/24 1255  glucagon (GLUCAGEN) injection 1 mg  Every 15 Minutes PRN         02/14/24 1255    02/14/24 1255  labetalol (NORMODYNE,TRANDATE) injection 10 mg  Every 4 Hours PRN         02/14/24 1255    02/14/24 1255  hydrALAZINE (APRESOLINE) injection 10 mg  Every 4 Hours PRN         02/14/24 1255    02/14/24 1155  Code Status and Medical Interventions:  Continuous         02/14/24 1202    02/14/24 1115  High Sensitivity Troponin T 2Hr  PROCEDURE ONCE         02/14/24 1113    02/14/24 1115  Inpatient Admission  Once         02/14/24 1114    02/14/24 1115  Cardiac Monitoring  Continuous        Comments: Follow Standing Orders As Outlined in Process Instructions (Open Order Report to View Full Instructions)    02/14/24 1114    02/14/24 1051  High Sensitivity Troponin T  Once         02/14/24 1050    02/14/24 1027  niCARdipine (CARDENE) 25 mg in 250 mL NS infusion kit  Titrated         02/14/24 1011    02/14/24 0930  cloNIDine (CATAPRES) tablet 0.1 mg  Once         02/14/24 0914    02/14/24 0910  Insert Peripheral IV  Once        See Hyperspace for full Linked Orders Report.    02/14/24 0909    02/14/24 0910  COVID-19, FLU A/B, RSV PCR 1 HR TAT - Swab, Nasopharynx  Once         02/14/24 0909    02/14/24 0909  sodium chloride 0.9 % flush 10 mL  As Needed        See Hyperspace for full Linked Orders Report.    02/14/24 0909    02/14/24 0909  CBC & Differential  Once         02/14/24 0909    02/14/24 0909  Comprehensive Metabolic Panel  Once         02/14/24 0909    02/14/24 0909  D-dimer, Quantitative  Once         02/14/24 0909    02/14/24 0909  BNP  Once         02/14/24 0909    02/14/24 0909  Blood Culture - Blood, Arm, Left  Once         02/14/24 0909    02/14/24 0909  Blood Culture - Blood, Hand, Digit Left  Once         02/14/24 0909    02/14/24 0909  XR Chest 1 View  1 Time Imaging         02/14/24 0909    02/14/24 0909   CBC Auto Differential  PROCEDURE ONCE         02/14/24 0909    02/14/24 0848  ECG 12 Lead Dyspnea  Once         02/14/24 0847    Unscheduled  Follow Hypoglycemia Standing Orders For Blood Glucose <70 & Notify Provider of Treatment  As Needed      Comments: Follow Hypoglycemia Orders As Outlined in Process Instructions (Open Order Report to View Full Instructions)  Notify Provider Any Time Hypoglycemia Treatment is Administered    02/14/24 2293

## 2024-02-14 NOTE — H&P
[facility] Medicine Services  HISTORY AND PHYSICAL    Date of Admission: 2/14/2024  Primary Care Physician: John Robertson MD    Subjective   Primary Historian: Patient.    Chief Complaint: Hypertensive urgency/acute renal failure    History of Present Illness  Patient is a 68-year-old black male presented to ER complaining of shortness of breath.  Patient has been having shortness of breath symptom for about a week off-and-on, is been constant for the last 3 days, progressively getting worse.  Patient denies any chest pain.Patient also complain of mild cough symptoms.  Patient denies any fever night sweats chills.  Upon evaluation patient blood pressure is 200/100, patient was given clonidine on resolved, currently requiring Cardene drip.  Creatinine is 3 .    Patient has a past medical history of stage IIIb renal failure, diabetes, edema, hyperlipidemia, hypertension.    Review of Systems   Constitutional:  Positive for activity change and appetite change. Negative for chills and fever.   HENT:  Negative for hearing loss, nosebleeds, tinnitus and trouble swallowing.    Eyes:  Negative for visual disturbance.   Respiratory:  Positive for cough and shortness of breath. Negative for chest tightness and wheezing.    Cardiovascular:  Negative for chest pain, palpitations and leg swelling.   Gastrointestinal:  Negative for abdominal distention, abdominal pain, blood in stool, constipation, diarrhea, nausea and vomiting.   Endocrine: Negative for cold intolerance, heat intolerance, polydipsia, polyphagia and polyuria.   Genitourinary:  Negative for decreased urine volume, difficulty urinating, dysuria, flank pain, frequency and hematuria.   Musculoskeletal:  Negative for arthralgias, joint swelling and myalgias.   Skin:  Negative for rash.   Allergic/Immunologic: Negative for immunocompromised state.   Neurological:  Positive for weakness. Negative for dizziness, syncope,  light-headedness and headaches.   Hematological:  Negative for adenopathy. Does not bruise/bleed easily.   Psychiatric/Behavioral:  Negative for confusion and sleep disturbance. The patient is not nervous/anxious.         Otherwise complete ROS reviewed and negative except as mentioned in the HPI.    Past Medical History:   Past Medical History:   Diagnosis Date    Diabetes mellitus     Edema     Hyperlipidemia     Hypertension      Past Surgical History:  Past Surgical History:   Procedure Laterality Date    NO PAST SURGERIES       Social History:  reports that he has never smoked. He has never used smokeless tobacco. He reports that he does not drink alcohol and does not use drugs.    Family History: family history includes Diabetes in his mother; No Known Problems in his father.       Allergies:  Allergies   Allergen Reactions    Duravent Dm  [Phenylephrine-Dm-Gg] Rash       Medications:  Prior to Admission medications    Medication Sig Start Date End Date Taking? Authorizing Provider   ACCU-CHEK GUIDE test strip  7/18/19   Tristan Barkley MD   amLODIPine (NORVASC) 10 MG tablet amlodipine 10 mg tablet   Take 1 tablet every day by oral route.    Tristan Barkley MD   ASPIRIN 81 PO aspirin 81 mg tablet,delayed release   Take 1 tablet every day by oral route.    Tristan Barkley MD   atorvastatin (LIPITOR) 20 MG tablet atorvastatin 20 mg tablet   Take 1 tablet every day by oral route.    Tristan Barkley MD   carvedilol (COREG) 25 MG tablet 12.5 mg. BID 8/15/19   Tristan Barkley MD   furosemide (LASIX) 40 MG tablet Daily. 8/29/19   Tristan Barkley MD   hydrochlorothiazide (MICROZIDE) 12.5 MG capsule hydrochlorothiazide 12.5 mg capsule   Take 1 capsule every day by oral route.    Tristan Barkley MD   losartan (COZAAR) 100 MG tablet Take 1 tablet by mouth Daily. 9/16/19   Kofi Meade MD   metFORMIN ER (GLUCOPHAGE-XR) 500 MG 24 hr tablet Take 500 mg by mouth 4 (Four) Times a  "Day. 8/15/19   Tristan Barkley MD   Potassium Chloride Aneta ER (K-DUR PO) 10 mEq Daily.    Tristan Barkley MD   vitamin D (ERGOCALCIFEROL) 41807 units capsule capsule  8/2/19   Tristan Barkley MD     I have utilized all available immediate resources to obtain, update, or review the patient's current medications (including all prescriptions, over-the-counter products, herbals, cannabis/cannabidiol products, and vitamin/mineral/dietary (nutritional) supplements).    Objective     Vital Signs: BP (!) 196/90   Pulse 97   Temp 98.1 °F (36.7 °C) (Oral)   Resp 16   Ht 170.2 cm (67\")   Wt 91.9 kg (202 lb 8 oz)   SpO2 96%   BMI 31.72 kg/m²   Physical Exam  Vitals and nursing note reviewed.   HENT:      Head: Normocephalic.   Eyes:      Conjunctiva/sclera: Conjunctivae normal.      Pupils: Pupils are equal, round, and reactive to light.   Cardiovascular:      Rate and Rhythm: Normal rate and regular rhythm.      Heart sounds: Normal heart sounds.   Pulmonary:      Effort: No respiratory distress.      Comments: Diminished breath of bilateral, clear, on room air.  Patient no acute distress.  Abdominal:      General: Bowel sounds are normal. There is no distension.      Palpations: Abdomen is soft.      Tenderness: There is no abdominal tenderness.      Comments: Obesity .   Musculoskeletal:         General: No swelling.      Cervical back: Neck supple.   Skin:     General: Skin is warm and dry.      Capillary Refill: Capillary refill takes 2 to 3 seconds.      Findings: No rash.   Neurological:      General: No focal deficit present.      Mental Status: He is alert and oriented to person, place, and time.   Psychiatric:         Mood and Affect: Mood normal.         Behavior: Behavior normal.         Thought Content: Thought content normal.              Results Reviewed:  Lab Results (last 24 hours)       Procedure Component Value Units Date/Time    High Sensitivity Troponin T 2Hr [[id number]]  " (Abnormal) Collected: 02/14/24 1126    Specimen: Blood Updated: 02/14/24 1201     HS Troponin T 78 ng/L      Troponin T Delta 1 ng/L     Narrative:      High Sensitive Troponin T Reference Range:  <14.0 ng/L- Negative Female for AMI  <22.0 ng/L- Negative Male for AMI  >=14 - Abnormal Female indicating possible myocardial injury.  >=22 - Abnormal Male indicating possible myocardial injury.   Clinicians would have to utilize clinical acumen, EKG, Troponin, and serial changes to determine if it is an Acute Myocardial Infarction or myocardial injury due to an underlying chronic condition.         High Sensitivity Troponin T [[id number]]  (Abnormal) Collected: 02/14/24 0915    Specimen: Blood Updated: 02/14/24 1113     HS Troponin T 77 ng/L     Narrative:      High Sensitive Troponin T Reference Range:  <14.0 ng/L- Negative Female for AMI  <22.0 ng/L- Negative Male for AMI  >=14 - Abnormal Female indicating possible myocardial injury.  >=22 - Abnormal Male indicating possible myocardial injury.   Clinicians would have to utilize clinical acumen, EKG, Troponin, and serial changes to determine if it is an Acute Myocardial Infarction or myocardial injury due to an underlying chronic condition.         COVID-19, FLU A/B, RSV PCR 1 HR TAT - Swab, Nasopharynx [[id number]]  (Normal) Collected: 02/14/24 0912    Specimen: Swab from Nasopharynx Updated: 02/14/24 1032     COVID19 Not Detected     Influenza A PCR Not Detected     Influenza B PCR Not Detected     RSV, PCR Not Detected    Narrative:      Fact sheet for providers: https://www.fda.gov/media/117086/download    Fact sheet for patients: https://www.fda.gov/media/433730/download    Test performed by PCR.    Comprehensive Metabolic Panel [[id number]]  (Abnormal) Collected: 02/14/24 0915    Specimen: Blood Updated: 02/14/24 0959     Glucose 165 mg/dL      BUN 52 mg/dL      Creatinine 3.35 mg/dL      Sodium 142 mmol/L      Potassium 3.9 mmol/L      Chloride 104 mmol/L      CO2  27.0 mmol/L      Calcium 8.8 mg/dL      Total Protein 7.3 g/dL      Albumin 3.2 g/dL      ALT (SGPT) 6 U/L      AST (SGOT) 12 U/L      Alkaline Phosphatase 119 U/L      Total Bilirubin 0.4 mg/dL      Globulin 4.1 gm/dL      A/G Ratio 0.8 g/dL      BUN/Creatinine Ratio 15.5     Anion Gap 11.0 mmol/L      eGFR 19.3 mL/min/1.73     Narrative:      GFR Normal >60  Chronic Kidney Disease <60  Kidney Failure <15      BNP [[id number]]  (Abnormal) Collected: 02/14/24 0915    Specimen: Blood Updated: 02/14/24 0954     proBNP 8,179.0 pg/mL     Narrative:      This assay is used as an aid in the diagnosis of individuals suspected of having heart failure. It can be used as an aid in the diagnosis of acute decompensated heart failure (ADHF) in patients presenting with signs and symptoms of ADHF to the emergency department (ED). In addition, NT-proBNP of <300 pg/mL indicates ADHF is not likely.    Age Range Result Interpretation  NT-proBNP Concentration (pg/mL:      <50             Positive            >450                   Gray                 300-450                    Negative             <300    50-75           Positive            >900                  Gray                300-900                  Negative            <300      >75             Positive            >1800                  Gray                300-1800                  Negative            <300    D-dimer, Quantitative [[id number]]  (Abnormal) Collected: 02/14/24 0915    Specimen: Blood Updated: 02/14/24 0943     D-Dimer, Quantitative 1.36 MCGFEU/mL     Narrative:      According to the assay 's published package insert, a normal (<0.50 MCGFEU/mL) D-dimer result in conjunction with a non-high clinical probability assessment, excludes deep vein thrombosis (DVT) and pulmonary embolism (PE) with high sensitivity.    D-dimer values increase with age and this can make VTE exclusion of an older population difficult. To address this, the American College of  "Physicians, based on best available evidence and recent guidelines, recommends that clinicians use age-adjusted D-dimer thresholds in patients greater than 50 years of age with: a) a low probability of PE who do not meet all Pulmonary Embolism Rule Out Criteria, or b) in those with intermediate probability of PE.   The formula for an age-adjusted D-dimer cut-off is \"age/100\".  For example, a 60 year old patient would have an age-adjusted cut-off of 0.60 MCGFEU/mL and an 80 year old 0.80 MCGFEU/mL.    CBC & Differential [[id number]]  (Abnormal) Collected: 02/14/24 0915    Specimen: Blood Updated: 02/14/24 0934    Narrative:      The following orders were created for panel order CBC & Differential.  Procedure                               Abnormality         Status                     ---------                               -----------         ------                     CBC Auto Differential[[id number]]        Abnormal            Final result                 Please view results for these tests on the individual orders.    CBC Auto Differential [[id number]]  (Abnormal) Collected: 02/14/24 0915    Specimen: Blood Updated: 02/14/24 0934     WBC 7.61 10*3/mm3      RBC 2.81 10*6/mm3      Hemoglobin 8.5 g/dL      Hematocrit 26.8 %      MCV 95.4 fL      MCH 30.2 pg      MCHC 31.7 g/dL      RDW 14.4 %      RDW-SD 49.9 fl      MPV 9.5 fL      Platelets 221 10*3/mm3      Neutrophil % 66.6 %      Lymphocyte % 22.5 %      Monocyte % 7.6 %      Eosinophil % 2.2 %      Basophil % 0.7 %      Immature Grans % 0.4 %      Neutrophils, Absolute 5.07 10*3/mm3      Lymphocytes, Absolute 1.71 10*3/mm3      Monocytes, Absolute 0.58 10*3/mm3      Eosinophils, Absolute 0.17 10*3/mm3      Basophils, Absolute 0.05 10*3/mm3      Immature Grans, Absolute 0.03 10*3/mm3      nRBC 0.0 /100 WBC     Blood Culture - Blood, Hand, Digit Left [[id number]] Collected: 02/14/24 0915    Specimen: Blood from Hand, Digit Left Updated: 02/14/24 0932    Blood Culture " - Blood, Arm, Left [[id number]] Collected: 02/14/24 0924    Specimen: Blood from Arm, Left Updated: 02/14/24 0932          Imaging Results (Last 24 Hours)       Procedure Component Value Units Date/Time    XR Chest 1 View [[id number]] Collected: 02/14/24 0944     Updated: 02/14/24 0950    Narrative:      EXAMINATION: XR CHEST 1 VW- 2/14/2024 9:44 AM     HISTORY: Shortness of breath.     COMPARISON: There are no correlative imaging studies for comparison.     REPORT:  The lungs are hypoaerated, no focal infiltrate or pulmonary  consolidation is identified. No pneumothorax or effusion is identified.  Mild cardiomegaly is present. The osseous structures show no acute  findings.       Impression:      Shallow inspiration, with mild cardiomegaly. No evidence of  overt CHF. No focal pulmonary infiltrates.     This report was signed and finalized on 2/14/2024 9:47 AM by Dr. Hemanth Barclay MD.             I have personally reviewed and interpreted the radiology studies and ECG obtained at time of admission.     Assessment / Plan   Assessment:   Active Hospital Problems    Diagnosis     **Hypertensive urgency     Acute on chronic renal failure     Controlled type 2 diabetes mellitus with complication, without long-term current use of insulin     Benign essential HTN     Mixed hyperlipidemia        Treatment Plan  The patient will be admitted to my service here at [facility].     Hypertensive urgency/hyperlipidemia/history of hypertension/cardiomegaly.  Continue Cardene drip.  Patient will need to go unit for now.  Norvasc.  Aspirin . Lipitor.  Coreg.  Hold Lasix due to renal failure.  Hold hydrochlorothiazide due to renal failure.  Hold losartan due to renal failure.  Hydralazine as needed.  Labetalol as needed.  Echocardiogram .    Acute on chronic renal failure stage IIIb..  Baseline creatinine 10/1/2020 2.5.  Creatinine today is 3.35.  Nephrology consult.    Ultrasound the kidneys.    Anemia.  Probably  secondary to chronic renal failure.  No sign of GI bleed.    Shortness of breath.  Chest x-ray-Shallow inspiration, with mild cardiomegaly, No evidence of overt CHF. No focal pulmonary infiltrates.  Patient is on room air    Elevated D-dimer.  Unable to do CTA of the chest due to renal failure.  Will start prophylaxis Lovenox for now.  Patient denies any chest pain.    Diabetes.  Hold metformin due to renal failure.  Low sliding scale for now.  Hemoglobin A1 C.    Nausea.  Zofran as needed.    Obesity.  BMI is 32.    Nutrition.  Cardiac/diabetic diet.    Blood cultures pending.  COVID-19-negative.  Flu screen negative.  RSV-negative.    Medical Decision Making  Number and Complexity of problems: Hypertensive urgency/acute on chronic renal failure/anemia/shortness of breath/diabetes  Differential Diagnosis: None    Conditions and Status        Condition is unchanged.     [facility] Data  External documents reviewed: ER notes  Cardiac tracing (EKG, telemetry) interpretation: Sinus  Radiology interpretation: Chest x-ray  Labs reviewed: Laboratory  Any tests that were considered but not ordered: Laboratory in AM     Decision rules/scores evaluated (example [id number], Wells, etc): None     Discussed with: Patient     Care Planning  Shared decision making: Patient and wife  Code status and discussions: Full code    Disposition  Social Determinants of Health that impact treatment or disposition: From home  Estimated length of stay is 2 to 5 days.     I confirmed that the patient's advanced care plan is present, code status is documented, and a surrogate decision maker is listed in the patient's medical record.     The patient's surrogate decision maker is wife.     The patient was seen and examined by me on 2/14/2024 at 1140.    Electronically signed by David Osborne MD, 02/14/24, 12:02 CST.

## 2024-02-14 NOTE — ED NOTES
Nursing report ED to floor  Mauro TAY Amanda  68 y.o.  male    HPI:   Chief Complaint   Patient presents with    Shortness of Breath       Admitting doctor:   David Osborne MD    Consulting provider(s):  Consults       No orders found from 1/16/2024 to 2/15/2024.             Admitting diagnosis:   The primary encounter diagnosis was Hypertensive urgency. Diagnoses of Chronic renal impairment, unspecified CKD stage, Acute congestive heart failure, unspecified heart failure type, and Chronic anemia were also pertinent to this visit.    Code status:   Current Code Status       Date Active Code Status Order ID Comments User Context       Not on file            Allergies:   Duravent dm  [phenylephrine-dm-gg]    Intake and Output  No intake or output data in the 24 hours ending 02/14/24 1154    Weight:       02/14/24  0845   Weight: 91.9 kg (202 lb 8 oz)       Most recent vitals:   Vitals:    02/14/24 0931 02/14/24 1117 02/14/24 1131 02/14/24 1146   BP: (!) 225/93 (!) 203/94 (!) 191/84 (!) 196/90   BP Location:       Patient Position:       Pulse: 88 85 83 97   Resp:       Temp:       TempSrc:       SpO2: 97% 96% 94% 96%   Weight:       Height:         Oxygen Therapy: .    Active LDAs/IV Access:   Lines, Drains & Airways       Active LDAs       Name Placement date Placement time Site Days    Peripheral IV 02/14/24 0925 Left Antecubital 02/14/24  0925  Antecubital  less than 1                    Labs (abnormal labs have a star):   Labs Reviewed   COMPREHENSIVE METABOLIC PANEL - Abnormal; Notable for the following components:       Result Value    Glucose 165 (*)     BUN 52 (*)     Creatinine 3.35 (*)     Albumin 3.2 (*)     Alkaline Phosphatase 119 (*)     eGFR 19.3 (*)     All other components within normal limits    Narrative:     GFR Normal >60  Chronic Kidney Disease <60  Kidney Failure <15     D-DIMER, QUANTITATIVE - Abnormal; Notable for the following components:    D-Dimer, Quantitative 1.36 (*)     All other  "components within normal limits    Narrative:     According to the assay 's published package insert, a normal (<0.50 MCGFEU/mL) D-dimer result in conjunction with a non-high clinical probability assessment, excludes deep vein thrombosis (DVT) and pulmonary embolism (PE) with high sensitivity.    D-dimer values increase with age and this can make VTE exclusion of an older population difficult. To address this, the [employer]icians, based on best available evidence and recent guidelines, recommends that clinicians use age-adjusted D-dimer thresholds in patients greater than 50 years of age with: a) a low probability of PE who do not meet all Pulmonary Embolism Rule Out Criteria, or b) in those with intermediate probability of PE.   The formula for an age-adjusted D-dimer cut-off is \"age/100\".  For example, a 60 year old patient would have an age-adjusted cut-off of 0.60 MCGFEU/mL and an 80 year old 0.80 MCGFEU/mL.   BNP (IN-HOUSE) - Abnormal; Notable for the following components:    proBNP 8,179.0 (*)     All other components within normal limits    Narrative:     This assay is used as an aid in the diagnosis of individuals suspected of having heart failure. It can be used as an aid in the diagnosis of acute decompensated heart failure (ADHF) in patients presenting with signs and symptoms of ADHF to the emergency department (ED). In addition, NT-proBNP of <300 pg/mL indicates ADHF is not likely.    Age Range Result Interpretation  NT-proBNP Concentration (pg/mL:      <50             Positive            >450                   Gray                 300-450                    Negative             <300    50-75           Positive            >900                  Gray                300-900                  Negative            <300      >75             Positive            >1800                  Gray                300-1800                  Negative            <300   CBC WITH AUTO DIFFERENTIAL - " Abnormal; Notable for the following components:    RBC 2.81 (*)     Hemoglobin 8.5 (*)     Hematocrit 26.8 (*)     All other components within normal limits   TROPONIN - Abnormal; Notable for the following components:    HS Troponin T 77 (*)     All other components within normal limits    Narrative:     High Sensitive Troponin T Reference Range:  <14.0 ng/L- Negative Female for AMI  <22.0 ng/L- Negative Male for AMI  >=14 - Abnormal Female indicating possible myocardial injury.  >=22 - Abnormal Male indicating possible myocardial injury.   Clinicians would have to utilize clinical acumen, EKG, Troponin, and serial changes to determine if it is an Acute Myocardial Infarction or myocardial injury due to an underlying chronic condition.        COVID-19/FLUA&B/RSV, NP SWAB IN TRANSPORT MEDIA 1 HR TAT - Normal    Narrative:     Fact sheet for providers: https://www.fda.gov/media/891646/download    Fact sheet for patients: https://www.fda.gov/media/836690/download    Test performed by PCR.   BLOOD CULTURE   BLOOD CULTURE   HIGH SENSITIVITIY TROPONIN T 2HR   CBC AND DIFFERENTIAL    Narrative:     The following orders were created for panel order CBC & Differential.  Procedure                               Abnormality         Status                     ---------                               -----------         ------                     CBC Auto Differential[[id number]]        Abnormal            Final result                 Please view results for these tests on the individual orders.       Meds given in ED:   Medications   sodium chloride 0.9 % flush 10 mL (has no administration in time range)   niCARdipine (CARDENE) 25 mg in 250 mL NS infusion kit (10 mg/hr Intravenous Rate/Dose Change 2/14/24 1136)   cloNIDine (CATAPRES) tablet 0.1 mg (0.1 mg Oral Given 2/14/24 2755)     niCARdipine, 5-15 mg/hr, Last Rate: 10 mg/hr (02/14/24 1136)         NIH Stroke Scale:       Isolation/Infection(s):  No active isolations   COVID  (rule out)     COVID Testing  Collected .  Resulted .    Nursing report ED to floor:  Mental status: A/O x 4.  Ambulatory status: .  Precautions: .    ED nurse phone extentsion- ..

## 2024-02-15 LAB
ALBUMIN SERPL-MCNC: 3.6 G/DL (ref 3.5–5.2)
ALBUMIN UR-MCNC: 193.8 MG/DL
ALBUMIN/GLOB SERPL: 0.8 G/DL
ALP SERPL-CCNC: 130 U/L (ref 39–117)
ALT SERPL W P-5'-P-CCNC: 6 U/L (ref 1–41)
ANION GAP SERPL CALCULATED.3IONS-SCNC: 14 MMOL/L (ref 5–15)
AST SERPL-CCNC: 13 U/L (ref 1–40)
B PARAPERT DNA SPEC QL NAA+PROBE: NOT DETECTED
B PERT DNA SPEC QL NAA+PROBE: NOT DETECTED
BASOPHILS # BLD AUTO: 0.05 10*3/MM3 (ref 0–0.2)
BASOPHILS NFR BLD AUTO: 0.7 % (ref 0–1.5)
BILIRUB SERPL-MCNC: 0.4 MG/DL (ref 0–1.2)
BUN SERPL-MCNC: 58 MG/DL (ref 8–23)
BUN/CREAT SERPL: 14.4 (ref 7–25)
C PNEUM DNA NPH QL NAA+NON-PROBE: NOT DETECTED
CALCIUM SPEC-SCNC: 9.5 MG/DL (ref 8.6–10.5)
CHLORIDE SERPL-SCNC: 101 MMOL/L (ref 98–107)
CHOLEST SERPL-MCNC: 152 MG/DL (ref 0–200)
CO2 SERPL-SCNC: 22 MMOL/L (ref 22–29)
CREAT SERPL-MCNC: 4.03 MG/DL (ref 0.76–1.27)
CREAT UR-MCNC: 186.3 MG/DL
DEPRECATED RDW RBC AUTO: 49.4 FL (ref 37–54)
EGFRCR SERPLBLD CKD-EPI 2021: 15.5 ML/MIN/1.73
EOSINOPHIL # BLD AUTO: 0.18 10*3/MM3 (ref 0–0.4)
EOSINOPHIL NFR BLD AUTO: 2.5 % (ref 0.3–6.2)
ERYTHROCYTE [DISTWIDTH] IN BLOOD BY AUTOMATED COUNT: 14.4 % (ref 12.3–15.4)
FERRITIN SERPL-MCNC: 338.6 NG/ML (ref 30–400)
FLUAV SUBTYP SPEC NAA+PROBE: NOT DETECTED
FLUBV RNA ISLT QL NAA+PROBE: NOT DETECTED
GLOBULIN UR ELPH-MCNC: 4.5 GM/DL
GLUCOSE BLDC GLUCOMTR-MCNC: 170 MG/DL (ref 70–130)
GLUCOSE BLDC GLUCOMTR-MCNC: 185 MG/DL (ref 70–130)
GLUCOSE BLDC GLUCOMTR-MCNC: 191 MG/DL (ref 70–130)
GLUCOSE BLDC GLUCOMTR-MCNC: 193 MG/DL (ref 70–130)
GLUCOSE SERPL-MCNC: 161 MG/DL (ref 65–99)
HADV DNA SPEC NAA+PROBE: NOT DETECTED
HBA1C MFR BLD: 7.3 % (ref 4.8–5.6)
HCOV 229E RNA SPEC QL NAA+PROBE: NOT DETECTED
HCOV HKU1 RNA SPEC QL NAA+PROBE: NOT DETECTED
HCOV NL63 RNA SPEC QL NAA+PROBE: NOT DETECTED
HCOV OC43 RNA SPEC QL NAA+PROBE: NOT DETECTED
HCT VFR BLD AUTO: 27.1 % (ref 37.5–51)
HDLC SERPL-MCNC: 28 MG/DL (ref 40–60)
HGB BLD-MCNC: 8.6 G/DL (ref 13–17.7)
HMPV RNA NPH QL NAA+NON-PROBE: NOT DETECTED
HPIV1 RNA ISLT QL NAA+PROBE: NOT DETECTED
HPIV2 RNA SPEC QL NAA+PROBE: NOT DETECTED
HPIV3 RNA NPH QL NAA+PROBE: NOT DETECTED
HPIV4 P GENE NPH QL NAA+PROBE: NOT DETECTED
IMM GRANULOCYTES # BLD AUTO: 0.03 10*3/MM3 (ref 0–0.05)
IMM GRANULOCYTES NFR BLD AUTO: 0.4 % (ref 0–0.5)
LDLC SERPL CALC-MCNC: 99 MG/DL (ref 0–100)
LDLC/HDLC SERPL: 3.45 {RATIO}
LYMPHOCYTES # BLD AUTO: 1.55 10*3/MM3 (ref 0.7–3.1)
LYMPHOCYTES NFR BLD AUTO: 21.8 % (ref 19.6–45.3)
M PNEUMO IGG SER IA-ACNC: NOT DETECTED
MCH RBC QN AUTO: 30 PG (ref 26.6–33)
MCHC RBC AUTO-ENTMCNC: 31.7 G/DL (ref 31.5–35.7)
MCV RBC AUTO: 94.4 FL (ref 79–97)
MICROALBUMIN/CREAT UR: 1040.3 MG/G (ref 0–29)
MONOCYTES # BLD AUTO: 0.49 10*3/MM3 (ref 0.1–0.9)
MONOCYTES NFR BLD AUTO: 6.9 % (ref 5–12)
NEUTROPHILS NFR BLD AUTO: 4.82 10*3/MM3 (ref 1.7–7)
NEUTROPHILS NFR BLD AUTO: 67.7 % (ref 42.7–76)
NRBC BLD AUTO-RTO: 0 /100 WBC (ref 0–0.2)
PLATELET # BLD AUTO: 259 10*3/MM3 (ref 140–450)
PMV BLD AUTO: 10.4 FL (ref 6–12)
POTASSIUM SERPL-SCNC: 3.7 MMOL/L (ref 3.5–5.2)
PROT ?TM UR-MCNC: 302.6 MG/DL
PROT SERPL-MCNC: 8.1 G/DL (ref 6–8.5)
QT INTERVAL: 376 MS
QTC INTERVAL: 459 MS
RBC # BLD AUTO: 2.87 10*6/MM3 (ref 4.14–5.8)
RHINOVIRUS RNA SPEC NAA+PROBE: NOT DETECTED
RSV RNA NPH QL NAA+NON-PROBE: NOT DETECTED
SARS-COV-2 RNA NPH QL NAA+NON-PROBE: NOT DETECTED
SODIUM SERPL-SCNC: 137 MMOL/L (ref 136–145)
SODIUM UR-SCNC: 23 MMOL/L
TRIGL SERPL-MCNC: 137 MG/DL (ref 0–150)
TSH SERPL DL<=0.05 MIU/L-ACNC: 2.38 UIU/ML (ref 0.27–4.2)
VLDLC SERPL-MCNC: 25 MG/DL (ref 5–40)
WBC NRBC COR # BLD AUTO: 7.12 10*3/MM3 (ref 3.4–10.8)

## 2024-02-15 PROCEDURE — 80061 LIPID PANEL: CPT | Performed by: FAMILY MEDICINE

## 2024-02-15 PROCEDURE — 25010000002 FUROSEMIDE PER 20 MG: Performed by: NURSE PRACTITIONER

## 2024-02-15 PROCEDURE — 25010000002 HYDRALAZINE PER 20 MG: Performed by: FAMILY MEDICINE

## 2024-02-15 PROCEDURE — 80053 COMPREHEN METABOLIC PANEL: CPT | Performed by: FAMILY MEDICINE

## 2024-02-15 PROCEDURE — 84156 ASSAY OF PROTEIN URINE: CPT | Performed by: NURSE PRACTITIONER

## 2024-02-15 PROCEDURE — 82728 ASSAY OF FERRITIN: CPT | Performed by: NURSE PRACTITIONER

## 2024-02-15 PROCEDURE — 63710000001 INSULIN LISPRO (HUMAN) PER 5 UNITS: Performed by: FAMILY MEDICINE

## 2024-02-15 PROCEDURE — 25010000002 LABETALOL 5 MG/ML SOLUTION: Performed by: FAMILY MEDICINE

## 2024-02-15 PROCEDURE — 82948 REAGENT STRIP/BLOOD GLUCOSE: CPT

## 2024-02-15 PROCEDURE — 82043 UR ALBUMIN QUANTITATIVE: CPT | Performed by: INTERNAL MEDICINE

## 2024-02-15 PROCEDURE — 85025 COMPLETE CBC W/AUTO DIFF WBC: CPT | Performed by: INTERNAL MEDICINE

## 2024-02-15 PROCEDURE — 84300 ASSAY OF URINE SODIUM: CPT | Performed by: INTERNAL MEDICINE

## 2024-02-15 PROCEDURE — 63710000001 INSULIN DETEMIR PER 5 UNITS: Performed by: FAMILY MEDICINE

## 2024-02-15 PROCEDURE — 84443 ASSAY THYROID STIM HORMONE: CPT | Performed by: FAMILY MEDICINE

## 2024-02-15 PROCEDURE — 0202U NFCT DS 22 TRGT SARS-COV-2: CPT | Performed by: FAMILY MEDICINE

## 2024-02-15 PROCEDURE — 83036 HEMOGLOBIN GLYCOSYLATED A1C: CPT | Performed by: FAMILY MEDICINE

## 2024-02-15 PROCEDURE — 82570 ASSAY OF URINE CREATININE: CPT | Performed by: NURSE PRACTITIONER

## 2024-02-15 PROCEDURE — 25010000002 ENOXAPARIN PER 10 MG: Performed by: FAMILY MEDICINE

## 2024-02-15 RX ORDER — HYDRALAZINE HYDROCHLORIDE 50 MG/1
50 TABLET, FILM COATED ORAL EVERY 8 HOURS SCHEDULED
Status: DISCONTINUED | OUTPATIENT
Start: 2024-02-15 | End: 2024-02-21 | Stop reason: HOSPADM

## 2024-02-15 RX ORDER — SODIUM BICARBONATE 650 MG/1
650 TABLET ORAL DAILY
Status: DISCONTINUED | OUTPATIENT
Start: 2024-02-15 | End: 2024-02-16

## 2024-02-15 RX ORDER — FUROSEMIDE 10 MG/ML
40 INJECTION INTRAMUSCULAR; INTRAVENOUS ONCE
Status: COMPLETED | OUTPATIENT
Start: 2024-02-15 | End: 2024-02-15

## 2024-02-15 RX ORDER — TAMSULOSIN HYDROCHLORIDE 0.4 MG/1
0.4 CAPSULE ORAL DAILY
Status: DISCONTINUED | OUTPATIENT
Start: 2024-02-15 | End: 2024-02-21 | Stop reason: HOSPADM

## 2024-02-15 RX ADMIN — Medication 1 APPLICATION: at 08:00

## 2024-02-15 RX ADMIN — HYDRALAZINE HYDROCHLORIDE 50 MG: 50 TABLET ORAL at 21:28

## 2024-02-15 RX ADMIN — DOCUSATE SODIUM 50 MG AND SENNOSIDES 8.6 MG 2 TABLET: 8.6; 5 TABLET, FILM COATED ORAL at 20:09

## 2024-02-15 RX ADMIN — INSULIN LISPRO 2 UNITS: 100 INJECTION, SOLUTION INTRAVENOUS; SUBCUTANEOUS at 07:44

## 2024-02-15 RX ADMIN — MAGNESIUM GLUCONATE 500 MG ORAL TABLET 400 MG: 500 TABLET ORAL at 16:03

## 2024-02-15 RX ADMIN — Medication 10 ML: at 08:50

## 2024-02-15 RX ADMIN — INSULIN LISPRO 2 UNITS: 100 INJECTION, SOLUTION INTRAVENOUS; SUBCUTANEOUS at 20:23

## 2024-02-15 RX ADMIN — LABETALOL HYDROCHLORIDE 10 MG: 5 INJECTION, SOLUTION INTRAVENOUS at 21:28

## 2024-02-15 RX ADMIN — Medication 10 ML: at 20:06

## 2024-02-15 RX ADMIN — FUROSEMIDE 40 MG: 10 INJECTION, SOLUTION INTRAVENOUS at 08:53

## 2024-02-15 RX ADMIN — INSULIN LISPRO 2 UNITS: 100 INJECTION, SOLUTION INTRAVENOUS; SUBCUTANEOUS at 16:59

## 2024-02-15 RX ADMIN — CARVEDILOL 25 MG: 25 TABLET, FILM COATED ORAL at 20:09

## 2024-02-15 RX ADMIN — ROSUVASTATIN CALCIUM 10 MG: 10 TABLET, COATED ORAL at 20:09

## 2024-02-15 RX ADMIN — INSULIN DETEMIR 5 UNITS: 100 INJECTION, SOLUTION SUBCUTANEOUS at 20:23

## 2024-02-15 RX ADMIN — HYDRALAZINE HYDROCHLORIDE 50 MG: 50 TABLET ORAL at 14:15

## 2024-02-15 RX ADMIN — Medication 1 APPLICATION: at 20:09

## 2024-02-15 RX ADMIN — HYDRALAZINE HYDROCHLORIDE 10 MG: 20 INJECTION INTRAMUSCULAR; INTRAVENOUS at 05:42

## 2024-02-15 RX ADMIN — SODIUM BICARBONATE 650 MG: 650 TABLET ORAL at 16:03

## 2024-02-15 RX ADMIN — INSULIN LISPRO 2 UNITS: 100 INJECTION, SOLUTION INTRAVENOUS; SUBCUTANEOUS at 11:46

## 2024-02-15 RX ADMIN — ENOXAPARIN SODIUM 30 MG: 30 INJECTION SUBCUTANEOUS at 08:01

## 2024-02-15 RX ADMIN — CHLORHEXIDINE GLUCONATE 1 APPLICATION: 500 CLOTH TOPICAL at 04:30

## 2024-02-15 RX ADMIN — ASPIRIN 81 MG: 81 TABLET, COATED ORAL at 08:01

## 2024-02-15 RX ADMIN — HYDRALAZINE HYDROCHLORIDE 10 MG: 20 INJECTION INTRAMUSCULAR; INTRAVENOUS at 10:13

## 2024-02-15 RX ADMIN — CARVEDILOL 25 MG: 25 TABLET, FILM COATED ORAL at 08:01

## 2024-02-15 RX ADMIN — DOCUSATE SODIUM 50 MG AND SENNOSIDES 8.6 MG 2 TABLET: 8.6; 5 TABLET, FILM COATED ORAL at 08:00

## 2024-02-15 RX ADMIN — LABETALOL HYDROCHLORIDE 10 MG: 5 INJECTION, SOLUTION INTRAVENOUS at 15:18

## 2024-02-15 RX ADMIN — LABETALOL HYDROCHLORIDE 10 MG: 5 INJECTION, SOLUTION INTRAVENOUS at 06:35

## 2024-02-15 RX ADMIN — TAMSULOSIN HYDROCHLORIDE 0.4 MG: 0.4 CAPSULE ORAL at 16:03

## 2024-02-15 NOTE — PROGRESS NOTES
[facility] Medicine Services  INPATIENT PROGRESS NOTE    Patient Name: Mauro Patel  Date of Admission: 2/14/2024  Today's Date: 02/15/24  Length of Stay: 1  Primary Care Physician: John Robertson MD    Subjective   Chief Complaint: Hypertensive urgency/acute renal failure   HPI   Blood pressure still remain high, put patient on hydralazine by nephrology.  Creatinine is increasing.  Patient is getting 40 of Lasix due to diastolic heart failure.  Patient still have shortness of breath on room air.  Afebrile.  Glucose remain high, add Levemir . white blood cells normal.  Hemoglobin stable.    Review of Systems   Constitutional:  Positive for activity change and appetite change. Negative for chills and fever.   HENT:  Negative for hearing loss, nosebleeds, tinnitus and trouble swallowing.    Eyes:  Negative for visual disturbance.   Respiratory:  Positive for cough and shortness of breath. Negative for chest tightness and wheezing.    Cardiovascular:  Negative for chest pain, palpitations and leg swelling.   Gastrointestinal:  Negative for abdominal distention, abdominal pain, blood in stool, constipation, diarrhea, nausea and vomiting.   Endocrine: Negative for cold intolerance, heat intolerance, polydipsia, polyphagia and polyuria.   Genitourinary:  Negative for decreased urine volume, difficulty urinating, dysuria, flank pain, frequency and hematuria.   Musculoskeletal:  Negative for arthralgias, joint swelling and myalgias.   Skin:  Negative for rash.   Allergic/Immunologic: Negative for immunocompromised state.   Neurological:  Positive for weakness. Negative for dizziness, syncope, light-headedness and headaches.   Hematological:  Negative for adenopathy. Does not bruise/bleed easily.   Psychiatric/Behavioral:  Negative for confusion and sleep disturbance. The patient is not nervous/anxious.    All pertinent negatives and positives are as above. All other systems have  been reviewed and are negative unless otherwise stated.     Objective    Temp:  [98 °F (36.7 °C)-98.6 °F (37 °C)] 98.6 °F (37 °C)  Heart Rate:  [] 90  Resp:  [15-24] 22  BP: (137-186)/(61-89) 172/89  Physical Exam  Vitals and nursing note reviewed.   HENT:      Head: Normocephalic.   Eyes:      Conjunctiva/sclera: Conjunctivae normal.      Pupils: Pupils are equal, round, and reactive to light.   Cardiovascular:      Rate and Rhythm: Normal rate and regular rhythm.      Heart sounds: Normal heart sounds.   Pulmonary:      Effort: No respiratory distress.      Comments: Diminished breath of bilateral, clear, on room air.  Patient no acute distress.  Abdominal:      General: Bowel sounds are normal. There is no distension.      Palpations: Abdomen is soft.      Tenderness: There is no abdominal tenderness.      Comments: Obesity .   Musculoskeletal:         General: No swelling.      Cervical back: Neck supple.   Skin:     General: Skin is warm and dry.      Capillary Refill: Capillary refill takes 2 to 3 seconds.      Findings: No rash.   Neurological:      General: No focal deficit present.      Mental Status: He is alert and oriented to person, place, and time.   Psychiatric:         Mood and Affect: Mood normal.         Behavior: Behavior normal.         Thought Content: Thought content normal.          Results Review:  I have reviewed the labs, radiology results, and diagnostic studies.    Laboratory Data:   Results from last 7 days   Lab Units 02/15/24  0529 02/14/24  0915   WBC 10*3/mm3 7.12 7.61   HEMOGLOBIN g/dL 8.6* 8.5*   HEMATOCRIT % 27.1* 26.8*   PLATELETS 10*3/mm3 259 221        Results from last 7 days   Lab Units 02/15/24  0529 02/14/24  0915   SODIUM mmol/L 137 142   POTASSIUM mmol/L 3.7 3.9   CHLORIDE mmol/L 101 104   CO2 mmol/L 22.0 27.0   BUN mg/dL 58* 52*   CREATININE mg/dL 4.03* 3.35*   CALCIUM mg/dL 9.5 8.8   BILIRUBIN mg/dL 0.4 0.4   ALK PHOS U/L 130* 119*   ALT (SGPT) U/L 6 6   AST  (SGOT) U/L 13 12   GLUCOSE mg/dL 161* 165*       Culture Data:   Blood Culture   Date Value Ref Range Status   02/14/2024 No growth at 24 hours  Preliminary   02/14/2024 No growth at 24 hours  Preliminary       Radiology Data:   Imaging Results (Last 24 Hours)       ** No results found for the last 24 hours. **            I have reviewed the patient's current medications.     Assessment/Plan   Assessment  Active Hospital Problems    Diagnosis     **Hypertensive urgency     Acute on chronic renal failure     Controlled type 2 diabetes mellitus with complication, without long-term current use of insulin     Benign essential HTN     Mixed hyperlipidemia        Treatment Plan  Hypertensive urgency/hyperlipidemia/history of hypertension/cardiomegaly/diastolic CHF.  Continue Cardene drip.  Patient will need to go unit for now.   Aspirin .  Crestor.  Coreg.  Hold Lasix due to renal failure.  Hold kerendia.  Hold lisinopril due to renal failure.   Labetalol as needed.  Hydralazine as needed.  IV Lasix 40 x 1 by nephrology.  Add hydralazine p.o. by nephrology.  Echocardiogram-ejection fraction 61 to 65%, mild concentric hypertrophy, diastolic dysfunction grade 2, normal right ventricle cavities size and systolic function, left atrial cavity moderately dilated, aortic valve exhibits sclerosis-mild thickening of aortic valve-no aortic valve regurgitation-mild aortic valve stenosis, moderate mitral annular calcification-mild mitral valve regurgitation, mild pulmonary hypertension.     Acute on chronic renal failure stage IIIb..  Baseline creatinine 10/1/2020 2.5.  Creatinine today is 3.35.  Nephrology consult.  Bicarb .  Ultrasound the kidneys-Negative for hydronephrosis or renal atrophy.      Anemia.  Hemoglobin stable.  Probably secondary to chronic renal failure.  No sign of GI bleed.  Iron sulfate .     Shortness of breath.  Chest x-ray-Shallow inspiration, with mild cardiomegaly, No evidence of overt CHF. No focal  pulmonary infiltrates.  Patient is on room air     Elevated D-dimer.  Unable to do CTA of the chest due to renal failure.  Will start prophylaxis Lovenox for now.  Patient denies any chest pain.     Diabetes.  Hold metformin due to renal failure.  Low sliding scale for now.  Hemoglobin A1 C 7.3.  Add Levemir .     Nausea.  Zofran as needed.    Prostate hypertrophy.  Continue Flomax.     Obesity.  BMI is 32.     Nutrition.  Cardiac/diabetic diet.  Magnesium oxide.     Blood cultures-no growth for 24 hours.  COVID-19-negative.  Flu screen negative.  RSV-negative.  Respiratory PCR .     Medical Decision Making  Number and Complexity of problems: Hypertensive urgency/acute on chronic renal failure/anemia/shortness of breath/diabetes  Differential Diagnosis: None     Conditions and Status        Condition is unchanged.     [facility] Data  External documents reviewed: ER notes  Cardiac tracing (EKG, telemetry) interpretation: Sinus  Radiology interpretation: Chest x-ray  Labs reviewed: Laboratory  Any tests that were considered but not ordered: Laboratory in AM     Decision rules/scores evaluated (example [id number], Wells, etc): None     Discussed with: Patient     Care Planning  Shared decision making: Patient and wife  Code status and discussions: Full code     Disposition  Social Determinants of Health that impact treatment or disposition: From home  Estimated length of stay is 2 to 5 days.        Electronically signed by David Osborne MD, 02/15/24, 15:25 CST.

## 2024-02-15 NOTE — PROGRESS NOTES
Nephrology ([facility]) Progress Note      Patient:  Mauro Patel  YOB: 1955  Date of Service: 2/15/2024  MRN: [id number]   Acct: [id number]   Primary Care Physician: John Robertson MD  Advance Directive:   Code Status and Medical Interventions:   Ordered at: 02/14/24 1202     Level Of Support Discussed With:    Patient     Code Status (Patient has no pulse and is not breathing):    CPR (Attempt to Resuscitate)     Medical Interventions (Patient has pulse or is breathing):    Full Support     Admit Date: 2/14/2024       Hospital Day: 1  Referring Provider: No ref. provider found      Patient personally seen and examined.  Complete chart including Consults, Notes, Operative Reports, Labs, Cardiology, and Radiology studies reviewed as able.        Subjective:  Mauro Patel is a 68 y.o. male for whom we were consulted for evaluation and treatment of acute kidney injury. Baseline chronic kidney disease stage 4. Follows with Teo BTAES in our office. Baseline creatinine approximately 2.8. History of type 2 diabetes, hypertension.  Patient was started on Kerendia a few months ago. He was seen in our office on 2/07 and creatinine had increased to 3.8 and he appeared somewhat volume depleted on exam. Patient was instructed to hold Lasix at that time and was supposed to get repeat labs this week. He presented to ER on 2/14 with dyspnea and elevated blood pressure. Creatinine 3.3 on arrival to ER. Admitted to CCU on Cardene drip. Denied n/v/d. Denies NSAID use. Denies changes in urination    Today is awake and alert. Dyspnea has improved. Cardene is off at time of exam but BP remains fairly elevated. Urine output nonoliguric    Allergies:  Duravent dm  [[id number]]    Home Meds:  Medications Prior to Admission   Medication Sig Dispense Refill Last Dose    ACCU-CHEK GUIDE test strip        allopurinol (ZYLOPRIM) 100 MG tablet Take 1 tablet by mouth Daily.        ASPIRIN 81 PO Take 81 mg by mouth Daily.       carvedilol (COREG) 25 MG tablet Take 1 tablet by mouth 2 (Two) Times a Day With Meals. BID       ferrous sulfate 325 (65 FE) MG tablet Take 1 tablet by mouth 2 (Two) Times a Day With Meals.       Finerenone (Kerendia) 10 MG tablet Take 1 tablet by mouth Daily.       insulin detemir (LEVEMIR) 100 UNIT/ML injection Inject 20 Units under the skin into the appropriate area as directed 2 (Two) Times a Day. 20 units every morning and 35 units every night       Insulin Lispro, 1 Unit Dial, (HumaLOG KwikPen) 100 UNIT/ML solution pen-injector Inject 5 Units under the skin into the appropriate area as directed 2 (Two) Times a Day. At breakfast and supper       lisinopril (PRINIVIL,ZESTRIL) 40 MG tablet Take 1 tablet by mouth Daily.       losartan (COZAAR) 100 MG tablet Take 1 tablet by mouth Daily. 90 tablet 3 More than a month    magnesium oxide (MAG-OX) 400 MG tablet Take 1 tablet by mouth Daily.       rosuvastatin (CRESTOR) 10 MG tablet Take 1 tablet by mouth Daily.       sodium bicarbonate 650 MG tablet Take 2 tablets by mouth 3 (Three) Times a Day.       tadalafil (CIALIS) 5 MG tablet Take 1 tablet by mouth Daily As Needed for Erectile Dysfunction.       tamsulosin (FLOMAX) 0.4 MG capsule 24 hr capsule Take 1 capsule by mouth Daily.       vitamin D (ERGOCALCIFEROL) 73493 units capsule capsule Take 1 capsule by mouth Every 30 (Thirty) Days. Around the 15th   1/15/2024       Medicines:  Current Facility-Administered Medications   Medication Dose Route Frequency Provider Last Rate Last Admin    aspirin EC tablet 81 mg  81 mg Oral Daily David Osborne MD   81 mg at 02/15/24 0801    sennosides-docusate (PERICOLACE) 8.6-50 MG per tablet 2 tablet  2 tablet Oral BID David Osborne MD   2 tablet at 02/15/24 0800    And    polyethylene glycol (MIRALAX) packet 17 g  17 g Oral Daily PRN David Osborne MD        And    bisacodyl (DULCOLAX) EC tablet 5 mg  5 mg Oral Daily PRN  David Osborne MD        And    bisacodyl (DULCOLAX) suppository 10 mg  10 mg Rectal Daily PRN David Osborne MD        carvedilol (COREG) tablet 25 mg  25 mg Oral BID David Osborne MD   25 mg at 02/15/24 0801    Chlorhexidine Gluconate Cloth 2 % pads 1 Application  1 application  Topical Once David Osborne MD        Chlorhexidine Gluconate Cloth 2 % pads 1 Application  1 Application Topical Q24H David Osborne MD   1 Application at 02/15/24 0430    dextrose (D50W) (25 g/50 mL) IV injection 25 g  25 g Intravenous Q15 Min PRN David Osborne MD        dextrose (GLUTOSE) oral gel 15 g  15 g Oral Q15 Min PRN David Osborne MD        Enoxaparin Sodium (LOVENOX) syringe 30 mg  30 mg Subcutaneous Daily David Osborne MD   30 mg at 02/15/24 0801    glucagon (GLUCAGEN) injection 1 mg  1 mg Intramuscular Q15 Min PRN David Osborne MD        hydrALAZINE (APRESOLINE) injection 10 mg  10 mg Intravenous Q4H PRN David Osborne MD   10 mg at 02/15/24 0542    Insulin Lispro (humaLOG) injection 2-7 Units  2-7 Units Subcutaneous 4x Daily AC & at Bedtime David Osborne MD   2 Units at 02/15/24 0744    labetalol (NORMODYNE,TRANDATE) injection 10 mg  10 mg Intravenous Q4H PRN David Osborne MD   10 mg at 02/15/24 0635    mupirocin (BACTROBAN) 2 % nasal ointment 1 Application  1 application  Each Nare BID David Osborne MD   1 Application at 02/15/24 0800    niCARdipine (CARDENE) 25 mg in 250 mL NS infusion kit  5-15 mg/hr Intravenous Titrated David Osborne MD   Stopped at 02/14/24 1816    ondansetron (ZOFRAN) injection 4 mg  4 mg Intravenous Q6H PRN David Osborne MD        rosuvastatin (CRESTOR) tablet 10 mg  10 mg Oral Nightly David Osborne MD   10 mg at 02/14/24 2001    sodium chloride 0.9 % flush 10 mL  10 mL Intravenous PRN David Osborne MD        sodium chloride 0.9 % flush 10 mL  10 mL Intravenous Q12H David Osborne MD   10 mL at 02/14/24 2001    sodium chloride 0.9 % flush 10 mL  10 mL Intravenous PRN  David Osborne MD        sodium chloride 0.9 % infusion 40 mL  40 mL Intravenous PRN David Osborne MD        sodium chloride 0.9 % infusion  75 mL/hr Intravenous Continuous Davie Styles MD 75 mL/hr at 02/14/24 2002 75 mL/hr at 02/14/24 2002       Past Medical History:  Past Medical History:   Diagnosis Date    Diabetes mellitus     Edema     Hyperlipidemia     Hypertension        Past Surgical History:  Past Surgical History:   Procedure Laterality Date    NO PAST SURGERIES         Family History  Family History   Problem Relation Age of Onset    Diabetes Mother     No Known Problems Father        Social History  Social History     Socioeconomic History    Marital status:    Tobacco Use    Smoking status: Never    Smokeless tobacco: Never   Vaping Use    Vaping Use: Never used   Substance and Sexual Activity    Alcohol use: No    Drug use: No    Sexual activity: Defer       Review of Systems:  History obtained from chart review and the patient  General ROS: No fever or chills  Respiratory ROS: No cough, shortness of breath, wheezing  Cardiovascular ROS: No chest pain or palpitations  Gastrointestinal ROS: No abdominal pain or melena  Genito-Urinary ROS: No dysuria or hematuria  Psych ROS: No anxiety and depression  14 point ROS reviewed with the patient and negative except as noted above and in the HPI unless unable to obtain.    Objective:  Patient Vitals for the past 24 hrs:   BP Temp Temp src Pulse Resp SpO2 Height Weight   02/15/24 0700 170/84 98 °F (36.7 °C) Oral 81 -- 99 % -- --   02/15/24 0536 177/81 -- -- 87 -- 99 % -- --   02/15/24 0500 163/78 -- -- 86 -- 99 % -- 92 kg (202 lb 12.8 oz)   02/15/24 0438 170/86 -- -- 93 -- -- -- --   02/15/24 0430 -- -- -- 86 -- 98 % -- --   02/15/24 0400 -- 98.1 °F (36.7 °C) Oral 86 -- 98 % -- --   02/15/24 0330 153/81 -- -- 88 -- 93 % -- --   02/15/24 0300 156/80 -- -- 81 -- 95 % -- --   02/15/24 0230 160/80 -- -- 83 -- 98 % -- --   02/15/24 0200 161/78 -- -- 82  "-- 98 % -- --   02/15/24 0130 146/69 -- -- 73 -- 95 % -- --   02/15/24 0100 150/80 -- -- 83 -- 96 % -- --   02/15/24 0030 154/73 -- -- 78 -- 98 % -- --   02/15/24 0000 150/77 98.6 °F (37 °C) Oral 84 22 97 % -- --   02/14/24 2330 142/73 -- -- 78 -- 97 % -- --   02/14/24 2300 143/69 -- -- 78 -- 98 % -- --   02/14/24 2230 145/67 -- -- 84 -- 98 % -- --   02/14/24 2200 143/69 -- -- 86 -- 97 % -- --   02/14/24 2130 142/61 -- -- 95 -- 97 % -- --   02/14/24 2100 161/76 -- -- 95 -- 96 % -- --   02/14/24 2030 162/76 -- -- 94 -- 96 % -- --   02/14/24 2000 168/72 98 °F (36.7 °C) Oral 95 24 95 % -- --   02/14/24 1930 149/74 -- -- 95 -- 94 % -- --   02/14/24 1900 137/63 -- -- 77 -- 93 % -- --   02/14/24 1830 151/70 -- -- 92 -- 96 % -- --   02/14/24 1815 149/74 -- -- 88 -- 94 % -- --   02/14/24 1800 155/69 -- -- 94 17 97 % -- --   02/14/24 1745 151/68 -- -- 94 -- 96 % -- --   02/14/24 1730 152/68 -- -- 98 -- 95 % -- --   02/14/24 1715 -- -- -- 97 -- 95 % -- --   02/14/24 1700 157/69 -- -- 102 19 96 % -- --   02/14/24 1645 144/70 -- -- 91 -- 97 % -- --   02/14/24 1630 156/68 -- -- 94 -- 97 % -- --   02/14/24 1615 165/69 -- -- 96 -- 98 % -- --   02/14/24 1600 160/75 98 °F (36.7 °C) Oral 100 15 98 % -- --   02/14/24 1545 157/67 -- -- 99 -- 98 % -- --   02/14/24 1530 157/71 -- -- 98 -- 97 % -- --   02/14/24 1445 159/70 -- -- 94 -- 97 % -- --   02/14/24 1430 159/69 -- -- 99 -- 97 % -- --   02/14/24 1415 164/73 -- -- 99 -- 95 % -- --   02/14/24 1400 173/72 -- -- 97 -- 96 % -- --   02/14/24 1347 (!) 186/76 -- -- -- -- -- 170.2 cm (67\") 91.6 kg (202 lb)   02/14/24 1345 164/79 -- -- 97 -- 96 % -- --   02/14/24 1330 158/69 -- -- 95 -- 97 % -- --   02/14/24 1315 164/73 -- -- 101 -- 97 % -- --   02/14/24 1300 159/75 97.9 °F (36.6 °C) Oral 97 16 97 % 170.2 cm (67\") 90.6 kg (199 lb 11.2 oz)   02/14/24 1245 175/79 -- -- 100 -- 96 % -- --   02/14/24 1201 (!) 186/76 -- -- 97 -- 96 % -- --   02/14/24 1146 (!) 196/90 -- -- 97 -- 96 % -- -- " "  02/14/24 1131 (!) 191/84 -- -- 83 -- 94 % -- --   02/14/24 1117 (!) 203/94 -- -- 85 -- 96 % -- --   02/14/24 0931 (!) 225/93 -- -- 88 -- 97 % -- --   02/14/24 0916 (!) 215/99 -- -- 89 -- 97 % -- --   02/14/24 0911 (!) 216/102 -- -- 85 -- 97 % -- --   02/14/24 0845 (!) 212/88 98.1 °F (36.7 °C) Oral 96 16 95 % 170.2 cm (67\") 91.9 kg (202 lb 8 oz)       Intake/Output Summary (Last 24 hours) at 2/15/2024 0824  Last data filed at 2/15/2024 0700  Gross per 24 hour   Intake 1380.3 ml   Output 125 ml   Net 1255.3 ml     General: awake/alert   Chest:  clear to auscultation bilaterally without respiratory distress  CVS: regular rate and rhythm  Abdominal: soft, nontender, positive bowel sounds  Extremities:  trace lower ext edema  Skin: warm and dry without rash      Labs:  Results from last 7 days   Lab Units 02/15/24  0529 02/14/24  0915   WBC 10*3/mm3 7.12 7.61   HEMOGLOBIN g/dL 8.6* 8.5*   HEMATOCRIT % 27.1* 26.8*   PLATELETS 10*3/mm3 259 221         Results from last 7 days   Lab Units 02/15/24  0529 02/14/24  0915   SODIUM mmol/L 137 142   POTASSIUM mmol/L 3.7 3.9   CHLORIDE mmol/L 101 104   CO2 mmol/L 22.0 27.0   BUN mg/dL 58* 52*   CREATININE mg/dL 4.03* 3.35*   CALCIUM mg/dL 9.5 8.8   EGFR mL/min/1.73 15.5* 19.3*   BILIRUBIN mg/dL 0.4 0.4   ALK PHOS U/L 130* 119*   ALT (SGPT) U/L 6 6   AST (SGOT) U/L 13 12   GLUCOSE mg/dL 161* 165*       Radiology:   Imaging Results (Last 72 Hours)       Procedure Component Value Units Date/Time    US Renal Bilateral [[id number]] Collected: 02/14/24 1452     Updated: 02/14/24 1456    Narrative:      EXAM/TECHNIQUE: US RENAL BILATERAL-     INDICATION: Acute on chronic renal failure, stage IIIb.;  I16.0-Hypertensive urgency; N18.9-Chronic kidney disease, unspecified;  I50.9-Heart failure, unspecified; D64.9-Anemia, unspecified     COMPARISON: None     FINDINGS:     Right kidney is 10.6 cm in length and demonstrates normal cortical  thickness and echogenicity. No shadowing calculi or " "hydronephrosis. No  right renal cyst or mass.     Left kidney is 9.8 cm in length and demonstrates normal cortical  thickness and echogenicity. No shadowing calculi or hydronephrosis. No  left renal cyst or mass.     No focal urinary bladder abnormality.       Impression:         Negative for hydronephrosis or renal atrophy.     This report was signed and finalized on 2/14/2024 2:53 PM by Dr. Kenny Tate MD.       XR Chest 1 View [[id number]] Collected: 02/14/24 0944     Updated: 02/14/24 0950    Narrative:      EXAMINATION: XR CHEST 1 VW- 2/14/2024 9:44 AM     HISTORY: Shortness of breath.     COMPARISON: There are no correlative imaging studies for comparison.     REPORT:  The lungs are hypoaerated, no focal infiltrate or pulmonary  consolidation is identified. No pneumothorax or effusion is identified.  Mild cardiomegaly is present. The osseous structures show no acute  findings.       Impression:      Shallow inspiration, with mild cardiomegaly. No evidence of  overt CHF. No focal pulmonary infiltrates.     This report was signed and finalized on 2/14/2024 9:47 AM by Dr. Hemanth Barclay MD.               Culture:  No results found for: \"BLOODCX\", \"URINECX\", \"WOUNDCX\", \"MRSACX\", \"RESPCX\", \"STOOLCX\"      Assessment    Acute kidney injury, ATN--worse today  Baseline chronic kidney disease stage 4  Hypertensive urgency  Type 2 diabetes  Anemia of CKD and iron deficiency    Plan:   Hold IV fluids  One time dose Lasix 40 mg IV  Continue to hold Kerendia, HCTZ, and Lisinopril  Renal function a little worse overnight after IV fluids, will continue to follow closely  Continue to titrate HTN medications      Teo Pate, APRN  2/15/2024  08:24 CST    "

## 2024-02-15 NOTE — CONSULTS
Nephrology ([facility]) Consult Note      Patient:  Mauro Patel  YOB: 1955  Date of Service: 2/14/2024  MRN: [id number]   Acct: [id number]   Primary Care Physician: John Robertson MD  Advance Directive:   Code Status and Medical Interventions:   Ordered at: 02/14/24 1202     Level Of Support Discussed With:    Patient     Code Status (Patient has no pulse and is not breathing):    CPR (Attempt to Resuscitate)     Medical Interventions (Patient has pulse or is breathing):    Full Support     Admit Date: 2/14/2024       Hospital Day: 0  Referring Provider: No ref. provider found      Patient Seen, Chart, Consults, Notes, Labs, Radiology studies reviewed.     chief complaint: Abnormal lab    Subjective:  Mauro Patel is a 68 y.o. male  whom we were consulted for acute kidney injury/chronic kidney disease.  Patient has stage IV chronic kidney disease, last estimated GFR, 23 mL.  He follows PAULO Bravo in the office.  He has progressive decline in renal function due to diabetic nephropathy.  He has poorly controlled diabetes with A1c close to 8%.  Presenting today with increasing shortness of breath.  On arrival in the emergency room his blood pressure was 200/100  Millimeters mercury.  His serum creatinine 3.3 mg with estimated GFR less than 20 mL.  He denies any nausea vomiting or diarrhea.  He denies any recent exposure to nonsteroidal agents.  He drinks about half a gallon of water a day..  His chest x-ray consistent with normal study with no infiltrate/No fluid/essentially normal chest x-ray.  He is now admitted to CCU with worsening of renal function and severe systolic and diastolic hypertension.  He is currently on Cardene for blood pressure control.  Patient also denies any urinary outflow problem such as increased frequency or hesitancy or nocturia.    Home Meds:  Medications Prior to Admission   Medication Sig Dispense Refill Last Dose     ACCU-CHEK GUIDE test strip        allopurinol (ZYLOPRIM) 100 MG tablet Take 1 tablet by mouth Daily.       ASPIRIN 81 PO Take 81 mg by mouth Daily.       carvedilol (COREG) 25 MG tablet Take 1 tablet by mouth 2 (Two) Times a Day With Meals. BID       ferrous sulfate 325 (65 FE) MG tablet Take 1 tablet by mouth 2 (Two) Times a Day With Meals.       Finerenone (Kerendia) 10 MG tablet Take 1 tablet by mouth Daily.       insulin detemir (LEVEMIR) 100 UNIT/ML injection Inject 20 Units under the skin into the appropriate area as directed 2 (Two) Times a Day. 20 units every morning and 35 units every night       Insulin Lispro, 1 Unit Dial, (HumaLOG KwikPen) 100 UNIT/ML solution pen-injector Inject 5 Units under the skin into the appropriate area as directed 2 (Two) Times a Day. At breakfast and supper       lisinopril (PRINIVIL,ZESTRIL) 40 MG tablet Take 1 tablet by mouth Daily.       losartan (COZAAR) 100 MG tablet Take 1 tablet by mouth Daily. 90 tablet 3 More than a month    magnesium oxide (MAG-OX) 400 MG tablet Take 1 tablet by mouth Daily.       rosuvastatin (CRESTOR) 10 MG tablet Take 1 tablet by mouth Daily.       sodium bicarbonate 650 MG tablet Take 2 tablets by mouth 3 (Three) Times a Day.       tadalafil (CIALIS) 5 MG tablet Take 1 tablet by mouth Daily As Needed for Erectile Dysfunction.       tamsulosin (FLOMAX) 0.4 MG capsule 24 hr capsule Take 1 capsule by mouth Daily.       vitamin D (ERGOCALCIFEROL) 31726 units capsule capsule Take 1 capsule by mouth Every 30 (Thirty) Days. Around the 15th   1/15/2024       Medicines:  Current Facility-Administered Medications   Medication Dose Route Frequency Provider Last Rate Last Admin    aspirin EC tablet 81 mg  81 mg Oral Daily David Osborne MD   81 mg at 02/14/24 1529    sennosides-docusate (PERICOLACE) 8.6-50 MG per tablet 2 tablet  2 tablet Oral BID David Osborne MD        And    polyethylene glycol (MIRALAX) packet 17 g  17 g Oral Daily PRN David Osborne,  MD        And    bisacodyl (DULCOLAX) EC tablet 5 mg  5 mg Oral Daily PRN David Osborne MD        And    bisacodyl (DULCOLAX) suppository 10 mg  10 mg Rectal Daily PRN David Osborne MD        carvedilol (COREG) tablet 25 mg  25 mg Oral BID David Osborne MD        Chlorhexidine Gluconate Cloth 2 % pads 1 Application  1 application  Topical Once David Osborne MD        [START ON 2/15/2024] Chlorhexidine Gluconate Cloth 2 % pads 1 Application  1 Application Topical Q24H David Osborne MD        dextrose (D50W) (25 g/50 mL) IV injection 25 g  25 g Intravenous Q15 Min PRN David Osborne MD        dextrose (GLUTOSE) oral gel 15 g  15 g Oral Q15 Min PRN David Osborne MD        Enoxaparin Sodium (LOVENOX) syringe 30 mg  30 mg Subcutaneous Daily David Osborne MD   30 mg at 02/14/24 1530    glucagon (GLUCAGEN) injection 1 mg  1 mg Intramuscular Q15 Min PRN David Osborne MD        hydrALAZINE (APRESOLINE) injection 10 mg  10 mg Intravenous Q4H PRN David Osborne MD        Insulin Lispro (humaLOG) injection 2-7 Units  2-7 Units Subcutaneous 4x Daily AC & at Bedtime David Osborne MD        labetalol (NORMODYNE,TRANDATE) injection 10 mg  10 mg Intravenous Q4H PRN David Osborne MD        mupirocin (BACTROBAN) 2 % nasal ointment 1 Application  1 application  Each Nare BID David Osborne MD   1 Application at 02/14/24 1727    niCARdipine (CARDENE) 25 mg in 250 mL NS infusion kit  5-15 mg/hr Intravenous Titrated David Osborne MD   Stopped at 02/14/24 1816    ondansetron (ZOFRAN) injection 4 mg  4 mg Intravenous Q6H PRN David Osborne MD        rosuvastatin (CRESTOR) tablet 10 mg  10 mg Oral Nightly David Osborne MD        sodium chloride 0.9 % flush 10 mL  10 mL Intravenous PRN David Osborne MD        sodium chloride 0.9 % flush 10 mL  10 mL Intravenous Q12H David Osborne MD   10 mL at 02/14/24 1529    sodium chloride 0.9 % flush 10 mL  10 mL Intravenous PRN David Osborne MD        sodium chloride 0.9  "% infusion 40 mL  40 mL Intravenous PRN David Osborne MD           Past Medical History:  Past Medical History:   Diagnosis Date    Diabetes mellitus     Edema     Hyperlipidemia     Hypertension        Past Surgical History:  Past Surgical History:   Procedure Laterality Date    NO PAST SURGERIES         Family History  Family History   Problem Relation Age of Onset    Diabetes Mother     No Known Problems Father        Social History  Social History     Socioeconomic History    Marital status:    Tobacco Use    Smoking status: Never    Smokeless tobacco: Never   Vaping Use    Vaping Use: Never used   Substance and Sexual Activity    Alcohol use: No    Drug use: No    Sexual activity: Defer         Review of Systems:  History obtained from chart review and the patient  General ROS: No fever or chills  Respiratory ROS: No cough, shortness of breath, wheezing  Cardiovascular ROS: no chest pain or dyspnea on exertion  Gastrointestinal ROS: No abdominal pain or melena  Genito-Urinary ROS: No dysuria or hematuria  14 point ROS reviewed with the patient and negative except as noted above and in the HPI unless unable to obtain.    Objective:  /69   Pulse 94   Temp 98 °F (36.7 °C) (Oral)   Resp 17   Ht 170.2 cm (67\")   Wt 91.6 kg (202 lb)   SpO2 97%   BMI 31.64 kg/m²     Intake/Output Summary (Last 24 hours) at 2/14/2024 1823  Last data filed at 2/14/2024 1816  Gross per 24 hour   Intake 520 ml   Output --   Net 520 ml     General: awake/alert   HEENT: Normocephalic atraumatic head  Neck: Supple with no JVD or carotid bruits.  Chest:  clear to auscultation bilaterally without respiratory distress  CVS: regular rate and rhythm  Abdominal: soft, nontender, normal bowel sounds  Extremities: no cyanosis or edema  Skin: warm and dry without rash      Labs:    Results from last 7 days   Lab Units 02/14/24  0915   WBC 10*3/mm3 7.61   HEMOGLOBIN g/dL 8.5*   HEMATOCRIT % 26.8*   PLATELETS 10*3/mm3 221 "       Results from last 7 days   Lab Units 02/14/24  0915   SODIUM mmol/L 142   POTASSIUM mmol/L 3.9   CHLORIDE mmol/L 104   CO2 mmol/L 27.0   BUN mg/dL 52*   CREATININE mg/dL 3.35*   CALCIUM mg/dL 8.8   BILIRUBIN mg/dL 0.4   ALK PHOS U/L 119*   ALT (SGPT) U/L 6   AST (SGOT) U/L 12   GLUCOSE mg/dL 165*   EGFR mL/min/1.73 19.3*         Radiology:   Imaging Results (Last 24 Hours)       Procedure Component Value Units Date/Time    US Renal Bilateral [[id number]] Collected: 02/14/24 1452     Updated: 02/14/24 1456    Narrative:      EXAM/TECHNIQUE: US RENAL BILATERAL-     INDICATION: Acute on chronic renal failure, stage IIIb.;  I16.0-Hypertensive urgency; N18.9-Chronic kidney disease, unspecified;  I50.9-Heart failure, unspecified; D64.9-Anemia, unspecified     COMPARISON: None     FINDINGS:     Right kidney is 10.6 cm in length and demonstrates normal cortical  thickness and echogenicity. No shadowing calculi or hydronephrosis. No  right renal cyst or mass.     Left kidney is 9.8 cm in length and demonstrates normal cortical  thickness and echogenicity. No shadowing calculi or hydronephrosis. No  left renal cyst or mass.     No focal urinary bladder abnormality.       Impression:         Negative for hydronephrosis or renal atrophy.     This report was signed and finalized on 2/14/2024 2:53 PM by Dr. Kenny Tate MD.       XR Chest 1 View [[id number]] Collected: 02/14/24 0944     Updated: 02/14/24 0950    Narrative:      EXAMINATION: XR CHEST 1 VW- 2/14/2024 9:44 AM     HISTORY: Shortness of breath.     COMPARISON: There are no correlative imaging studies for comparison.     REPORT:  The lungs are hypoaerated, no focal infiltrate or pulmonary  consolidation is identified. No pneumothorax or effusion is identified.  Mild cardiomegaly is present. The osseous structures show no acute  findings.       Impression:      Shallow inspiration, with mild cardiomegaly. No evidence of  overt CHF. No focal pulmonary  "infiltrates.     This report was signed and finalized on 2/14/2024 9:47 AM by Dr. Hemanth Barclay MD.               Culture:  No components found for: \"WOUNDCUL\", \"3\"  No components found for: \"CSFCUL\", \"3\"  No components found for: \"BC\", \"3\"  No components found for: \"URINECUL\", \"3\"      Assessment   1.  Acute kidney injury versus progression of CKD.  2.  Intravascular volume depletion versus ATN  3.  Stage IV chronic kidney disease baseline.  4.  Severe systolic and diastolic hypertension.  5.  Type 2 diabetes with nephropathy.  6.  Anemia of chronic kidney disease.      Plan:  1.  IV fluid administration.  2.  Urinary electrolytes.  3.  Baseline iron studies, serum PTH level.  4.  Renal ultrasound was reviewed/no hydronephrosis.  5.  I will continue to monitor renal function      Thank you for the consult, we appreciate the opportunity to provide care to your patients.  Feel free to contact me if I can be of any further assistance.      Davie Styles MD  2/14/2024  18:23 CST  "

## 2024-02-15 NOTE — CASE MANAGEMENT/SOCIAL WORK
Discharge Planning Assessment  [facility]     Patient Name: Mauro Patel  MRN: [id number]  Today's Date: 2/15/2024    Admit Date: 2/14/2024        Discharge Needs Assessment       Row Name 02/15/24 1325       Living Environment    People in Home spouse    Name(s) of People in Home Alessandra-spouse    Current Living Arrangements home    Potentially Unsafe Housing Conditions none    Primary Care Provided by self    Provides Primary Care For no one    Family Caregiver if Needed spouse    Family Caregiver Names Alessandra-spouse    Quality of Family Relationships helpful;involved;supportive    Able to Return to Prior Arrangements yes       Resource/Environmental Concerns    Transportation Concerns none       Transition Planning    Patient/Family Anticipates Transition to home with family    Patient/Family Anticipated Services at Transition none    Transportation Anticipated family or friend will provide       Discharge Needs Assessment    Readmission Within the Last 30 Days no previous admission in last 30 days    Equipment Currently Used at Home none    Concerns to be Addressed no discharge needs identified;denies needs/concerns at this time    Equipment Needed After Discharge none    Discharge Coordination/Progress Spoke with patient to complete DC planning. Pt plans to return home with spouse at DC. Has a PCP and Rx coverage. Pt denies the need for HH or any DME at this time. Will follow for DC needs.                   Discharge Plan    No documentation.                 Continued Care and Services - Admitted Since 2/14/2024    Coordination has not been started for this encounter.          Demographic Summary    No documentation.                  Functional Status    No documentation.                  Psychosocial    No documentation.                  Abuse/Neglect    No documentation.                  Legal    No documentation.                  Substance Abuse    No documentation.                  Patient Forms    No  documentation.                     Jamilah Hoyt RN

## 2024-02-15 NOTE — PLAN OF CARE
Goal Outcome Evaluation:      Cardene off all shift. Administered hydralazine x2, see MAR for details. Normal sinus rhythm. Room air. Alert and oriented x4.

## 2024-02-16 LAB
ALBUMIN SERPL-MCNC: 3.1 G/DL (ref 3.5–5.2)
ALBUMIN/GLOB SERPL: 0.8 G/DL
ALP SERPL-CCNC: 109 U/L (ref 39–117)
ALT SERPL W P-5'-P-CCNC: 5 U/L (ref 1–41)
ANION GAP SERPL CALCULATED.3IONS-SCNC: 14 MMOL/L (ref 5–15)
AST SERPL-CCNC: 12 U/L (ref 1–40)
BACTERIA UR QL AUTO: ABNORMAL /HPF
BASOPHILS # BLD AUTO: 0.06 10*3/MM3 (ref 0–0.2)
BASOPHILS NFR BLD AUTO: 0.6 % (ref 0–1.5)
BILIRUB SERPL-MCNC: 0.3 MG/DL (ref 0–1.2)
BILIRUB UR QL STRIP: NEGATIVE
BUN SERPL-MCNC: 61 MG/DL (ref 8–23)
BUN/CREAT SERPL: 14.7 (ref 7–25)
CALCIUM SPEC-SCNC: 8.6 MG/DL (ref 8.6–10.5)
CHLORIDE SERPL-SCNC: 104 MMOL/L (ref 98–107)
CLARITY UR: ABNORMAL
CO2 SERPL-SCNC: 20 MMOL/L (ref 22–29)
COLOR UR: ABNORMAL
CREAT SERPL-MCNC: 4.14 MG/DL (ref 0.76–1.27)
DEPRECATED RDW RBC AUTO: 48.7 FL (ref 37–54)
EGFRCR SERPLBLD CKD-EPI 2021: 15 ML/MIN/1.73
EOSINOPHIL # BLD AUTO: 0.14 10*3/MM3 (ref 0–0.4)
EOSINOPHIL NFR BLD AUTO: 1.5 % (ref 0.3–6.2)
ERYTHROCYTE [DISTWIDTH] IN BLOOD BY AUTOMATED COUNT: 14.6 % (ref 12.3–15.4)
GLOBULIN UR ELPH-MCNC: 3.9 GM/DL
GLUCOSE BLDC GLUCOMTR-MCNC: 147 MG/DL (ref 70–130)
GLUCOSE BLDC GLUCOMTR-MCNC: 154 MG/DL (ref 70–130)
GLUCOSE BLDC GLUCOMTR-MCNC: 177 MG/DL (ref 70–130)
GLUCOSE BLDC GLUCOMTR-MCNC: 180 MG/DL (ref 70–130)
GLUCOSE SERPL-MCNC: 119 MG/DL (ref 65–99)
GLUCOSE UR STRIP-MCNC: NEGATIVE MG/DL
HCT VFR BLD AUTO: 25.1 % (ref 37.5–51)
HGB BLD-MCNC: 8.4 G/DL (ref 13–17.7)
HGB UR QL STRIP.AUTO: ABNORMAL
HYALINE CASTS UR QL AUTO: ABNORMAL /LPF
IMM GRANULOCYTES # BLD AUTO: 0.09 10*3/MM3 (ref 0–0.05)
IMM GRANULOCYTES NFR BLD AUTO: 1 % (ref 0–0.5)
KETONES UR QL STRIP: ABNORMAL
LEUKOCYTE ESTERASE UR QL STRIP.AUTO: ABNORMAL
LYMPHOCYTES # BLD AUTO: 1.81 10*3/MM3 (ref 0.7–3.1)
LYMPHOCYTES NFR BLD AUTO: 19.6 % (ref 19.6–45.3)
MCH RBC QN AUTO: 30.4 PG (ref 26.6–33)
MCHC RBC AUTO-ENTMCNC: 33.5 G/DL (ref 31.5–35.7)
MCV RBC AUTO: 90.9 FL (ref 79–97)
MONOCYTES # BLD AUTO: 0.77 10*3/MM3 (ref 0.1–0.9)
MONOCYTES NFR BLD AUTO: 8.3 % (ref 5–12)
NEUTROPHILS NFR BLD AUTO: 6.38 10*3/MM3 (ref 1.7–7)
NEUTROPHILS NFR BLD AUTO: 69 % (ref 42.7–76)
NITRITE UR QL STRIP: NEGATIVE
NRBC BLD AUTO-RTO: 0 /100 WBC (ref 0–0.2)
PH UR STRIP.AUTO: <=5 [PH] (ref 5–8)
PLATELET # BLD AUTO: 241 10*3/MM3 (ref 140–450)
PMV BLD AUTO: 10 FL (ref 6–12)
POTASSIUM SERPL-SCNC: 3.9 MMOL/L (ref 3.5–5.2)
PROT SERPL-MCNC: 7 G/DL (ref 6–8.5)
PROT UR QL STRIP: ABNORMAL
PTH-INTACT SERPL-MCNC: 94.6 PG/ML (ref 15–65)
RBC # BLD AUTO: 2.76 10*6/MM3 (ref 4.14–5.8)
RBC # UR STRIP: ABNORMAL /HPF
REF LAB TEST METHOD: ABNORMAL
SODIUM SERPL-SCNC: 138 MMOL/L (ref 136–145)
SP GR UR STRIP: 1.02 (ref 1–1.03)
SQUAMOUS #/AREA URNS HPF: ABNORMAL /HPF
UROBILINOGEN UR QL STRIP: ABNORMAL
WBC # UR STRIP: ABNORMAL /HPF
WBC NRBC COR # BLD AUTO: 9.25 10*3/MM3 (ref 3.4–10.8)

## 2024-02-16 PROCEDURE — 25010000002 ENOXAPARIN PER 10 MG: Performed by: FAMILY MEDICINE

## 2024-02-16 PROCEDURE — 25810000003 SODIUM CHLORIDE 0.9 % SOLUTION: Performed by: FAMILY MEDICINE

## 2024-02-16 PROCEDURE — 81001 URINALYSIS AUTO W/SCOPE: CPT | Performed by: INTERNAL MEDICINE

## 2024-02-16 PROCEDURE — 80053 COMPREHEN METABOLIC PANEL: CPT | Performed by: NURSE PRACTITIONER

## 2024-02-16 PROCEDURE — 25010000002 LABETALOL 5 MG/ML SOLUTION: Performed by: FAMILY MEDICINE

## 2024-02-16 PROCEDURE — 25010000002 CEFTRIAXONE PER 250 MG: Performed by: FAMILY MEDICINE

## 2024-02-16 PROCEDURE — 63710000001 INSULIN DETEMIR PER 5 UNITS: Performed by: FAMILY MEDICINE

## 2024-02-16 PROCEDURE — 83970 ASSAY OF PARATHORMONE: CPT | Performed by: INTERNAL MEDICINE

## 2024-02-16 PROCEDURE — 85025 COMPLETE CBC W/AUTO DIFF WBC: CPT | Performed by: INTERNAL MEDICINE

## 2024-02-16 PROCEDURE — 25010000002 NICARDIPINE 2.5 MG/ML SOLUTION: Performed by: FAMILY MEDICINE

## 2024-02-16 PROCEDURE — 82948 REAGENT STRIP/BLOOD GLUCOSE: CPT

## 2024-02-16 PROCEDURE — 63710000001 INSULIN LISPRO (HUMAN) PER 5 UNITS: Performed by: FAMILY MEDICINE

## 2024-02-16 PROCEDURE — 25010000002 HYDRALAZINE PER 20 MG: Performed by: FAMILY MEDICINE

## 2024-02-16 PROCEDURE — 87086 URINE CULTURE/COLONY COUNT: CPT | Performed by: INTERNAL MEDICINE

## 2024-02-16 RX ORDER — SODIUM BICARBONATE 650 MG/1
650 TABLET ORAL 2 TIMES DAILY
Status: DISCONTINUED | OUTPATIENT
Start: 2024-02-16 | End: 2024-02-18

## 2024-02-16 RX ORDER — NIFEDIPINE 60 MG/1
60 TABLET, EXTENDED RELEASE ORAL
Status: DISCONTINUED | OUTPATIENT
Start: 2024-02-16 | End: 2024-02-21 | Stop reason: HOSPADM

## 2024-02-16 RX ADMIN — Medication 1 APPLICATION: at 08:09

## 2024-02-16 RX ADMIN — SODIUM BICARBONATE 650 MG: 650 TABLET ORAL at 08:08

## 2024-02-16 RX ADMIN — ENOXAPARIN SODIUM 30 MG: 30 INJECTION SUBCUTANEOUS at 08:09

## 2024-02-16 RX ADMIN — Medication 10 ML: at 08:14

## 2024-02-16 RX ADMIN — Medication 1 APPLICATION: at 20:47

## 2024-02-16 RX ADMIN — SODIUM BICARBONATE 650 MG: 650 TABLET ORAL at 20:47

## 2024-02-16 RX ADMIN — INSULIN LISPRO 2 UNITS: 100 INJECTION, SOLUTION INTRAVENOUS; SUBCUTANEOUS at 07:57

## 2024-02-16 RX ADMIN — CARVEDILOL 25 MG: 25 TABLET, FILM COATED ORAL at 08:09

## 2024-02-16 RX ADMIN — HYDRALAZINE HYDROCHLORIDE 10 MG: 20 INJECTION INTRAMUSCULAR; INTRAVENOUS at 15:56

## 2024-02-16 RX ADMIN — INSULIN LISPRO 2 UNITS: 100 INJECTION, SOLUTION INTRAVENOUS; SUBCUTANEOUS at 16:36

## 2024-02-16 RX ADMIN — ASPIRIN 81 MG: 81 TABLET, COATED ORAL at 08:09

## 2024-02-16 RX ADMIN — ROSUVASTATIN CALCIUM 10 MG: 10 TABLET, COATED ORAL at 20:47

## 2024-02-16 RX ADMIN — HYDRALAZINE HYDROCHLORIDE 50 MG: 50 TABLET ORAL at 13:02

## 2024-02-16 RX ADMIN — CHLORHEXIDINE GLUCONATE 1 APPLICATION: 500 CLOTH TOPICAL at 04:35

## 2024-02-16 RX ADMIN — LABETALOL HYDROCHLORIDE 10 MG: 5 INJECTION, SOLUTION INTRAVENOUS at 04:33

## 2024-02-16 RX ADMIN — HYDRALAZINE HYDROCHLORIDE 10 MG: 20 INJECTION INTRAMUSCULAR; INTRAVENOUS at 11:19

## 2024-02-16 RX ADMIN — INSULIN DETEMIR 5 UNITS: 100 INJECTION, SOLUTION SUBCUTANEOUS at 08:08

## 2024-02-16 RX ADMIN — NIFEDIPINE 60 MG: 60 TABLET, FILM COATED, EXTENDED RELEASE ORAL at 13:40

## 2024-02-16 RX ADMIN — HYDRALAZINE HYDROCHLORIDE 50 MG: 50 TABLET ORAL at 06:19

## 2024-02-16 RX ADMIN — INSULIN LISPRO 2 UNITS: 100 INJECTION, SOLUTION INTRAVENOUS; SUBCUTANEOUS at 20:55

## 2024-02-16 RX ADMIN — CEFTRIAXONE 1000 MG: 1 INJECTION, POWDER, FOR SOLUTION INTRAMUSCULAR; INTRAVENOUS at 14:43

## 2024-02-16 RX ADMIN — HYDRALAZINE HYDROCHLORIDE 10 MG: 20 INJECTION INTRAMUSCULAR; INTRAVENOUS at 06:19

## 2024-02-16 RX ADMIN — Medication 10 ML: at 20:48

## 2024-02-16 RX ADMIN — NICARDIPINE HYDROCHLORIDE 5 MG/HR: 25 INJECTION, SOLUTION INTRAVENOUS at 06:30

## 2024-02-16 RX ADMIN — CARVEDILOL 25 MG: 25 TABLET, FILM COATED ORAL at 20:47

## 2024-02-16 RX ADMIN — TAMSULOSIN HYDROCHLORIDE 0.4 MG: 0.4 CAPSULE ORAL at 08:08

## 2024-02-16 RX ADMIN — LABETALOL HYDROCHLORIDE 10 MG: 5 INJECTION, SOLUTION INTRAVENOUS at 14:40

## 2024-02-16 RX ADMIN — HYDRALAZINE HYDROCHLORIDE 50 MG: 50 TABLET ORAL at 20:48

## 2024-02-16 RX ADMIN — MAGNESIUM GLUCONATE 500 MG ORAL TABLET 400 MG: 500 TABLET ORAL at 08:08

## 2024-02-16 RX ADMIN — DOCUSATE SODIUM 50 MG AND SENNOSIDES 8.6 MG 2 TABLET: 8.6; 5 TABLET, FILM COATED ORAL at 20:47

## 2024-02-16 RX ADMIN — INSULIN DETEMIR 5 UNITS: 100 INJECTION, SOLUTION SUBCUTANEOUS at 20:55

## 2024-02-16 NOTE — PROGRESS NOTES
Nephrology ([facility]) Progress Note      Patient:  Mauro Patel  YOB: 1955  Date of Service: 2/16/2024  MRN: [id number]   Acct: [id number]   Primary Care Physician: John Robertson MD  Advance Directive:   Code Status and Medical Interventions:   Ordered at: 02/14/24 1202     Level Of Support Discussed With:    Patient     Code Status (Patient has no pulse and is not breathing):    CPR (Attempt to Resuscitate)     Medical Interventions (Patient has pulse or is breathing):    Full Support     Admit Date: 2/14/2024       Hospital Day: 2  Referring Provider: No ref. provider found      Patient personally seen and examined.  Complete chart including Consults, Notes, Operative Reports, Labs, Cardiology, and Radiology studies reviewed as able.        Subjective:  Mauro Patel is a 68 y.o. male for whom we were consulted for evaluation and treatment of acute kidney injury. Baseline chronic kidney disease stage 4. Follows with Teo BATES in our office. Baseline creatinine approximately 2.8. History of type 2 diabetes, hypertension.  Patient was started on Kerendia a few months ago. He was seen in our office on 2/07 and creatinine had increased to 3.8 and he appeared somewhat volume depleted on exam. Patient was instructed to hold Lasix at that time and was supposed to get repeat labs this week. He presented to ER on 2/14 with dyspnea and elevated blood pressure. Creatinine 3.3 on arrival to ER. Admitted to CCU on Cardene drip. Denied n/v/d. Denies NSAID use. Denies changes in urination    Today is awake and alert. Complaint of dysuria. Cardene off at time of exam but has still been needed intermittently. Urine output nonoliguric    Allergies:  Duravent dm  [[id number]]    Home Meds:  Medications Prior to Admission   Medication Sig Dispense Refill Last Dose    ACCU-CHEK GUIDE test strip        allopurinol (ZYLOPRIM) 100 MG tablet Take 1 tablet by mouth  Daily.       ASPIRIN 81 PO Take 81 mg by mouth Daily.       carvedilol (COREG) 25 MG tablet Take 1 tablet by mouth 2 (Two) Times a Day With Meals. BID       ferrous sulfate 325 (65 FE) MG tablet Take 1 tablet by mouth 2 (Two) Times a Day With Meals.       Finerenone (Kerendia) 10 MG tablet Take 1 tablet by mouth Daily.       insulin detemir (LEVEMIR) 100 UNIT/ML injection Inject 20 Units under the skin into the appropriate area as directed 2 (Two) Times a Day. 20 units every morning and 35 units every night       Insulin Lispro, 1 Unit Dial, (HumaLOG KwikPen) 100 UNIT/ML solution pen-injector Inject 5 Units under the skin into the appropriate area as directed 2 (Two) Times a Day. At breakfast and supper       lisinopril (PRINIVIL,ZESTRIL) 40 MG tablet Take 1 tablet by mouth Daily.       losartan (COZAAR) 100 MG tablet Take 1 tablet by mouth Daily. 90 tablet 3 More than a month    magnesium oxide (MAG-OX) 400 MG tablet Take 1 tablet by mouth Daily.       rosuvastatin (CRESTOR) 10 MG tablet Take 1 tablet by mouth Daily.       sodium bicarbonate 650 MG tablet Take 2 tablets by mouth 3 (Three) Times a Day.       tadalafil (CIALIS) 5 MG tablet Take 1 tablet by mouth Daily As Needed for Erectile Dysfunction.       tamsulosin (FLOMAX) 0.4 MG capsule 24 hr capsule Take 1 capsule by mouth Daily.       vitamin D (ERGOCALCIFEROL) 63848 units capsule capsule Take 1 capsule by mouth Every 30 (Thirty) Days. Around the 15th   1/15/2024       Medicines:  Current Facility-Administered Medications   Medication Dose Route Frequency Provider Last Rate Last Admin    aspirin EC tablet 81 mg  81 mg Oral Daily David Osborne MD   81 mg at 02/16/24 0809    sennosides-docusate (PERICOLACE) 8.6-50 MG per tablet 2 tablet  2 tablet Oral BID David Osborne MD   2 tablet at 02/15/24 2009    And    polyethylene glycol (MIRALAX) packet 17 g  17 g Oral Daily PRN David Osborne MD        And    bisacodyl (DULCOLAX) EC tablet 5 mg  5 mg Oral Daily  PRN David Osborne MD        And    bisacodyl (DULCOLAX) suppository 10 mg  10 mg Rectal Daily PRN David Osborne MD        carvedilol (COREG) tablet 25 mg  25 mg Oral BID David sOborne MD   25 mg at 02/16/24 0809    Chlorhexidine Gluconate Cloth 2 % pads 1 Application  1 application  Topical Once David Osborne MD        Chlorhexidine Gluconate Cloth 2 % pads 1 Application  1 Application Topical Q24H David Osborne MD   1 Application at 02/16/24 0435    dextrose (D50W) (25 g/50 mL) IV injection 25 g  25 g Intravenous Q15 Min PRN David Osborne MD        dextrose (GLUTOSE) oral gel 15 g  15 g Oral Q15 Min PRN David Osborne MD        Enoxaparin Sodium (LOVENOX) syringe 30 mg  30 mg Subcutaneous Daily David Osborne MD   30 mg at 02/16/24 0809    glucagon (GLUCAGEN) injection 1 mg  1 mg Intramuscular Q15 Min PRN David Osborne MD        hydrALAZINE (APRESOLINE) injection 10 mg  10 mg Intravenous Q4H PRN David Osborne MD   10 mg at 02/16/24 0619    hydrALAZINE (APRESOLINE) tablet 50 mg  50 mg Oral Q8H Davie Styles MD   50 mg at 02/16/24 0619    insulin detemir (LEVEMIR) injection 5 Units  5 Units Subcutaneous Q12H David Osborne MD   5 Units at 02/16/24 0808    Insulin Lispro (humaLOG) injection 2-7 Units  2-7 Units Subcutaneous 4x Daily AC & at Bedtime David Osborne MD   2 Units at 02/16/24 0757    labetalol (NORMODYNE,TRANDATE) injection 10 mg  10 mg Intravenous Q4H PRN David Osborne MD   10 mg at 02/16/24 0433    magnesium oxide (MAG-OX) tablet 400 mg  400 mg Oral Daily David Osborne MD   400 mg at 02/16/24 0808    mupirocin (BACTROBAN) 2 % nasal ointment 1 Application  1 application  Each Nare BID David Osborne MD   1 Application at 02/16/24 0809    niCARdipine (CARDENE) 25 mg in 250 mL NS infusion kit  5-15 mg/hr Intravenous Titrated David Osborne MD 50 mL/hr at 02/16/24 0630 5 mg/hr at 02/16/24 0630    ondansetron (ZOFRAN) injection 4 mg  4 mg Intravenous Q6H PRN David Osborne MD         rosuvastatin (CRESTOR) tablet 10 mg  10 mg Oral Nightly David Osborne MD   10 mg at 02/15/24 2009    sodium bicarbonate tablet 650 mg  650 mg Oral Daily David Osborne MD   650 mg at 02/16/24 0808    sodium chloride 0.9 % flush 10 mL  10 mL Intravenous PRN David Osborne MD        sodium chloride 0.9 % flush 10 mL  10 mL Intravenous Q12H David Osborne MD   10 mL at 02/16/24 0814    sodium chloride 0.9 % flush 10 mL  10 mL Intravenous PRN David Osborne MD   10 mL at 02/16/24 0814    sodium chloride 0.9 % infusion 40 mL  40 mL Intravenous PRN David Osborne MD        tamsulosin (FLOMAX) 24 hr capsule 0.4 mg  0.4 mg Oral Daily David Osborne MD   0.4 mg at 02/16/24 0808       Past Medical History:  Past Medical History:   Diagnosis Date    Diabetes mellitus     Edema     Hyperlipidemia     Hypertension        Past Surgical History:  Past Surgical History:   Procedure Laterality Date    NO PAST SURGERIES         Family History  Family History   Problem Relation Age of Onset    Diabetes Mother     No Known Problems Father        Social History  Social History     Socioeconomic History    Marital status:    Tobacco Use    Smoking status: Never    Smokeless tobacco: Never   Vaping Use    Vaping Use: Never used   Substance and Sexual Activity    Alcohol use: No    Drug use: No    Sexual activity: Defer       Review of Systems:  History obtained from chart review and the patient  General ROS: No fever or chills  Respiratory ROS: No cough, shortness of breath, wheezing  Cardiovascular ROS: No chest pain or palpitations  Gastrointestinal ROS: No abdominal pain or melena  Genito-Urinary ROS: No dysuria or hematuria  Psych ROS: No anxiety and depression  14 point ROS reviewed with the patient and negative except as noted above and in the HPI unless unable to obtain.    Objective:  Patient Vitals for the past 24 hrs:   BP Temp Temp src Pulse Resp SpO2 Weight   02/16/24 0900 160/81 -- -- 94 -- 98 % --   02/16/24  0730 146/70 98 °F (36.7 °C) Oral 97 -- 100 % --   02/16/24 0700 162/74 -- -- 93 -- 100 % --   02/16/24 0630 179/73 -- -- 97 -- 100 % --   02/16/24 0600 (!) 195/84 -- -- 95 -- 99 % --   02/16/24 0530 (!) 183/90 -- -- 92 -- 99 % --   02/16/24 0500 170/85 -- -- 87 -- 99 % 93.2 kg (205 lb 6.4 oz)   02/16/24 0430 (!) 182/83 -- -- 90 -- 99 % --   02/16/24 0400 172/76 98.5 °F (36.9 °C) Oral 89 -- 99 % --   02/16/24 0330 170/81 -- -- 89 -- 99 % --   02/16/24 0300 151/65 -- -- 85 -- 98 % --   02/16/24 0230 142/59 -- -- 84 -- 97 % --   02/16/24 0200 154/64 -- -- 84 -- 98 % --   02/16/24 0130 150/68 -- -- 85 -- 97 % --   02/16/24 0100 143/64 -- -- 84 -- 98 % --   02/16/24 0030 146/70 -- -- 86 -- 97 % --   02/16/24 0000 155/76 -- -- 81 -- 99 % --   02/15/24 2352 -- 98.1 °F (36.7 °C) Oral 81 16 100 % --   02/15/24 2330 152/75 -- -- 81 -- 99 % --   02/15/24 2300 139/67 -- -- 86 -- 99 % --   02/15/24 2230 134/72 -- -- 75 -- 99 % --   02/15/24 2200 164/93 -- -- 82 -- 98 % --   02/15/24 2130 164/85 -- -- 91 -- 100 % --   02/15/24 2100 (!) 182/94 -- -- 93 -- 98 % --   02/15/24 2030 (!) 187/91 -- -- 97 -- 98 % --   02/15/24 2000 165/78 98.7 °F (37.1 °C) Oral 95 19 96 % --   02/15/24 1900 148/74 -- -- 86 -- 100 % --   02/15/24 1800 152/69 -- -- 87 -- 100 % --   02/15/24 1700 176/84 -- -- 95 -- 99 % --   02/15/24 1530 166/79 -- -- 86 -- 99 % --   02/15/24 1500 (!) 181/88 -- -- 92 -- 98 % --   02/15/24 1400 172/89 -- -- 90 -- 98 % --   02/15/24 1300 160/75 -- -- 83 -- 99 % --   02/15/24 1230 173/83 -- -- 88 -- 98 % --   02/15/24 1200 170/85 98.6 °F (37 °C) Oral 91 -- 98 % --   02/15/24 1130 153/73 -- -- 81 -- 98 % --   02/15/24 1100 158/74 -- -- 84 -- 98 % --   02/15/24 1030 160/71 -- -- 87 -- 98 % --   02/15/24 1000 180/87 -- -- 94 -- 99 % --   02/15/24 0930 173/81 -- -- 91 -- 99 % --       Intake/Output Summary (Last 24 hours) at 2/16/2024 0910  Last data filed at 2/15/2024 2135  Gross per 24 hour   Intake 633.75 ml   Output 750 ml    Net -116.25 ml     General: awake/alert   Chest:  clear to auscultation bilaterally without respiratory distress  CVS: regular rate and rhythm  Abdominal: soft, nontender, positive bowel sounds  Extremities:  trace lower ext edema  Skin: warm and dry without rash      Labs:  Results from last 7 days   Lab Units 02/16/24  0354 02/15/24  0529 02/14/24  0915   WBC 10*3/mm3 9.25 7.12 7.61   HEMOGLOBIN g/dL 8.4* 8.6* 8.5*   HEMATOCRIT % 25.1* 27.1* 26.8*   PLATELETS 10*3/mm3 241 259 221         Results from last 7 days   Lab Units 02/16/24  0354 02/15/24  0529 02/14/24  0915   SODIUM mmol/L 138 137 142   POTASSIUM mmol/L 3.9 3.7 3.9   CHLORIDE mmol/L 104 101 104   CO2 mmol/L 20.0* 22.0 27.0   BUN mg/dL 61* 58* 52*   CREATININE mg/dL 4.14* 4.03* 3.35*   CALCIUM mg/dL 8.6 9.5 8.8   EGFR mL/min/1.73 15.0* 15.5* 19.3*   BILIRUBIN mg/dL 0.3 0.4 0.4   ALK PHOS U/L 109 130* 119*   ALT (SGPT) U/L 5 6 6   AST (SGOT) U/L 12 13 12   GLUCOSE mg/dL 119* 161* 165*       Radiology:   Imaging Results (Last 72 Hours)       Procedure Component Value Units Date/Time    US Renal Bilateral [[id number]] Collected: 02/14/24 1452     Updated: 02/14/24 1456    Narrative:      EXAM/TECHNIQUE: US RENAL BILATERAL-     INDICATION: Acute on chronic renal failure, stage IIIb.;  I16.0-Hypertensive urgency; N18.9-Chronic kidney disease, unspecified;  I50.9-Heart failure, unspecified; D64.9-Anemia, unspecified     COMPARISON: None     FINDINGS:     Right kidney is 10.6 cm in length and demonstrates normal cortical  thickness and echogenicity. No shadowing calculi or hydronephrosis. No  right renal cyst or mass.     Left kidney is 9.8 cm in length and demonstrates normal cortical  thickness and echogenicity. No shadowing calculi or hydronephrosis. No  left renal cyst or mass.     No focal urinary bladder abnormality.       Impression:         Negative for hydronephrosis or renal atrophy.     This report was signed and finalized on 2/14/2024 2:53 PM by  "Dr. Kenny Tate MD.       XR Chest 1 View [[id number]] Collected: 02/14/24 0944     Updated: 02/14/24 0950    Narrative:      EXAMINATION: XR CHEST 1 VW- 2/14/2024 9:44 AM     HISTORY: Shortness of breath.     COMPARISON: There are no correlative imaging studies for comparison.     REPORT:  The lungs are hypoaerated, no focal infiltrate or pulmonary  consolidation is identified. No pneumothorax or effusion is identified.  Mild cardiomegaly is present. The osseous structures show no acute  findings.       Impression:      Shallow inspiration, with mild cardiomegaly. No evidence of  overt CHF. No focal pulmonary infiltrates.     This report was signed and finalized on 2/14/2024 9:47 AM by Dr. Hemanth Barclay MD.               Culture:  No results found for: \"BLOODCX\", \"URINECX\", \"WOUNDCX\", \"MRSACX\", \"RESPCX\", \"STOOLCX\"      Assessment    Acute kidney injury, ATN--worse today  Baseline chronic kidney disease stage 4  Hypertensive urgency  Type 2 diabetes  Anemia of CKD and iron deficiency    Plan:   Check urinalysis  Renal function slightly worse today, hopeful that we will see some improvement going forward.  Continue to titrate HTN medications      Teo Pate, APRN  2/16/2024  09:10 CST    "

## 2024-02-16 NOTE — CONSULTS
Nutrition Services    Patient Name:  Mauro Patel  YOB: 1955  MRN: [id number]  Admit Date:  2/14/2024    Ntn consult for diet education.    Pt requested diet education and information on nutrition supplements appropriate for DM. He reports his appetite is fair, not as good as usual. He is ordered a healthy heart, C.CHO diet. He has consumed 38% of the last 2 meals. Will add Boost gluc control to meals BID.     Provided pt with simple DM diet education. Discussed appropriate nutrition suppelments so as not to exacerbate glucose levels. Encouraged protein and fiber with meals to control BS spikes. Provided pt a handout with ways to add protein to meals, including appropriate snacks and supplements. Answered pt questions. Encouraged pt to call RD office with diet questions. Follow per protocol.     Electronically signed by:  Melanie Escobar RDN, TORRES  02/16/24 16:47 CST

## 2024-02-16 NOTE — PROGRESS NOTES
[facility]  INPATIENT PROGRESS NOTE    Patient Name: Mauro Patel  Date of Admission: 2/14/2024  Today's Date: 02/16/24  Length of Stay: 2  Primary Care Physician: John Robertson MD    Subjective   Chief Complaint: Hypertensive urgency/acute renal failure    HPI   Blood pressure slightly high, afebrile.  Patient is on room air.  Slight increase in creatinine.  Hemoglobin stable.  Shortness of breath is improved.    Review of Systems   Constitutional:  Positive for activity change and appetite change. Negative for chills and fever.   HENT:  Negative for hearing loss, nosebleeds, tinnitus and trouble swallowing.    Eyes:  Negative for visual disturbance.   Respiratory: Shortness of breath improved.  Negative for chest tightness and wheezing.    Cardiovascular:  Negative for chest pain, palpitations and leg swelling.   Gastrointestinal:  Negative for abdominal distention, abdominal pain, blood in stool, constipation, diarrhea, nausea and vomiting.   Endocrine: Negative for cold intolerance, heat intolerance, polydipsia, polyphagia and polyuria.   Genitourinary:  Negative for decreased urine volume, difficulty urinating, dysuria, flank pain, frequency and hematuria.   Musculoskeletal:  Negative for arthralgias, joint swelling and myalgias.   Skin:  Negative for rash.   Allergic/Immunologic: Negative for immunocompromised state.   Neurological:  Positive for weakness. Negative for dizziness, syncope, light-headedness and headaches.   Hematological:  Negative for adenopathy. Does not bruise/bleed easily.   Psychiatric/Behavioral:  Negative for confusion and sleep disturbance. The patient is not nervous/anxious.    All pertinent negatives and positives are as above. All other systems have been reviewed and are negative unless otherwise stated.     Objective    Temp:  [98 °F (36.7 °C)-98.7 °F (37.1 °C)] 98 °F (36.7 °C)  Heart Rate:  [75-97] 91  Resp:  [16-19]  16  BP: (134-195)/(59-94) 169/76  Physical Exam  Vitals and nursing note reviewed.   HENT:      Head: Normocephalic.   Eyes:      Conjunctiva/sclera: Conjunctivae normal.      Pupils: Pupils are equal, round, and reactive to light.   Cardiovascular:      Rate and Rhythm: Normal rate and regular rhythm.      Heart sounds: Normal heart sounds.   Pulmonary:      Effort: No respiratory distress.      Comments: Diminished breath of bilateral, clear, on room air.  Patient no acute distress.  Abdominal:      General: Bowel sounds are normal. There is no distension.      Palpations: Abdomen is soft.      Tenderness: There is no abdominal tenderness.      Comments: Obesity .   Musculoskeletal:         General: No swelling.      Cervical back: Neck supple.   Skin:     General: Skin is warm and dry.      Capillary Refill: Capillary refill takes 2 to 3 seconds.      Findings: No rash.   Neurological:      General: No focal deficit present.      Mental Status: He is alert and oriented to person, place, and time.   Psychiatric:         Mood and Affect: Mood normal.         Behavior: Behavior normal.         Thought Content: Thought content normal.          Results Review:  I have reviewed the labs, radiology results, and diagnostic studies.    Laboratory Data:   Results from last 7 days   Lab Units 02/16/24  0354 02/15/24  0529 02/14/24  0915   WBC 10*3/mm3 9.25 7.12 7.61   HEMOGLOBIN g/dL 8.4* 8.6* 8.5*   HEMATOCRIT % 25.1* 27.1* 26.8*   PLATELETS 10*3/mm3 241 259 221        Results from last 7 days   Lab Units 02/16/24  0354 02/15/24  0529 02/14/24  0915   SODIUM mmol/L 138 137 142   POTASSIUM mmol/L 3.9 3.7 3.9   CHLORIDE mmol/L 104 101 104   CO2 mmol/L 20.0* 22.0 27.0   BUN mg/dL 61* 58* 52*   CREATININE mg/dL 4.14* 4.03* 3.35*   CALCIUM mg/dL 8.6 9.5 8.8   BILIRUBIN mg/dL 0.3 0.4 0.4   ALK PHOS U/L 109 130* 119*   ALT (SGPT) U/L 5 6 6   AST (SGOT) U/L 12 13 12   GLUCOSE mg/dL 119* 161* 165*       Culture Data:   Blood  Culture   Date Value Ref Range Status   02/14/2024 No growth at 2 days  Preliminary   02/14/2024 No growth at 2 days  Preliminary       Radiology Data:   Imaging Results (Last 24 Hours)       ** No results found for the last 24 hours. **            I have reviewed the patient's current medications.     Assessment/Plan   Assessment  Active Hospital Problems    Diagnosis     **Hypertensive urgency     Acute on chronic renal failure     Controlled type 2 diabetes mellitus with complication, without long-term current use of insulin     Benign essential HTN     Mixed hyperlipidemia        Treatment Plan  Hypertensive urgency/hyperlipidemia/history of hypertension/cardiomegaly/diastolic CHF.  Continue Cardene drip.  Patient will need to go unit for now.   Aspirin .  Crestor.  Coreg.  Hold Lasix due to renal failure.  Hold kerendia.  Hold lisinopril due to renal failure.   Labetalol as needed.  Hydralazine as needed.  Continue hydralazine p.o. by nephrology.  Add nifedipine cardiology  Echocardiogram-ejection fraction 61 to 65%, mild concentric hypertrophy, diastolic dysfunction grade 2, normal right ventricle cavities size and systolic function, left atrial cavity moderately dilated, aortic valve exhibits sclerosis-mild thickening of aortic valve-no aortic valve regurgitation-mild aortic valve stenosis, moderate mitral annular calcification-mild mitral valve regurgitation, mild pulmonary hypertension.     Acute on chronic renal failure stage IIIb/metabolic acidosis..  Baseline creatinine 10/1/2020 2.5.  Creatinine today is 3.35.  Nephrology consult.  Increase bicarb .  Creatinine slight increased .  Ultrasound the kidneys-Negative for hydronephrosis or renal atrophy.      Anemia.  Hemoglobin stable.  Probably secondary to chronic renal failure.  No sign of GI bleed.  Iron sulfate .     Shortness of breath.  Chest x-ray-Shallow inspiration, with mild cardiomegaly, No evidence of overt CHF. No focal pulmonary  infiltrates.  Patient is on room air     Elevated D-dimer.  Unable to do CTA of the chest due to renal failure.  Will start prophylaxis Lovenox for now.  Patient denies any chest pain.     Diabetes.  Hold metformin due to renal failure.  Low sliding scale for now.  Hemoglobin A1 C 7.3.  Add Levemir .    UTI.  Rocephin x 3 days.     Nausea.  Zofran as needed.     Prostate hypertrophy.  Continue Flomax.     Obesity.  BMI is 32.     Nutrition.  Cardiac/diabetic diet.  Magnesium oxide.     Blood cultures-no growth for 2 days.  COVID-19-negative.  Flu screen negative.  RSV-negative.  Respiratory PCR- neg .  Urine cultures pending.     Medical Decision Making  Number and Complexity of problems: Hypertensive urgency/acute on chronic renal failure/anemia/shortness of breath/diabetes  Differential Diagnosis: None     Conditions and Status        Condition is unchanged.     [facility] Data  External documents reviewed: ER notes  Cardiac tracing (EKG, telemetry) interpretation: Sinus  Radiology interpretation: Chest x-ray  Labs reviewed: Laboratory  Any tests that were considered but not ordered: Laboratory in AM     Decision rules/scores evaluated (example [id number], [place], etc): None     Discussed with: Patient     Care Planning  Shared decision making: Patient and wife  Code status and discussions: Full code     Disposition  Social Determinants of Health that impact treatment or disposition: From home  Estimated length of stay is 2 to 5 days.        Electronically signed by David Osborne MD, 02/16/24, 13:34 CST.

## 2024-02-16 NOTE — PLAN OF CARE
Goal Outcome Evaluation:      Cardene restarted, unable to keep SBP below 180 with PRN hydralazine and labetalol. On room air. Normal sinus rhythm. BM this shift.

## 2024-02-17 LAB
ALBUMIN SERPL-MCNC: 3.1 G/DL (ref 3.5–5.2)
ALBUMIN/GLOB SERPL: 0.8 G/DL
ALP SERPL-CCNC: 105 U/L (ref 39–117)
ALT SERPL W P-5'-P-CCNC: 5 U/L (ref 1–41)
ANION GAP SERPL CALCULATED.3IONS-SCNC: 14 MMOL/L (ref 5–15)
AST SERPL-CCNC: 12 U/L (ref 1–40)
BACTERIA SPEC AEROBE CULT: NORMAL
BASOPHILS # BLD AUTO: 0.03 10*3/MM3 (ref 0–0.2)
BASOPHILS NFR BLD AUTO: 0.4 % (ref 0–1.5)
BILIRUB SERPL-MCNC: 0.2 MG/DL (ref 0–1.2)
BUN SERPL-MCNC: 68 MG/DL (ref 8–23)
BUN/CREAT SERPL: 13.8 (ref 7–25)
CALCIUM SPEC-SCNC: 9 MG/DL (ref 8.6–10.5)
CHLORIDE SERPL-SCNC: 99 MMOL/L (ref 98–107)
CO2 SERPL-SCNC: 21 MMOL/L (ref 22–29)
CREAT SERPL-MCNC: 4.91 MG/DL (ref 0.76–1.27)
DEPRECATED RDW RBC AUTO: 50.7 FL (ref 37–54)
EGFRCR SERPLBLD CKD-EPI 2021: 12.2 ML/MIN/1.73
EOSINOPHIL # BLD AUTO: 0.15 10*3/MM3 (ref 0–0.4)
EOSINOPHIL NFR BLD AUTO: 1.8 % (ref 0.3–6.2)
ERYTHROCYTE [DISTWIDTH] IN BLOOD BY AUTOMATED COUNT: 14.8 % (ref 12.3–15.4)
GLOBULIN UR ELPH-MCNC: 3.9 GM/DL
GLUCOSE BLDC GLUCOMTR-MCNC: 146 MG/DL (ref 70–130)
GLUCOSE BLDC GLUCOMTR-MCNC: 176 MG/DL (ref 70–130)
GLUCOSE BLDC GLUCOMTR-MCNC: 199 MG/DL (ref 70–130)
GLUCOSE BLDC GLUCOMTR-MCNC: 201 MG/DL (ref 70–130)
GLUCOSE SERPL-MCNC: 133 MG/DL (ref 65–99)
HCT VFR BLD AUTO: 24.6 % (ref 37.5–51)
HGB BLD-MCNC: 8 G/DL (ref 13–17.7)
IMM GRANULOCYTES # BLD AUTO: 0.05 10*3/MM3 (ref 0–0.05)
IMM GRANULOCYTES NFR BLD AUTO: 0.6 % (ref 0–0.5)
LYMPHOCYTES # BLD AUTO: 1.67 10*3/MM3 (ref 0.7–3.1)
LYMPHOCYTES NFR BLD AUTO: 20 % (ref 19.6–45.3)
MCH RBC QN AUTO: 30.5 PG (ref 26.6–33)
MCHC RBC AUTO-ENTMCNC: 32.5 G/DL (ref 31.5–35.7)
MCV RBC AUTO: 93.9 FL (ref 79–97)
MONOCYTES # BLD AUTO: 0.59 10*3/MM3 (ref 0.1–0.9)
MONOCYTES NFR BLD AUTO: 7.1 % (ref 5–12)
NEUTROPHILS NFR BLD AUTO: 5.85 10*3/MM3 (ref 1.7–7)
NEUTROPHILS NFR BLD AUTO: 70.1 % (ref 42.7–76)
NRBC BLD AUTO-RTO: 0 /100 WBC (ref 0–0.2)
PLATELET # BLD AUTO: 236 10*3/MM3 (ref 140–450)
PMV BLD AUTO: 10.1 FL (ref 6–12)
POTASSIUM SERPL-SCNC: 4 MMOL/L (ref 3.5–5.2)
PROT SERPL-MCNC: 7 G/DL (ref 6–8.5)
RBC # BLD AUTO: 2.62 10*6/MM3 (ref 4.14–5.8)
SODIUM SERPL-SCNC: 134 MMOL/L (ref 136–145)
WBC NRBC COR # BLD AUTO: 8.34 10*3/MM3 (ref 3.4–10.8)

## 2024-02-17 PROCEDURE — 25010000002 ENOXAPARIN PER 10 MG: Performed by: FAMILY MEDICINE

## 2024-02-17 PROCEDURE — 63710000001 INSULIN LISPRO (HUMAN) PER 5 UNITS: Performed by: FAMILY MEDICINE

## 2024-02-17 PROCEDURE — 25010000002 CEFTRIAXONE PER 250 MG: Performed by: FAMILY MEDICINE

## 2024-02-17 PROCEDURE — 85025 COMPLETE CBC W/AUTO DIFF WBC: CPT | Performed by: INTERNAL MEDICINE

## 2024-02-17 PROCEDURE — 80053 COMPREHEN METABOLIC PANEL: CPT | Performed by: INTERNAL MEDICINE

## 2024-02-17 PROCEDURE — 63710000001 INSULIN DETEMIR PER 5 UNITS: Performed by: FAMILY MEDICINE

## 2024-02-17 PROCEDURE — 82948 REAGENT STRIP/BLOOD GLUCOSE: CPT

## 2024-02-17 RX ADMIN — HYDRALAZINE HYDROCHLORIDE 50 MG: 50 TABLET ORAL at 22:09

## 2024-02-17 RX ADMIN — INSULIN DETEMIR 5 UNITS: 100 INJECTION, SOLUTION SUBCUTANEOUS at 09:55

## 2024-02-17 RX ADMIN — INSULIN LISPRO 2 UNITS: 100 INJECTION, SOLUTION INTRAVENOUS; SUBCUTANEOUS at 11:37

## 2024-02-17 RX ADMIN — CARVEDILOL 25 MG: 25 TABLET, FILM COATED ORAL at 09:54

## 2024-02-17 RX ADMIN — INSULIN LISPRO 2 UNITS: 100 INJECTION, SOLUTION INTRAVENOUS; SUBCUTANEOUS at 22:15

## 2024-02-17 RX ADMIN — INSULIN DETEMIR 5 UNITS: 100 INJECTION, SOLUTION SUBCUTANEOUS at 22:16

## 2024-02-17 RX ADMIN — INSULIN LISPRO 3 UNITS: 100 INJECTION, SOLUTION INTRAVENOUS; SUBCUTANEOUS at 17:24

## 2024-02-17 RX ADMIN — CARVEDILOL 25 MG: 25 TABLET, FILM COATED ORAL at 22:09

## 2024-02-17 RX ADMIN — Medication 10 ML: at 09:57

## 2024-02-17 RX ADMIN — DOCUSATE SODIUM 50 MG AND SENNOSIDES 8.6 MG 2 TABLET: 8.6; 5 TABLET, FILM COATED ORAL at 09:56

## 2024-02-17 RX ADMIN — Medication 10 ML: at 22:09

## 2024-02-17 RX ADMIN — ASPIRIN 81 MG: 81 TABLET, COATED ORAL at 09:54

## 2024-02-17 RX ADMIN — CEFTRIAXONE 1000 MG: 1 INJECTION, POWDER, FOR SOLUTION INTRAMUSCULAR; INTRAVENOUS at 13:57

## 2024-02-17 RX ADMIN — ENOXAPARIN SODIUM 30 MG: 30 INJECTION SUBCUTANEOUS at 09:56

## 2024-02-17 RX ADMIN — DOCUSATE SODIUM 50 MG AND SENNOSIDES 8.6 MG 2 TABLET: 8.6; 5 TABLET, FILM COATED ORAL at 22:15

## 2024-02-17 RX ADMIN — TAMSULOSIN HYDROCHLORIDE 0.4 MG: 0.4 CAPSULE ORAL at 09:54

## 2024-02-17 RX ADMIN — NIFEDIPINE 60 MG: 60 TABLET, FILM COATED, EXTENDED RELEASE ORAL at 09:55

## 2024-02-17 RX ADMIN — HYDRALAZINE HYDROCHLORIDE 50 MG: 50 TABLET ORAL at 13:54

## 2024-02-17 RX ADMIN — HYDRALAZINE HYDROCHLORIDE 50 MG: 50 TABLET ORAL at 05:46

## 2024-02-17 RX ADMIN — SODIUM BICARBONATE 650 MG: 650 TABLET ORAL at 22:09

## 2024-02-17 RX ADMIN — SODIUM BICARBONATE 650 MG: 650 TABLET ORAL at 09:54

## 2024-02-17 RX ADMIN — Medication 1 APPLICATION: at 09:55

## 2024-02-17 RX ADMIN — MAGNESIUM GLUCONATE 500 MG ORAL TABLET 400 MG: 500 TABLET ORAL at 09:54

## 2024-02-17 RX ADMIN — ROSUVASTATIN CALCIUM 10 MG: 10 TABLET, COATED ORAL at 22:09

## 2024-02-17 NOTE — PROGRESS NOTES
Nephrology ([facility]) Progress Note      Patient:  Mauro Patel  YOB: 1955  Date of Service: 2/17/2024  MRN: [id number]   Acct: [id number]   Primary Care Physician: John Robertson MD  Advance Directive:   Code Status and Medical Interventions:   Ordered at: 02/14/24 1202     Level Of Support Discussed With:    Patient     Code Status (Patient has no pulse and is not breathing):    CPR (Attempt to Resuscitate)     Medical Interventions (Patient has pulse or is breathing):    Full Support     Admit Date: 2/14/2024       Hospital Day: 3  Referring Provider: No ref. provider found      Patient personally seen and examined.  Complete chart including Consults, Notes, Operative Reports, Labs, Cardiology, and Radiology studies reviewed as able.    Chief complaint: Abnormal labs.    Subjective:  Mauro Patel is a 68 y.o. male for whom we were consulted for evaluation and treatment of acute kidney injury. Baseline chronic kidney disease stage 4. Follows with Teo BATES in our office. Baseline creatinine approximately 2.8. History of type 2 diabetes, hypertension.  Patient was started on Kerendia a few months ago. He was seen in our office on 2/07 and creatinine had increased to 3.8 and he appeared somewhat volume depleted on exam. Patient was instructed to hold Lasix at that time and was supposed to get repeat labs this week. He presented to ER on 2/14 with dyspnea and elevated blood pressure. Creatinine 3.3 on arrival to ER.  He was initially admitted to ICU, needing intravenous Cardene for the treatment of severe hypertension.  Cardene has been weaned off.  He is now transferred to regular floor.    This afternoon patient is feeling well.  He denies any shortness of breath or swelling of legs.  His renal function has been worsening but has good urine output    Allergies:  Duravent dm  [[id number]]    Home Meds:  Medications Prior to Admission    Medication Sig Dispense Refill Last Dose    ACCU-CHEK GUIDE test strip        allopurinol (ZYLOPRIM) 100 MG tablet Take 1 tablet by mouth Daily.       ASPIRIN 81 PO Take 81 mg by mouth Daily.       carvedilol (COREG) 25 MG tablet Take 1 tablet by mouth 2 (Two) Times a Day With Meals. BID       ferrous sulfate 325 (65 FE) MG tablet Take 1 tablet by mouth 2 (Two) Times a Day With Meals.       Finerenone (Kerendia) 10 MG tablet Take 1 tablet by mouth Daily.       insulin detemir (LEVEMIR) 100 UNIT/ML injection Inject 20 Units under the skin into the appropriate area as directed 2 (Two) Times a Day. 20 units every morning and 35 units every night       Insulin Lispro, 1 Unit Dial, (HumaLOG KwikPen) 100 UNIT/ML solution pen-injector Inject 5 Units under the skin into the appropriate area as directed 2 (Two) Times a Day. At breakfast and supper       lisinopril (PRINIVIL,ZESTRIL) 40 MG tablet Take 1 tablet by mouth Daily.       losartan (COZAAR) 100 MG tablet Take 1 tablet by mouth Daily. 90 tablet 3 More than a month    magnesium oxide (MAG-OX) 400 MG tablet Take 1 tablet by mouth Daily.       rosuvastatin (CRESTOR) 10 MG tablet Take 1 tablet by mouth Daily.       sodium bicarbonate 650 MG tablet Take 2 tablets by mouth 3 (Three) Times a Day.       tadalafil (CIALIS) 5 MG tablet Take 1 tablet by mouth Daily As Needed for Erectile Dysfunction.       tamsulosin (FLOMAX) 0.4 MG capsule 24 hr capsule Take 1 capsule by mouth Daily.       vitamin D (ERGOCALCIFEROL) 33618 units capsule capsule Take 1 capsule by mouth Every 30 (Thirty) Days. Around the 15th   1/15/2024       Medicines:  Current Facility-Administered Medications   Medication Dose Route Frequency Provider Last Rate Last Admin    aspirin EC tablet 81 mg  81 mg Oral Daily David Osborne MD   81 mg at 02/17/24 0954    sennosides-docusate (PERICOLACE) 8.6-50 MG per tablet 2 tablet  2 tablet Oral BID David Osborne MD   2 tablet at 02/17/24 0956    And     polyethylene glycol (MIRALAX) packet 17 g  17 g Oral Daily PRN David Osborne MD        And    bisacodyl (DULCOLAX) EC tablet 5 mg  5 mg Oral Daily PRN David Osborne MD        And    bisacodyl (DULCOLAX) suppository 10 mg  10 mg Rectal Daily PRN David Osborne MD        carvedilol (COREG) tablet 25 mg  25 mg Oral BID David Osborne MD   25 mg at 02/17/24 0954    cefTRIAXone (ROCEPHIN) 1,000 mg in sodium chloride 0.9 % 100 mL IVPB  1,000 mg Intravenous Q24H David Osborne  mL/hr at 02/16/24 1443 1,000 mg at 02/16/24 1443    dextrose (D50W) (25 g/50 mL) IV injection 25 g  25 g Intravenous Q15 Min PRN David Osborne MD        dextrose (GLUTOSE) oral gel 15 g  15 g Oral Q15 Min PRN David Osborne MD        Enoxaparin Sodium (LOVENOX) syringe 30 mg  30 mg Subcutaneous Daily David Osborne MD   30 mg at 02/17/24 0956    glucagon (GLUCAGEN) injection 1 mg  1 mg Intramuscular Q15 Min PRN David Osborne MD        hydrALAZINE (APRESOLINE) injection 10 mg  10 mg Intravenous Q4H PRN David Osborne MD   10 mg at 02/16/24 1556    hydrALAZINE (APRESOLINE) tablet 50 mg  50 mg Oral Q8H Davie Styles MD   50 mg at 02/17/24 0546    insulin detemir (LEVEMIR) injection 5 Units  5 Units Subcutaneous Q12H David Osborne MD   5 Units at 02/17/24 0955    Insulin Lispro (humaLOG) injection 2-7 Units  2-7 Units Subcutaneous 4x Daily AC & at Bedtime David Osborne MD   2 Units at 02/17/24 1137    labetalol (NORMODYNE,TRANDATE) injection 10 mg  10 mg Intravenous Q4H PRN David Osborne MD   10 mg at 02/16/24 1440    magnesium oxide (MAG-OX) tablet 400 mg  400 mg Oral Daily David Osborne MD   400 mg at 02/17/24 0954    mupirocin (BACTROBAN) 2 % nasal ointment 1 Application  1 application  Each Nare BID David Osborne MD   1 Application at 02/17/24 0955    niCARdipine (CARDENE) 25 mg in 250 mL NS infusion kit  5-15 mg/hr Intravenous Titrated David Osborne MD   Stopped at 02/17/24 1059    NIFEdipine XL (PROCARDIA XL) 24 hr  tablet 60 mg  60 mg Oral Q24H Davie Styles MD   60 mg at 02/17/24 0955    ondansetron (ZOFRAN) injection 4 mg  4 mg Intravenous Q6H PRN David Osborne MD        rosuvastatin (CRESTOR) tablet 10 mg  10 mg Oral Nightly David Osborne MD   10 mg at 02/16/24 2047    sodium bicarbonate tablet 650 mg  650 mg Oral BID David Osborne MD   650 mg at 02/17/24 0954    sodium chloride 0.9 % flush 10 mL  10 mL Intravenous PRN David Osborne MD        sodium chloride 0.9 % flush 10 mL  10 mL Intravenous Q12H David Osborne MD   10 mL at 02/17/24 0957    sodium chloride 0.9 % flush 10 mL  10 mL Intravenous PRN David Osborne MD   10 mL at 02/16/24 0814    sodium chloride 0.9 % infusion 40 mL  40 mL Intravenous PRN David Osborne MD        tamsulosin (FLOMAX) 24 hr capsule 0.4 mg  0.4 mg Oral Daily David Osborne MD   0.4 mg at 02/17/24 0954       Past Medical History:  Past Medical History:   Diagnosis Date    Diabetes mellitus     Edema     Hyperlipidemia     Hypertension        Past Surgical History:  Past Surgical History:   Procedure Laterality Date    NO PAST SURGERIES         Family History  Family History   Problem Relation Age of Onset    Diabetes Mother     No Known Problems Father        Social History  Social History     Socioeconomic History    Marital status:    Tobacco Use    Smoking status: Never    Smokeless tobacco: Never   Vaping Use    Vaping Use: Never used   Substance and Sexual Activity    Alcohol use: No    Drug use: No    Sexual activity: Defer       Review of Systems:  History obtained from chart review and the patient  General ROS: No fever or chills  Respiratory ROS: No cough, shortness of breath, wheezing  Cardiovascular ROS: No chest pain or palpitations  Gastrointestinal ROS: No abdominal pain or melena  Genito-Urinary ROS: No dysuria or hematuria  Psych ROS: No anxiety and depression  14 point ROS reviewed with the patient and negative except as noted above and in the HPI unless  "unable to obtain.    Objective:  Patient Vitals for the past 24 hrs:   BP Temp Temp src Pulse Resp SpO2 Height Weight   02/17/24 1115 158/65 98 °F (36.7 °C) Oral 90 16 99 % -- --   02/17/24 0738 166/64 98.1 °F (36.7 °C) Oral 89 16 97 % -- --   02/17/24 0440 -- -- -- -- -- -- -- 94.4 kg (208 lb 3.2 oz)   02/17/24 0343 139/61 97.8 °F (36.6 °C) Oral 82 16 98 % -- --   02/16/24 2322 123/59 98.4 °F (36.9 °C) Oral 79 16 96 % -- --   02/16/24 1912 154/69 98.4 °F (36.9 °C) Oral 85 16 98 % -- --   02/16/24 1740 148/63 98.1 °F (36.7 °C) Oral 92 16 99 % 172.7 cm (68\") --   02/16/24 1700 164/77 -- -- 89 -- 99 % -- --   02/16/24 1600 171/81 98 °F (36.7 °C) Oral 91 -- 99 % -- --   02/16/24 1500 167/76 -- -- 88 -- 99 % -- --       Intake/Output Summary (Last 24 hours) at 2/17/2024 1328  Last data filed at 2/17/2024 1307  Gross per 24 hour   Intake 1060 ml   Output 100 ml   Net 960 ml     General: awake/alert   Chest:  clear to auscultation bilaterally without respiratory distress  CVS: regular rate and rhythm  Abdominal: soft, nontender, positive bowel sounds  Extremities:  trace lower ext edema  Skin: warm and dry without rash      Labs:  Results from last 7 days   Lab Units 02/17/24  0432 02/16/24  0354 02/15/24  0529   WBC 10*3/mm3 8.34 9.25 7.12   HEMOGLOBIN g/dL 8.0* 8.4* 8.6*   HEMATOCRIT % 24.6* 25.1* 27.1*   PLATELETS 10*3/mm3 236 241 259         Results from last 7 days   Lab Units 02/17/24  0432 02/16/24  0354 02/15/24  0529   SODIUM mmol/L 134* 138 137   POTASSIUM mmol/L 4.0 3.9 3.7   CHLORIDE mmol/L 99 104 101   CO2 mmol/L 21.0* 20.0* 22.0   BUN mg/dL 68* 61* 58*   CREATININE mg/dL 4.91* 4.14* 4.03*   CALCIUM mg/dL 9.0 8.6 9.5   EGFR mL/min/1.73 12.2* 15.0* 15.5*   BILIRUBIN mg/dL 0.2 0.3 0.4   ALK PHOS U/L 105 109 130*   ALT (SGPT) U/L 5 5 6   AST (SGOT) U/L 12 12 13   GLUCOSE mg/dL 133* 119* 161*       Radiology:   Imaging Results (Last 72 Hours)       Procedure Component Value Units Date/Time    US Renal Bilateral " "[[id number]] Collected: 02/14/24 1452     Updated: 02/14/24 1456    Narrative:      EXAM/TECHNIQUE: US RENAL BILATERAL-     INDICATION: Acute on chronic renal failure, stage IIIb.;  I16.0-Hypertensive urgency; N18.9-Chronic kidney disease, unspecified;  I50.9-Heart failure, unspecified; D64.9-Anemia, unspecified     COMPARISON: None     FINDINGS:     Right kidney is 10.6 cm in length and demonstrates normal cortical  thickness and echogenicity. No shadowing calculi or hydronephrosis. No  right renal cyst or mass.     Left kidney is 9.8 cm in length and demonstrates normal cortical  thickness and echogenicity. No shadowing calculi or hydronephrosis. No  left renal cyst or mass.     No focal urinary bladder abnormality.       Impression:         Negative for hydronephrosis or renal atrophy.     This report was signed and finalized on 2/14/2024 2:53 PM by Dr. Kenny Tate MD.               Culture:  No results found for: \"BLOODCX\", \"URINECX\", \"WOUNDCX\", \"MRSACX\", \"RESPCX\", \"STOOLCX\"      Assessment    Acute kidney injury, ATN-/worsening/due to Kerendia  Baseline chronic kidney disease stage 4  Hypertensive urgency  Type 2 diabetes with nephropathy.  Anemia of CKD and iron deficiency    Plan:  Continue to monitor renal function.  Advised him to drink more water.  His severe hypertension is markedly better      Davie Styles MD  2/17/2024  13:28 CST    "

## 2024-02-17 NOTE — PROGRESS NOTES
[facility] Medicine Services  INPATIENT PROGRESS NOTE    Patient Name: Mauro Patel  Date of Admission: 2/14/2024  Today's Date: 02/17/24  Length of Stay: 3  Primary Care Physician: John Robertson MD    Subjective   Chief Complaint: Hypertensive urgency/acute renal failure     HPI   Blood pressure still remain high but better.  Afebrile.  Patient is on room air.  Creatinine is increasing today.  Metabolic acidosis improving.  Hemoglobin stable.    Review of Systems   Constitutional:  Positive for activity change and appetite change. Negative for chills and fever.   HENT:  Negative for hearing loss, nosebleeds, tinnitus and trouble swallowing.    Eyes:  Negative for visual disturbance.   Respiratory: Shortness of breath resolved, on room air.  Negative for chest tightness and wheezing.    Cardiovascular:  Negative for chest pain, palpitations and leg swelling.   Gastrointestinal:  Negative for abdominal distention, abdominal pain, blood in stool, constipation, diarrhea, nausea and vomiting.   Endocrine: Negative for cold intolerance, heat intolerance, polydipsia, polyphagia and polyuria.   Genitourinary:  Negative for decreased urine volume, difficulty urinating, dysuria, flank pain, frequency and hematuria.   Musculoskeletal:  Negative for arthralgias, joint swelling and myalgias.   Skin:  Negative for rash.   Allergic/Immunologic: Negative for immunocompromised state.   Neurological:  Positive for weakness. Negative for dizziness, syncope, light-headedness and headaches.   Hematological:  Negative for adenopathy. Does not bruise/bleed easily.   Psychiatric/Behavioral:  Negative for confusion and sleep disturbance. The patient is not nervous/anxious.   All pertinent negatives and positives are as above. All other systems have been reviewed and are negative unless otherwise stated.     Objective    Temp:  [97.8 °F (36.6 °C)-98.4 °F (36.9 °C)] 98 °F (36.7 °C)  Heart Rate:   [79-92] 90  Resp:  [16] 16  BP: (123-171)/(59-81) 158/65  Physical Exam  Vitals and nursing note reviewed.   HENT:      Head: Normocephalic.   Eyes:      Conjunctiva/sclera: Conjunctivae normal.      Pupils: Pupils are equal, round, and reactive to light.   Cardiovascular:      Rate and Rhythm: Normal rate and regular rhythm.      Heart sounds: Normal heart sounds.   Pulmonary:      Effort: No respiratory distress.      Comments: Diminished breath of bilateral, clear, on room air.  Patient no acute distress.  Abdominal:      General: Bowel sounds are normal. There is no distension.      Palpations: Abdomen is soft.      Tenderness: There is no abdominal tenderness.      Comments: Obesity .   Musculoskeletal:         General: No swelling.      Cervical back: Neck supple.   Skin:     General: Skin is warm and dry.      Capillary Refill: Capillary refill takes 2 to 3 seconds.      Findings: No rash.   Neurological:      General: No focal deficit present.      Mental Status: He is alert and oriented to person, place, and time.   Psychiatric:         Mood and Affect: Mood normal.         Behavior: Behavior normal.         Thought Content: Thought content normal.          Results Review:  I have reviewed the labs, radiology results, and diagnostic studies.    Laboratory Data:   Results from last 7 days   Lab Units 02/17/24  0432 02/16/24  0354 02/15/24  0529   WBC 10*3/mm3 8.34 9.25 7.12   HEMOGLOBIN g/dL 8.0* 8.4* 8.6*   HEMATOCRIT % 24.6* 25.1* 27.1*   PLATELETS 10*3/mm3 236 241 259        Results from last 7 days   Lab Units 02/17/24  0432 02/16/24  0354 02/15/24  0529   SODIUM mmol/L 134* 138 137   POTASSIUM mmol/L 4.0 3.9 3.7   CHLORIDE mmol/L 99 104 101   CO2 mmol/L 21.0* 20.0* 22.0   BUN mg/dL 68* 61* 58*   CREATININE mg/dL 4.91* 4.14* 4.03*   CALCIUM mg/dL 9.0 8.6 9.5   BILIRUBIN mg/dL 0.2 0.3 0.4   ALK PHOS U/L 105 109 130*   ALT (SGPT) U/L 5 5 6   AST (SGOT) U/L 12 12 13   GLUCOSE mg/dL 133* 119* 161*        Culture Data:   Blood Culture   Date Value Ref Range Status   02/14/2024 No growth at 3 days  Preliminary   02/14/2024 No growth at 3 days  Preliminary       Radiology Data:   Imaging Results (Last 24 Hours)       ** No results found for the last 24 hours. **            I have reviewed the patient's current medications.     Assessment/Plan   Assessment  Active Hospital Problems    Diagnosis     **Hypertensive urgency     Acute on chronic renal failure     Controlled type 2 diabetes mellitus with complication, without long-term current use of insulin     Benign essential HTN     Mixed hyperlipidemia        Treatment Plan  Hypertensive urgency/hyperlipidemia/history of hypertension/cardiomegaly/diastolic CHF.  Continue Cardene drip.  Patient will need to go unit for now.   Aspirin .  Crestor.  Coreg.  Hold Lasix due to renal failure.  Hold kerendia.  Hold lisinopril due to renal failure.   Labetalol as needed.  Hydralazine as needed.  Continue hydralazine p.o. by nephrology.  Add nifedipine cardiology  Echocardiogram-ejection fraction 61 to 65%, mild concentric hypertrophy, diastolic dysfunction grade 2, normal right ventricle cavities size and systolic function, left atrial cavity moderately dilated, aortic valve exhibits sclerosis-mild thickening of aortic valve-no aortic valve regurgitation-mild aortic valve stenosis, moderate mitral annular calcification-mild mitral valve regurgitation, mild pulmonary hypertension.     Acute on chronic renal failure stage IIIb/metabolic acidosis..  Baseline creatinine 10/1/2020 2.5.  Creatinine today is 3.35.  Nephrology consult.  Continue bicarb .  Creatinine slight increased .  Ultrasound the kidneys-Negative for hydronephrosis or renal atrophy.      Anemia.  Hemoglobin stable.  Probably secondary to chronic renal failure.  No sign of GI bleed.  Iron sulfate .     Shortness of breath.  Chest x-ray-Shallow inspiration, with mild cardiomegaly, No evidence of overt CHF. No  focal pulmonary infiltrates.  Patient is on room air     Elevated D-dimer.  Unable to do CTA of the chest due to renal failure.  Will start prophylaxis Lovenox for now.  Patient denies any chest pain.     Diabetes.  Hold metformin due to renal failure.  Low sliding scale for now.  Hemoglobin A1 C 7.3.  Continue Levemir .     UTI.  Rocephin x 3 days.     Nausea.  Zofran as needed.     Prostate hypertrophy.  Continue Flomax.     Obesity.  BMI is 32.     Nutrition.  Cardiac/diabetic diet.  Magnesium oxide.     Blood cultures-no growth for 3 days.  COVID-19-negative.  Flu screen negative.  RSV-negative.  Respiratory PCR- neg .  Urine cultures pending.     Medical Decision Making  Number and Complexity of problems: Hypertensive urgency/acute on chronic renal failure/anemia/shortness of breath/diabetes  Differential Diagnosis: None     Conditions and Status        Condition is unchanged.     [facility] Data  External documents reviewed: ER notes  Cardiac tracing (EKG, telemetry) interpretation: Sinus  Radiology interpretation: Chest x-ray  Labs reviewed: Laboratory  Any tests that were considered but not ordered: Laboratory in AM     Decision rules/scores evaluated (example [id number], Wells, etc): None     Discussed with: Patient     Care Planning  Shared decision making: Patient and wife  Code status and discussions: Full code     Disposition  Social Determinants of Health that impact treatment or disposition: From home  Estimated length of stay is 2 to 5 days.        Electronically signed by David Osborne MD, 02/17/24, 12:31 CST.

## 2024-02-17 NOTE — PAYOR COMM NOTE
"2/16 CLINICAL  [id number]    [phone]    Mauro Cat (68 y.o. Male)       Date of Birth   1955    Social Security Number       Address   [address] [place], [place] [address]    Home Phone   [phone]    MRN   [id number]       Mormon   [place]    Marital Status                               Admission Date   2/14/24    Admission Type   Emergency    Admitting Provider   David Osborne MD    Attending Provider   David Osborne MD    Department, Room/Bed   [facility] 4B, 440/1       Discharge Date       Discharge Disposition       Discharge Destination                                 Attending Provider: David Osborne MD    Allergies: Duravent Dm  [[id number]]    Isolation: None   Infection: COVID (rule out) (02/15/24)   Code Status: CPR    Ht: 172.7 cm (68\")   Wt: 94.4 kg (208 lb 3.2 oz)    Admission Cmt: None   Principal Problem: Hypertensive urgency [I16.0]                   Active Insurance as of 2/14/2024       Primary Coverage       Payor Plan Insurance Group Employer/Plan Group    ANTHEM MEDICARE REPLACEMENT ANTHEM MEDICARE ADVANTAGE [id number]       Payor Plan Address Payor Plan Phone Number Payor Plan Fax Number Effective Dates    [address] [phone]  1/1/2022 - None Entered    [facility] [address]         Subscriber Name Subscriber Birth Date Member ID       MAURO CAT 1955 [id number]                     Emergency Contacts        (Rel.) Home Phone Work Phone Mobile Phone    osbaldo cat (Spouse) [phone] -- --              Current Facility-Administered Medications   Medication Dose Route Frequency Provider Last Rate Last Admin    aspirin EC tablet 81 mg  81 mg Oral Daily David Osborne MD   81 mg at 02/16/24 0809    sennosides-docusate (PERICOLACE) 8.6-50 MG per tablet 2 tablet  2 tablet Oral BID David Osborne MD   2 tablet at 02/16/24 2047    And    polyethylene glycol (MIRALAX) packet 17 g  17 g Oral " Daily PRN David Osborne MD        And    bisacodyl (DULCOLAX) EC tablet 5 mg  5 mg Oral Daily PRN David Osborne MD        And    bisacodyl (DULCOLAX) suppository 10 mg  10 mg Rectal Daily PRN David Osborne MD        carvedilol (COREG) tablet 25 mg  25 mg Oral BID David Osborne MD   25 mg at 02/16/24 2047    cefTRIAXone (ROCEPHIN) 1,000 mg in sodium chloride 0.9 % 100 mL IVPB  1,000 mg Intravenous Q24H David Osborne  mL/hr at 02/16/24 1443 1,000 mg at 02/16/24 1443    dextrose (D50W) (25 g/50 mL) IV injection 25 g  25 g Intravenous Q15 Min PRN David Osborne MD        dextrose (GLUTOSE) oral gel 15 g  15 g Oral Q15 Min PRN David Osborne MD        Enoxaparin Sodium (LOVENOX) syringe 30 mg  30 mg Subcutaneous Daily David Osborne MD   30 mg at 02/16/24 0809    glucagon (GLUCAGEN) injection 1 mg  1 mg Intramuscular Q15 Min PRN David Osborne MD        hydrALAZINE (APRESOLINE) injection 10 mg  10 mg Intravenous Q4H PRN David Osborne MD   10 mg at 02/16/24 1556    hydrALAZINE (APRESOLINE) tablet 50 mg  50 mg Oral Q8H Davie Styles MD   50 mg at 02/17/24 0546    insulin detemir (LEVEMIR) injection 5 Units  5 Units Subcutaneous Q12H David Osborne MD   5 Units at 02/16/24 2055    Insulin Lispro (humaLOG) injection 2-7 Units  2-7 Units Subcutaneous 4x Daily AC & at Bedtime David Osborne MD   2 Units at 02/16/24 2055    labetalol (NORMODYNE,TRANDATE) injection 10 mg  10 mg Intravenous Q4H PRN David Osborne MD   10 mg at 02/16/24 1440    magnesium oxide (MAG-OX) tablet 400 mg  400 mg Oral Daily David Osborne MD   400 mg at 02/16/24 0808    mupirocin (BACTROBAN) 2 % nasal ointment 1 Application  1 application  Each Nare BID David Osborne MD   1 Application at 02/16/24 2047    niCARdipine (CARDENE) 25 mg in 250 mL NS infusion kit  5-15 mg/hr Intravenous Titrated David Osborne MD 50 mL/hr at 02/16/24 0630 5 mg/hr at 02/16/24 0630    NIFEdipine XL (PROCARDIA XL) 24 hr tablet 60 mg  60 mg Oral Q24H  Davie Styles MD   60 mg at 02/16/24 1340    ondansetron (ZOFRAN) injection 4 mg  4 mg Intravenous Q6H PRN David Osborne MD        rosuvastatin (CRESTOR) tablet 10 mg  10 mg Oral Nightly David Osborne MD   10 mg at 02/16/24 2047    sodium bicarbonate tablet 650 mg  650 mg Oral BID David Osborne MD   650 mg at 02/16/24 2047    sodium chloride 0.9 % flush 10 mL  10 mL Intravenous PRN David Osborne MD        sodium chloride 0.9 % flush 10 mL  10 mL Intravenous Q12H David Osborne MD   10 mL at 02/16/24 2048    sodium chloride 0.9 % flush 10 mL  10 mL Intravenous PRN David Osborne MD   10 mL at 02/16/24 0814    sodium chloride 0.9 % infusion 40 mL  40 mL Intravenous PRN David Osborne MD        tamsulosin (FLOMAX) 24 hr capsule 0.4 mg  0.4 mg Oral Daily David Osborne MD   0.4 mg at 02/16/24 0808     Orders (last 24 hrs)        Start     Ordered    02/17/24 0600  Comprehensive Metabolic Panel  Morning Draw         02/16/24 1303    02/17/24 0600  CBC & Differential  Morning Draw         02/16/24 1303    02/17/24 0600  CBC Auto Differential  PROCEDURE ONCE         02/16/24 2201    02/16/24 2103  POC Glucose Once  PROCEDURE ONCE        Comments: Complete no more than 45 minutes prior to patient eating      02/16/24 2051    02/16/24 2100  sodium bicarbonate tablet 650 mg  2 Times Daily         02/16/24 1343    02/16/24 1800  Dietary Nutrition Supplements Boost Glucose Control (Glucerna Shake)  Daily With Breakfast & Dinner       02/16/24 1513    02/16/24 1645  POC Glucose Once  PROCEDURE ONCE        Comments: Complete no more than 45 minutes prior to patient eating      02/16/24 1633    02/16/24 1620  Transfer Patient  Once         02/16/24 1620    02/16/24 1430  cefTRIAXone (ROCEPHIN) 1,000 mg in sodium chloride 0.9 % 100 mL IVPB  Every 24 Hours         02/16/24 1337    02/16/24 1400  NIFEdipine XL (PROCARDIA XL) 24 hr tablet 60 mg  Every 24 Hours Scheduled         02/16/24 1303    02/16/24 1301  PTH, Intact   Once         02/16/24 1303    02/16/24 1259  Urine Culture - Urine, Urine, Clean Catch  Once         02/16/24 1258    02/16/24 1243  Urinalysis, Microscopic Only - Urine, Clean Catch  Once         02/16/24 1242    02/16/24 1243  Nutrition Consult  Once        Provider:  (Not yet assigned)    02/16/24 1243    02/16/24 1226  Urinalysis With Culture If Indicated - Urine, Clean Catch  Once         02/16/24 1229    02/16/24 1142  POC Glucose Once  PROCEDURE ONCE        Comments: Complete no more than 45 minutes prior to patient eating      02/16/24 1130    02/16/24 0756  POC Glucose Once  PROCEDURE ONCE        Comments: Complete no more than 45 minutes prior to patient eating      02/16/24 0744    02/15/24 2100  insulin detemir (LEVEMIR) injection 5 Units  Every 12 Hours Scheduled         02/15/24 1544    02/15/24 1630  sodium bicarbonate tablet 650 mg  Daily,   Status:  Discontinued         02/15/24 1530    02/15/24 1630  tamsulosin (FLOMAX) 24 hr capsule 0.4 mg  Daily         02/15/24 1531    02/15/24 1630  magnesium oxide (MAG-OX) tablet 400 mg  Daily         02/15/24 1543    02/15/24 1400  hydrALAZINE (APRESOLINE) tablet 50 mg  Every 8 Hours Scheduled         02/15/24 1237    02/15/24 0400  Chlorhexidine Gluconate Cloth 2 % pads 1 Application  Every 24 Hours,   Status:  Discontinued         02/14/24 1656    02/14/24 2100  sennosides-docusate (PERICOLACE) 8.6-50 MG per tablet 2 tablet  2 Times Daily        See Hyperspace for full Linked Orders Report.    02/14/24 1255    02/14/24 2100  rosuvastatin (CRESTOR) tablet 10 mg  Nightly         02/14/24 1632    02/14/24 2100  carvedilol (COREG) tablet 25 mg  2 Times Daily         02/14/24 1633    02/14/24 1800  Oral Care  2 Times Daily       02/14/24 1255    02/14/24 1745  mupirocin (BACTROBAN) 2 % nasal ointment 1 Application  2 Times Daily         02/14/24 1656    02/14/24 1745  Chlorhexidine Gluconate Cloth 2 % pads 1 Application  Once,   Status:  Discontinued          02/14/24 1656    02/14/24 1730  Insulin Lispro (humaLOG) injection 2-7 Units  4 Times Daily Before Meals & Nightly         02/14/24 1255    02/14/24 1700  POC Glucose 4x Daily Before Meals & at Bedtime  4 Times Daily Before Meals & at Bedtime      Comments: Complete no more than 45 minutes prior to patient eating      02/14/24 1255    02/14/24 1600  Vital Signs  Every 4 Hours       02/14/24 1255    02/14/24 1345  aspirin EC tablet 81 mg  Daily         02/14/24 1255    02/14/24 1345  sodium chloride 0.9 % flush 10 mL  Every 12 Hours Scheduled         02/14/24 1255    02/14/24 1345  Enoxaparin Sodium (LOVENOX) syringe 30 mg  Daily         02/14/24 1255    02/14/24 1300  Strict Intake & Output  Every Hour       02/14/24 1255    02/14/24 1256  Intake & Output  Every Shift       02/14/24 1255    02/14/24 1256  Daily Weights  Daily       02/14/24 1255    02/14/24 1255  polyethylene glycol (MIRALAX) packet 17 g  Daily PRN        See Hyperspace for full Linked Orders Report.    02/14/24 1255    02/14/24 1255  bisacodyl (DULCOLAX) EC tablet 5 mg  Daily PRN        See Hyperspace for full Linked Orders Report.    02/14/24 1255    02/14/24 1255  bisacodyl (DULCOLAX) suppository 10 mg  Daily PRN        See Hyperspace for full Linked Orders Report.    02/14/24 1255    02/14/24 1255  sodium chloride 0.9 % flush 10 mL  As Needed         02/14/24 1255    02/14/24 1255  sodium chloride 0.9 % infusion 40 mL  As Needed         02/14/24 1255    02/14/24 1255  ondansetron (ZOFRAN) injection 4 mg  Every 6 Hours PRN         02/14/24 1255    02/14/24 1255  dextrose (GLUTOSE) oral gel 15 g  Every 15 Minutes PRN         02/14/24 1255    02/14/24 1255  dextrose (D50W) (25 g/50 mL) IV injection 25 g  Every 15 Minutes PRN         02/14/24 1255    02/14/24 1255  glucagon (GLUCAGEN) injection 1 mg  Every 15 Minutes PRN         02/14/24 1255    02/14/24 1255  labetalol (NORMODYNE,TRANDATE) injection 10 mg  Every 4 Hours PRN         02/14/24 1255     02/14/24 1255  hydrALAZINE (APRESOLINE) injection 10 mg  Every 4 Hours PRN         02/14/24 1255    02/14/24 1027  niCARdipine (CARDENE) 25 mg in 250 mL NS infusion kit  Titrated         02/14/24 1011    02/14/24 0909  sodium chloride 0.9 % flush 10 mL  As Needed        See Hyperspace for full Linked Orders Report.    02/14/24 0909    Unscheduled  Follow Hypoglycemia Standing Orders For Blood Glucose <70 & Notify Provider of Treatment  As Needed      Comments: Follow Hypoglycemia Orders As Outlined in Process Instructions (Open Order Report to View Full Instructions)  Notify Provider Any Time Hypoglycemia Treatment is Administered    02/14/24 1255    --  allopurinol (ZYLOPRIM) 100 MG tablet  Daily         02/14/24 1557    --  ferrous sulfate 325 (65 FE) MG tablet  2 Times Daily With Meals         02/14/24 1557    --  Finerenone (Kerendia) 10 MG tablet  Daily         02/14/24 1557    --  insulin detemir (LEVEMIR) 100 UNIT/ML injection  2 Times Daily         02/14/24 1557    --  Insulin Lispro, 1 Unit Dial, (HumaLOG KwikPen) 100 UNIT/ML solution pen-injector  2 Times Daily         02/14/24 1557    --  lisinopril (PRINIVIL,ZESTRIL) 40 MG tablet  Daily         02/14/24 1557    --  magnesium oxide (MAG-OX) 400 MG tablet  Daily         02/14/24 1557    --  sodium bicarbonate 650 MG tablet  3 Times Daily         02/14/24 1557    --  tadalafil (CIALIS) 5 MG tablet  Daily PRN         02/14/24 1557    --  tamsulosin (FLOMAX) 0.4 MG capsule 24 hr capsule  Daily         02/14/24 1557    --  rosuvastatin (CRESTOR) 10 MG tablet  Daily         02/14/24 1559    --  SCANNED EKG         02/14/24 0000                     Physician Progress Notes (last 48 hours)        David Osborne MD at 02/16/24 1334              [facility] Medicine Services  INPATIENT PROGRESS NOTE    Patient Name: Mauro Lamtreet  Date of Admission: 2/14/2024  Today's Date: 02/16/24  Length of Stay: 2  Primary Care Physician:  John Robertson MD    Subjective   Chief Complaint: Hypertensive urgency/acute renal failure    HPI   Blood pressure slightly high, afebrile.  Patient is on room air.  Slight increase in creatinine.  Hemoglobin stable.  Shortness of breath is improved.    Review of Systems   Constitutional:  Positive for activity change and appetite change. Negative for chills and fever.   HENT:  Negative for hearing loss, nosebleeds, tinnitus and trouble swallowing.    Eyes:  Negative for visual disturbance.   Respiratory: Shortness of breath improved.  Negative for chest tightness and wheezing.    Cardiovascular:  Negative for chest pain, palpitations and leg swelling.   Gastrointestinal:  Negative for abdominal distention, abdominal pain, blood in stool, constipation, diarrhea, nausea and vomiting.   Endocrine: Negative for cold intolerance, heat intolerance, polydipsia, polyphagia and polyuria.   Genitourinary:  Negative for decreased urine volume, difficulty urinating, dysuria, flank pain, frequency and hematuria.   Musculoskeletal:  Negative for arthralgias, joint swelling and myalgias.   Skin:  Negative for rash.   Allergic/Immunologic: Negative for immunocompromised state.   Neurological:  Positive for weakness. Negative for dizziness, syncope, light-headedness and headaches.   Hematological:  Negative for adenopathy. Does not bruise/bleed easily.   Psychiatric/Behavioral:  Negative for confusion and sleep disturbance. The patient is not nervous/anxious.    All pertinent negatives and positives are as above. All other systems have been reviewed and are negative unless otherwise stated.     Objective    Temp:  [98 °F (36.7 °C)-98.7 °F (37.1 °C)] 98 °F (36.7 °C)  Heart Rate:  [75-97] 91  Resp:  [16-19] 16  BP: (134-195)/(59-94) 169/76  Physical Exam  Vitals and nursing note reviewed.   HENT:      Head: Normocephalic.   Eyes:      Conjunctiva/sclera: Conjunctivae normal.      Pupils: Pupils are equal, round, and reactive to  light.   Cardiovascular:      Rate and Rhythm: Normal rate and regular rhythm.      Heart sounds: Normal heart sounds.   Pulmonary:      Effort: No respiratory distress.      Comments: Diminished breath of bilateral, clear, on room air.  Patient no acute distress.  Abdominal:      General: Bowel sounds are normal. There is no distension.      Palpations: Abdomen is soft.      Tenderness: There is no abdominal tenderness.      Comments: Obesity .   Musculoskeletal:         General: No swelling.      Cervical back: Neck supple.   Skin:     General: Skin is warm and dry.      Capillary Refill: Capillary refill takes 2 to 3 seconds.      Findings: No rash.   Neurological:      General: No focal deficit present.      Mental Status: He is alert and oriented to person, place, and time.   Psychiatric:         Mood and Affect: Mood normal.         Behavior: Behavior normal.         Thought Content: Thought content normal.          Results Review:  I have reviewed the labs, radiology results, and diagnostic studies.    Laboratory Data:   Results from last 7 days   Lab Units 02/16/24  0354 02/15/24  0529 02/14/24  0915   WBC 10*3/mm3 9.25 7.12 7.61   HEMOGLOBIN g/dL 8.4* 8.6* 8.5*   HEMATOCRIT % 25.1* 27.1* 26.8*   PLATELETS 10*3/mm3 241 259 221        Results from last 7 days   Lab Units 02/16/24  0354 02/15/24  0529 02/14/24  0915   SODIUM mmol/L 138 137 142   POTASSIUM mmol/L 3.9 3.7 3.9   CHLORIDE mmol/L 104 101 104   CO2 mmol/L 20.0* 22.0 27.0   BUN mg/dL 61* 58* 52*   CREATININE mg/dL 4.14* 4.03* 3.35*   CALCIUM mg/dL 8.6 9.5 8.8   BILIRUBIN mg/dL 0.3 0.4 0.4   ALK PHOS U/L 109 130* 119*   ALT (SGPT) U/L 5 6 6   AST (SGOT) U/L 12 13 12   GLUCOSE mg/dL 119* 161* 165*       Culture Data:   Blood Culture   Date Value Ref Range Status   02/14/2024 No growth at 2 days  Preliminary   02/14/2024 No growth at 2 days  Preliminary       Radiology Data:   Imaging Results (Last 24 Hours)       ** No results found for the last 24  hours. **            I have reviewed the patient's current medications.     Assessment/Plan   Assessment  Active Hospital Problems    Diagnosis     **Hypertensive urgency     Acute on chronic renal failure     Controlled type 2 diabetes mellitus with complication, without long-term current use of insulin     Benign essential HTN     Mixed hyperlipidemia        Treatment Plan  Hypertensive urgency/hyperlipidemia/history of hypertension/cardiomegaly/diastolic CHF.  Continue Cardene drip.  Patient will need to go unit for now.   Aspirin .  Crestor.  Coreg.  Hold Lasix due to renal failure.  Hold kerendia.  Hold lisinopril due to renal failure.   Labetalol as needed.  Hydralazine as needed.  Continue hydralazine p.o. by nephrology.  Add nifedipine cardiology  Echocardiogram-ejection fraction 61 to 65%, mild concentric hypertrophy, diastolic dysfunction grade 2, normal right ventricle cavities size and systolic function, left atrial cavity moderately dilated, aortic valve exhibits sclerosis-mild thickening of aortic valve-no aortic valve regurgitation-mild aortic valve stenosis, moderate mitral annular calcification-mild mitral valve regurgitation, mild pulmonary hypertension.     Acute on chronic renal failure stage IIIb/metabolic acidosis..  Baseline creatinine 10/1/2020 2.5.  Creatinine today is 3.35.  Nephrology consult.  Increase bicarb .  Creatinine slight increased .  Ultrasound the kidneys-Negative for hydronephrosis or renal atrophy.      Anemia.  Hemoglobin stable.  Probably secondary to chronic renal failure.  No sign of GI bleed.  Iron sulfate .     Shortness of breath.  Chest x-ray-Shallow inspiration, with mild cardiomegaly, No evidence of overt CHF. No focal pulmonary infiltrates.  Patient is on room air     Elevated D-dimer.  Unable to do CTA of the chest due to renal failure.  Will start prophylaxis Lovenox for now.  Patient denies any chest pain.     Diabetes.  Hold metformin due to renal failure.  Low  sliding scale for now.  Hemoglobin A1 C 7.3.  Add Levemir .    UTI.  Rocephin x 3 days.     Nausea.  Zofran as needed.     Prostate hypertrophy.  Continue Flomax.     Obesity.  BMI is 32.     Nutrition.  Cardiac/diabetic diet.  Magnesium oxide.     Blood cultures-no growth for 2 days.  COVID-19-negative.  Flu screen negative.  RSV-negative.  Respiratory PCR- neg .  Urine cultures pending.     Medical Decision Making  Number and Complexity of problems: Hypertensive urgency/acute on chronic renal failure/anemia/shortness of breath/diabetes  Differential Diagnosis: None     Conditions and Status        Condition is unchanged.     [facility] Data  External documents reviewed: ER notes  Cardiac tracing (EKG, telemetry) interpretation: Sinus  Radiology interpretation: Chest x-ray  Labs reviewed: Laboratory  Any tests that were considered but not ordered: Laboratory in AM     Decision rules/scores evaluated (example [id number], Wells, etc): None     Discussed with: Patient     Care Planning  Shared decision making: Patient and wife  Code status and discussions: Full code     Disposition  Social Determinants of Health that impact treatment or disposition: From home  Estimated length of stay is 2 to 5 days.        Electronically signed by David Osborne MD, 02/16/24, 13:34 CST.    Electronically signed by David Osborne MD at 02/16/24 1345       Teo Pate APRN at 02/16/24 0910       Attestation signed by Davie Styles MD at 02/16/24 1258    I have personally examined the patient and reviewed all the data.  I also agree with history and physical examination as well as plan generated by my nurse practitioner.     Chief complaint: Abnormal labs.     68 years old gentleman with history of stage IV chronic kidney disease, follows PAULO Bravo in the office.  His baseline creatinine 2.8 mg/GFR is 23 mL.  He also has history of type 2 diabetes, hypertension and diabetic nephropathy.  He was recently started on  Kerendia which led to worsening of renal function and creatinine ER was discontinued.  He was also instructed to hold Lasix and repeat lab see if his functions are improving.  However he presented to the emergency room with increasing shortness of breath and his blood pressure was extremely elevated.  He was treated with intravenous Cardene and IV fluid but no improvement of renal function.     This afternoon patient remains in CCU.  He required intermittent Cardene IV for blood pressure control.  He has not shown any significant response or improvement of renal function.  However he has good urine output.     Assessment:  1.  Acute kidney injury/worsening.  2.  Hemodynamic effect of Kerendia/ATN.  3.  Stage IV CKD baseline.  5.  Recurrent hyperkalemia improving.  6.  Type 2 diabetes with nephropathy.  7.  Poorly controlled hypertension improving  8.  Anemia of CKD       Plan:  1.  Adjust blood pressure medications.  2.  Continue to hold currently as well as lisinopril.  3.  Continue to monitor renal recovery.  4.  Baseline iron studies/PTH                Nephrology ([facility]) Progress Note      Patient:  Mauro Patel  YOB: 1955  Date of Service: 2/16/2024  MRN: [id number]   Acct: [id number]   Primary Care Physician: John Robertson MD  Advance Directive:   Code Status and Medical Interventions:   Ordered at: 02/14/24 1202     Level Of Support Discussed With:    Patient     Code Status (Patient has no pulse and is not breathing):    CPR (Attempt to Resuscitate)     Medical Interventions (Patient has pulse or is breathing):    Full Support     Admit Date: 2/14/2024       Hospital Day: 2  Referring Provider: No ref. provider found      Patient personally seen and examined.  Complete chart including Consults, Notes, Operative Reports, Labs, Cardiology, and Radiology studies reviewed as able.        Subjective:  Mauro Patel is a 68 y.o. male for whom we were  consulted for evaluation and treatment of acute kidney injury. Baseline chronic kidney disease stage 4. Follows with Teo BATES in our office. Baseline creatinine approximately 2.8. History of type 2 diabetes, hypertension.  Patient was started on Kerendia a few months ago. He was seen in our office on 2/07 and creatinine had increased to 3.8 and he appeared somewhat volume depleted on exam. Patient was instructed to hold Lasix at that time and was supposed to get repeat labs this week. He presented to ER on 2/14 with dyspnea and elevated blood pressure. Creatinine 3.3 on arrival to ER. Admitted to CCU on Cardene drip. Denied n/v/d. Denies NSAID use. Denies changes in urination    Today is awake and alert. Complaint of dysuria. Cardene off at time of exam but has still been needed intermittently. Urine output nonoliguric    Allergies:  Duravent dm  [[id number]]    Home Meds:  Medications Prior to Admission   Medication Sig Dispense Refill Last Dose    ACCU-CHEK GUIDE test strip        allopurinol (ZYLOPRIM) 100 MG tablet Take 1 tablet by mouth Daily.       ASPIRIN 81 PO Take 81 mg by mouth Daily.       carvedilol (COREG) 25 MG tablet Take 1 tablet by mouth 2 (Two) Times a Day With Meals. BID       ferrous sulfate 325 (65 FE) MG tablet Take 1 tablet by mouth 2 (Two) Times a Day With Meals.       Finerenone (Kerendia) 10 MG tablet Take 1 tablet by mouth Daily.       insulin detemir (LEVEMIR) 100 UNIT/ML injection Inject 20 Units under the skin into the appropriate area as directed 2 (Two) Times a Day. 20 units every morning and 35 units every night       Insulin Lispro, 1 Unit Dial, (HumaLOG KwikPen) 100 UNIT/ML solution pen-injector Inject 5 Units under the skin into the appropriate area as directed 2 (Two) Times a Day. At breakfast and supper       lisinopril (PRINIVIL,ZESTRIL) 40 MG tablet Take 1 tablet by mouth Daily.       losartan (COZAAR) 100 MG tablet Take 1 tablet by mouth Daily. 90 tablet  3 More than a month    magnesium oxide (MAG-OX) 400 MG tablet Take 1 tablet by mouth Daily.       rosuvastatin (CRESTOR) 10 MG tablet Take 1 tablet by mouth Daily.       sodium bicarbonate 650 MG tablet Take 2 tablets by mouth 3 (Three) Times a Day.       tadalafil (CIALIS) 5 MG tablet Take 1 tablet by mouth Daily As Needed for Erectile Dysfunction.       tamsulosin (FLOMAX) 0.4 MG capsule 24 hr capsule Take 1 capsule by mouth Daily.       vitamin D (ERGOCALCIFEROL) 99445 units capsule capsule Take 1 capsule by mouth Every 30 (Thirty) Days. Around the 15th   1/15/2024       Medicines:  Current Facility-Administered Medications   Medication Dose Route Frequency Provider Last Rate Last Admin    aspirin EC tablet 81 mg  81 mg Oral Daily David Osborne MD   81 mg at 02/16/24 0809    sennosides-docusate (PERICOLACE) 8.6-50 MG per tablet 2 tablet  2 tablet Oral BID David Osborne MD   2 tablet at 02/15/24 2009    And    polyethylene glycol (MIRALAX) packet 17 g  17 g Oral Daily PRN David Osborne MD        And    bisacodyl (DULCOLAX) EC tablet 5 mg  5 mg Oral Daily PRN David Osborne MD        And    bisacodyl (DULCOLAX) suppository 10 mg  10 mg Rectal Daily PRN David Osborne MD        carvedilol (COREG) tablet 25 mg  25 mg Oral BID David Osborne MD   25 mg at 02/16/24 0809    Chlorhexidine Gluconate Cloth 2 % pads 1 Application  1 application  Topical Once David Osborne MD        Chlorhexidine Gluconate Cloth 2 % pads 1 Application  1 Application Topical Q24H David Osborne MD   1 Application at 02/16/24 0435    dextrose (D50W) (25 g/50 mL) IV injection 25 g  25 g Intravenous Q15 Min PRN David Osborne MD        dextrose (GLUTOSE) oral gel 15 g  15 g Oral Q15 Min PRN David Osborne MD        Enoxaparin Sodium (LOVENOX) syringe 30 mg  30 mg Subcutaneous Daily David Osborne MD   30 mg at 02/16/24 0809    glucagon (GLUCAGEN) injection 1 mg  1 mg Intramuscular Q15 Min David Jain MD        hydrALAZINE  (APRESOLINE) injection 10 mg  10 mg Intravenous Q4H PRN David Osborne MD   10 mg at 02/16/24 0619    hydrALAZINE (APRESOLINE) tablet 50 mg  50 mg Oral Q8H Davie Styles MD   50 mg at 02/16/24 0619    insulin detemir (LEVEMIR) injection 5 Units  5 Units Subcutaneous Q12H David Osborne MD   5 Units at 02/16/24 0808    Insulin Lispro (humaLOG) injection 2-7 Units  2-7 Units Subcutaneous 4x Daily AC & at Bedtime David Osborne MD   2 Units at 02/16/24 0757    labetalol (NORMODYNE,TRANDATE) injection 10 mg  10 mg Intravenous Q4H PRN David Osborne MD   10 mg at 02/16/24 0433    magnesium oxide (MAG-OX) tablet 400 mg  400 mg Oral Daily David Osborne MD   400 mg at 02/16/24 0808    mupirocin (BACTROBAN) 2 % nasal ointment 1 Application  1 application  Each Nare BID David Osborne MD   1 Application at 02/16/24 0809    niCARdipine (CARDENE) 25 mg in 250 mL NS infusion kit  5-15 mg/hr Intravenous Titrated David Osborne MD 50 mL/hr at 02/16/24 0630 5 mg/hr at 02/16/24 0630    ondansetron (ZOFRAN) injection 4 mg  4 mg Intravenous Q6H PRN David Osborne MD        rosuvastatin (CRESTOR) tablet 10 mg  10 mg Oral Nightly David Osborne MD   10 mg at 02/15/24 2009    sodium bicarbonate tablet 650 mg  650 mg Oral Daily David Osborne MD   650 mg at 02/16/24 0808    sodium chloride 0.9 % flush 10 mL  10 mL Intravenous PRN David Osborne MD        sodium chloride 0.9 % flush 10 mL  10 mL Intravenous Q12H David Osborne MD   10 mL at 02/16/24 0814    sodium chloride 0.9 % flush 10 mL  10 mL Intravenous PRN David Osborne MD   10 mL at 02/16/24 0814    sodium chloride 0.9 % infusion 40 mL  40 mL Intravenous PRN David Osborne MD        tamsulosin (FLOMAX) 24 hr capsule 0.4 mg  0.4 mg Oral Daily David Osborne MD   0.4 mg at 02/16/24 0808       Past Medical History:  Past Medical History:   Diagnosis Date    Diabetes mellitus     Edema     Hyperlipidemia     Hypertension        Past Surgical History:  Past Surgical  History:   Procedure Laterality Date    NO PAST SURGERIES         Family History  Family History   Problem Relation Age of Onset    Diabetes Mother     No Known Problems Father        Social History  Social History     Socioeconomic History    Marital status:    Tobacco Use    Smoking status: Never    Smokeless tobacco: Never   Vaping Use    Vaping Use: Never used   Substance and Sexual Activity    Alcohol use: No    Drug use: No    Sexual activity: Defer       Review of Systems:  History obtained from chart review and the patient  General ROS: No fever or chills  Respiratory ROS: No cough, shortness of breath, wheezing  Cardiovascular ROS: No chest pain or palpitations  Gastrointestinal ROS: No abdominal pain or melena  Genito-Urinary ROS: No dysuria or hematuria  Psych ROS: No anxiety and depression  14 point ROS reviewed with the patient and negative except as noted above and in the HPI unless unable to obtain.    Objective:  Patient Vitals for the past 24 hrs:   BP Temp Temp src Pulse Resp SpO2 Weight   02/16/24 0900 160/81 -- -- 94 -- 98 % --   02/16/24 0730 146/70 98 °F (36.7 °C) Oral 97 -- 100 % --   02/16/24 0700 162/74 -- -- 93 -- 100 % --   02/16/24 0630 179/73 -- -- 97 -- 100 % --   02/16/24 0600 (!) 195/84 -- -- 95 -- 99 % --   02/16/24 0530 (!) 183/90 -- -- 92 -- 99 % --   02/16/24 0500 170/85 -- -- 87 -- 99 % 93.2 kg (205 lb 6.4 oz)   02/16/24 0430 (!) 182/83 -- -- 90 -- 99 % --   02/16/24 0400 172/76 98.5 °F (36.9 °C) Oral 89 -- 99 % --   02/16/24 0330 170/81 -- -- 89 -- 99 % --   02/16/24 0300 151/65 -- -- 85 -- 98 % --   02/16/24 0230 142/59 -- -- 84 -- 97 % --   02/16/24 0200 154/64 -- -- 84 -- 98 % --   02/16/24 0130 150/68 -- -- 85 -- 97 % --   02/16/24 0100 143/64 -- -- 84 -- 98 % --   02/16/24 0030 146/70 -- -- 86 -- 97 % --   02/16/24 0000 155/76 -- -- 81 -- 99 % --   02/15/24 2352 -- 98.1 °F (36.7 °C) Oral 81 16 100 % --   02/15/24 2330 152/75 -- -- 81 -- 99 % --   02/15/24 2300 139/67  -- -- 86 -- 99 % --   02/15/24 2230 134/72 -- -- 75 -- 99 % --   02/15/24 2200 164/93 -- -- 82 -- 98 % --   02/15/24 2130 164/85 -- -- 91 -- 100 % --   02/15/24 2100 (!) 182/94 -- -- 93 -- 98 % --   02/15/24 2030 (!) 187/91 -- -- 97 -- 98 % --   02/15/24 2000 165/78 98.7 °F (37.1 °C) Oral 95 19 96 % --   02/15/24 1900 148/74 -- -- 86 -- 100 % --   02/15/24 1800 152/69 -- -- 87 -- 100 % --   02/15/24 1700 176/84 -- -- 95 -- 99 % --   02/15/24 1530 166/79 -- -- 86 -- 99 % --   02/15/24 1500 (!) 181/88 -- -- 92 -- 98 % --   02/15/24 1400 172/89 -- -- 90 -- 98 % --   02/15/24 1300 160/75 -- -- 83 -- 99 % --   02/15/24 1230 173/83 -- -- 88 -- 98 % --   02/15/24 1200 170/85 98.6 °F (37 °C) Oral 91 -- 98 % --   02/15/24 1130 153/73 -- -- 81 -- 98 % --   02/15/24 1100 158/74 -- -- 84 -- 98 % --   02/15/24 1030 160/71 -- -- 87 -- 98 % --   02/15/24 1000 180/87 -- -- 94 -- 99 % --   02/15/24 0930 173/81 -- -- 91 -- 99 % --       Intake/Output Summary (Last 24 hours) at 2/16/2024 0910  Last data filed at 2/15/2024 2135  Gross per 24 hour   Intake 633.75 ml   Output 750 ml   Net -116.25 ml     General: awake/alert   Chest:  clear to auscultation bilaterally without respiratory distress  CVS: regular rate and rhythm  Abdominal: soft, nontender, positive bowel sounds  Extremities:  trace lower ext edema  Skin: warm and dry without rash      Labs:  Results from last 7 days   Lab Units 02/16/24  0354 02/15/24  0529 02/14/24  0915   WBC 10*3/mm3 9.25 7.12 7.61   HEMOGLOBIN g/dL 8.4* 8.6* 8.5*   HEMATOCRIT % 25.1* 27.1* 26.8*   PLATELETS 10*3/mm3 241 259 221         Results from last 7 days   Lab Units 02/16/24  0354 02/15/24  0529 02/14/24  0915   SODIUM mmol/L 138 137 142   POTASSIUM mmol/L 3.9 3.7 3.9   CHLORIDE mmol/L 104 101 104   CO2 mmol/L 20.0* 22.0 27.0   BUN mg/dL 61* 58* 52*   CREATININE mg/dL 4.14* 4.03* 3.35*   CALCIUM mg/dL 8.6 9.5 8.8   EGFR mL/min/1.73 15.0* 15.5* 19.3*   BILIRUBIN mg/dL 0.3 0.4 0.4   ALK PHOS U/L 109  "130* 119*   ALT (SGPT) U/L 5 6 6   AST (SGOT) U/L 12 13 12   GLUCOSE mg/dL 119* 161* 165*       Radiology:   Imaging Results (Last 72 Hours)       Procedure Component Value Units Date/Time    US Renal Bilateral [[id number]] Collected: 02/14/24 1452     Updated: 02/14/24 1456    Narrative:      EXAM/TECHNIQUE: US RENAL BILATERAL-     INDICATION: Acute on chronic renal failure, stage IIIb.;  I16.0-Hypertensive urgency; N18.9-Chronic kidney disease, unspecified;  I50.9-Heart failure, unspecified; D64.9-Anemia, unspecified     COMPARISON: None     FINDINGS:     Right kidney is 10.6 cm in length and demonstrates normal cortical  thickness and echogenicity. No shadowing calculi or hydronephrosis. No  right renal cyst or mass.     Left kidney is 9.8 cm in length and demonstrates normal cortical  thickness and echogenicity. No shadowing calculi or hydronephrosis. No  left renal cyst or mass.     No focal urinary bladder abnormality.       Impression:         Negative for hydronephrosis or renal atrophy.     This report was signed and finalized on 2/14/2024 2:53 PM by Dr. Kenny Tate MD.       XR Chest 1 View [[id number]] Collected: 02/14/24 0944     Updated: 02/14/24 0950    Narrative:      EXAMINATION: XR CHEST 1 VW- 2/14/2024 9:44 AM     HISTORY: Shortness of breath.     COMPARISON: There are no correlative imaging studies for comparison.     REPORT:  The lungs are hypoaerated, no focal infiltrate or pulmonary  consolidation is identified. No pneumothorax or effusion is identified.  Mild cardiomegaly is present. The osseous structures show no acute  findings.       Impression:      Shallow inspiration, with mild cardiomegaly. No evidence of  overt CHF. No focal pulmonary infiltrates.     This report was signed and finalized on 2/14/2024 9:47 AM by Dr. Hemanth Barclay MD.               Culture:  No results found for: \"BLOODCX\", \"URINECX\", \"WOUNDCX\", \"MRSACX\", \"RESPCX\", \"STOOLCX\"      Assessment    Acute kidney injury, " ATN--worse today  Baseline chronic kidney disease stage 4  Hypertensive urgency  Type 2 diabetes  Anemia of CKD and iron deficiency    Plan:   Check urinalysis  Renal function slightly worse today, hopeful that we will see some improvement going forward.  Continue to titrate HTN medications      Teo Pate, APRN  2/16/2024  09:10 CST      Electronically signed by Davie Styles MD at 02/16/24 1258       David Osborne MD at 02/15/24 7694              [facility]  INPATIENT PROGRESS NOTE    Patient Name: Mauro TAY Amanda  Date of Admission: 2/14/2024  Today's Date: 02/15/24  Length of Stay: 1  Primary Care Physician: John Robertson MD    Subjective   Chief Complaint: Hypertensive urgency/acute renal failure   HPI   Blood pressure still remain high, put patient on hydralazine by nephrology.  Creatinine is increasing.  Patient is getting 40 of Lasix due to diastolic heart failure.  Patient still have shortness of breath on room air.  Afebrile.  Glucose remain high, add Levemir . white blood cells normal.  Hemoglobin stable.    Review of Systems   Constitutional:  Positive for activity change and appetite change. Negative for chills and fever.   HENT:  Negative for hearing loss, nosebleeds, tinnitus and trouble swallowing.    Eyes:  Negative for visual disturbance.   Respiratory:  Positive for cough and shortness of breath. Negative for chest tightness and wheezing.    Cardiovascular:  Negative for chest pain, palpitations and leg swelling.   Gastrointestinal:  Negative for abdominal distention, abdominal pain, blood in stool, constipation, diarrhea, nausea and vomiting.   Endocrine: Negative for cold intolerance, heat intolerance, polydipsia, polyphagia and polyuria.   Genitourinary:  Negative for decreased urine volume, difficulty urinating, dysuria, flank pain, frequency and hematuria.   Musculoskeletal:  Negative for arthralgias, joint swelling and  myalgias.   Skin:  Negative for rash.   Allergic/Immunologic: Negative for immunocompromised state.   Neurological:  Positive for weakness. Negative for dizziness, syncope, light-headedness and headaches.   Hematological:  Negative for adenopathy. Does not bruise/bleed easily.   Psychiatric/Behavioral:  Negative for confusion and sleep disturbance. The patient is not nervous/anxious.    All pertinent negatives and positives are as above. All other systems have been reviewed and are negative unless otherwise stated.     Objective    Temp:  [98 °F (36.7 °C)-98.6 °F (37 °C)] 98.6 °F (37 °C)  Heart Rate:  [] 90  Resp:  [15-24] 22  BP: (137-186)/(61-89) 172/89  Physical Exam  Vitals and nursing note reviewed.   HENT:      Head: Normocephalic.   Eyes:      Conjunctiva/sclera: Conjunctivae normal.      Pupils: Pupils are equal, round, and reactive to light.   Cardiovascular:      Rate and Rhythm: Normal rate and regular rhythm.      Heart sounds: Normal heart sounds.   Pulmonary:      Effort: No respiratory distress.      Comments: Diminished breath of bilateral, clear, on room air.  Patient no acute distress.  Abdominal:      General: Bowel sounds are normal. There is no distension.      Palpations: Abdomen is soft.      Tenderness: There is no abdominal tenderness.      Comments: Obesity .   Musculoskeletal:         General: No swelling.      Cervical back: Neck supple.   Skin:     General: Skin is warm and dry.      Capillary Refill: Capillary refill takes 2 to 3 seconds.      Findings: No rash.   Neurological:      General: No focal deficit present.      Mental Status: He is alert and oriented to person, place, and time.   Psychiatric:         Mood and Affect: Mood normal.         Behavior: Behavior normal.         Thought Content: Thought content normal.          Results Review:  I have reviewed the labs, radiology results, and diagnostic studies.    Laboratory Data:   Results from last 7 days   Lab Units  02/15/24  0529 02/14/24  0915   WBC 10*3/mm3 7.12 7.61   HEMOGLOBIN g/dL 8.6* 8.5*   HEMATOCRIT % 27.1* 26.8*   PLATELETS 10*3/mm3 259 221        Results from last 7 days   Lab Units 02/15/24  0529 02/14/24  0915   SODIUM mmol/L 137 142   POTASSIUM mmol/L 3.7 3.9   CHLORIDE mmol/L 101 104   CO2 mmol/L 22.0 27.0   BUN mg/dL 58* 52*   CREATININE mg/dL 4.03* 3.35*   CALCIUM mg/dL 9.5 8.8   BILIRUBIN mg/dL 0.4 0.4   ALK PHOS U/L 130* 119*   ALT (SGPT) U/L 6 6   AST (SGOT) U/L 13 12   GLUCOSE mg/dL 161* 165*       Culture Data:   Blood Culture   Date Value Ref Range Status   02/14/2024 No growth at 24 hours  Preliminary   02/14/2024 No growth at 24 hours  Preliminary       Radiology Data:   Imaging Results (Last 24 Hours)       ** No results found for the last 24 hours. **            I have reviewed the patient's current medications.     Assessment/Plan   Assessment  Active Hospital Problems    Diagnosis     **Hypertensive urgency     Acute on chronic renal failure     Controlled type 2 diabetes mellitus with complication, without long-term current use of insulin     Benign essential HTN     Mixed hyperlipidemia        Treatment Plan  Hypertensive urgency/hyperlipidemia/history of hypertension/cardiomegaly/diastolic CHF.  Continue Cardene drip.  Patient will need to go unit for now.   Aspirin .  Crestor.  Coreg.  Hold Lasix due to renal failure.  Hold kerendia.  Hold lisinopril due to renal failure.   Labetalol as needed.  Hydralazine as needed.  IV Lasix 40 x 1 by nephrology.  Add hydralazine p.o. by nephrology.  Echocardiogram-ejection fraction 61 to 65%, mild concentric hypertrophy, diastolic dysfunction grade 2, normal right ventricle cavities size and systolic function, left atrial cavity moderately dilated, aortic valve exhibits sclerosis-mild thickening of aortic valve-no aortic valve regurgitation-mild aortic valve stenosis, moderate mitral annular calcification-mild mitral valve regurgitation, mild pulmonary  hypertension.     Acute on chronic renal failure stage IIIb..  Baseline creatinine 10/1/2020 2.5.  Creatinine today is 3.35.  Nephrology consult.  Bicarb .  Ultrasound the kidneys-Negative for hydronephrosis or renal atrophy.      Anemia.  Hemoglobin stable.  Probably secondary to chronic renal failure.  No sign of GI bleed.  Iron sulfate .     Shortness of breath.  Chest x-ray-Shallow inspiration, with mild cardiomegaly, No evidence of overt CHF. No focal pulmonary infiltrates.  Patient is on room air     Elevated D-dimer.  Unable to do CTA of the chest due to renal failure.  Will start prophylaxis Lovenox for now.  Patient denies any chest pain.     Diabetes.  Hold metformin due to renal failure.  Low sliding scale for now.  Hemoglobin A1 C 7.3.  Add Levemir .     Nausea.  Zofran as needed.    Prostate hypertrophy.  Continue Flomax.     Obesity.  BMI is 32.     Nutrition.  Cardiac/diabetic diet.  Magnesium oxide.     Blood cultures-no growth for 24 hours.  COVID-19-negative.  Flu screen negative.  RSV-negative.  Respiratory PCR .     Medical Decision Making  Number and Complexity of problems: Hypertensive urgency/acute on chronic renal failure/anemia/shortness of breath/diabetes  Differential Diagnosis: None     Conditions and Status        Condition is unchanged.     [facility] Data  External documents reviewed: ER notes  Cardiac tracing (EKG, telemetry) interpretation: Sinus  Radiology interpretation: Chest x-ray  Labs reviewed: Laboratory  Any tests that were considered but not ordered: Laboratory in AM     Decision rules/scores evaluated (example [id number], [place], etc): None     Discussed with: Patient     Care Planning  Shared decision making: Patient and wife  Code status and discussions: Full code     Disposition  Social Determinants of Health that impact treatment or disposition: From home  Estimated length of stay is 2 to 5 days.        Electronically signed by David Osborne MD, 02/15/24, 15:25  CST.    Electronically signed by David Osborne MD at 02/15/24 1546       Teo Pate APRN at 02/15/24 0824       Attestation signed by Davie Styles MD at 02/15/24 1237    I have personally examined the patient and reviewed all the data.  I also agree with history and physical examination as well as plan generated by my nurse practitioner.    Chief complaint: Abnormal labs.    68 years old gentleman with history of stage IV chronic kidney disease, follows PAULO Bravo in the office.  His baseline creatinine 2.8 mg/GFR is 23 mL.  He also has history of type 2 diabetes, hypertension and diabetic nephropathy.  He was recently started on Kerendia which led to worsening of renal function and creatinine ER was discontinued.  He was also instructed to hold Lasix and repeat lab see if his functions are improving.  However he presented to the emergency room with increasing shortness of breath and his blood pressure was extremely elevated.  He was treated with intravenous Cardene and IV fluid but no improvement of renal function.    This afternoon he was seen in CCU, denies any shortness of breath.  He is currently off Cardene but serum creatinine is worsening.    Assessment:  1.  Acute kidney injury/worsening.  2.  Hemodynamic effect of Kerendia/ATN.  3.  Stage IV CKD baseline.  5.  Recurrent hyperkalemia improving.  6.  Type 2 diabetes with nephropathy.  7.  Poorly controlled hypertension improving    Plan:  1.  Agree with intravenous diuretics.  2.  Continue to hold currently as well as lisinopril.  3.  Continue to monitor renal recovery.  4.  Plan was discussed with the patient                  Nephrology ([facility]) Progress Note      Patient:  Mauro TAY Amanda  YOB: 1955  Date of Service: 2/15/2024  MRN: [id number]   Acct: [id number]   Primary Care Physician: John Robertson MD  Advance Directive:   Code Status and Medical Interventions:   Ordered at:  02/14/24 1202     Level Of Support Discussed With:    Patient     Code Status (Patient has no pulse and is not breathing):    CPR (Attempt to Resuscitate)     Medical Interventions (Patient has pulse or is breathing):    Full Support     Admit Date: 2/14/2024       Hospital Day: 1  Referring Provider: No ref. provider found      Patient personally seen and examined.  Complete chart including Consults, Notes, Operative Reports, Labs, Cardiology, and Radiology studies reviewed as able.        Subjective:  Mauro Patel is a 68 y.o. male for whom we were consulted for evaluation and treatment of acute kidney injury. Baseline chronic kidney disease stage 4. Follows with Teo BATES in our office. Baseline creatinine approximately 2.8. History of type 2 diabetes, hypertension.  Patient was started on Kerendia a few months ago. He was seen in our office on 2/07 and creatinine had increased to 3.8 and he appeared somewhat volume depleted on exam. Patient was instructed to hold Lasix at that time and was supposed to get repeat labs this week. He presented to ER on 2/14 with dyspnea and elevated blood pressure. Creatinine 3.3 on arrival to ER. Admitted to CCU on Cardene drip. Denied n/v/d. Denies NSAID use. Denies changes in urination    Today is awake and alert. Dyspnea has improved. Cardene is off at time of exam but BP remains fairly elevated. Urine output nonoliguric    Allergies:  Duravent dm  [[id number]]    Home Meds:  Medications Prior to Admission   Medication Sig Dispense Refill Last Dose    ACCU-CHEK GUIDE test strip        allopurinol (ZYLOPRIM) 100 MG tablet Take 1 tablet by mouth Daily.       ASPIRIN 81 PO Take 81 mg by mouth Daily.       carvedilol (COREG) 25 MG tablet Take 1 tablet by mouth 2 (Two) Times a Day With Meals. BID       ferrous sulfate 325 (65 FE) MG tablet Take 1 tablet by mouth 2 (Two) Times a Day With Meals.       Finerenone (Kerendia) 10 MG tablet Take 1 tablet by mouth  Daily.       insulin detemir (LEVEMIR) 100 UNIT/ML injection Inject 20 Units under the skin into the appropriate area as directed 2 (Two) Times a Day. 20 units every morning and 35 units every night       Insulin Lispro, 1 Unit Dial, (HumaLOG KwikPen) 100 UNIT/ML solution pen-injector Inject 5 Units under the skin into the appropriate area as directed 2 (Two) Times a Day. At breakfast and supper       lisinopril (PRINIVIL,ZESTRIL) 40 MG tablet Take 1 tablet by mouth Daily.       losartan (COZAAR) 100 MG tablet Take 1 tablet by mouth Daily. 90 tablet 3 More than a month    magnesium oxide (MAG-OX) 400 MG tablet Take 1 tablet by mouth Daily.       rosuvastatin (CRESTOR) 10 MG tablet Take 1 tablet by mouth Daily.       sodium bicarbonate 650 MG tablet Take 2 tablets by mouth 3 (Three) Times a Day.       tadalafil (CIALIS) 5 MG tablet Take 1 tablet by mouth Daily As Needed for Erectile Dysfunction.       tamsulosin (FLOMAX) 0.4 MG capsule 24 hr capsule Take 1 capsule by mouth Daily.       vitamin D (ERGOCALCIFEROL) 07125 units capsule capsule Take 1 capsule by mouth Every 30 (Thirty) Days. Around the 15th   1/15/2024       Medicines:  Current Facility-Administered Medications   Medication Dose Route Frequency Provider Last Rate Last Admin    aspirin EC tablet 81 mg  81 mg Oral Daily David Osborne MD   81 mg at 02/15/24 0801    sennosides-docusate (PERICOLACE) 8.6-50 MG per tablet 2 tablet  2 tablet Oral BID David Osborne MD   2 tablet at 02/15/24 0800    And    polyethylene glycol (MIRALAX) packet 17 g  17 g Oral Daily PRN David Osborne MD        And    bisacodyl (DULCOLAX) EC tablet 5 mg  5 mg Oral Daily PRN David Osborne MD        And    bisacodyl (DULCOLAX) suppository 10 mg  10 mg Rectal Daily PRN David Osborne MD        carvedilol (COREG) tablet 25 mg  25 mg Oral BID David Osborne MD   25 mg at 02/15/24 0801    Chlorhexidine Gluconate Cloth 2 % pads 1 Application  1 application  Topical Once Dylon  David SCRUGGS MD        Chlorhexidine Gluconate Cloth 2 % pads 1 Application  1 Application Topical Q24H David Osborne MD   1 Application at 02/15/24 0430    dextrose (D50W) (25 g/50 mL) IV injection 25 g  25 g Intravenous Q15 Min PRN David Osborne MD        dextrose (GLUTOSE) oral gel 15 g  15 g Oral Q15 Min PRN David Osborne MD        Enoxaparin Sodium (LOVENOX) syringe 30 mg  30 mg Subcutaneous Daily David Osborne MD   30 mg at 02/15/24 0801    glucagon (GLUCAGEN) injection 1 mg  1 mg Intramuscular Q15 Min PRN David Osborne MD        hydrALAZINE (APRESOLINE) injection 10 mg  10 mg Intravenous Q4H PRN David Osborne MD   10 mg at 02/15/24 0542    Insulin Lispro (humaLOG) injection 2-7 Units  2-7 Units Subcutaneous 4x Daily AC & at Bedtime David Osborne MD   2 Units at 02/15/24 0744    labetalol (NORMODYNE,TRANDATE) injection 10 mg  10 mg Intravenous Q4H PRN David Osborne MD   10 mg at 02/15/24 0635    mupirocin (BACTROBAN) 2 % nasal ointment 1 Application  1 application  Each Nare BID David Osborne MD   1 Application at 02/15/24 0800    niCARdipine (CARDENE) 25 mg in 250 mL NS infusion kit  5-15 mg/hr Intravenous Titrated David Osborne MD   Stopped at 02/14/24 1816    ondansetron (ZOFRAN) injection 4 mg  4 mg Intravenous Q6H PRN David Osborne MD        rosuvastatin (CRESTOR) tablet 10 mg  10 mg Oral Nightly David Osborne MD   10 mg at 02/14/24 2001    sodium chloride 0.9 % flush 10 mL  10 mL Intravenous PRN David Osborne MD        sodium chloride 0.9 % flush 10 mL  10 mL Intravenous Q12H David Osborne MD   10 mL at 02/14/24 2001    sodium chloride 0.9 % flush 10 mL  10 mL Intravenous PRN David Osborne MD        sodium chloride 0.9 % infusion 40 mL  40 mL Intravenous PRN David Osborne MD        sodium chloride 0.9 % infusion  75 mL/hr Intravenous Continuous Davie Styles MD 75 mL/hr at 02/14/24 2002 75 mL/hr at 02/14/24 2002       Past Medical History:  Past Medical History:   Diagnosis  Date    Diabetes mellitus     Edema     Hyperlipidemia     Hypertension        Past Surgical History:  Past Surgical History:   Procedure Laterality Date    NO PAST SURGERIES         Family History  Family History   Problem Relation Age of Onset    Diabetes Mother     No Known Problems Father        Social History  Social History     Socioeconomic History    Marital status:    Tobacco Use    Smoking status: Never    Smokeless tobacco: Never   Vaping Use    Vaping Use: Never used   Substance and Sexual Activity    Alcohol use: No    Drug use: No    Sexual activity: Defer       Review of Systems:  History obtained from chart review and the patient  General ROS: No fever or chills  Respiratory ROS: No cough, shortness of breath, wheezing  Cardiovascular ROS: No chest pain or palpitations  Gastrointestinal ROS: No abdominal pain or melena  Genito-Urinary ROS: No dysuria or hematuria  Psych ROS: No anxiety and depression  14 point ROS reviewed with the patient and negative except as noted above and in the HPI unless unable to obtain.    Objective:  Patient Vitals for the past 24 hrs:   BP Temp Temp src Pulse Resp SpO2 Height Weight   02/15/24 0700 170/84 98 °F (36.7 °C) Oral 81 -- 99 % -- --   02/15/24 0536 177/81 -- -- 87 -- 99 % -- --   02/15/24 0500 163/78 -- -- 86 -- 99 % -- 92 kg (202 lb 12.8 oz)   02/15/24 0438 170/86 -- -- 93 -- -- -- --   02/15/24 0430 -- -- -- 86 -- 98 % -- --   02/15/24 0400 -- 98.1 °F (36.7 °C) Oral 86 -- 98 % -- --   02/15/24 0330 153/81 -- -- 88 -- 93 % -- --   02/15/24 0300 156/80 -- -- 81 -- 95 % -- --   02/15/24 0230 160/80 -- -- 83 -- 98 % -- --   02/15/24 0200 161/78 -- -- 82 -- 98 % -- --   02/15/24 0130 146/69 -- -- 73 -- 95 % -- --   02/15/24 0100 150/80 -- -- 83 -- 96 % -- --   02/15/24 0030 154/73 -- -- 78 -- 98 % -- --   02/15/24 0000 150/77 98.6 °F (37 °C) Oral 84 22 97 % -- --   02/14/24 2330 142/73 -- -- 78 -- 97 % -- --   02/14/24 2300 143/69 -- -- 78 -- 98 % -- --  "  02/14/24 2230 145/67 -- -- 84 -- 98 % -- --   02/14/24 2200 143/69 -- -- 86 -- 97 % -- --   02/14/24 2130 142/61 -- -- 95 -- 97 % -- --   02/14/24 2100 161/76 -- -- 95 -- 96 % -- --   02/14/24 2030 162/76 -- -- 94 -- 96 % -- --   02/14/24 2000 168/72 98 °F (36.7 °C) Oral 95 24 95 % -- --   02/14/24 1930 149/74 -- -- 95 -- 94 % -- --   02/14/24 1900 137/63 -- -- 77 -- 93 % -- --   02/14/24 1830 151/70 -- -- 92 -- 96 % -- --   02/14/24 1815 149/74 -- -- 88 -- 94 % -- --   02/14/24 1800 155/69 -- -- 94 17 97 % -- --   02/14/24 1745 151/68 -- -- 94 -- 96 % -- --   02/14/24 1730 152/68 -- -- 98 -- 95 % -- --   02/14/24 1715 -- -- -- 97 -- 95 % -- --   02/14/24 1700 157/69 -- -- 102 19 96 % -- --   02/14/24 1645 144/70 -- -- 91 -- 97 % -- --   02/14/24 1630 156/68 -- -- 94 -- 97 % -- --   02/14/24 1615 165/69 -- -- 96 -- 98 % -- --   02/14/24 1600 160/75 98 °F (36.7 °C) Oral 100 15 98 % -- --   02/14/24 1545 157/67 -- -- 99 -- 98 % -- --   02/14/24 1530 157/71 -- -- 98 -- 97 % -- --   02/14/24 1445 159/70 -- -- 94 -- 97 % -- --   02/14/24 1430 159/69 -- -- 99 -- 97 % -- --   02/14/24 1415 164/73 -- -- 99 -- 95 % -- --   02/14/24 1400 173/72 -- -- 97 -- 96 % -- --   02/14/24 1347 (!) 186/76 -- -- -- -- -- 170.2 cm (67\") 91.6 kg (202 lb)   02/14/24 1345 164/79 -- -- 97 -- 96 % -- --   02/14/24 1330 158/69 -- -- 95 -- 97 % -- --   02/14/24 1315 164/73 -- -- 101 -- 97 % -- --   02/14/24 1300 159/75 97.9 °F (36.6 °C) Oral 97 16 97 % 170.2 cm (67\") 90.6 kg (199 lb 11.2 oz)   02/14/24 1245 175/79 -- -- 100 -- 96 % -- --   02/14/24 1201 (!) 186/76 -- -- 97 -- 96 % -- --   02/14/24 1146 (!) 196/90 -- -- 97 -- 96 % -- --   02/14/24 1131 (!) 191/84 -- -- 83 -- 94 % -- --   02/14/24 1117 (!) 203/94 -- -- 85 -- 96 % -- --   02/14/24 0931 (!) 225/93 -- -- 88 -- 97 % -- --   02/14/24 0916 (!) 215/99 -- -- 89 -- 97 % -- --   02/14/24 0911 (!) 216/102 -- -- 85 -- 97 % -- --   02/14/24 0845 (!) 212/88 98.1 °F (36.7 °C) Oral 96 16 95 % " "170.2 cm (67\") 91.9 kg (202 lb 8 oz)       Intake/Output Summary (Last 24 hours) at 2/15/2024 0824  Last data filed at 2/15/2024 0700  Gross per 24 hour   Intake 1380.3 ml   Output 125 ml   Net 1255.3 ml     General: awake/alert   Chest:  clear to auscultation bilaterally without respiratory distress  CVS: regular rate and rhythm  Abdominal: soft, nontender, positive bowel sounds  Extremities:  trace lower ext edema  Skin: warm and dry without rash      Labs:  Results from last 7 days   Lab Units 02/15/24  0529 02/14/24  0915   WBC 10*3/mm3 7.12 7.61   HEMOGLOBIN g/dL 8.6* 8.5*   HEMATOCRIT % 27.1* 26.8*   PLATELETS 10*3/mm3 259 221         Results from last 7 days   Lab Units 02/15/24  0529 02/14/24  0915   SODIUM mmol/L 137 142   POTASSIUM mmol/L 3.7 3.9   CHLORIDE mmol/L 101 104   CO2 mmol/L 22.0 27.0   BUN mg/dL 58* 52*   CREATININE mg/dL 4.03* 3.35*   CALCIUM mg/dL 9.5 8.8   EGFR mL/min/1.73 15.5* 19.3*   BILIRUBIN mg/dL 0.4 0.4   ALK PHOS U/L 130* 119*   ALT (SGPT) U/L 6 6   AST (SGOT) U/L 13 12   GLUCOSE mg/dL 161* 165*       Radiology:   Imaging Results (Last 72 Hours)       Procedure Component Value Units Date/Time    US Renal Bilateral [[id number]] Collected: 02/14/24 1452     Updated: 02/14/24 1456    Narrative:      EXAM/TECHNIQUE: US RENAL BILATERAL-     INDICATION: Acute on chronic renal failure, stage IIIb.;  I16.0-Hypertensive urgency; N18.9-Chronic kidney disease, unspecified;  I50.9-Heart failure, unspecified; D64.9-Anemia, unspecified     COMPARISON: None     FINDINGS:     Right kidney is 10.6 cm in length and demonstrates normal cortical  thickness and echogenicity. No shadowing calculi or hydronephrosis. No  right renal cyst or mass.     Left kidney is 9.8 cm in length and demonstrates normal cortical  thickness and echogenicity. No shadowing calculi or hydronephrosis. No  left renal cyst or mass.     No focal urinary bladder abnormality.       Impression:         Negative for hydronephrosis or " "renal atrophy.     This report was signed and finalized on 2/14/2024 2:53 PM by Dr. Kenny Tate MD.       XR Chest 1 View [[id number]] Collected: 02/14/24 0944     Updated: 02/14/24 0950    Narrative:      EXAMINATION: XR CHEST 1 VW- 2/14/2024 9:44 AM     HISTORY: Shortness of breath.     COMPARISON: There are no correlative imaging studies for comparison.     REPORT:  The lungs are hypoaerated, no focal infiltrate or pulmonary  consolidation is identified. No pneumothorax or effusion is identified.  Mild cardiomegaly is present. The osseous structures show no acute  findings.       Impression:      Shallow inspiration, with mild cardiomegaly. No evidence of  overt CHF. No focal pulmonary infiltrates.     This report was signed and finalized on 2/14/2024 9:47 AM by Dr. Hemanth Barclay MD.               Culture:  No results found for: \"BLOODCX\", \"URINECX\", \"WOUNDCX\", \"MRSACX\", \"RESPCX\", \"STOOLCX\"      Assessment    Acute kidney injury, ATN--worse today  Baseline chronic kidney disease stage 4  Hypertensive urgency  Type 2 diabetes  Anemia of CKD and iron deficiency    Plan:   Hold IV fluids  One time dose Lasix 40 mg IV  Continue to hold Kerendia, HCTZ, and Lisinopril  Renal function a little worse overnight after IV fluids, will continue to follow closely  Continue to titrate HTN medications      Teo Pate, APRN  2/15/2024  08:24 CST      Electronically signed by Davie Styles MD at 02/15/24 1235       Consult Notes (last 48 hours)  Notes from 02/15/24 0719 through 02/17/24 0719   No notes of this type exist for this encounter.       "

## 2024-02-17 NOTE — PLAN OF CARE
Goal Outcome Evaluation:  Plan of Care Reviewed With: patient        Progress: no change  Outcome Evaluation: No complaints this shift. Bp controlled with scheduled medications. S 76-90 per tele. Call light in reach.

## 2024-02-18 LAB
ALBUMIN SERPL-MCNC: 3.1 G/DL (ref 3.5–5.2)
ALBUMIN/GLOB SERPL: 0.9 G/DL
ALP SERPL-CCNC: 100 U/L (ref 39–117)
ALT SERPL W P-5'-P-CCNC: 8 U/L (ref 1–41)
ANION GAP SERPL CALCULATED.3IONS-SCNC: 14 MMOL/L (ref 5–15)
AST SERPL-CCNC: 17 U/L (ref 1–40)
BASOPHILS # BLD AUTO: 0.05 10*3/MM3 (ref 0–0.2)
BASOPHILS NFR BLD AUTO: 0.6 % (ref 0–1.5)
BILIRUB SERPL-MCNC: <0.2 MG/DL (ref 0–1.2)
BUN SERPL-MCNC: 76 MG/DL (ref 8–23)
BUN/CREAT SERPL: 14.2 (ref 7–25)
CALCIUM SPEC-SCNC: 8.3 MG/DL (ref 8.6–10.5)
CHLORIDE SERPL-SCNC: 94 MMOL/L (ref 98–107)
CO2 SERPL-SCNC: 19 MMOL/L (ref 22–29)
CREAT SERPL-MCNC: 5.35 MG/DL (ref 0.76–1.27)
DEPRECATED RDW RBC AUTO: 47.4 FL (ref 37–54)
EGFRCR SERPLBLD CKD-EPI 2021: 11 ML/MIN/1.73
EOSINOPHIL # BLD AUTO: 0.21 10*3/MM3 (ref 0–0.4)
EOSINOPHIL NFR BLD AUTO: 2.5 % (ref 0.3–6.2)
ERYTHROCYTE [DISTWIDTH] IN BLOOD BY AUTOMATED COUNT: 14.5 % (ref 12.3–15.4)
GLOBULIN UR ELPH-MCNC: 3.4 GM/DL
GLUCOSE BLDC GLUCOMTR-MCNC: 165 MG/DL (ref 70–130)
GLUCOSE BLDC GLUCOMTR-MCNC: 173 MG/DL (ref 70–130)
GLUCOSE BLDC GLUCOMTR-MCNC: 176 MG/DL (ref 70–130)
GLUCOSE BLDC GLUCOMTR-MCNC: 192 MG/DL (ref 70–130)
GLUCOSE SERPL-MCNC: 177 MG/DL (ref 65–99)
HCT VFR BLD AUTO: 23.7 % (ref 37.5–51)
HGB BLD-MCNC: 8 G/DL (ref 13–17.7)
IMM GRANULOCYTES # BLD AUTO: 0.06 10*3/MM3 (ref 0–0.05)
IMM GRANULOCYTES NFR BLD AUTO: 0.7 % (ref 0–0.5)
LYMPHOCYTES # BLD AUTO: 1.71 10*3/MM3 (ref 0.7–3.1)
LYMPHOCYTES NFR BLD AUTO: 20.2 % (ref 19.6–45.3)
MCH RBC QN AUTO: 30.4 PG (ref 26.6–33)
MCHC RBC AUTO-ENTMCNC: 33.8 G/DL (ref 31.5–35.7)
MCV RBC AUTO: 90.1 FL (ref 79–97)
MONOCYTES # BLD AUTO: 0.81 10*3/MM3 (ref 0.1–0.9)
MONOCYTES NFR BLD AUTO: 9.6 % (ref 5–12)
NEUTROPHILS NFR BLD AUTO: 5.64 10*3/MM3 (ref 1.7–7)
NEUTROPHILS NFR BLD AUTO: 66.4 % (ref 42.7–76)
NRBC BLD AUTO-RTO: 0 /100 WBC (ref 0–0.2)
PLATELET # BLD AUTO: 226 10*3/MM3 (ref 140–450)
PMV BLD AUTO: 9.8 FL (ref 6–12)
POTASSIUM SERPL-SCNC: 4.4 MMOL/L (ref 3.5–5.2)
PROT SERPL-MCNC: 6.5 G/DL (ref 6–8.5)
RBC # BLD AUTO: 2.63 10*6/MM3 (ref 4.14–5.8)
SODIUM SERPL-SCNC: 127 MMOL/L (ref 136–145)
WBC NRBC COR # BLD AUTO: 8.48 10*3/MM3 (ref 3.4–10.8)

## 2024-02-18 PROCEDURE — 85025 COMPLETE CBC W/AUTO DIFF WBC: CPT | Performed by: FAMILY MEDICINE

## 2024-02-18 PROCEDURE — 25010000002 ENOXAPARIN PER 10 MG: Performed by: FAMILY MEDICINE

## 2024-02-18 PROCEDURE — 63710000001 INSULIN LISPRO (HUMAN) PER 5 UNITS: Performed by: FAMILY MEDICINE

## 2024-02-18 PROCEDURE — 63710000001 INSULIN DETEMIR PER 5 UNITS: Performed by: FAMILY MEDICINE

## 2024-02-18 PROCEDURE — 82948 REAGENT STRIP/BLOOD GLUCOSE: CPT

## 2024-02-18 PROCEDURE — 25010000002 CEFTRIAXONE PER 250 MG: Performed by: FAMILY MEDICINE

## 2024-02-18 PROCEDURE — 80053 COMPREHEN METABOLIC PANEL: CPT | Performed by: FAMILY MEDICINE

## 2024-02-18 RX ORDER — SODIUM BICARBONATE 650 MG/1
650 TABLET ORAL 3 TIMES DAILY
Status: DISCONTINUED | OUTPATIENT
Start: 2024-02-18 | End: 2024-02-21 | Stop reason: HOSPADM

## 2024-02-18 RX ADMIN — INSULIN LISPRO 2 UNITS: 100 INJECTION, SOLUTION INTRAVENOUS; SUBCUTANEOUS at 11:45

## 2024-02-18 RX ADMIN — HYDRALAZINE HYDROCHLORIDE 50 MG: 50 TABLET ORAL at 13:39

## 2024-02-18 RX ADMIN — ROSUVASTATIN CALCIUM 10 MG: 10 TABLET, COATED ORAL at 20:59

## 2024-02-18 RX ADMIN — INSULIN DETEMIR 5 UNITS: 100 INJECTION, SOLUTION SUBCUTANEOUS at 09:08

## 2024-02-18 RX ADMIN — CEFTRIAXONE 1000 MG: 1 INJECTION, POWDER, FOR SOLUTION INTRAMUSCULAR; INTRAVENOUS at 13:39

## 2024-02-18 RX ADMIN — INSULIN LISPRO 2 UNITS: 100 INJECTION, SOLUTION INTRAVENOUS; SUBCUTANEOUS at 21:03

## 2024-02-18 RX ADMIN — CARVEDILOL 25 MG: 25 TABLET, FILM COATED ORAL at 08:59

## 2024-02-18 RX ADMIN — MAGNESIUM GLUCONATE 500 MG ORAL TABLET 400 MG: 500 TABLET ORAL at 08:59

## 2024-02-18 RX ADMIN — Medication 10 ML: at 09:08

## 2024-02-18 RX ADMIN — SODIUM BICARBONATE 650 MG: 650 TABLET ORAL at 08:59

## 2024-02-18 RX ADMIN — NIFEDIPINE 60 MG: 60 TABLET, FILM COATED, EXTENDED RELEASE ORAL at 08:59

## 2024-02-18 RX ADMIN — HYDRALAZINE HYDROCHLORIDE 50 MG: 50 TABLET ORAL at 21:00

## 2024-02-18 RX ADMIN — SODIUM BICARBONATE 650 MG: 650 TABLET ORAL at 16:36

## 2024-02-18 RX ADMIN — Medication 1 APPLICATION: at 09:05

## 2024-02-18 RX ADMIN — Medication 10 ML: at 21:01

## 2024-02-18 RX ADMIN — INSULIN LISPRO 2 UNITS: 100 INJECTION, SOLUTION INTRAVENOUS; SUBCUTANEOUS at 16:36

## 2024-02-18 RX ADMIN — DOCUSATE SODIUM 50 MG AND SENNOSIDES 8.6 MG 2 TABLET: 8.6; 5 TABLET, FILM COATED ORAL at 09:05

## 2024-02-18 RX ADMIN — HYDRALAZINE HYDROCHLORIDE 50 MG: 50 TABLET ORAL at 04:50

## 2024-02-18 RX ADMIN — TAMSULOSIN HYDROCHLORIDE 0.4 MG: 0.4 CAPSULE ORAL at 08:59

## 2024-02-18 RX ADMIN — ENOXAPARIN SODIUM 30 MG: 30 INJECTION SUBCUTANEOUS at 08:59

## 2024-02-18 RX ADMIN — INSULIN LISPRO 2 UNITS: 100 INJECTION, SOLUTION INTRAVENOUS; SUBCUTANEOUS at 09:08

## 2024-02-18 RX ADMIN — ASPIRIN 81 MG: 81 TABLET, COATED ORAL at 08:59

## 2024-02-18 RX ADMIN — CARVEDILOL 25 MG: 25 TABLET, FILM COATED ORAL at 20:58

## 2024-02-18 RX ADMIN — SODIUM BICARBONATE 650 MG: 650 TABLET ORAL at 20:58

## 2024-02-18 RX ADMIN — DOCUSATE SODIUM 50 MG AND SENNOSIDES 8.6 MG 2 TABLET: 8.6; 5 TABLET, FILM COATED ORAL at 20:58

## 2024-02-18 RX ADMIN — INSULIN DETEMIR 5 UNITS: 100 INJECTION, SOLUTION SUBCUTANEOUS at 21:04

## 2024-02-18 NOTE — PROGRESS NOTES
[facility] Medicine Services  INPATIENT PROGRESS NOTE    Patient Name: Mauro Patel  Date of Admission: 2/14/2024  Today's Date: 02/18/24  Length of Stay: 4  Primary Care Physician: John Robertson MD    Subjective   Chief Complaint: Hypertension/acute renal failure.  HPI   Creatinine is worsened today, discussed with patient plan for permacath placement and dialysis.  Blood pressures improving.  Bladder scan shows 26 cc.    Review of Systems   Constitutional:  Positive for activity change and appetite change. Negative for chills and fever.   HENT:  Negative for hearing loss, nosebleeds, tinnitus and trouble swallowing.    Eyes:  Negative for visual disturbance.   Respiratory: Shortness of breath resolved, on room air.  Negative for chest tightness and wheezing.    Cardiovascular:  Negative for chest pain, palpitations and leg swelling.   Gastrointestinal:  Negative for abdominal distention, abdominal pain, blood in stool, constipation, diarrhea, nausea and vomiting.   Endocrine: Negative for cold intolerance, heat intolerance, polydipsia, polyphagia and polyuria.   Genitourinary:  Negative for decreased urine volume, difficulty urinating, dysuria, flank pain, frequency and hematuria.   Musculoskeletal:  Negative for arthralgias, joint swelling and myalgias.   Skin:  Negative for rash.   Allergic/Immunologic: Negative for immunocompromised state.   Neurological:  Positive for weakness. Negative for dizziness, syncope, light-headedness and headaches.   Hematological:  Negative for adenopathy. Does not bruise/bleed easily.   Psychiatric/Behavioral:  Negative for confusion and sleep disturbance. The patient is not nervous/anxious.   All pertinent negatives and positives are as above. All other systems have been reviewed and are negative unless otherwise stated.     Objective    Temp:  [97.4 °F (36.3 °C)-98.7 °F (37.1 °C)] 98.4 °F (36.9 °C)  Heart Rate:  [81-92] 81  Resp:   [14-18] 16  BP: (138-158)/(59-70) 140/62  Physical Exam  Vitals and nursing note reviewed.   HENT:      Head: Normocephalic.   Eyes:      Conjunctiva/sclera: Conjunctivae normal.      Pupils: Pupils are equal, round, and reactive to light.   Cardiovascular:      Rate and Rhythm: Normal rate and regular rhythm.      Heart sounds: Normal heart sounds.   Pulmonary:      Effort: No respiratory distress.      Comments: Diminished breath of bilateral, clear, on room air.  Patient no acute distress.  Abdominal:      General: Bowel sounds are normal. There is no distension.      Palpations: Abdomen is soft.      Tenderness: There is no abdominal tenderness.      Comments: Obesity .   Musculoskeletal:         General: No swelling.      Cervical back: Neck supple.   Skin:     General: Skin is warm and dry.      Capillary Refill: Capillary refill takes 2 to 3 seconds.      Findings: No rash.   Neurological:      General: No focal deficit present.      Mental Status: He is alert and oriented to person, place, and time.   Psychiatric:         Mood and Affect: Mood normal.         Behavior: Behavior normal.         Thought Content: Thought content normal.       Results Review:  I have reviewed the labs, radiology results, and diagnostic studies.    Laboratory Data:   Results from last 7 days   Lab Units 02/18/24  0622 02/17/24  0432 02/16/24  0354   WBC 10*3/mm3 8.48 8.34 9.25   HEMOGLOBIN g/dL 8.0* 8.0* 8.4*   HEMATOCRIT % 23.7* 24.6* 25.1*   PLATELETS 10*3/mm3 226 236 241        Results from last 7 days   Lab Units 02/18/24  0622 02/17/24  0432 02/16/24  0354   SODIUM mmol/L 127* 134* 138   POTASSIUM mmol/L 4.4 4.0 3.9   CHLORIDE mmol/L 94* 99 104   CO2 mmol/L 19.0* 21.0* 20.0*   BUN mg/dL 76* 68* 61*   CREATININE mg/dL 5.35* 4.91* 4.14*   CALCIUM mg/dL 8.3* 9.0 8.6   BILIRUBIN mg/dL <0.2 0.2 0.3   ALK PHOS U/L 100 105 109   ALT (SGPT) U/L 8 5 5   AST (SGOT) U/L 17 12 12   GLUCOSE mg/dL 177* 133* 119*       Culture Data:    Blood Culture   Date Value Ref Range Status   02/14/2024 No growth at 4 days  Preliminary   02/14/2024 No growth at 4 days  Preliminary     Urine Culture   Date Value Ref Range Status   02/16/2024 25,000 CFU/mL Mixed Mary Isolated  Final       Radiology Data:   Imaging Results (Last 24 Hours)       ** No results found for the last 24 hours. **            I have reviewed the patient's current medications.     Assessment/Plan   Assessment  Active Hospital Problems    Diagnosis     **Hypertensive urgency     Acute on chronic renal failure     Controlled type 2 diabetes mellitus with complication, without long-term current use of insulin     Benign essential HTN     Mixed hyperlipidemia        Treatment Plan  Hypertensive urgency/hyperlipidemia/history of hypertension/cardiomegaly/diastolic CHF.  Continue Cardene drip.  Patient will need to go unit for now.   Aspirin .  Crestor.  Coreg.  Hold Lasix due to renal failure.  Hold kerendia.  Hold lisinopril due to renal failure.   Labetalol as needed.  Hydralazine as needed.  Continue hydralazine p.o..  Continue nifedipine.    Echocardiogram-ejection fraction 61 to 65%, mild concentric hypertrophy, diastolic dysfunction grade 2, normal right ventricle cavities size and systolic function, left atrial cavity moderately dilated, aortic valve exhibits sclerosis-mild thickening of aortic valve-no aortic valve regurgitation-mild aortic valve stenosis, moderate mitral annular calcification-mild mitral valve regurgitation, mild pulmonary hypertension.     Acute on chronic renal failure stage IIIb/metabolic acidosis..  Baseline creatinine 10/1/2020 2.5.  Creatinine increasing..  Nephrology consult.  Continue bicarb .    Ultrasound the kidneys-Negative for hydronephrosis or renal atrophy.   Plan for dialysis, plan for permacath placement.    Hyponatremia . Decrease water intake discussed with patient.     Anemia.  Hemoglobin stable.  Probably secondary to chronic renal failure.  No  sign of GI bleed.  Iron sulfate .     Shortness of breath.  Chest x-ray-Shallow inspiration, with mild cardiomegaly, No evidence of overt CHF. No focal pulmonary infiltrates.  Patient is on room air     Elevated D-dimer.  Unable to do CTA of the chest due to renal failure.  Will start prophylaxis Lovenox for now.  Patient denies any chest pain.     Diabetes.  Hold metformin due to renal failure.  Low sliding scale for now.  Hemoglobin A1 C 7.3.  Continue Levemir .     UTI.  Rocephin x 3 days.     Nausea.  Zofran as needed.     Prostate hypertrophy.  Continue Flomax.  Bladder scan today only shows 26 cc.     Obesity.  BMI is 32.     Nutrition.  Cardiac/diabetic diet.  Magnesium oxide.     Blood cultures-no growth for 4 days.  COVID-19-negative.  Flu screen negative.  RSV-negative.  Respiratory PCR- neg .  Urine cultures-mixed arianna.     Medical Decision Making  Number and Complexity of problems: Hypertensive urgency/acute on chronic renal failure/anemia/shortness of breath/diabetes  Differential Diagnosis: None     Conditions and Status        Condition is unchanged.     [facility] Data  External documents reviewed: ER notes  Cardiac tracing (EKG, telemetry) interpretation: Sinus  Radiology interpretation: Chest x-ray  Labs reviewed: Laboratory  Any tests that were considered but not ordered: Laboratory in AM     Decision rules/scores evaluated (example [id number], [place], etc): None     Discussed with: Patient     Care Planning  Shared decision making: Patient and wife  Code status and discussions: Full code     Disposition  Social Determinants of Health that impact treatment or disposition: From home  Estimated length of stay is 2 to 5 days.

## 2024-02-18 NOTE — PROGRESS NOTES
Nephrology ([facility]) Progress Note      Patient:  Mauro Patel  YOB: 1955  Date of Service: 2/18/2024  MRN: [id number]   Acct: [id number]   Primary Care Physician: John Robertson MD  Advance Directive:   Code Status and Medical Interventions:   Ordered at: 02/14/24 1202     Level Of Support Discussed With:    Patient     Code Status (Patient has no pulse and is not breathing):    CPR (Attempt to Resuscitate)     Medical Interventions (Patient has pulse or is breathing):    Full Support     Admit Date: 2/14/2024       Hospital Day: 4  Referring Provider: No ref. provider found      Patient personally seen and examined.  Complete chart including Consults, Notes, Operative Reports, Labs, Cardiology, and Radiology studies reviewed as able.    Chief complaint: Abnormal labs.    Subjective:  Mauro Patel is a 68 y.o. male for whom we were consulted for evaluation and treatment of acute kidney injury. Baseline chronic kidney disease stage 4. Follows with Teo BATES in our office. Baseline creatinine approximately 2.8. History of type 2 diabetes, hypertension.  Patient was started on Kerendia a few months ago. He was seen in our office on 2/07 and creatinine had increased to 3.8 and he appeared somewhat volume depleted on exam. Patient was instructed to hold Lasix at that time and was supposed to get repeat labs this week. He presented to ER on 2/14 with dyspnea and elevated blood pressure. Creatinine 3.3 on arrival to ER.  He was initially admitted to ICU, needing intravenous Cardene for the treatment of severe hypertension.  Cardene has been weaned off.  He is now transferred to regular floor.    This afternoon patient is feeling weak.  He has noticed less urine output.  His renal function continued to worsen.  He has excellent blood pressure control at this point.    Allergies:  Duravent dm  [[id number]]    Home Meds:  Medications Prior to  Admission   Medication Sig Dispense Refill Last Dose    ACCU-CHEK GUIDE test strip        allopurinol (ZYLOPRIM) 100 MG tablet Take 1 tablet by mouth Daily.       ASPIRIN 81 PO Take 81 mg by mouth Daily.       carvedilol (COREG) 25 MG tablet Take 1 tablet by mouth 2 (Two) Times a Day With Meals. BID       ferrous sulfate 325 (65 FE) MG tablet Take 1 tablet by mouth 2 (Two) Times a Day With Meals.       Finerenone (Kerendia) 10 MG tablet Take 1 tablet by mouth Daily.       insulin detemir (LEVEMIR) 100 UNIT/ML injection Inject 20 Units under the skin into the appropriate area as directed 2 (Two) Times a Day. 20 units every morning and 35 units every night       Insulin Lispro, 1 Unit Dial, (HumaLOG KwikPen) 100 UNIT/ML solution pen-injector Inject 5 Units under the skin into the appropriate area as directed 2 (Two) Times a Day. At breakfast and supper       lisinopril (PRINIVIL,ZESTRIL) 40 MG tablet Take 1 tablet by mouth Daily.       losartan (COZAAR) 100 MG tablet Take 1 tablet by mouth Daily. 90 tablet 3 More than a month    magnesium oxide (MAG-OX) 400 MG tablet Take 1 tablet by mouth Daily.       rosuvastatin (CRESTOR) 10 MG tablet Take 1 tablet by mouth Daily.       sodium bicarbonate 650 MG tablet Take 2 tablets by mouth 3 (Three) Times a Day.       tadalafil (CIALIS) 5 MG tablet Take 1 tablet by mouth Daily As Needed for Erectile Dysfunction.       tamsulosin (FLOMAX) 0.4 MG capsule 24 hr capsule Take 1 capsule by mouth Daily.       vitamin D (ERGOCALCIFEROL) 34487 units capsule capsule Take 1 capsule by mouth Every 30 (Thirty) Days. Around the 15th   1/15/2024       Medicines:  Current Facility-Administered Medications   Medication Dose Route Frequency Provider Last Rate Last Admin    aspirin EC tablet 81 mg  81 mg Oral Daily David Osborne MD   81 mg at 02/18/24 0859    sennosides-docusate (PERICOLACE) 8.6-50 MG per tablet 2 tablet  2 tablet Oral BID David Osborne MD   2 tablet at 02/18/24 0905    And     polyethylene glycol (MIRALAX) packet 17 g  17 g Oral Daily PRN David Osborne MD        And    bisacodyl (DULCOLAX) EC tablet 5 mg  5 mg Oral Daily PRN David Osborne MD        And    bisacodyl (DULCOLAX) suppository 10 mg  10 mg Rectal Daily PRN David Osborne MD        carvedilol (COREG) tablet 25 mg  25 mg Oral BID David Osborne MD   25 mg at 02/18/24 0859    cefTRIAXone (ROCEPHIN) 1,000 mg in sodium chloride 0.9 % 100 mL IVPB  1,000 mg Intravenous Q24H David Osborne  mL/hr at 02/18/24 1339 1,000 mg at 02/18/24 1339    dextrose (D50W) (25 g/50 mL) IV injection 25 g  25 g Intravenous Q15 Min PRN David Osborne MD        dextrose (GLUTOSE) oral gel 15 g  15 g Oral Q15 Min PRN David Osborne MD        Enoxaparin Sodium (LOVENOX) syringe 30 mg  30 mg Subcutaneous Daily David Osborne MD   30 mg at 02/18/24 0859    glucagon (GLUCAGEN) injection 1 mg  1 mg Intramuscular Q15 Min PRN David Osborne MD        hydrALAZINE (APRESOLINE) injection 10 mg  10 mg Intravenous Q4H PRN David Osborne MD   10 mg at 02/16/24 1556    hydrALAZINE (APRESOLINE) tablet 50 mg  50 mg Oral Q8H Davie Styles MD   50 mg at 02/18/24 1339    insulin detemir (LEVEMIR) injection 5 Units  5 Units Subcutaneous Q12H David Osborne MD   5 Units at 02/18/24 0908    Insulin Lispro (humaLOG) injection 2-7 Units  2-7 Units Subcutaneous 4x Daily AC & at Bedtime David Osborne MD   2 Units at 02/18/24 1145    labetalol (NORMODYNE,TRANDATE) injection 10 mg  10 mg Intravenous Q4H PRN David Osborne MD   10 mg at 02/16/24 1440    magnesium oxide (MAG-OX) tablet 400 mg  400 mg Oral Daily David Osborne MD   400 mg at 02/18/24 0859    mupirocin (BACTROBAN) 2 % nasal ointment 1 Application  1 application  Each Nare BID David Osborne MD   1 Application at 02/18/24 0905    niCARdipine (CARDENE) 25 mg in 250 mL NS infusion kit  5-15 mg/hr Intravenous Titrated David Osborne MD   Stopped at 02/17/24 1059    NIFEdipine XL (PROCARDIA XL) 24 hr  tablet 60 mg  60 mg Oral Q24H Davie Styles MD   60 mg at 02/18/24 0859    ondansetron (ZOFRAN) injection 4 mg  4 mg Intravenous Q6H PRN David Osborne MD        rosuvastatin (CRESTOR) tablet 10 mg  10 mg Oral Nightly David Osborne MD   10 mg at 02/17/24 2209    sodium bicarbonate tablet 650 mg  650 mg Oral BID David Osborne MD   650 mg at 02/18/24 0859    sodium chloride 0.9 % flush 10 mL  10 mL Intravenous PRN David Osborne MD        sodium chloride 0.9 % flush 10 mL  10 mL Intravenous Q12H David Osborne MD   10 mL at 02/18/24 0908    sodium chloride 0.9 % flush 10 mL  10 mL Intravenous PRN David Osborne MD   10 mL at 02/16/24 0814    sodium chloride 0.9 % infusion 40 mL  40 mL Intravenous PRN David Osborne MD        tamsulosin (FLOMAX) 24 hr capsule 0.4 mg  0.4 mg Oral Daily David Osborne MD   0.4 mg at 02/18/24 0859       Past Medical History:  Past Medical History:   Diagnosis Date    Diabetes mellitus     Edema     Hyperlipidemia     Hypertension        Past Surgical History:  Past Surgical History:   Procedure Laterality Date    NO PAST SURGERIES         Family History  Family History   Problem Relation Age of Onset    Diabetes Mother     No Known Problems Father        Social History  Social History     Socioeconomic History    Marital status:    Tobacco Use    Smoking status: Never    Smokeless tobacco: Never   Vaping Use    Vaping Use: Never used   Substance and Sexual Activity    Alcohol use: No    Drug use: No    Sexual activity: Defer       Review of Systems:  History obtained from chart review and the patient  General ROS: No fever or chills  Respiratory ROS: No cough, shortness of breath, wheezing  Cardiovascular ROS: No chest pain or palpitations  Gastrointestinal ROS: No abdominal pain or melena  Genito-Urinary ROS: No dysuria or hematuria  Psych ROS: No anxiety and depression  14 point ROS reviewed with the patient and negative except as noted above and in the HPI unless  "unable to obtain.    Objective:  Patient Vitals for the past 24 hrs:   BP Temp Temp src Pulse Resp SpO2 Weight   02/18/24 1100 148/66 98 °F (36.7 °C) Oral 90 14 96 % --   02/18/24 0859 145/61 -- -- 84 -- -- --   02/18/24 0726 138/59 97.9 °F (36.6 °C) Oral 81 16 98 % --   02/18/24 0450 -- -- -- -- -- -- 97.5 kg (215 lb)   02/18/24 0445 139/66 97.4 °F (36.3 °C) Oral 82 18 99 % --   02/17/24 2300 158/69 97.8 °F (36.6 °C) Oral 91 16 98 % --   02/17/24 2019 156/70 98.4 °F (36.9 °C) Oral 92 18 98 % --   02/17/24 1645 147/66 98.7 °F (37.1 °C) Oral 83 16 96 % --       Intake/Output Summary (Last 24 hours) at 2/18/2024 1343  Last data filed at 2/18/2024 1313  Gross per 24 hour   Intake 480 ml   Output 400 ml   Net 80 ml     General: awake/alert   HEENT: Normocephalic atraumatic head  Neck: Supple with no JVD or carotid bruits.  Chest:  clear to auscultation bilaterally without respiratory distress  CVS: regular rate and rhythm  Abdominal: soft, nontender, positive bowel sounds  Extremities:  trace lower ext edema  Skin: warm and dry without rash      Labs:  Results from last 7 days   Lab Units 02/18/24 0622 02/17/24  0432 02/16/24  0354   WBC 10*3/mm3 8.48 8.34 9.25   HEMOGLOBIN g/dL 8.0* 8.0* 8.4*   HEMATOCRIT % 23.7* 24.6* 25.1*   PLATELETS 10*3/mm3 226 236 241         Results from last 7 days   Lab Units 02/18/24 0622 02/17/24  0432 02/16/24  0354   SODIUM mmol/L 127* 134* 138   POTASSIUM mmol/L 4.4 4.0 3.9   CHLORIDE mmol/L 94* 99 104   CO2 mmol/L 19.0* 21.0* 20.0*   BUN mg/dL 76* 68* 61*   CREATININE mg/dL 5.35* 4.91* 4.14*   CALCIUM mg/dL 8.3* 9.0 8.6   EGFR mL/min/1.73 11.0* 12.2* 15.0*   BILIRUBIN mg/dL <0.2 0.2 0.3   ALK PHOS U/L 100 105 109   ALT (SGPT) U/L 8 5 5   AST (SGOT) U/L 17 12 12   GLUCOSE mg/dL 177* 133* 119*       Radiology:   Imaging Results (Last 72 Hours)       ** No results found for the last 72 hours. **            Culture:  No results found for: \"BLOODCX\", \"URINECX\", \"WOUNDCX\", \"MRSACX\", " "\"RESPCX\", \"STOOLCX\"      Assessment    Acute kidney injury, ATN-/worsening/due to Kerendia  Baseline chronic kidney disease stage 4  Hypertensive urgency  Type 2 diabetes with nephropathy.  Anemia of CKD and iron deficiency  Hyponatremia due to excessive water intake  Metabolic acidemia.    Plan:  Long discussion with patient, recommended dialysis.  Patient agreed to proceed with hemodialysis via permacath.  I will request a consultation with Dr. Armenta for insertion of permacath  Decrease water intake.  Sodium bicarbonate.  Initiate dialysis once he has access      Davie Styles MD  2/18/2024  13:43 CST    "

## 2024-02-19 ENCOUNTER — APPOINTMENT (OUTPATIENT)
Dept: INTERVENTIONAL RADIOLOGY/VASCULAR | Facility: HOSPITAL | Age: 69
End: 2024-02-19
Payer: MEDICARE

## 2024-02-19 ENCOUNTER — ANESTHESIA EVENT (OUTPATIENT)
Dept: PERIOP | Facility: HOSPITAL | Age: 69
End: 2024-02-19
Payer: MEDICARE

## 2024-02-19 ENCOUNTER — ANESTHESIA (OUTPATIENT)
Dept: PERIOP | Facility: HOSPITAL | Age: 69
End: 2024-02-19
Payer: MEDICARE

## 2024-02-19 PROBLEM — N18.9 CHRONIC RENAL IMPAIRMENT: Status: ACTIVE | Noted: 2024-02-14

## 2024-02-19 LAB
ABO GROUP BLD: NORMAL
ALBUMIN SERPL-MCNC: 3 G/DL (ref 3.5–5.2)
ALBUMIN/GLOB SERPL: 0.8 G/DL
ALP SERPL-CCNC: 104 U/L (ref 39–117)
ALT SERPL W P-5'-P-CCNC: 11 U/L (ref 1–41)
ANION GAP SERPL CALCULATED.3IONS-SCNC: 13 MMOL/L (ref 5–15)
AST SERPL-CCNC: 22 U/L (ref 1–40)
BACTERIA SPEC AEROBE CULT: NORMAL
BACTERIA SPEC AEROBE CULT: NORMAL
BASOPHILS # BLD AUTO: 0.05 10*3/MM3 (ref 0–0.2)
BASOPHILS NFR BLD AUTO: 0.7 % (ref 0–1.5)
BILIRUB SERPL-MCNC: <0.2 MG/DL (ref 0–1.2)
BLD GP AB SCN SERPL QL: NEGATIVE
BUN SERPL-MCNC: 78 MG/DL (ref 8–23)
BUN/CREAT SERPL: 13.9 (ref 7–25)
CALCIUM SPEC-SCNC: 8.6 MG/DL (ref 8.6–10.5)
CHLORIDE SERPL-SCNC: 94 MMOL/L (ref 98–107)
CO2 SERPL-SCNC: 21 MMOL/L (ref 22–29)
CREAT SERPL-MCNC: 5.63 MG/DL (ref 0.76–1.27)
DEPRECATED RDW RBC AUTO: 48.8 FL (ref 37–54)
EGFRCR SERPLBLD CKD-EPI 2021: 10.4 ML/MIN/1.73
EOSINOPHIL # BLD AUTO: 0.18 10*3/MM3 (ref 0–0.4)
EOSINOPHIL NFR BLD AUTO: 2.4 % (ref 0.3–6.2)
ERYTHROCYTE [DISTWIDTH] IN BLOOD BY AUTOMATED COUNT: 14.4 % (ref 12.3–15.4)
GLOBULIN UR ELPH-MCNC: 3.6 GM/DL
GLUCOSE BLDC GLUCOMTR-MCNC: 140 MG/DL (ref 70–130)
GLUCOSE BLDC GLUCOMTR-MCNC: 152 MG/DL (ref 70–130)
GLUCOSE BLDC GLUCOMTR-MCNC: 160 MG/DL (ref 70–130)
GLUCOSE BLDC GLUCOMTR-MCNC: 161 MG/DL (ref 70–130)
GLUCOSE BLDC GLUCOMTR-MCNC: 198 MG/DL (ref 70–130)
GLUCOSE SERPL-MCNC: 110 MG/DL (ref 65–99)
HAV IGM SERPL QL IA: NORMAL
HBV CORE IGM SERPL QL IA: NORMAL
HBV SURFACE AB SER RIA-ACNC: NORMAL
HBV SURFACE AG SERPL QL IA: NORMAL
HCT VFR BLD AUTO: 25 % (ref 37.5–51)
HCV AB SER DONR QL: NORMAL
HGB BLD-MCNC: 8.2 G/DL (ref 13–17.7)
IMM GRANULOCYTES # BLD AUTO: 0.05 10*3/MM3 (ref 0–0.05)
IMM GRANULOCYTES NFR BLD AUTO: 0.7 % (ref 0–0.5)
LYMPHOCYTES # BLD AUTO: 1.8 10*3/MM3 (ref 0.7–3.1)
LYMPHOCYTES NFR BLD AUTO: 24.4 % (ref 19.6–45.3)
MCH RBC QN AUTO: 30.3 PG (ref 26.6–33)
MCHC RBC AUTO-ENTMCNC: 32.8 G/DL (ref 31.5–35.7)
MCV RBC AUTO: 92.3 FL (ref 79–97)
MONOCYTES # BLD AUTO: 0.69 10*3/MM3 (ref 0.1–0.9)
MONOCYTES NFR BLD AUTO: 9.3 % (ref 5–12)
NEUTROPHILS NFR BLD AUTO: 4.61 10*3/MM3 (ref 1.7–7)
NEUTROPHILS NFR BLD AUTO: 62.5 % (ref 42.7–76)
NRBC BLD AUTO-RTO: 0 /100 WBC (ref 0–0.2)
PLATELET # BLD AUTO: 227 10*3/MM3 (ref 140–450)
PMV BLD AUTO: 10 FL (ref 6–12)
POTASSIUM SERPL-SCNC: 4 MMOL/L (ref 3.5–5.2)
PROT SERPL-MCNC: 6.6 G/DL (ref 6–8.5)
RBC # BLD AUTO: 2.71 10*6/MM3 (ref 4.14–5.8)
RH BLD: POSITIVE
SODIUM SERPL-SCNC: 128 MMOL/L (ref 136–145)
T&S EXPIRATION DATE: NORMAL
WBC NRBC COR # BLD AUTO: 7.38 10*3/MM3 (ref 3.4–10.8)

## 2024-02-19 PROCEDURE — 5A1D70Z PERFORMANCE OF URINARY FILTRATION, INTERMITTENT, LESS THAN 6 HOURS PER DAY: ICD-10-PCS | Performed by: INTERNAL MEDICINE

## 2024-02-19 PROCEDURE — 36558 INSERT TUNNELED CV CATH: CPT

## 2024-02-19 PROCEDURE — 80053 COMPREHEN METABOLIC PANEL: CPT | Performed by: INTERNAL MEDICINE

## 2024-02-19 PROCEDURE — 86901 BLOOD TYPING SEROLOGIC RH(D): CPT | Performed by: SURGERY

## 2024-02-19 PROCEDURE — 80074 ACUTE HEPATITIS PANEL: CPT | Performed by: SURGERY

## 2024-02-19 PROCEDURE — 86900 BLOOD TYPING SEROLOGIC ABO: CPT | Performed by: SURGERY

## 2024-02-19 PROCEDURE — 25010000002 CEFAZOLIN PER 500 MG: Performed by: SURGERY

## 2024-02-19 PROCEDURE — 86850 RBC ANTIBODY SCREEN: CPT | Performed by: SURGERY

## 2024-02-19 PROCEDURE — B548ZZA ULTRASONOGRAPHY OF SUPERIOR VENA CAVA, GUIDANCE: ICD-10-PCS | Performed by: SURGERY

## 2024-02-19 PROCEDURE — 77001 FLUOROGUIDE FOR VEIN DEVICE: CPT | Performed by: SURGERY

## 2024-02-19 PROCEDURE — C1750 CATH, HEMODIALYSIS,LONG-TERM: HCPCS | Performed by: SURGERY

## 2024-02-19 PROCEDURE — 76000 FLUOROSCOPY <1 HR PHYS/QHP: CPT

## 2024-02-19 PROCEDURE — 63710000001 INSULIN LISPRO (HUMAN) PER 5 UNITS: Performed by: SURGERY

## 2024-02-19 PROCEDURE — C1894 INTRO/SHEATH, NON-LASER: HCPCS | Performed by: SURGERY

## 2024-02-19 PROCEDURE — 99222 1ST HOSP IP/OBS MODERATE 55: CPT | Performed by: SURGERY

## 2024-02-19 PROCEDURE — 0JH63XZ INSERTION OF TUNNELED VASCULAR ACCESS DEVICE INTO CHEST SUBCUTANEOUS TISSUE AND FASCIA, PERCUTANEOUS APPROACH: ICD-10-PCS | Performed by: SURGERY

## 2024-02-19 PROCEDURE — 25810000003 SODIUM CHLORIDE 0.9 % SOLUTION: Performed by: SURGERY

## 2024-02-19 PROCEDURE — 25010000002 HEPARIN (PORCINE) PER 1000 UNITS: Performed by: INTERNAL MEDICINE

## 2024-02-19 PROCEDURE — 25010000002 FENTANYL CITRATE (PF) 100 MCG/2ML SOLUTION

## 2024-02-19 PROCEDURE — 25010000002 HEPARIN (PORCINE) PER 1000 UNITS: Performed by: SURGERY

## 2024-02-19 PROCEDURE — 25010000002 PROPOFOL 200 MG/20ML EMULSION

## 2024-02-19 PROCEDURE — 82948 REAGENT STRIP/BLOOD GLUCOSE: CPT

## 2024-02-19 PROCEDURE — 36558 INSERT TUNNELED CV CATH: CPT | Performed by: SURGERY

## 2024-02-19 PROCEDURE — 63710000001 INSULIN DETEMIR PER 5 UNITS: Performed by: SURGERY

## 2024-02-19 PROCEDURE — 02HV33Z INSERTION OF INFUSION DEVICE INTO SUPERIOR VENA CAVA, PERCUTANEOUS APPROACH: ICD-10-PCS | Performed by: SURGERY

## 2024-02-19 PROCEDURE — 77001 FLUOROGUIDE FOR VEIN DEVICE: CPT

## 2024-02-19 PROCEDURE — 86706 HEP B SURFACE ANTIBODY: CPT | Performed by: SURGERY

## 2024-02-19 PROCEDURE — 85025 COMPLETE CBC W/AUTO DIFF WBC: CPT | Performed by: INTERNAL MEDICINE

## 2024-02-19 PROCEDURE — B5181ZA FLUOROSCOPY OF SUPERIOR VENA CAVA USING LOW OSMOLAR CONTRAST, GUIDANCE: ICD-10-PCS | Performed by: SURGERY

## 2024-02-19 RX ORDER — HYDROCODONE BITARTRATE AND ACETAMINOPHEN 5; 325 MG/1; MG/1
1 TABLET ORAL ONCE AS NEEDED
Status: DISCONTINUED | OUTPATIENT
Start: 2024-02-19 | End: 2024-02-19 | Stop reason: HOSPADM

## 2024-02-19 RX ORDER — ONDANSETRON 4 MG/1
4 TABLET, ORALLY DISINTEGRATING ORAL EVERY 6 HOURS PRN
Status: DISCONTINUED | OUTPATIENT
Start: 2024-02-19 | End: 2024-02-21 | Stop reason: HOSPADM

## 2024-02-19 RX ORDER — LIDOCAINE HYDROCHLORIDE 20 MG/ML
INJECTION, SOLUTION EPIDURAL; INFILTRATION; INTRACAUDAL; PERINEURAL AS NEEDED
Status: DISCONTINUED | OUTPATIENT
Start: 2024-02-19 | End: 2024-02-19 | Stop reason: SURG

## 2024-02-19 RX ORDER — HEPARIN SODIUM 1000 [USP'U]/ML
3200 INJECTION, SOLUTION INTRAVENOUS; SUBCUTANEOUS AS NEEDED
Status: DISCONTINUED | OUTPATIENT
Start: 2024-02-19 | End: 2024-02-21 | Stop reason: HOSPADM

## 2024-02-19 RX ORDER — SODIUM CHLORIDE 0.9 % (FLUSH) 0.9 %
3 SYRINGE (ML) INJECTION AS NEEDED
Status: DISCONTINUED | OUTPATIENT
Start: 2024-02-19 | End: 2024-02-19 | Stop reason: HOSPADM

## 2024-02-19 RX ORDER — ONDANSETRON 2 MG/ML
4 INJECTION INTRAMUSCULAR; INTRAVENOUS EVERY 6 HOURS PRN
Status: DISCONTINUED | OUTPATIENT
Start: 2024-02-19 | End: 2024-02-21 | Stop reason: HOSPADM

## 2024-02-19 RX ORDER — HEPARIN SODIUM 1000 [USP'U]/ML
INJECTION, SOLUTION INTRAVENOUS; SUBCUTANEOUS AS NEEDED
Status: DISCONTINUED | OUTPATIENT
Start: 2024-02-19 | End: 2024-02-19 | Stop reason: HOSPADM

## 2024-02-19 RX ORDER — HYDROCODONE BITARTRATE AND ACETAMINOPHEN 5; 325 MG/1; MG/1
1 TABLET ORAL EVERY 4 HOURS PRN
Status: DISCONTINUED | OUTPATIENT
Start: 2024-02-19 | End: 2024-02-21 | Stop reason: HOSPADM

## 2024-02-19 RX ORDER — DROPERIDOL 2.5 MG/ML
0.62 INJECTION, SOLUTION INTRAMUSCULAR; INTRAVENOUS ONCE AS NEEDED
Status: DISCONTINUED | OUTPATIENT
Start: 2024-02-19 | End: 2024-02-19 | Stop reason: HOSPADM

## 2024-02-19 RX ORDER — LABETALOL HYDROCHLORIDE 5 MG/ML
5 INJECTION, SOLUTION INTRAVENOUS
Status: DISCONTINUED | OUTPATIENT
Start: 2024-02-19 | End: 2024-02-19 | Stop reason: HOSPADM

## 2024-02-19 RX ORDER — SODIUM CHLORIDE 9 MG/ML
500 INJECTION, SOLUTION INTRAVENOUS CONTINUOUS
Status: DISPENSED | OUTPATIENT
Start: 2024-02-19 | End: 2024-02-20

## 2024-02-19 RX ORDER — SODIUM CHLORIDE 0.9 % (FLUSH) 0.9 %
3-10 SYRINGE (ML) INJECTION AS NEEDED
Status: DISCONTINUED | OUTPATIENT
Start: 2024-02-19 | End: 2024-02-19 | Stop reason: HOSPADM

## 2024-02-19 RX ORDER — IBUPROFEN 600 MG/1
600 TABLET ORAL ONCE AS NEEDED
Status: DISCONTINUED | OUTPATIENT
Start: 2024-02-19 | End: 2024-02-19 | Stop reason: HOSPADM

## 2024-02-19 RX ORDER — LIDOCAINE HYDROCHLORIDE 10 MG/ML
0.5 INJECTION, SOLUTION EPIDURAL; INFILTRATION; INTRACAUDAL; PERINEURAL ONCE AS NEEDED
Status: DISCONTINUED | OUTPATIENT
Start: 2024-02-19 | End: 2024-02-19 | Stop reason: HOSPADM

## 2024-02-19 RX ORDER — BUPIVACAINE HYDROCHLORIDE AND EPINEPHRINE 5; 5 MG/ML; UG/ML
INJECTION, SOLUTION EPIDURAL; INTRACAUDAL; PERINEURAL AS NEEDED
Status: DISCONTINUED | OUTPATIENT
Start: 2024-02-19 | End: 2024-02-19 | Stop reason: HOSPADM

## 2024-02-19 RX ORDER — ONDANSETRON 2 MG/ML
4 INJECTION INTRAMUSCULAR; INTRAVENOUS
Status: DISCONTINUED | OUTPATIENT
Start: 2024-02-19 | End: 2024-02-19 | Stop reason: HOSPADM

## 2024-02-19 RX ORDER — SODIUM CHLORIDE, SODIUM LACTATE, POTASSIUM CHLORIDE, CALCIUM CHLORIDE 600; 310; 30; 20 MG/100ML; MG/100ML; MG/100ML; MG/100ML
100 INJECTION, SOLUTION INTRAVENOUS CONTINUOUS
Status: DISCONTINUED | OUTPATIENT
Start: 2024-02-19 | End: 2024-02-19

## 2024-02-19 RX ORDER — SODIUM CHLORIDE 9 MG/ML
40 INJECTION, SOLUTION INTRAVENOUS AS NEEDED
Status: DISCONTINUED | OUTPATIENT
Start: 2024-02-19 | End: 2024-02-19 | Stop reason: HOSPADM

## 2024-02-19 RX ORDER — HYDROMORPHONE HYDROCHLORIDE 1 MG/ML
0.2 INJECTION, SOLUTION INTRAMUSCULAR; INTRAVENOUS; SUBCUTANEOUS
Status: DISCONTINUED | OUTPATIENT
Start: 2024-02-19 | End: 2024-02-19 | Stop reason: HOSPADM

## 2024-02-19 RX ORDER — FENTANYL CITRATE 50 UG/ML
25 INJECTION, SOLUTION INTRAMUSCULAR; INTRAVENOUS
Status: DISCONTINUED | OUTPATIENT
Start: 2024-02-19 | End: 2024-02-19 | Stop reason: HOSPADM

## 2024-02-19 RX ORDER — FENTANYL CITRATE 50 UG/ML
INJECTION, SOLUTION INTRAMUSCULAR; INTRAVENOUS AS NEEDED
Status: DISCONTINUED | OUTPATIENT
Start: 2024-02-19 | End: 2024-02-19 | Stop reason: SURG

## 2024-02-19 RX ORDER — NALOXONE HCL 0.4 MG/ML
0.04 VIAL (ML) INJECTION AS NEEDED
Status: DISCONTINUED | OUTPATIENT
Start: 2024-02-19 | End: 2024-02-19 | Stop reason: HOSPADM

## 2024-02-19 RX ORDER — FLUMAZENIL 0.1 MG/ML
0.2 INJECTION INTRAVENOUS AS NEEDED
Status: DISCONTINUED | OUTPATIENT
Start: 2024-02-19 | End: 2024-02-19 | Stop reason: HOSPADM

## 2024-02-19 RX ORDER — ACETAMINOPHEN 325 MG/1
650 TABLET ORAL EVERY 4 HOURS PRN
Status: DISCONTINUED | OUTPATIENT
Start: 2024-02-19 | End: 2024-02-21 | Stop reason: HOSPADM

## 2024-02-19 RX ORDER — SODIUM CHLORIDE 0.9 % (FLUSH) 0.9 %
3 SYRINGE (ML) INJECTION EVERY 12 HOURS SCHEDULED
Status: DISCONTINUED | OUTPATIENT
Start: 2024-02-19 | End: 2024-02-19 | Stop reason: HOSPADM

## 2024-02-19 RX ORDER — PROPOFOL 10 MG/ML
INJECTION, EMULSION INTRAVENOUS CONTINUOUS PRN
Status: DISCONTINUED | OUTPATIENT
Start: 2024-02-19 | End: 2024-02-19 | Stop reason: SURG

## 2024-02-19 RX ADMIN — CEFAZOLIN 2000 MG: 2 INJECTION, POWDER, FOR SOLUTION INTRAMUSCULAR; INTRAVENOUS at 17:47

## 2024-02-19 RX ADMIN — Medication 10 ML: at 20:28

## 2024-02-19 RX ADMIN — INSULIN DETEMIR 5 UNITS: 100 INJECTION, SOLUTION SUBCUTANEOUS at 20:26

## 2024-02-19 RX ADMIN — LIDOCAINE HYDROCHLORIDE 50 MG: 20 INJECTION, SOLUTION EPIDURAL; INFILTRATION; INTRACAUDAL; PERINEURAL at 09:43

## 2024-02-19 RX ADMIN — CARVEDILOL 25 MG: 25 TABLET, FILM COATED ORAL at 11:17

## 2024-02-19 RX ADMIN — INSULIN LISPRO 2 UNITS: 100 INJECTION, SOLUTION INTRAVENOUS; SUBCUTANEOUS at 20:26

## 2024-02-19 RX ADMIN — HYDRALAZINE HYDROCHLORIDE 50 MG: 50 TABLET ORAL at 06:02

## 2024-02-19 RX ADMIN — CEFAZOLIN 2000 MG: 2 INJECTION, POWDER, FOR SOLUTION INTRAMUSCULAR; INTRAVENOUS at 09:44

## 2024-02-19 RX ADMIN — TAMSULOSIN HYDROCHLORIDE 0.4 MG: 0.4 CAPSULE ORAL at 11:16

## 2024-02-19 RX ADMIN — HYDRALAZINE HYDROCHLORIDE 50 MG: 50 TABLET ORAL at 20:27

## 2024-02-19 RX ADMIN — SODIUM BICARBONATE 650 MG: 650 TABLET ORAL at 11:18

## 2024-02-19 RX ADMIN — NIFEDIPINE 60 MG: 60 TABLET, FILM COATED, EXTENDED RELEASE ORAL at 11:17

## 2024-02-19 RX ADMIN — HEPARIN SODIUM 3200 UNITS: 1000 INJECTION, SOLUTION INTRAVENOUS; SUBCUTANEOUS at 15:33

## 2024-02-19 RX ADMIN — ASPIRIN 81 MG: 81 TABLET, COATED ORAL at 11:16

## 2024-02-19 RX ADMIN — CARVEDILOL 25 MG: 25 TABLET, FILM COATED ORAL at 20:27

## 2024-02-19 RX ADMIN — INSULIN LISPRO 2 UNITS: 100 INJECTION, SOLUTION INTRAVENOUS; SUBCUTANEOUS at 12:29

## 2024-02-19 RX ADMIN — SODIUM BICARBONATE 650 MG: 650 TABLET ORAL at 17:47

## 2024-02-19 RX ADMIN — Medication 10 ML: at 11:17

## 2024-02-19 RX ADMIN — DOCUSATE SODIUM 50 MG AND SENNOSIDES 8.6 MG 2 TABLET: 8.6; 5 TABLET, FILM COATED ORAL at 20:26

## 2024-02-19 RX ADMIN — MAGNESIUM GLUCONATE 500 MG ORAL TABLET 400 MG: 500 TABLET ORAL at 11:16

## 2024-02-19 RX ADMIN — HYDRALAZINE HYDROCHLORIDE 50 MG: 50 TABLET ORAL at 17:47

## 2024-02-19 RX ADMIN — ROSUVASTATIN CALCIUM 10 MG: 10 TABLET, COATED ORAL at 20:27

## 2024-02-19 RX ADMIN — FENTANYL CITRATE 100 MCG: 50 INJECTION, SOLUTION INTRAMUSCULAR; INTRAVENOUS at 09:40

## 2024-02-19 RX ADMIN — SODIUM CHLORIDE 500 ML: 9 INJECTION, SOLUTION INTRAVENOUS at 09:24

## 2024-02-19 RX ADMIN — PROPOFOL 100 MCG/KG/MIN: 10 INJECTION, EMULSION INTRAVENOUS at 09:43

## 2024-02-19 NOTE — PLAN OF CARE
Problem: Adult Inpatient Plan of Care  Goal: Plan of Care Review  Flowsheets (Taken 2/19/2024 7240)  Outcome Evaluation: Pt. had no c/o pain this shift. VSS. Pt. to go for possible permacath procedure today, NPO since 0000. Tele- NSR 73-86. Safety maintained. Care plan ongoing.                                   Speech Therapy Daily Treatment  Infant Feeding/Swallow     Admitting diagnosis:   infant of 28 completed weeks of gestation [P07.31]   YOB: 2020, 2 month old   Corrected gestational age:39w 3d  Birth Weight: 3 lb 6.5 oz (1545 g)  Current hospitalization required due to the following medical needs:  Active Problems:      infant of 28 completed weeks of gestation    Twin delivery by     Respiratory distress of     At risk for apnea    Anemia    Hypernatremia    Thrombocytopenia (CMS/HCC)    RDS of     Hypotension due to hypovolemia    Elevated serum creatinine    Metabolic acidosis    Inguinal hernia    Moderate BPD (bronchopulmonary dysplasia)    Hypertension    Heart murmur  Resolved Problems:    Observation and evaluation of  for suspected infectious condition ruled out    Acute respiratory failure (CMS/HCC)    Cyclical neutropenia (CMS/HCC)    Hyperbilirubinemia    Idiopathic hypotension    Medical changes or updates since last session: decreased in O2 to 0.1L     SUBJECTIVE   Collaboration with: Nurse Roxanne whom reports infant's feedings have been increased to 70 mLs.    Pain before session:  FLACC (face, legs, activity, cry, consolability):   Face 0 No particular expression or smile   Legs 0 Normal position or relaxed   Activity 0 Lying quietly, normal position, moves easily   Cry 0 No cry, quiet   Consolability 0 Content, relaxed   Score 0       OBJECTIVE   Infant awake and alert. Vitals wnl. No parent present.    Status at onset of session:   Physiologic: Stable autonomic behaviors including  stable heart rate, regular respirations, pink/stable color and appropriate oxygen saturations.  Motor: Instability of the motor system as evidenced by  diffuse activity and finger splay/hault hands  State: Infant was in a active alert  state. Stable state behaviors including self consoling and appropriate responses.    Current Feeding: PO, NG, 70  mls q 3 hours, Neosure formula, 22 abdulaziz/oz   Respiratory Status: nasal cannula, 0.1 L     Treatment/Intervention    Oral Motor/ Peripheral Examination  Structural observations: intact  Oral musculature: decreased strength of movement  Reflexes: age appropriate  Secretion management: within functional limits  Phonation: N/A  Non-nutritive suck: Rooting: adequate, Suck initiation: independent, Tongue movements: impaired tongue cup, impaired seal, Pattern: rhythmical  Stress cues observed during oral motor:   - Motor: none noted   - Physiological: none noted  Oral motor treatment: Infant was grunting and bearing down at start of session. Infant benefited from infant massage to support passing of gas and bowel movement prior to feeding. Work to occiput, paraspinals, pecs, and scaps provided to improve postural alignment for feeding, as infant was arching with arms in extension prior to session. Improved tongue placement with work to hyoid. Infant then benefited from from tight swaddle and initiation of non-nutritive suck prior to start of feeding.    Oral Feeding:   - Infant fed by this clinician  - Postioning during feeding: Sidelying, Elevated   - Infant consumed 33 mls  via Dr. Brown's Ultra Preemie Nipple.     Oral Phase:   - Initiation: able to initiate a suck without stimulation required   - Skills: impaired tongue cup, impaired seal on the nipple, anterior spillage, arrhythmical, inconsistent jaw excursions, changes in vitals tachypnea, increased work of breathing, extended rest breaks needed    - Pattern: uncoordinated suck/swallow/breath pattern, disorganized, dysfunctional   Pharyngeal Phase: Uncoordinated suck/swallow/breathe coordination    Stress Cues noted during oral feeding:  Autonomic: tachypnea and scout to mid 70s x2 and desat to mid 50s with wet burps; no spells with active feeding  State: shut down and after spells with wet burps  Motor: finger splay/hault hands, facial grimace, squirming and  anterior spillage    Therapy Techniques: Infant fed in side lying position.  Infant initially required external pacing at 3-4 sucks with nipple tipped but able to remain in mouth. As feeding progressed, infant self-paced at 3-5 sucks. Decreased tachypnea noted this session, but increased spells with wet burps. Decreased overall work of breathing. Feeding stopped after 33 mLs, as infant shut down after wet burps with accompanying spells.    Family Centered Support/Education: Caregiver(s) not present. Will meet and provide teaching strategies as available. Discussed with bedside nurse     ASSESSMENT:   Infant demonstrated moderately disorganized oral feeding pattern characterized by uncoordinated suck/swallow/breathe pattern. Infant benefited from pacing every 3-5 sucks if infant does not attempt self-pacing. Recommend feeding if awake, alert, and RR <60.  Speech to continue to follow.     Impressions & Recommendations:  Aspiration Risk Mild  Factors include: prematurity     Tips for Caregivers  Positioning Recommendations: Elevated Side Lying  Nipple Recommendations: Dr. Issa's ultra-preemie  Pacing Recommendations: Feeder required to closely follow infant driven cues  State Regulation: swaddle, organizing techniques prior to feeding  Stress Signals: finger splay, tachypnea; bradys and desats with wet burps  Recommendation Comments: Stop feeding with signs of fatigue or poor tolerance    Patient will benefit from speech therapy. Habilitative potential is good based on assessment above    Pain after session:  FLACC (face, legs, activity, cry, consolability):   Face 0 No particular expression or smile   Legs 0 Normal position or relaxed   Activity 0 Lying quietly, normal position, moves easily   Cry 0 No cry, quiet   Consolability 0 Content, relaxed   Score 0       PLAN:   Suggestions for next session as indicated: Continue with plan of care    Goals:  Goals  Discharge Goal #1: Infant will initiate and maintain a  safe, organized po feeding pattern to sustain nutrition and hydration  Goal Progression #1: Outcome not met, continue to monitor    Plan  Frequency: 2-3x/week  Plan for Next Session: address dysphagia    Documentation completed on following flowsheet: SLP flowsheet, Infant flowsheet

## 2024-02-19 NOTE — OP NOTE
Mauro Patel  2/19/2024     PREOPERATIVE DIAGNOSIS: Hypertensive urgency [I16.0]  Chronic renal impairment, unspecified CKD stage [N18.9]     POSTOPERATIVE DIAGNOSIS: Post-Op Diagnosis Codes:     * Hypertensive urgency [I16.0]     * Chronic renal impairment, unspecified CKD stage [N18.9]     PROCEDURE PERFORMED:   1.  Ultrasound-guided cannulation of the right internal jugular vein  2.  Placement of a 14.5 x 19 cm tunneled PermCath  3.  Radiographic supervision and interpretation     SURGEON: Michael Armenta DO      ANESTHESIA: MAC    PREPARATION: Routine.    STAFF: Circulator: Tiffany Bone RN  Scrub Person: Lidia Robles  Assistant: Paul Garcia  Vascular Radiology Technician: Rosa Lopes    Estimated Blood Loss: minimal    SPECIMENS: None    COMPLICATIONS: None    INDICATIONS: Mauro Patel is a 68 y.o. male whom we were consulted for acute kidney injury/chronic kidney disease/PermCath placement.  Patient has stage IV chronic kidney disease, last estimated GFR, 23 mL.  He follows PAULO Bravo in the office.  He has progressive decline in renal function due to diabetic nephropathy.  He has poorly controlled diabetes with A1c close to 8%.  Presenting today with increasing shortness of breath.  On arrival in the emergency room his blood pressure was 200/100  Millimeters mercury.  His serum creatinine 3.3 mg with estimated GFR less than 20 mL.  He denies any nausea vomiting or diarrhea.  He denies any recent exposure to nonsteroidal agents.  He drinks about half a gallon of water a day..  His chest x-ray consistent with normal study with no infiltrate/No fluid/essentially normal chest x-ray.  He is now admitted to CCU with worsening of renal function and severe systolic and diastolic hypertension.  He is currently on Cardene for blood pressure control.  Patient also denies any urinary outflow problem such as increased frequency or hesitancy or nocturia. The indications, risks, and  possible complications of the procedure were explained to the patient, who voiced understanding and wished to proceed with surgery.     PROCEDURE IN DETAIL:     The patient was taken to the operating room and placed on the operating table in a supine position. After MAC anesthesia was obtained, the right neck and chest were prepped and draped in a sterile manner.  Under ultrasound guidance and using a micropuncture technique the right internal jugular vein was cannulated and a microsheath was placed.  The J-wire was advanced into the IVC under fluoroscopic guidance.  Next, 10 mL of 0.5% Marcaine with epinephrine was used to infiltrate the subcutaneous tract.  Two small stab incisions were made with the 11 blade.  The tunneling device attached to the catheter was then tunneled in a subcutaneous manner up through the neck insertion site.  Next, serial dilatation was then made of the internal jugular vein under fluoroscopic guidance.  Lastly, the tear-away sheath was placed and the inner dilator and wire were removed.  The catheter was placed through the tear-away sheath with the tip ending in the right atrial/SVC junction.  There were no kinks in the catheter and both ports had good blood return.  Each port was flushed with heparinized saline and straight heparin 1000 units per mL.  The caps were applied.  The catheter was secured to skin with a 2-0 nylon.  The neck insertion site was closed with a 4-0 Monocryl in a subcuticular fashion.  The wounds were then cleaned.  Sterile dressings were applied. The patient tolerated the procedure well. Sponge and needle counts were correct. The patient was then awakened in the operating room and taken to the recovery room in good condition.    Michael Armenta DO  2/19/2024  10:02 CST

## 2024-02-19 NOTE — CASE MANAGEMENT/SOCIAL WORK
Continued Stay Note   [place]     Patient Name: Mauro Patel  MRN: [id number]  Today's Date: 2/19/2024    Admit Date: 2/14/2024    Plan: Outpatient HD   Discharge Plan       Row Name 02/19/24 1440       Plan    Plan Outpatient HD    Plan Comments Noted that patient had permcath placed today for first HD treatment. MSW faxed referral to [facility] at [phone] to start outpatient chair assignment process. MSW notified Nadiya Beltran with [facility] of new referral as well. Will follow for chair time assignment.             ADALGISA Rice

## 2024-02-19 NOTE — CONSULTS
Mauro Patel  [id number]  [id number]  [facility]/MAIN OR  Rajiv Carmona MD  2/14/2024    Chief Complaint   Patient presents with    Shortness of Breath       HPI: Mauro Patel is a 68 y.o. male whom we were consulted for acute kidney injury/chronic kidney disease/PermCath placement.  Patient has stage IV chronic kidney disease, last estimated GFR, 23 mL.  He follows PAULO Bravo in the office.  He has progressive decline in renal function due to diabetic nephropathy.  He has poorly controlled diabetes with A1c close to 8%.  Presenting today with increasing shortness of breath.  On arrival in the emergency room his blood pressure was 200/100  Millimeters mercury.  His serum creatinine 3.3 mg with estimated GFR less than 20 mL.  He denies any nausea vomiting or diarrhea.  He denies any recent exposure to nonsteroidal agents.  He drinks about half a gallon of water a day..  His chest x-ray consistent with normal study with no infiltrate/No fluid/essentially normal chest x-ray.  He is now admitted to CCU with worsening of renal function and severe systolic and diastolic hypertension.      Past Medical History:   Diagnosis Date    Chronic renal impairment 2/14/2024    Diabetes mellitus     Edema     Hyperlipidemia     Hypertension        Past Surgical History:   Procedure Laterality Date    NO PAST SURGERIES         Family History   Problem Relation Age of Onset    Diabetes Mother     No Known Problems Father        Social History     Socioeconomic History    Marital status:    Tobacco Use    Smoking status: Never    Smokeless tobacco: Never   Vaping Use    Vaping Use: Never used   Substance and Sexual Activity    Alcohol use: No    Drug use: No    Sexual activity: Defer       Allergies   Allergen Reactions    Duravent Dm  [Phenylephrine-Dm-Gg] Rash       Hospital Medications (active)         Dose Frequency Start End    aspirin EC tablet 81 mg ([MAR Hold] since 2/19/2024  9:16 AM) 81 mg Daily  2/14/2024 --    Admin Instructions: Do not exceed 4 grams of aspirin in a 24 hr period.    If given for pain, use the following pain scale:   Mild Pain = Pain Score of 1-3, CPOT 1-2  Moderate Pain = Pain Score of 4-6, CPOT 3-4  Severe Pain = Pain Score of 7-10, CPOT 5-8    Route: Oral    atropine sulfate injection 0.5 mg 0.5 mg Once As Needed 2/19/2024 --    Admin Instructions: May repeat Atropine 0.5mg IV x1 dose in 3 minutes IF symptomatic bradycardia is not resolved.     Route: Intravenous    bisacodyl (DULCOLAX) EC tablet 5 mg ([MAR Hold] since 2/19/2024  9:16 AM) 5 mg Daily PRN 2/14/2024 --    Admin Instructions: Use if no bowel movement after 12 hours.  Swallow whole. Do not crush, split, or chew tablet.    Route: Oral    Linked Group 1: See Hyperspace for full Linked Orders Report.        bisacodyl (DULCOLAX) suppository 10 mg ([MAR Hold] since 2/19/2024  9:16 AM) 10 mg Daily PRN 2/14/2024 --    Admin Instructions: Use if no bowel movement after 12 hours.  Hold for diarrhea    Route: Rectal    Linked Group 1: See Hyperspace for full Linked Orders Report.        carvedilol (COREG) tablet 25 mg 25 mg 2 Times Daily 2/14/2024 --    Admin Instructions: Hold for SBP less than 100, DBP less than 60, or heart rate less than 50. If a dose is held, please contact the provider.  Give with food.    Route: Oral    dextrose (D50W) (25 g/50 mL) IV injection 25 g ([MAR Hold] since 2/19/2024  9:16 AM) 25 g Every 15 Minutes PRN 2/14/2024 --    Admin Instructions: Blood sugar less than 70; patient has IV access - Unresponsive, NPO or Unable To Safely Swallow    Route: Intravenous    dextrose (GLUTOSE) oral gel 15 g ([MAR Hold] since 2/19/2024  9:16 AM) 15 g Every 15 Minutes PRN 2/14/2024 --    Admin Instructions: BS<70, Patient Alert, Is not NPO, Can safely swallow.    Route: Oral    droperidol (INAPSINE) injection 0.625 mg 0.625 mg Once As Needed 2/19/2024 --    Route: Intravenous    Linked Group 2: See Hyperspace for full  Linked Orders Report.        droperidol (INAPSINE) injection 0.625 mg 0.625 mg Once As Needed 2/19/2024 --    Route: Intramuscular    Linked Group 2: See Hyperswendy for full Linked Orders Report.        Enoxaparin Sodium (LOVENOX) syringe 30 mg ([MAR Hold] since 2/19/2024  9:16 AM) 30 mg Daily 2/14/2024 --    Admin Instructions: Give subcutaneous in abdomen only. Do not massage site after injection.    Route: Subcutaneous    fentaNYL citrate (PF) (SUBLIMAZE) injection 25 mcg 25 mcg Every 5 Minutes PRN 2/19/2024 --    Admin Instructions: Maximum total dose of fentaNYL is 100 mcg.  Contact anesthesia before admin if pt HR <50 or resp <10.  If given for pain, use the following pain scale:  Mild Pain = Pain Score of 1-3, CPOT 1-2  Moderate Pain = Pain Score of 4-6, CPOT 3-4  Severe Pain = Pain Score of 7-10, CPOT 5-8    Route: Intravenous    fentaNYL citrate (PF) (SUBLIMAZE) injection  As Needed 2/19/2024 --    Route: Intravenous    flumazenil (ROMAZICON) injection 0.2 mg 0.2 mg As Needed 2/19/2024 --    Admin Instructions: ** give IV over 15-30 seconds **    Route: Intravenous    glucagon (GLUCAGEN) injection 1 mg ([MAR Hold] since 2/19/2024  9:16 AM) 1 mg Every 15 Minutes PRN 2/14/2024 --    Admin Instructions: Blood Glucose Less Than 70 - Patient Without IV Access - Unresponsive, NPO or Unable To Safely Swallow  Reconstitute powder for injection by adding 1 mL of -supplied sterile diluent or sterile water for injection to a vial containing 1 mg of the drug, to provide solutions containing 1 mg/mL. Shake vial gently to dissolve.    Route: Intramuscular    hydrALAZINE (APRESOLINE) injection 10 mg 10 mg Every 4 Hours PRN 2/14/2024 --    Admin Instructions: Hold for SBP less than 100, DBP less than 60  Caution: Look alike/sound alike drug alert    Route: Intravenous    hydrALAZINE (APRESOLINE) tablet 50 mg 50 mg Every 8 Hours Scheduled 2/15/2024 --    Admin Instructions: Hold for SBP less than 100, DBP less  than 60  Caution: Look alike/sound alike drug alert    Route: Oral    HYDROcodone-acetaminophen (NORCO) 5-325 MG per tablet 1 tablet 1 tablet Once As Needed 2/19/2024 --    Admin Instructions: Based on patient request - if ordered for moderate or severe pain, provider allows for administration of a medication prescribed for a lower pain scale.  [ALAN]    Do not exceed 4 grams of acetaminophen in a 24 hr period. Max dose of 2gm for AST/ALT greater than 120 units/L        If given for pain, use the following pain scale:   Mild Pain = Pain Score of 1-3, CPOT 1-2  Moderate Pain = Pain Score of 4-6, CPOT 3-4  Severe Pain = Pain Score of 7-10, CPOT 5-8    Route: Oral    HYDROmorphone (DILAUDID) injection 0.2 mg 0.2 mg Every 10 Minutes PRN 2/19/2024 --    Admin Instructions: Maximum total dose of hydromorphone is 2 mg.  If given for pain, use the following pain scale:  Mild Pain = Pain Score of 1-3, CPOT 1-2  Moderate Pain = Pain Score of 4-6, CPOT 3-4  Severe Pain = Pain Score of 7-10, CPOT 5-8    Route: Intravenous    ibuprofen (ADVIL,MOTRIN) tablet 600 mg 600 mg Once As Needed 2/19/2024 --    Admin Instructions: If given for pain, use the following pain scale:  Mild Pain = Pain Score of 1-3, CPOT 1-2  Moderate Pain = Pain Score of 4-6, CPOT 3-4  Severe Pain = Pain Score of 7-10, CPOT 5-8    Route: Oral    insulin detemir (LEVEMIR) injection 5 Units ([MAR Hold] since 2/19/2024  9:16 AM) 5 Units Every 12 Hours Scheduled 2/15/2024 --    Admin Instructions:     Route: Subcutaneous    Insulin Lispro (humaLOG) injection 2-7 Units ([MAR Hold] since 2/19/2024  9:16 AM) 2-7 Units 4 Times Daily Before Meals & Nightly 2/14/2024 --    Admin Instructions: Correction Insulin - Low Dose - Total Insulin Dose Less Than 40 units/day (Lean, Elderly or Renal Patients)    Blood Glucose 150-199 mg/dL - 2 units  Blood Glucose 200-249 mg/dL - 3 units  Blood Glucose 250-299 mg/dL - 4 units  Blood Glucose 300-349 mg/dL - 5 units  Blood Glucose  "350-400 mg/dL - 6 units  Blood Glucose Greater Than 400 mg/dL - 7 units & Call Provider   Caution: Look alike/sound alike drug alert    Route: Subcutaneous    labetalol (NORMODYNE,TRANDATE) injection 10 mg 10 mg Every 4 Hours PRN 2/14/2024 --    Admin Instructions: As needed for SBP greater than 160  Give IV Push over 2 minutes.    Route: Intravenous    labetalol (NORMODYNE,TRANDATE) injection 5 mg 5 mg Every 5 Minutes PRN 2/19/2024 --    Admin Instructions: Hold for heart rate less than 60.  Give IV Push over 2 minutes.    Route: Intravenous    lactated ringers infusion 100 mL/hr Continuous 2/19/2024 --    Route: Intravenous    lidocaine PF 2% (XYLOCAINE) injection  As Needed 2/19/2024 --    Route: Intravenous    magnesium oxide (MAG-OX) tablet 400 mg ([MAR Hold] since 2/19/2024  9:16 AM) 400 mg Daily 2/15/2024 --    Admin Instructions: Each 400mg of magnesium oxide is equal to 241.3mg of elemental magnesium.    Route: Oral    naloxone (NARCAN) injection 0.04 mg 0.04 mg As Needed 2/19/2024 --    Admin Instructions: Prepare Narcan slush by diluting 0.4mg of Naloxone in 9mL of NS (final concentration of 0.04mg/mL).  Administer 1mL of slush IV every 3 minutes until Resp Rate is above 12 per minute and patient is responsive to verbal stimuli or a total of 10mL (0.4mg) had been administered.     Route: Intravenous    NIFEdipine XL (PROCARDIA XL) 24 hr tablet 60 mg 60 mg Every 24 Hours Scheduled 2/16/2024 --    Admin Instructions: Hold for SBP less than 100, DBP less than 60  Caution: Look alike/sound alike drug alert. Avoid grapefruit juice. Swallow whole. Do not crush, split or chew.    Route: Oral    ondansetron (ZOFRAN) injection 4 mg ([MAR Hold] since 2/19/2024  9:16 AM) 4 mg Every 6 Hours PRN 2/14/2024 --    Admin Instructions: \"If multiple N/V medications ordered, use in the following order: Ondansetron, Prochlorperazine, Promethazine. Use PO unless patient refuses or patient unable to swallow.\"    Route: " Intravenous    ondansetron (ZOFRAN) injection 4 mg 4 mg Every 15 Minutes PRN 2/19/2024 --    Admin Instructions: Max total dose of 8mg in 6 hours.   If BOTH ondansetron (ZOFRAN) and promethazine (PHENERGAN) are ordered use ondansetron first and THEN promethazine IF ondansetron is ineffective.    Route: Intravenous    polyethylene glycol (MIRALAX) packet 17 g ([MAR Hold] since 2/19/2024  9:16 AM) 17 g Daily PRN 2/14/2024 --    Admin Instructions: Use if no bowel movement after 12 hours. Mix in 6-8 ounces of water.  Use 4-8 ounces of water, tea, or juice for each 17 gram dose.    Route: Oral    Linked Group 1: See Hyperspace for full Linked Orders Report.        Propofol (DIPRIVAN) injection  Continuous PRN 2/19/2024 --    Route: Intravenous    rosuvastatin (CRESTOR) tablet 10 mg ([MAR Hold] since 2/19/2024  9:16 AM) 10 mg Nightly 2/14/2024 --    Admin Instructions: Avoid grapefruit juice.    Route: Oral    sennosides-docusate (PERICOLACE) 8.6-50 MG per tablet 2 tablet ([MAR Hold] since 2/19/2024  9:16 AM) 2 tablet 2 Times Daily 2/14/2024 --    Admin Instructions: HOLD MEDICATION IF PATIENT HAS HAD BOWEL MOVEMENT. Start bowel management regimen if patient has not had a bowel movement after 12 hours.    Route: Oral    Linked Group 1: See Hyperspace for full Linked Orders Report.        sodium bicarbonate tablet 650 mg ([MAR Hold] since 2/19/2024  9:16 AM) 650 mg 3 Times Daily 2/18/2024 --    Route: Oral    sodium chloride 0.9 % flush 10 mL ([MAR Hold] since 2/19/2024  9:16 AM) 10 mL As Needed 2/14/2024 --    Route: Intravenous    Linked Group 3: See Hyperspace for full Linked Orders Report.        sodium chloride 0.9 % flush 10 mL ([MAR Hold] since 2/19/2024  9:16 AM) 10 mL Every 12 Hours Scheduled 2/14/2024 --    Route: Intravenous    sodium chloride 0.9 % flush 10 mL ([MAR Hold] since 2/19/2024  9:16 AM) 10 mL As Needed 2/14/2024 --    Route: Intravenous    sodium chloride 0.9 % infusion 40 mL ([MAR Hold] since  "2/19/2024  9:16 AM) 40 mL As Needed 2/14/2024 --    Admin Instructions: Following administration of an IV intermittent medication, flush line with 40mL NS at 100mL/hr.    Route: Intravenous    sodium chloride 0.9 % infusion 500 mL 500 mL Continuous 2/19/2024 2/20/2024    Route: Intravenous    tamsulosin (FLOMAX) 24 hr capsule 0.4 mg ([MAR Hold] since 2/19/2024  9:16 AM) 0.4 mg Daily 2/15/2024 --    Admin Instructions: Do not crush or chew the capsules or tablets. The drug may not work as designed if the capsule or tablet is crushed or chewed. Swallow whole.    Route: Oral            Review of Systems   Constitutional: Negative.    HENT: Negative.     Eyes: Negative.    Respiratory: Negative.     Cardiovascular: Negative.    Gastrointestinal: Negative.    Endocrine: Negative.    Genitourinary: Negative.    Musculoskeletal: Negative.    Skin: Negative.    Allergic/Immunologic: Negative.    Neurological: Negative.    Hematological: Negative.    Psychiatric/Behavioral: Negative.     All other systems reviewed and are negative.      /67 (BP Location: Right arm, Patient Position: Lying)   Pulse 90   Temp 98 °F (36.7 °C) (Oral)   Resp 18   Ht 172.7 cm (68\")   Wt 98.2 kg (216 lb 6.4 oz)   SpO2 98%   BMI 32.90 kg/m²     Physical Exam  Vitals and nursing note reviewed.   Constitutional:       Appearance: He is well-developed.   HENT:      Head: Normocephalic and atraumatic.   Eyes:      General: No scleral icterus.     Pupils: Pupils are equal, round, and reactive to light.   Neck:      Thyroid: No thyromegaly.      Vascular: No carotid bruit or JVD.   Cardiovascular:      Rate and Rhythm: Normal rate and regular rhythm.      Pulses:           Carotid pulses are 2+ on the right side and 2+ on the left side.       Femoral pulses are 2+ on the right side and 2+ on the left side.       Popliteal pulses are 2+ on the right side and 2+ on the left side.        Dorsalis pedis pulses are 2+ on the right side and 2+ on " the left side.        Posterior tibial pulses are 2+ on the right side and 2+ on the left side.      Heart sounds: Normal heart sounds.   Pulmonary:      Effort: Pulmonary effort is normal.      Breath sounds: Decreased breath sounds present.   Abdominal:      General: Bowel sounds are normal. There is no distension or abdominal bruit.      Palpations: Abdomen is soft. There is no mass.      Tenderness: There is no abdominal tenderness.   Musculoskeletal:         General: Normal range of motion.      Cervical back: Neck supple.   Lymphadenopathy:      Cervical: No cervical adenopathy.   Skin:     General: Skin is warm and dry.   Neurological:      Mental Status: He is alert and oriented to person, place, and time.      Cranial Nerves: No cranial nerve deficit.      Sensory: No sensory deficit.         Laboratory Data:  Results from last 7 days   Lab Units 02/19/24 0437 02/18/24 0622 02/17/24 0432   WBC 10*3/mm3 7.38 8.48 8.34   HEMOGLOBIN g/dL 8.2* 8.0* 8.0*   HEMATOCRIT % 25.0* 23.7* 24.6*   PLATELETS 10*3/mm3 227 226 236       Results from last 7 days   Lab Units 02/19/24 0437 02/18/24 0622 02/17/24 0432   SODIUM mmol/L 128* 127* 134*   POTASSIUM mmol/L 4.0 4.4 4.0   CHLORIDE mmol/L 94* 94* 99   CO2 mmol/L 21.0* 19.0* 21.0*   BUN mg/dL 78* 76* 68*   CREATININE mg/dL 5.63* 5.35* 4.91*   CALCIUM mg/dL 8.6 8.3* 9.0   BILIRUBIN mg/dL <0.2 <0.2 0.2   ALK PHOS U/L 104 100 105   ALT (SGPT) U/L 11 8 5   AST (SGOT) U/L 22 17 12   GLUCOSE mg/dL 110* 177* 133*             Diagnostic Data:  Imaging Results (Last 24 Hours)       Procedure Component Value Units Date/Time    IR Insert Tunneled CV Catheter Without Port 5 Plus [[id number]] Resulted: 02/19/24 0957     Updated: 02/19/24 0959    FL C Arm During Surgery [[id number]] Resulted: 02/19/24 0959     Updated: 02/19/24 0959    Narrative:      This procedure was auto-finalized with no dictation required.            Impression:    Hypertensive urgency    Controlled type 2  diabetes mellitus with complication, without long-term current use of insulin    Benign essential HTN    Mixed hyperlipidemia    Acute on chronic renal failure    Chronic renal impairment      Plan: After thoroughly evaluating Mauro Patel, I believe the best course of action is to proceed with PermCath placement the operating room this morning. The risks and benefits were explained at great length to the patient which include but are not limited to bleeding, infection, vessel damage, nerve damage and pneumothorax. The patient understands the risks and wishes for me to proceed.   Thank you for allowing me to see Mauro Patel in consult. Please feel free to reach out with any questions or concerns.    Michael Armenta, DO  2/19/2024  10:04 CST

## 2024-02-19 NOTE — PROGRESS NOTES
Nephrology ([facility], [facility]) Progress Note      Patient:  Mauro Patel  YOB: 1955  Date of Service: 2/19/2024  MRN: [id number]   Acct: [id number]   Primary Care Physician: John Robertson MD  Advance Directive:   Code Status and Medical Interventions:   Ordered at: 02/14/24 1202     Level Of Support Discussed With:    Patient     Code Status (Patient has no pulse and is not breathing):    CPR (Attempt to Resuscitate)     Medical Interventions (Patient has pulse or is breathing):    Full Support     Admit Date: 2/14/2024       Hospital Day: 5  Referring Provider: No ref. provider found      Patient personally seen and examined.  Complete chart including Consults, Notes, Operative Reports, Labs, Cardiology, and Radiology studies reviewed as able.        Subjective:  Mauro Patel is a 68 y.o. male for whom we were consulted for evaluation and treatment of acute kidney injury. Baseline chronic kidney disease stage 4. Follows with Teo BATES in our office. Baseline creatinine approximately 2.8. History of type 2 diabetes, hypertension.  Patient was started on Kerendia a few months ago. He was seen in our office on 2/07 and creatinine had increased to 3.8 and he appeared somewhat volume depleted on exam. Patient was instructed to hold Lasix at that time and was supposed to get repeat labs this week. He presented to ER on 2/14 with dyspnea and elevated blood pressure. Creatinine 3.3 on arrival to ER. Admitted to CCU on Cardene drip. Denied n/v/d. Denied NSAID use. Denied changes in urination    Today is awake and alert. Seen just after returning to room from permcath placement. No new complaint. Urine output nonoliguric    Allergies:  Duravent dm  [[id number]]    Home Meds:  Medications Prior to Admission   Medication Sig Dispense Refill Last Dose    ACCU-CHEK GUIDE test strip        allopurinol (ZYLOPRIM) 100 MG tablet Take 1 tablet by mouth Daily.        ASPIRIN 81 PO Take 81 mg by mouth Daily.       carvedilol (COREG) 25 MG tablet Take 1 tablet by mouth 2 (Two) Times a Day With Meals. BID       ferrous sulfate 325 (65 FE) MG tablet Take 1 tablet by mouth 2 (Two) Times a Day With Meals.       Finerenone (Kerendia) 10 MG tablet Take 1 tablet by mouth Daily.       insulin detemir (LEVEMIR) 100 UNIT/ML injection Inject 20 Units under the skin into the appropriate area as directed 2 (Two) Times a Day. 20 units every morning and 35 units every night       Insulin Lispro, 1 Unit Dial, (HumaLOG KwikPen) 100 UNIT/ML solution pen-injector Inject 5 Units under the skin into the appropriate area as directed 2 (Two) Times a Day. At breakfast and supper       lisinopril (PRINIVIL,ZESTRIL) 40 MG tablet Take 1 tablet by mouth Daily.       losartan (COZAAR) 100 MG tablet Take 1 tablet by mouth Daily. 90 tablet 3 More than a month    magnesium oxide (MAG-OX) 400 MG tablet Take 1 tablet by mouth Daily.       rosuvastatin (CRESTOR) 10 MG tablet Take 1 tablet by mouth Daily.       sodium bicarbonate 650 MG tablet Take 2 tablets by mouth 3 (Three) Times a Day.       tadalafil (CIALIS) 5 MG tablet Take 1 tablet by mouth Daily As Needed for Erectile Dysfunction.       tamsulosin (FLOMAX) 0.4 MG capsule 24 hr capsule Take 1 capsule by mouth Daily.       vitamin D (ERGOCALCIFEROL) 01532 units capsule capsule Take 1 capsule by mouth Every 30 (Thirty) Days. Around the 15th   1/15/2024       Medicines:  Current Facility-Administered Medications   Medication Dose Route Frequency Provider Last Rate Last Admin    acetaminophen (TYLENOL) tablet 650 mg  650 mg Oral Q4H PRN Michael Armenta DO        aspirin EC tablet 81 mg  81 mg Oral Daily Michael Armenta DO   81 mg at 02/19/24 1116    sennosides-docusate (PERICOLACE) 8.6-50 MG per tablet 2 tablet  2 tablet Oral BID Michael Armenta DO   2 tablet at 02/18/24 2058    And    polyethylene glycol (MIRALAX) packet 17 g  17 g Oral Daily PRN  Michael Armenta DO        And    bisacodyl (DULCOLAX) EC tablet 5 mg  5 mg Oral Daily PRN BicMichael tapia DO        And    bisacodyl (DULCOLAX) suppository 10 mg  10 mg Rectal Daily PRN BicMichael tapia DO        carvedilol (COREG) tablet 25 mg  25 mg Oral BID BickingMichael DO   25 mg at 02/19/24 1117    ceFAZolin 2000 mg IVPB in 100 mL NS (MBP)  2,000 mg Intravenous Q8H BicMichael tapia DO        dextrose (D50W) (25 g/50 mL) IV injection 25 g  25 g Intravenous Q15 Min PRN Michael Armenta DO        dextrose (GLUTOSE) oral gel 15 g  15 g Oral Q15 Min PRN BicMichael tapia DO        Enoxaparin Sodium (LOVENOX) syringe 30 mg  30 mg Subcutaneous Daily BicMichael tapia DO   30 mg at 02/18/24 0859    glucagon (GLUCAGEN) injection 1 mg  1 mg Intramuscular Q15 Min PRN BicMichael tapia DO        hydrALAZINE (APRESOLINE) injection 10 mg  10 mg Intravenous Q4H PRN BicMichael tapia DO   10 mg at 02/16/24 1556    hydrALAZINE (APRESOLINE) tablet 50 mg  50 mg Oral Q8H Michael Armenta DO   50 mg at 02/19/24 0602    HYDROcodone-acetaminophen (NORCO) 5-325 MG per tablet 1 tablet  1 tablet Oral Q4H PRN Michael Armenta DO        insulin detemir (LEVEMIR) injection 5 Units  5 Units Subcutaneous Q12H Michael Armenta DO   5 Units at 02/18/24 2104    Insulin Lispro (humaLOG) injection 2-7 Units  2-7 Units Subcutaneous 4x Daily AC & at Bedtime Michael Armenta DO   2 Units at 02/18/24 2103    labetalol (NORMODYNE,TRANDATE) injection 10 mg  10 mg Intravenous Q4H PRN BicMichael tapia DO   10 mg at 02/16/24 1440    magnesium oxide (MAG-OX) tablet 400 mg  400 mg Oral Daily BicMichael tapia DO   400 mg at 02/19/24 1116    NIFEdipine XL (PROCARDIA XL) 24 hr tablet 60 mg  60 mg Oral Q24H Michael Armenta DO   60 mg at 02/19/24 1117    ondansetron ODT (ZOFRAN-ODT) disintegrating tablet 4 mg  4 mg Oral Q6H PRN Michael Armenta DO        Or    ondansetron (ZOFRAN) injection 4 mg  4 mg  Intravenous Q6H PRN Bicking, Michael K, DO        rosuvastatin (CRESTOR) tablet 10 mg  10 mg Oral Nightly Bicking, Michael K, DO   10 mg at 02/18/24 2059    sodium bicarbonate tablet 650 mg  650 mg Oral TID Bicking, Michael K, DO   650 mg at 02/19/24 1118    sodium chloride 0.9 % flush 10 mL  10 mL Intravenous PRN Bicking, Michael K, DO        sodium chloride 0.9 % flush 10 mL  10 mL Intravenous Q12H Bicking, Michael K, DO   10 mL at 02/19/24 1117    sodium chloride 0.9 % infusion 40 mL  40 mL Intravenous PRN Bicking, Michael K, DO        sodium chloride 0.9 % infusion 500 mL  500 mL Intravenous Continuous Bicking, Michael K, DO 25 mL/hr at 02/19/24 0938 Currently Infusing at 02/19/24 0938    tamsulosin (FLOMAX) 24 hr capsule 0.4 mg  0.4 mg Oral Daily Bicking, Michael K, DO   0.4 mg at 02/19/24 1116       Past Medical History:  Past Medical History:   Diagnosis Date    Chronic renal impairment 2/14/2024    Diabetes mellitus     Edema     Hyperlipidemia     Hypertension        Past Surgical History:  Past Surgical History:   Procedure Laterality Date    NO PAST SURGERIES         Family History  Family History   Problem Relation Age of Onset    Diabetes Mother     No Known Problems Father        Social History  Social History     Socioeconomic History    Marital status:    Tobacco Use    Smoking status: Never    Smokeless tobacco: Never   Vaping Use    Vaping Use: Never used   Substance and Sexual Activity    Alcohol use: No    Drug use: No    Sexual activity: Defer       Review of Systems:  History obtained from chart review and the patient  General ROS: No fever or chills  Respiratory ROS: No cough, shortness of breath, wheezing  Cardiovascular ROS: No chest pain or palpitations  Gastrointestinal ROS: No abdominal pain or melena  Genito-Urinary ROS: No dysuria or hematuria  Psych ROS: No anxiety and depression  14 point ROS reviewed with the patient and negative except as noted above and in the HPI unless  unable to obtain.    Objective:  Patient Vitals for the past 24 hrs:   BP Temp Temp src Pulse Resp SpO2 Weight   02/19/24 1116 152/63 98.1 °F (36.7 °C) Oral 85 16 98 % --   02/19/24 1025 163/68 98.1 °F (36.7 °C) Temporal 81 14 99 % --   02/19/24 1020 157/64 -- -- 81 14 99 % --   02/19/24 1015 150/65 -- -- 81 14 100 % --   02/19/24 1010 140/63 -- -- 81 16 100 % --   02/19/24 1005 137/60 -- -- 82 12 100 % --   02/19/24 1003 137/60 97.7 °F (36.5 °C) Temporal 81 16 100 % --   02/19/24 0920 -- -- -- 90 18 98 % --   02/19/24 0817 149/67 98 °F (36.7 °C) Oral 85 20 98 % --   02/19/24 0500 -- -- -- -- -- -- 98.2 kg (216 lb 6.4 oz)   02/19/24 0402 149/63 98 °F (36.7 °C) Oral 81 20 98 % --   02/19/24 0020 135/51 98.3 °F (36.8 °C) Oral 82 20 97 % --   02/18/24 2037 155/65 98.2 °F (36.8 °C) Oral 87 20 98 % --   02/18/24 1512 140/62 98.4 °F (36.9 °C) Oral 81 16 97 % --       Intake/Output Summary (Last 24 hours) at 2/19/2024 1120  Last data filed at 2/19/2024 0604  Gross per 24 hour   Intake 360 ml   Output 450 ml   Net -90 ml     General: awake/alert   Chest:  clear to auscultation bilaterally without respiratory distress  CVS: regular rate and rhythm  Abdominal: soft, nontender, positive bowel sounds  Extremities:  trace lower ext edema  Skin: warm and dry without rash      Labs:  Results from last 7 days   Lab Units 02/19/24  0437 02/18/24  0622 02/17/24  0432   WBC 10*3/mm3 7.38 8.48 8.34   HEMOGLOBIN g/dL 8.2* 8.0* 8.0*   HEMATOCRIT % 25.0* 23.7* 24.6*   PLATELETS 10*3/mm3 227 226 236         Results from last 7 days   Lab Units 02/19/24  0437 02/18/24  0622 02/17/24  0432   SODIUM mmol/L 128* 127* 134*   POTASSIUM mmol/L 4.0 4.4 4.0   CHLORIDE mmol/L 94* 94* 99   CO2 mmol/L 21.0* 19.0* 21.0*   BUN mg/dL 78* 76* 68*   CREATININE mg/dL 5.63* 5.35* 4.91*   CALCIUM mg/dL 8.6 8.3* 9.0   EGFR mL/min/1.73 10.4* 11.0* 12.2*   BILIRUBIN mg/dL <0.2 <0.2 0.2   ALK PHOS U/L 104 100 105   ALT (SGPT) U/L 11 8 5   AST (SGOT) U/L 22 17 12  "  GLUCOSE mg/dL 110* 177* 133*       Radiology:   Imaging Results (Last 72 Hours)       Procedure Component Value Units Date/Time    IR Insert Tunneled CV Catheter Without Port 5 Plus [[id number]] Resulted: 02/19/24 0957     Updated: 02/19/24 0959    FL C Arm During Surgery [[id number]] Resulted: 02/19/24 0959     Updated: 02/19/24 0959    Narrative:      This procedure was auto-finalized with no dictation required.            Culture:  No results found for: \"BLOODCX\", \"URINECX\", \"WOUNDCX\", \"MRSACX\", \"RESPCX\", \"STOOLCX\"      Assessment    Acute kidney injury, ATN--now starting hemodialysis  Baseline chronic kidney disease stage 4  Hypertensive urgency  Type 2 diabetes  Anemia of CKD and iron deficiency  Hyponatremia  Metabolic acidosis    Plan:  First hemodialysis today  Monitor labs      Teo Pate, APRN  2/19/2024  11:20 CST    "

## 2024-02-19 NOTE — PROGRESS NOTES
[facility] Medicine Services  INPATIENT PROGRESS NOTE    Patient Name: Mauro Lamtreet  Date of Admission: 2/14/2024  Today's Date: 02/19/24  Length of Stay: 5  Primary Care Physician: John Robertson MD    Subjective   Chief Complaint: ARF, HTN  Shortness of Breath       To get permacath placed today to start HD        Review of Systems   Respiratory:  Positive for shortness of breath.       All pertinent negatives and positives are as above. All other systems have been reviewed and are negative unless otherwise stated.     Objective    Temp:  [97.7 °F (36.5 °C)-98.4 °F (36.9 °C)] 98.1 °F (36.7 °C)  Heart Rate:  [81-90] 85  Resp:  [12-20] 16  BP: (135-163)/(51-68) 152/63  Physical Exam  Constitutional:       Appearance: Normal appearance.   HENT:      Head: Normocephalic and atraumatic.      Nose: Nose normal.      Mouth/Throat:      Mouth: Mucous membranes are moist.   Eyes:      Extraocular Movements: Extraocular movements intact.      Pupils: Pupils are equal, round, and reactive to light.   Cardiovascular:      Rate and Rhythm: Normal rate and regular rhythm.      Pulses: Normal pulses.      Heart sounds: Normal heart sounds.   Pulmonary:      Effort: Pulmonary effort is normal.      Breath sounds: Normal breath sounds.   Abdominal:      General: Bowel sounds are normal.      Palpations: Abdomen is soft.   Musculoskeletal:         General: Normal range of motion.      Cervical back: Normal range of motion.   Skin:     General: Skin is warm and dry.   Neurological:      General: No focal deficit present.      Mental Status: He is alert and oriented to person, place, and time.   Psychiatric:         Mood and Affect: Mood normal.         Behavior: Behavior normal.             Results Review:  I have reviewed the labs, radiology results, and diagnostic studies.    Laboratory Data:   Results from last 7 days   Lab Units 02/19/24  0437 02/18/24  0622 02/17/24  0432   WBC  10*3/mm3 7.38 8.48 8.34   HEMOGLOBIN g/dL 8.2* 8.0* 8.0*   HEMATOCRIT % 25.0* 23.7* 24.6*   PLATELETS 10*3/mm3 227 226 236        Results from last 7 days   Lab Units 02/19/24  0437 02/18/24  0622 02/17/24  0432   SODIUM mmol/L 128* 127* 134*   POTASSIUM mmol/L 4.0 4.4 4.0   CHLORIDE mmol/L 94* 94* 99   CO2 mmol/L 21.0* 19.0* 21.0*   BUN mg/dL 78* 76* 68*   CREATININE mg/dL 5.63* 5.35* 4.91*   CALCIUM mg/dL 8.6 8.3* 9.0   BILIRUBIN mg/dL <0.2 <0.2 0.2   ALK PHOS U/L 104 100 105   ALT (SGPT) U/L 11 8 5   AST (SGOT) U/L 22 17 12   GLUCOSE mg/dL 110* 177* 133*       Culture Data:   Blood Culture   Date Value Ref Range Status   02/14/2024 No growth at 5 days  Final   02/14/2024 No growth at 5 days  Final     Urine Culture   Date Value Ref Range Status   02/16/2024 25,000 CFU/mL Mixed Mary Isolated  Final       Radiology Data:   Imaging Results (Last 24 Hours)       Procedure Component Value Units Date/Time    IR Insert Tunneled CV Catheter Without Port 5 Plus [[id number]] Resulted: 02/19/24 0957     Updated: 02/19/24 0959    FL C Arm During Surgery [[id number]] Resulted: 02/19/24 0959     Updated: 02/19/24 0959    Narrative:      This procedure was auto-finalized with no dictation required.            I have reviewed the patient's current medications.     Assessment/Plan   Assessment  Active Hospital Problems    Diagnosis     **Hypertensive urgency     Acute on chronic renal failure     Chronic renal impairment     Controlled type 2 diabetes mellitus with complication, without long-term current use of insulin     Benign essential HTN     Mixed hyperlipidemia        Treatment Plan  - Renal following, permacath placement today and than to get HD  - trend labs daily  - SSI and levemir  - cont BP meds, adjust as needed for better control  - cont home meds as able    Conditions and Status        Condition is unchanged.     MDM Data  External documents reviewed: yes  Cardiac tracing (EKG, telemetry) interpretation:  yes  Radiology interpretation: yes  Labs reviewed: yes     Decision rules/scores evaluated (example [id number], Wells, etc): yes     Discussed with: patient and family     Care Planning  Shared decision making: nephrology and patient  Code status and discussions: full code    Disposition  Social Determinants of Health that impact treatment or disposition: from home  I expect the patient to be discharged to home in 3-4 days.     Electronically signed by Rajiv Carmona MD, 02/19/24, 13:05 CST.

## 2024-02-19 NOTE — ANESTHESIA POSTPROCEDURE EVALUATION
"Patient: Mauro TAY Amanda    Procedure Summary       Date: 02/19/24 Room / Location:  [facility] OR  /  [facility] OR    Anesthesia Start: 0938 Anesthesia Stop: 1005    Procedure: HEMODIALYSIS CATHETER INSERTION (Neck) Diagnosis:       Hypertensive urgency      Chronic renal impairment, unspecified CKD stage      (Hypertensive urgency [I16.0])      (Chronic renal impairment, unspecified CKD stage [N18.9])    Surgeons: Michael Armenta DO Provider: Evin Gerardo CRNA    Anesthesia Type: MAC ASA Status: 3            Anesthesia Type: MAC    Vitals  Vitals Value Taken Time   /68 02/19/24 1026   Temp 98.1 °F (36.7 °C) 02/19/24 1025   Pulse 81 02/19/24 1026   Resp 14 02/19/24 1025   SpO2 99 % 02/19/24 1026   Vitals shown include unfiled device data.        Post Anesthesia Care and Evaluation    Patient location during evaluation: PACU  Patient participation: complete - patient participated  Level of consciousness: awake and awake and alert  Pain score: 0  Pain management: adequate    Airway patency: patent  Anesthetic complications: No anesthetic complications  PONV Status: none  Cardiovascular status: acceptable  Respiratory status: acceptable  Hydration status: acceptable    Comments: Patient discharged according to acceptable Mckenzie score per RN assessment. See nursing records for further information.     Blood pressure 163/68, pulse 81, temperature 98.1 °F (36.7 °C), temperature source Temporal, resp. rate 14, height 172.7 cm (68\"), weight 98.2 kg (216 lb 6.4 oz), SpO2 99%.      "

## 2024-02-19 NOTE — PROGRESS NOTES
Pt tolerated first dialysis tx today well, without complications. 1 L removed. Pt stable.  CVC dressing changed.

## 2024-02-19 NOTE — ANESTHESIA PREPROCEDURE EVALUATION
Anesthesia Evaluation     no history of anesthetic complications:   NPO Solid Status: > 8 hours  NPO Liquid Status: > 8 hours           Airway   Mallampati: I  No difficulty expected  Dental      Pulmonary    Cardiovascular   Exercise tolerance: poor (<4 METS)    (+) hypertension, MUNGUIA, hyperlipidemia    ROS comment: Echo 2/14/24    Left ventricular systolic function is normal. Left ventricular ejection fraction appears to be 61 - 65%.  ·  Left ventricular wall thickness is consistent with mild asymmetric hypertrophy  ·  Left ventricular diastolic function is consistent with (grade II w/high LAP) pseudonormalization.  ·  Normal right ventricular cavity size and systolic function noted.  ·  The left atrial cavity is moderately dilated.  ·  The aortic valve exhibits sclerosis. There is mild thickening of the aortic valve. No aortic valve regurgitation is present. Mild aortic valve stenosis is present.  ·  Moderate mitral annular calcification is present. Mild mitral valve regurgitation is present  ·  Mild pulmonary hypertension is present.      Neuro/Psych  GI/Hepatic/Renal/Endo    (+) obesity, renal disease- ARF and CRI, diabetes mellitus type 2 using insulin    ROS Comment: Admitted with shortness of breath, found to have renal failure    Musculoskeletal     Abdominal    Substance History      OB/GYN          Other   blood dyscrasia anemia,       Other Comment: hyponatremia                    Anesthesia Plan    ASA 3     MAC     intravenous induction     Anesthetic plan, risks, benefits, and alternatives have been provided, discussed and informed consent has been obtained with: patient.        CODE STATUS:    Level Of Support Discussed With: Patient  Code Status (Patient has no pulse and is not breathing): CPR (Attempt to Resuscitate)  Medical Interventions (Patient has pulse or is breathing): Full Support

## 2024-02-20 LAB
ALBUMIN SERPL-MCNC: 3.2 G/DL (ref 3.5–5.2)
ANION GAP SERPL CALCULATED.3IONS-SCNC: 13 MMOL/L (ref 5–15)
BASOPHILS # BLD AUTO: 0.05 10*3/MM3 (ref 0–0.2)
BASOPHILS NFR BLD AUTO: 0.6 % (ref 0–1.5)
BUN SERPL-MCNC: 59 MG/DL (ref 8–23)
BUN/CREAT SERPL: 12.8 (ref 7–25)
CALCIUM SPEC-SCNC: 8.2 MG/DL (ref 8.6–10.5)
CHLORIDE SERPL-SCNC: 93 MMOL/L (ref 98–107)
CO2 SERPL-SCNC: 24 MMOL/L (ref 22–29)
CREAT SERPL-MCNC: 4.61 MG/DL (ref 0.76–1.27)
DEPRECATED RDW RBC AUTO: 50.1 FL (ref 37–54)
EGFRCR SERPLBLD CKD-EPI 2021: 13.2 ML/MIN/1.73
EOSINOPHIL # BLD AUTO: 0.19 10*3/MM3 (ref 0–0.4)
EOSINOPHIL NFR BLD AUTO: 2.2 % (ref 0.3–6.2)
ERYTHROCYTE [DISTWIDTH] IN BLOOD BY AUTOMATED COUNT: 14.6 % (ref 12.3–15.4)
GLUCOSE BLDC GLUCOMTR-MCNC: 120 MG/DL (ref 70–130)
GLUCOSE BLDC GLUCOMTR-MCNC: 140 MG/DL (ref 70–130)
GLUCOSE BLDC GLUCOMTR-MCNC: 175 MG/DL (ref 70–130)
GLUCOSE BLDC GLUCOMTR-MCNC: 206 MG/DL (ref 70–130)
GLUCOSE SERPL-MCNC: 148 MG/DL (ref 65–99)
HCT VFR BLD AUTO: 24.1 % (ref 37.5–51)
HGB BLD-MCNC: 7.9 G/DL (ref 13–17.7)
IMM GRANULOCYTES # BLD AUTO: 0.05 10*3/MM3 (ref 0–0.05)
IMM GRANULOCYTES NFR BLD AUTO: 0.6 % (ref 0–0.5)
LYMPHOCYTES # BLD AUTO: 1.61 10*3/MM3 (ref 0.7–3.1)
LYMPHOCYTES NFR BLD AUTO: 19 % (ref 19.6–45.3)
MCH RBC QN AUTO: 30.7 PG (ref 26.6–33)
MCHC RBC AUTO-ENTMCNC: 32.8 G/DL (ref 31.5–35.7)
MCV RBC AUTO: 93.8 FL (ref 79–97)
MONOCYTES # BLD AUTO: 0.92 10*3/MM3 (ref 0.1–0.9)
MONOCYTES NFR BLD AUTO: 10.9 % (ref 5–12)
NEUTROPHILS NFR BLD AUTO: 5.64 10*3/MM3 (ref 1.7–7)
NEUTROPHILS NFR BLD AUTO: 66.7 % (ref 42.7–76)
NRBC BLD AUTO-RTO: 0 /100 WBC (ref 0–0.2)
PHOSPHATE SERPL-MCNC: 4.4 MG/DL (ref 2.5–4.5)
PLATELET # BLD AUTO: 248 10*3/MM3 (ref 140–450)
PMV BLD AUTO: 9.9 FL (ref 6–12)
POTASSIUM SERPL-SCNC: 4.4 MMOL/L (ref 3.5–5.2)
RBC # BLD AUTO: 2.57 10*6/MM3 (ref 4.14–5.8)
SODIUM SERPL-SCNC: 130 MMOL/L (ref 136–145)
WBC NRBC COR # BLD AUTO: 8.46 10*3/MM3 (ref 3.4–10.8)

## 2024-02-20 PROCEDURE — 25010000002 HEPARIN (PORCINE) PER 1000 UNITS: Performed by: INTERNAL MEDICINE

## 2024-02-20 PROCEDURE — 85025 COMPLETE CBC W/AUTO DIFF WBC: CPT | Performed by: SURGERY

## 2024-02-20 PROCEDURE — 63710000001 INSULIN LISPRO (HUMAN) PER 5 UNITS: Performed by: SURGERY

## 2024-02-20 PROCEDURE — 25010000002 CEFAZOLIN PER 500 MG: Performed by: SURGERY

## 2024-02-20 PROCEDURE — 80069 RENAL FUNCTION PANEL: CPT | Performed by: SURGERY

## 2024-02-20 PROCEDURE — 25010000002 ENOXAPARIN PER 10 MG: Performed by: SURGERY

## 2024-02-20 PROCEDURE — 63710000001 INSULIN DETEMIR PER 5 UNITS: Performed by: SURGERY

## 2024-02-20 PROCEDURE — 82948 REAGENT STRIP/BLOOD GLUCOSE: CPT

## 2024-02-20 RX ADMIN — INSULIN LISPRO 2 UNITS: 100 INJECTION, SOLUTION INTRAVENOUS; SUBCUTANEOUS at 19:52

## 2024-02-20 RX ADMIN — SODIUM BICARBONATE 650 MG: 650 TABLET ORAL at 19:53

## 2024-02-20 RX ADMIN — ROSUVASTATIN CALCIUM 10 MG: 10 TABLET, COATED ORAL at 19:54

## 2024-02-20 RX ADMIN — ASPIRIN 81 MG: 81 TABLET, COATED ORAL at 10:47

## 2024-02-20 RX ADMIN — TAMSULOSIN HYDROCHLORIDE 0.4 MG: 0.4 CAPSULE ORAL at 10:46

## 2024-02-20 RX ADMIN — INSULIN DETEMIR 5 UNITS: 100 INJECTION, SOLUTION SUBCUTANEOUS at 10:45

## 2024-02-20 RX ADMIN — CEFAZOLIN 2000 MG: 2 INJECTION, POWDER, FOR SOLUTION INTRAMUSCULAR; INTRAVENOUS at 01:14

## 2024-02-20 RX ADMIN — NIFEDIPINE 60 MG: 60 TABLET, FILM COATED, EXTENDED RELEASE ORAL at 10:45

## 2024-02-20 RX ADMIN — Medication 10 ML: at 21:00

## 2024-02-20 RX ADMIN — ENOXAPARIN SODIUM 30 MG: 30 INJECTION SUBCUTANEOUS at 10:55

## 2024-02-20 RX ADMIN — INSULIN DETEMIR 5 UNITS: 100 INJECTION, SOLUTION SUBCUTANEOUS at 19:53

## 2024-02-20 RX ADMIN — CARVEDILOL 25 MG: 25 TABLET, FILM COATED ORAL at 10:46

## 2024-02-20 RX ADMIN — DOCUSATE SODIUM 50 MG AND SENNOSIDES 8.6 MG 2 TABLET: 8.6; 5 TABLET, FILM COATED ORAL at 19:54

## 2024-02-20 RX ADMIN — Medication 10 ML: at 10:46

## 2024-02-20 RX ADMIN — HEPARIN SODIUM 3200 UNITS: 1000 INJECTION, SOLUTION INTRAVENOUS; SUBCUTANEOUS at 15:31

## 2024-02-20 RX ADMIN — SODIUM BICARBONATE 650 MG: 650 TABLET ORAL at 17:05

## 2024-02-20 RX ADMIN — DOCUSATE SODIUM 50 MG AND SENNOSIDES 8.6 MG 2 TABLET: 8.6; 5 TABLET, FILM COATED ORAL at 10:45

## 2024-02-20 RX ADMIN — SODIUM BICARBONATE 650 MG: 650 TABLET ORAL at 10:46

## 2024-02-20 RX ADMIN — MAGNESIUM GLUCONATE 500 MG ORAL TABLET 400 MG: 500 TABLET ORAL at 10:46

## 2024-02-20 RX ADMIN — HYDRALAZINE HYDROCHLORIDE 50 MG: 50 TABLET ORAL at 17:05

## 2024-02-20 NOTE — PAYOR COMM NOTE
"2/20 CLINICAL  [id number]    [phone]    Mauro Cat (68 y.o. Male)       Date of Birth   1955    Social Security Number       Address   [address] [place], [place] [address]    Home Phone   [phone]    MRN   [id number]       Islam   Congregational    Marital Status                               Admission Date   2/14/24    Admission Type   Emergency    Admitting Provider   Rajiv Carmona MD    Attending Provider   Rajiv Carmona MD    Department, Room/Bed   [facility] 4B, 440/1       Discharge Date       Discharge Disposition       Discharge Destination                                 Attending Provider: Rajiv Carmona MD    Allergies: Duravent Dm  [[id number]]    Isolation: None   Infection: None   Code Status: CPR    Ht: 172.7 cm (68\")   Wt: 98.8 kg (217 lb 12.8 oz)    Admission Cmt: None   Principal Problem: Hypertensive urgency [I16.0]                   Active Insurance as of 2/14/2024       Primary Coverage       Payor Plan Insurance Group Employer/Plan Group    ANTH MEDICARE REPLACEMENT ANTHEM MEDICARE ADVANTAGE [id number]       Payor Plan Address Payor Plan Phone Number Payor Plan Fax Number Effective Dates    [address] [phone]  1/1/2022 - None Entered    [facility] [id number]         Subscriber Name Subscriber Birth Date Member ID       MAURO CAT 1955 [id number]                     Emergency Contacts        (Rel.) Home Phone Work Phone Mobile Phone    osbaldo cat (Spouse) [phone] -- --              Current Facility-Administered Medications   Medication Dose Route Frequency Provider Last Rate Last Admin    acetaminophen (TYLENOL) tablet 650 mg  650 mg Oral Q4H PRN Michael Armenta DO        aspirin EC tablet 81 mg  81 mg Oral Daily Michael Armenta DO   81 mg at 02/20/24 1047    sennosides-docusate (PERICOLACE) 8.6-50 MG per tablet 2 tablet  2 tablet Oral BID Michael Armenta DO   2 " tablet at 02/20/24 1045    And    polyethylene glycol (MIRALAX) packet 17 g  17 g Oral Daily PRN BicMichael tapia DO        And    bisacodyl (DULCOLAX) EC tablet 5 mg  5 mg Oral Daily PRN BickingMichael DO        And    bisacodyl (DULCOLAX) suppository 10 mg  10 mg Rectal Daily PRN BickingMichael, DO        carvedilol (COREG) tablet 25 mg  25 mg Oral BID BickingMichael, DO   25 mg at 02/20/24 1046    dextrose (D50W) (25 g/50 mL) IV injection 25 g  25 g Intravenous Q15 Min PRN BicMichael tapia DO        dextrose (GLUTOSE) oral gel 15 g  15 g Oral Q15 Min PRN BicMichael tapia, DO        Enoxaparin Sodium (LOVENOX) syringe 30 mg  30 mg Subcutaneous Daily BicMichael tapia DO   30 mg at 02/20/24 1055    glucagon (GLUCAGEN) injection 1 mg  1 mg Intramuscular Q15 Min PRN Michael Armenta DO        heparin (porcine) injection 3,200 Units  3,200 Units Intracatheter PRN Nikhil Cabrera MD   3,200 Units at 02/19/24 1533    hydrALAZINE (APRESOLINE) injection 10 mg  10 mg Intravenous Q4H PRN Michael Armenta DO   10 mg at 02/16/24 1556    hydrALAZINE (APRESOLINE) tablet 50 mg  50 mg Oral Q8H Michael Armenta DO   50 mg at 02/19/24 2027    HYDROcodone-acetaminophen (NORCO) 5-325 MG per tablet 1 tablet  1 tablet Oral Q4H PRN BicMichael tapia DO        insulin detemir (LEVEMIR) injection 5 Units  5 Units Subcutaneous Q12H BicMichael tapia DO   5 Units at 02/20/24 1045    Insulin Lispro (humaLOG) injection 2-7 Units  2-7 Units Subcutaneous 4x Daily AC & at Bedtime Michael Armenta DO   2 Units at 02/19/24 2026    labetalol (NORMODYNE,TRANDATE) injection 10 mg  10 mg Intravenous Q4H PRN Michael Armenta DO   10 mg at 02/16/24 1440    magnesium oxide (MAG-OX) tablet 400 mg  400 mg Oral Daily Michael Armenta DO   400 mg at 02/20/24 1046    NIFEdipine XL (PROCARDIA XL) 24 hr tablet 60 mg  60 mg Oral Q24H Michael Armenta DO   60 mg at 02/20/24 1045    ondansetron ODT  (ZOFRAN-ODT) disintegrating tablet 4 mg  4 mg Oral Q6H PRN BickingMichael K, DO        Or    ondansetron (ZOFRAN) injection 4 mg  4 mg Intravenous Q6H PRN Bicking, Michael REINA, DO        rosuvastatin (CRESTOR) tablet 10 mg  10 mg Oral Nightly Bicking, Michael K, DO   10 mg at 02/19/24 2027    sodium bicarbonate tablet 650 mg  650 mg Oral TID BickingMichael K, DO   650 mg at 02/20/24 1046    sodium chloride 0.9 % flush 10 mL  10 mL Intravenous PRN Bicking, Michael K, DO        sodium chloride 0.9 % flush 10 mL  10 mL Intravenous Q12H Bicking, Michael K, DO   10 mL at 02/20/24 1046    sodium chloride 0.9 % infusion 40 mL  40 mL Intravenous PRN Bic, Michael K, DO        tamsulosin (FLOMAX) 24 hr capsule 0.4 mg  0.4 mg Oral Daily Bicking, Michael K, DO   0.4 mg at 02/20/24 1046     Orders (last 24 hrs)        Start     Ordered    02/20/24 1130  POC Glucose Once  PROCEDURE ONCE        Comments: Complete no more than 45 minutes prior to patient eating      02/20/24 1118    02/20/24 0940  PT Consult: Eval & Treat Functional Mobility Below Baseline  Once         02/20/24 0939    02/20/24 0745  POC Glucose Once  PROCEDURE ONCE        Comments: Complete no more than 45 minutes prior to patient eating      02/20/24 0733    02/20/24 0600  CBC & Differential  Daily       02/19/24 0911    02/20/24 0600  Renal Function Panel  Daily       02/19/24 0911    02/20/24 0600  CBC Auto Differential  PROCEDURE ONCE         02/19/24 2201    02/19/24 2030  POC Glucose Once  PROCEDURE ONCE        Comments: Complete no more than 45 minutes prior to patient eating      02/19/24 2017    02/19/24 1800  ceFAZolin 2000 mg IVPB in 100 mL NS (MBP)  Every 8 Hours         02/19/24 1040    02/19/24 1652  POC Glucose Once  PROCEDURE ONCE        Comments: Complete no more than 45 minutes prior to patient eating      02/19/24 1627    02/19/24 1354  heparin (porcine) injection 3,200 Units  As Needed         02/19/24 1354    02/19/24 1200  Incentive  Spirometry  Every 2 Hours While Awake       02/19/24 1040    02/19/24 1040  ondansetron ODT (ZOFRAN-ODT) disintegrating tablet 4 mg  Every 6 Hours PRN        See Hyperspace for full Linked Orders Report.    02/19/24 1040    02/19/24 1040  ondansetron (ZOFRAN) injection 4 mg  Every 6 Hours PRN        See Hyperspace for full Linked Orders Report.    02/19/24 1040    02/19/24 1040  acetaminophen (TYLENOL) tablet 650 mg  Every 4 Hours PRN         02/19/24 1040    02/19/24 1040  HYDROcodone-acetaminophen (NORCO) 5-325 MG per tablet 1 tablet  Every 4 Hours PRN         02/19/24 1040    02/19/24 1000  Instructions on coughing, deep breathing, and incentive spirometry.  Every 4 Hours While Awake,   Status:  Canceled       02/19/24 0735    02/19/24 0926  sodium chloride 0.9 % infusion 500 mL  Continuous         02/19/24 0924    02/18/24 1600  sodium bicarbonate tablet 650 mg  3 Times Daily         02/18/24 1346    02/16/24 1800  Dietary Nutrition Supplements Boost Glucose Control (Glucerna Shake)  Daily With Breakfast & Dinner       02/16/24 1513    02/16/24 1400  NIFEdipine XL (PROCARDIA XL) 24 hr tablet 60 mg  Every 24 Hours Scheduled         02/16/24 1303    02/15/24 2100  insulin detemir (LEVEMIR) injection 5 Units  Every 12 Hours Scheduled         02/15/24 1544    02/15/24 1630  tamsulosin (FLOMAX) 24 hr capsule 0.4 mg  Daily         02/15/24 1531    02/15/24 1630  magnesium oxide (MAG-OX) tablet 400 mg  Daily         02/15/24 1543    02/15/24 1400  hydrALAZINE (APRESOLINE) tablet 50 mg  Every 8 Hours Scheduled         02/15/24 1237    02/14/24 2100  sennosides-docusate (PERICOLACE) 8.6-50 MG per tablet 2 tablet  2 Times Daily        See Hyperspace for full Linked Orders Report.    02/14/24 1255    02/14/24 2100  rosuvastatin (CRESTOR) tablet 10 mg  Nightly         02/14/24 1632    02/14/24 2100  carvedilol (COREG) tablet 25 mg  2 Times Daily         02/14/24 1633    02/14/24 1800  Oral Care  2 Times Daily       02/14/24  1255    02/14/24 1730  Insulin Lispro (humaLOG) injection 2-7 Units  4 Times Daily Before Meals & Nightly         02/14/24 1255    02/14/24 1700  POC Glucose 4x Daily Before Meals & at Bedtime  4 Times Daily Before Meals & at Bedtime      Comments: Complete no more than 45 minutes prior to patient eating      02/14/24 1255    02/14/24 1600  Vital Signs  Every 4 Hours       02/14/24 1255    02/14/24 1345  aspirin EC tablet 81 mg  Daily         02/14/24 1255    02/14/24 1345  sodium chloride 0.9 % flush 10 mL  Every 12 Hours Scheduled         02/14/24 1255    02/14/24 1345  Enoxaparin Sodium (LOVENOX) syringe 30 mg  Daily         02/14/24 1255    02/14/24 1300  Strict Intake & Output  Every Hour       02/14/24 1255    02/14/24 1256  Intake & Output  Every Shift       02/14/24 1255    02/14/24 1256  Daily Weights  Daily       02/14/24 1255    02/14/24 1255  polyethylene glycol (MIRALAX) packet 17 g  Daily PRN        See Hyperspace for full Linked Orders Report.    02/14/24 1255    02/14/24 1255  bisacodyl (DULCOLAX) EC tablet 5 mg  Daily PRN        See Hyperspace for full Linked Orders Report.    02/14/24 1255    02/14/24 1255  bisacodyl (DULCOLAX) suppository 10 mg  Daily PRN        See Hyperspace for full Linked Orders Report.    02/14/24 1255    02/14/24 1255  sodium chloride 0.9 % infusion 40 mL  As Needed         02/14/24 1255    02/14/24 1255  dextrose (GLUTOSE) oral gel 15 g  Every 15 Minutes PRN         02/14/24 1255    02/14/24 1255  dextrose (D50W) (25 g/50 mL) IV injection 25 g  Every 15 Minutes PRN         02/14/24 1255    02/14/24 1255  glucagon (GLUCAGEN) injection 1 mg  Every 15 Minutes PRN         02/14/24 1255    02/14/24 1255  labetalol (NORMODYNE,TRANDATE) injection 10 mg  Every 4 Hours PRN         02/14/24 1255    02/14/24 1255  hydrALAZINE (APRESOLINE) injection 10 mg  Every 4 Hours PRN         02/14/24 1255    02/14/24 0909  sodium chloride 0.9 % flush 10 mL  As Needed        See Hyperspace for  full Linked Orders Report.    02/14/24 0909    Unscheduled  Follow Hypoglycemia Standing Orders For Blood Glucose <70 & Notify Provider of Treatment  As Needed      Comments: Follow Hypoglycemia Orders As Outlined in Process Instructions (Open Order Report to View Full Instructions)  Notify Provider Any Time Hypoglycemia Treatment is Administered    02/14/24 1255    --  allopurinol (ZYLOPRIM) 100 MG tablet  Daily         02/14/24 1557    --  ferrous sulfate 325 (65 FE) MG tablet  2 Times Daily With Meals         02/14/24 1557    --  Finerenone (Kerendia) 10 MG tablet  Daily         02/14/24 1557    --  insulin detemir (LEVEMIR) 100 UNIT/ML injection  2 Times Daily         02/14/24 1557    --  Insulin Lispro, 1 Unit Dial, (HumaLOG KwikPen) 100 UNIT/ML solution pen-injector  2 Times Daily         02/14/24 1557    --  lisinopril (PRINIVIL,ZESTRIL) 40 MG tablet  Daily         02/14/24 1557    --  magnesium oxide (MAG-OX) 400 MG tablet  Daily         02/14/24 1557    --  sodium bicarbonate 650 MG tablet  3 Times Daily         02/14/24 1557    --  tadalafil (CIALIS) 5 MG tablet  Daily PRN         02/14/24 1557    --  tamsulosin (FLOMAX) 0.4 MG capsule 24 hr capsule  Daily         02/14/24 1557    --  rosuvastatin (CRESTOR) 10 MG tablet  Daily         02/14/24 1559    --  SCANNED EKG         02/14/24 0000    --  SCANNED - TELEMETRY           02/14/24 0000    --  SCANNED - TELEMETRY           02/14/24 0000    --  SCANNED - TELEMETRY           02/14/24 0000    --  SCANNED - TELEMETRY           02/14/24 0000                     Operative/Procedure Notes (all)        Michael Armenta,  at 02/19/24 0947  Version 1 of 1         Mauro Howardet  2/19/2024     PREOPERATIVE DIAGNOSIS: Hypertensive urgency [I16.0]  Chronic renal impairment, unspecified CKD stage [N18.9]     POSTOPERATIVE DIAGNOSIS: Post-Op Diagnosis Codes:     * Hypertensive urgency [I16.0]     * Chronic renal impairment, unspecified CKD stage [N18.9]      PROCEDURE PERFORMED:   1.  Ultrasound-guided cannulation of the right internal jugular vein  2.  Placement of a 14.5 x 19 cm tunneled PermCath  3.  Radiographic supervision and interpretation     SURGEON: Michael Armenta DO      ANESTHESIA: MAC    PREPARATION: Routine.    STAFF: Circulator: Tiffany Bone RN  Scrub Person: Lidia Robles  Assistant: Paul Garcia  Vascular Radiology Technician: Rosa Lopes    Estimated Blood Loss: minimal    SPECIMENS: None    COMPLICATIONS: None    INDICATIONS: Mauro Patel is a 68 y.o. male whom we were consulted for acute kidney injury/chronic kidney disease/PermCath placement.  Patient has stage IV chronic kidney disease, last estimated GFR, 23 mL.  He follows PAULO Bravo in the office.  He has progressive decline in renal function due to diabetic nephropathy.  He has poorly controlled diabetes with A1c close to 8%.  Presenting today with increasing shortness of breath.  On arrival in the emergency room his blood pressure was 200/100  Millimeters mercury.  His serum creatinine 3.3 mg with estimated GFR less than 20 mL.  He denies any nausea vomiting or diarrhea.  He denies any recent exposure to nonsteroidal agents.  He drinks about half a gallon of water a day..  His chest x-ray consistent with normal study with no infiltrate/No fluid/essentially normal chest x-ray.  He is now admitted to CCU with worsening of renal function and severe systolic and diastolic hypertension.  He is currently on Cardene for blood pressure control.  Patient also denies any urinary outflow problem such as increased frequency or hesitancy or nocturia. The indications, risks, and possible complications of the procedure were explained to the patient, who voiced understanding and wished to proceed with surgery.     PROCEDURE IN DETAIL:     The patient was taken to the operating room and placed on the operating table in a supine position. After MAC anesthesia was obtained, the  right neck and chest were prepped and draped in a sterile manner.  Under ultrasound guidance and using a micropuncture technique the right internal jugular vein was cannulated and a microsheath was placed.  The J-wire was advanced into the IVC under fluoroscopic guidance.  Next, 10 mL of 0.5% Marcaine with epinephrine was used to infiltrate the subcutaneous tract.  Two small stab incisions were made with the 11 blade.  The tunneling device attached to the catheter was then tunneled in a subcutaneous manner up through the neck insertion site.  Next, serial dilatation was then made of the internal jugular vein under fluoroscopic guidance.  Lastly, the tear-away sheath was placed and the inner dilator and wire were removed.  The catheter was placed through the tear-away sheath with the tip ending in the right atrial/SVC junction.  There were no kinks in the catheter and both ports had good blood return.  Each port was flushed with heparinized saline and straight heparin 1000 units per mL.  The caps were applied.  The catheter was secured to skin with a 2-0 nylon.  The neck insertion site was closed with a 4-0 Monocryl in a subcuticular fashion.  The wounds were then cleaned.  Sterile dressings were applied. The patient tolerated the procedure well. Sponge and needle counts were correct. The patient was then awakened in the operating room and taken to the recovery room in good condition.    Michael Armenta DO  2/19/2024  10:02 CST    Electronically signed by Michael Armenta DO at 02/19/24 1003          Physician Progress Notes (last 24 hours)        Rajiv Carmona MD at 02/20/24 1138              [facility] Medicine Services  INPATIENT PROGRESS NOTE    Patient Name: Mauro TAY Amanda  Date of Admission: 2/14/2024  Today's Date: 02/20/24  Length of Stay: 6  Primary Care Physician: John Robertson MD    Subjective   Chief Complaint: ARF, HTN  Shortness of Breath       Had HD  yesterday and tolerated it fine        Review of Systems   Respiratory:  Positive for shortness of breath.       All pertinent negatives and positives are as above. All other systems have been reviewed and are negative unless otherwise stated.     Objective    Temp:  [97.9 °F (36.6 °C)-98.6 °F (37 °C)] 98 °F (36.7 °C)  Heart Rate:  [79-99] 88  Resp:  [14-16] 14  BP: (134-161)/(55-68) 154/66  Physical Exam  Constitutional:       Appearance: Normal appearance.   HENT:      Head: Normocephalic and atraumatic.      Nose: Nose normal.      Mouth/Throat:      Mouth: Mucous membranes are moist.   Eyes:      Extraocular Movements: Extraocular movements intact.      Pupils: Pupils are equal, round, and reactive to light.   Cardiovascular:      Rate and Rhythm: Normal rate and regular rhythm.      Pulses: Normal pulses.      Heart sounds: Normal heart sounds.   Pulmonary:      Effort: Pulmonary effort is normal.      Breath sounds: Normal breath sounds.   Abdominal:      General: Bowel sounds are normal.      Palpations: Abdomen is soft.   Musculoskeletal:         General: Normal range of motion.      Cervical back: Normal range of motion.   Skin:     General: Skin is warm and dry.   Neurological:      General: No focal deficit present.      Mental Status: He is alert and oriented to person, place, and time.   Psychiatric:         Mood and Affect: Mood normal.         Behavior: Behavior normal.             Results Review:  I have reviewed the labs, radiology results, and diagnostic studies.    Laboratory Data:   Results from last 7 days   Lab Units 02/20/24  0712 02/19/24 0437 02/18/24 0622   WBC 10*3/mm3 8.46 7.38 8.48   HEMOGLOBIN g/dL 7.9* 8.2* 8.0*   HEMATOCRIT % 24.1* 25.0* 23.7*   PLATELETS 10*3/mm3 248 227 226        Results from last 7 days   Lab Units 02/20/24  0712 02/19/24  0437 02/18/24  0622 02/17/24  0432   SODIUM mmol/L 130* 128* 127* 134*   POTASSIUM mmol/L 4.4 4.0 4.4 4.0   CHLORIDE mmol/L 93* 94* 94* 99    CO2 mmol/L 24.0 21.0* 19.0* 21.0*   BUN mg/dL 59* 78* 76* 68*   CREATININE mg/dL 4.61* 5.63* 5.35* 4.91*   CALCIUM mg/dL 8.2* 8.6 8.3* 9.0   BILIRUBIN mg/dL  --  <0.2 <0.2 0.2   ALK PHOS U/L  --  104 100 105   ALT (SGPT) U/L  --  11 8 5   AST (SGOT) U/L  --  22 17 12   GLUCOSE mg/dL 148* 110* 177* 133*       Culture Data:   Blood Culture   Date Value Ref Range Status   02/14/2024 No growth at 5 days  Final   02/14/2024 No growth at 5 days  Final     Urine Culture   Date Value Ref Range Status   02/16/2024 25,000 CFU/mL Mixed Mary Isolated  Final       Radiology Data:   Imaging Results (Last 24 Hours)       Procedure Component Value Units Date/Time    IR Insert Tunneled CV Catheter Without Port 5 Plus [[id number]] Collected: 02/20/24 0707     Updated: 02/20/24 0707    Narrative:      Performed by Dr. Armenta. Please see procedure note.               I have reviewed the patient's current medications.     Assessment/Plan   Assessment  Active Hospital Problems    Diagnosis     **Hypertensive urgency     Acute on chronic renal failure     Chronic renal impairment     Controlled type 2 diabetes mellitus with complication, without long-term current use of insulin     Benign essential HTN     Mixed hyperlipidemia        Treatment Plan  - Renal following, received HD yesterday along with permacath  - trend labs daily  - SSI and levemir  - cont BP meds, adjust as needed for better control  - cont home meds as able  - supportive care  - case management will need to setup outpatient HD    Conditions and Status        Condition is unchanged.     [facility] Data  External documents reviewed: yes  Cardiac tracing (EKG, telemetry) interpretation: yes  Radiology interpretation: yes  Labs reviewed: yes     Decision rules/scores evaluated (example [id number], Wells, etc): yes     Discussed with: patient and family     Care Planning  Shared decision making: nephrology and patient  Code status and discussions: full  code    Disposition  Social Determinants of Health that impact treatment or disposition: from home  I expect the patient to be discharged to home in 3-4 days.     Electronically signed by Rajiv Carmona MD, 02/20/24, 11:38 CST.    Electronically signed by Rajiv Carmona MD at 02/20/24 1138       Rajiv Carmona MD at 02/19/24 1305              [facility] Medicine Services  INPATIENT PROGRESS NOTE    Patient Name: Mauro TAY Amanda  Date of Admission: 2/14/2024  Today's Date: 02/19/24  Length of Stay: 5  Primary Care Physician: John Robertson MD    Subjective   Chief Complaint: ARF, HTN  Shortness of Breath       To get permacath placed today to start HD        Review of Systems   Respiratory:  Positive for shortness of breath.       All pertinent negatives and positives are as above. All other systems have been reviewed and are negative unless otherwise stated.     Objective    Temp:  [97.7 °F (36.5 °C)-98.4 °F (36.9 °C)] 98.1 °F (36.7 °C)  Heart Rate:  [81-90] 85  Resp:  [12-20] 16  BP: (135-163)/(51-68) 152/63  Physical Exam  Constitutional:       Appearance: Normal appearance.   HENT:      Head: Normocephalic and atraumatic.      Nose: Nose normal.      Mouth/Throat:      Mouth: Mucous membranes are moist.   Eyes:      Extraocular Movements: Extraocular movements intact.      Pupils: Pupils are equal, round, and reactive to light.   Cardiovascular:      Rate and Rhythm: Normal rate and regular rhythm.      Pulses: Normal pulses.      Heart sounds: Normal heart sounds.   Pulmonary:      Effort: Pulmonary effort is normal.      Breath sounds: Normal breath sounds.   Abdominal:      General: Bowel sounds are normal.      Palpations: Abdomen is soft.   Musculoskeletal:         General: Normal range of motion.      Cervical back: Normal range of motion.   Skin:     General: Skin is warm and dry.   Neurological:      General: No focal deficit present.      Mental Status:  He is alert and oriented to person, place, and time.   Psychiatric:         Mood and Affect: Mood normal.         Behavior: Behavior normal.             Results Review:  I have reviewed the labs, radiology results, and diagnostic studies.    Laboratory Data:   Results from last 7 days   Lab Units 02/19/24 0437 02/18/24 0622 02/17/24 0432   WBC 10*3/mm3 7.38 8.48 8.34   HEMOGLOBIN g/dL 8.2* 8.0* 8.0*   HEMATOCRIT % 25.0* 23.7* 24.6*   PLATELETS 10*3/mm3 227 226 236        Results from last 7 days   Lab Units 02/19/24 0437 02/18/24 0622 02/17/24 0432   SODIUM mmol/L 128* 127* 134*   POTASSIUM mmol/L 4.0 4.4 4.0   CHLORIDE mmol/L 94* 94* 99   CO2 mmol/L 21.0* 19.0* 21.0*   BUN mg/dL 78* 76* 68*   CREATININE mg/dL 5.63* 5.35* 4.91*   CALCIUM mg/dL 8.6 8.3* 9.0   BILIRUBIN mg/dL <0.2 <0.2 0.2   ALK PHOS U/L 104 100 105   ALT (SGPT) U/L 11 8 5   AST (SGOT) U/L 22 17 12   GLUCOSE mg/dL 110* 177* 133*       Culture Data:   Blood Culture   Date Value Ref Range Status   02/14/2024 No growth at 5 days  Final   02/14/2024 No growth at 5 days  Final     Urine Culture   Date Value Ref Range Status   02/16/2024 25,000 CFU/mL Mixed Mary Isolated  Final       Radiology Data:   Imaging Results (Last 24 Hours)       Procedure Component Value Units Date/Time    IR Insert Tunneled CV Catheter Without Port 5 Plus [[id number]] Resulted: 02/19/24 0957     Updated: 02/19/24 0959    FL C Arm During Surgery [[id number]] Resulted: 02/19/24 0959     Updated: 02/19/24 0959    Narrative:      This procedure was auto-finalized with no dictation required.            I have reviewed the patient's current medications.     Assessment/Plan   Assessment  Active Hospital Problems    Diagnosis     **Hypertensive urgency     Acute on chronic renal failure     Chronic renal impairment     Controlled type 2 diabetes mellitus with complication, without long-term current use of insulin     Benign essential HTN     Mixed hyperlipidemia        Treatment  Plan  - Renal following, permacath placement today and than to get HD  - trend labs daily  - SSI and levemir  - cont BP meds, adjust as needed for better control  - cont home meds as able    Conditions and Status        Condition is unchanged.     [facility] Data  External documents reviewed: yes  Cardiac tracing (EKG, telemetry) interpretation: yes  Radiology interpretation: yes  Labs reviewed: yes     Decision rules/scores evaluated (example [id number], Wells, etc): yes     Discussed with: patient and family     Care Planning  Shared decision making: nephrology and patient  Code status and discussions: full code    Disposition  Social Determinants of Health that impact treatment or disposition: from home  I expect the patient to be discharged to home in 3-4 days.     Electronically signed by Rajiv Carmona MD, 02/19/24, 13:05 CST.    Electronically signed by Rajiv Carmona MD at 02/19/24 1339       Consult Notes (last 24 hours)  Notes from 02/19/24 1208 through 02/20/24 1208   No notes of this type exist for this encounter.

## 2024-02-20 NOTE — PROGRESS NOTES
[facility] Medicine Services  INPATIENT PROGRESS NOTE    Patient Name: Mauro aLmtreet  Date of Admission: 2/14/2024  Today's Date: 02/20/24  Length of Stay: 6  Primary Care Physician: John Robertson MD    Subjective   Chief Complaint: ARF, HTN  Shortness of Breath       Had HD yesterday and tolerated it fine        Review of Systems   Respiratory:  Positive for shortness of breath.       All pertinent negatives and positives are as above. All other systems have been reviewed and are negative unless otherwise stated.     Objective    Temp:  [97.9 °F (36.6 °C)-98.6 °F (37 °C)] 98 °F (36.7 °C)  Heart Rate:  [79-99] 88  Resp:  [14-16] 14  BP: (134-161)/(55-68) 154/66  Physical Exam  Constitutional:       Appearance: Normal appearance.   HENT:      Head: Normocephalic and atraumatic.      Nose: Nose normal.      Mouth/Throat:      Mouth: Mucous membranes are moist.   Eyes:      Extraocular Movements: Extraocular movements intact.      Pupils: Pupils are equal, round, and reactive to light.   Cardiovascular:      Rate and Rhythm: Normal rate and regular rhythm.      Pulses: Normal pulses.      Heart sounds: Normal heart sounds.   Pulmonary:      Effort: Pulmonary effort is normal.      Breath sounds: Normal breath sounds.   Abdominal:      General: Bowel sounds are normal.      Palpations: Abdomen is soft.   Musculoskeletal:         General: Normal range of motion.      Cervical back: Normal range of motion.   Skin:     General: Skin is warm and dry.   Neurological:      General: No focal deficit present.      Mental Status: He is alert and oriented to person, place, and time.   Psychiatric:         Mood and Affect: Mood normal.         Behavior: Behavior normal.             Results Review:  I have reviewed the labs, radiology results, and diagnostic studies.    Laboratory Data:   Results from last 7 days   Lab Units 02/20/24  0712 02/19/24  0437 02/18/24  0622   WBC 10*3/mm3 8.46  7.38 8.48   HEMOGLOBIN g/dL 7.9* 8.2* 8.0*   HEMATOCRIT % 24.1* 25.0* 23.7*   PLATELETS 10*3/mm3 248 227 226        Results from last 7 days   Lab Units 02/20/24  0712 02/19/24  0437 02/18/24  0622 02/17/24  0432   SODIUM mmol/L 130* 128* 127* 134*   POTASSIUM mmol/L 4.4 4.0 4.4 4.0   CHLORIDE mmol/L 93* 94* 94* 99   CO2 mmol/L 24.0 21.0* 19.0* 21.0*   BUN mg/dL 59* 78* 76* 68*   CREATININE mg/dL 4.61* 5.63* 5.35* 4.91*   CALCIUM mg/dL 8.2* 8.6 8.3* 9.0   BILIRUBIN mg/dL  --  <0.2 <0.2 0.2   ALK PHOS U/L  --  104 100 105   ALT (SGPT) U/L  --  11 8 5   AST (SGOT) U/L  --  22 17 12   GLUCOSE mg/dL 148* 110* 177* 133*       Culture Data:   Blood Culture   Date Value Ref Range Status   02/14/2024 No growth at 5 days  Final   02/14/2024 No growth at 5 days  Final     Urine Culture   Date Value Ref Range Status   02/16/2024 25,000 CFU/mL Mixed Mary Isolated  Final       Radiology Data:   Imaging Results (Last 24 Hours)       Procedure Component Value Units Date/Time    IR Insert Tunneled CV Catheter Without Port 5 Plus [[id number]] Collected: 02/20/24 0707     Updated: 02/20/24 0707    Narrative:      Performed by Dr. Armenta. Please see procedure note.               I have reviewed the patient's current medications.     Assessment/Plan   Assessment  Active Hospital Problems    Diagnosis     **Hypertensive urgency     Acute on chronic renal failure     Chronic renal impairment     Controlled type 2 diabetes mellitus with complication, without long-term current use of insulin     Benign essential HTN     Mixed hyperlipidemia        Treatment Plan  - Renal following, received HD yesterday along with permacath  - trend labs daily  - SSI and levemir  - cont BP meds, adjust as needed for better control  - cont home meds as able  - supportive care  - case management will need to setup outpatient HD    Conditions and Status        Condition is unchanged.     MDM Data  External documents reviewed: yes  Cardiac tracing (EKG,  telemetry) interpretation: yes  Radiology interpretation: yes  Labs reviewed: yes     Decision rules/scores evaluated (example [id number], [place], etc): yes     Discussed with: patient and family     Care Planning  Shared decision making: nephrology and patient  Code status and discussions: full code    Disposition  Social Determinants of Health that impact treatment or disposition: from home  I expect the patient to be discharged to home in 3-4 days.     Electronically signed by Rajiv Carmona MD, 02/20/24, 11:38 CST.

## 2024-02-20 NOTE — CASE MANAGEMENT/SOCIAL WORK
Continued Stay Note  [facility]     Patient Name: Mauro Patel  MRN: [id number]  Today's Date: 2/20/2024    Admit Date: 2/14/2024    Plan: Outpatient HD   Discharge Plan       Row Name 02/20/24 1542       Plan    Plan Outpatient HD    Plan Comments Outpatient HD approved and chair time is set for [facility], T/Th/S at 6:00AM.  will provide schedule letter to patient when received from [facility].             ADALGISA Rice

## 2024-02-20 NOTE — CASE MANAGEMENT/SOCIAL WORK
Continued Stay Note   [facility]     Patient Name: Mauro Patel  MRN: [id number]  Today's Date: 2/20/2024    Admit Date: 2/14/2024    Plan: Outpatient HD   Discharge Plan       Row Name 02/20/24 1236       Plan    Plan Outpatient HD    Plan Comments Spoke to Nadiya Beltran with [facility]. Nadiya received referral and is working on chair time assignment. Will follow for updates.           ADALGISA Rice

## 2024-02-20 NOTE — PAYOR COMM NOTE
"2/19 CLINICAL  [id number]    [phone]    Mauro Cat (68 y.o. Male)       Date of Birth   1955    Social Security Number       Address   [address] [place], [place] [address]    Home Phone   [phone]    MRN   [id number]       Catholic   Sabianism    Marital Status                               Admission Date   2/14/24    Admission Type   Emergency    Admitting Provider   Rajiv Carmona MD    Attending Provider   Rajiv Carmona MD    Department, Room/Bed   [facility] 4B, 440/1       Discharge Date       Discharge Disposition       Discharge Destination                                 Attending Provider: Rajiv Carmona MD    Allergies: Duravent Dm  [[id number]]    Isolation: None   Infection: None   Code Status: CPR    Ht: 172.7 cm (68\")   Wt: 98.8 kg (217 lb 12.8 oz)    Admission Cmt: None   Principal Problem: Hypertensive urgency [I16.0]                   Active Insurance as of 2/14/2024       Primary Coverage       Payor Plan Insurance Group Employer/Plan Group    ANTH MEDICARE REPLACEMENT ANTHEM MEDICARE ADVANTAGE [id number]       Payor Plan Address Payor Plan Phone Number Payor Plan Fax Number Effective Dates    [address] [phone]  1/1/2022 - None Entered    [facility] [id number]         Subscriber Name Subscriber Birth Date Member ID       MAURO CAT 1955 [id number]                     Emergency Contacts        (Rel.) Home Phone Work Phone Mobile Phone    osbaldo cat (Spouse) [phone] -- --              Current Facility-Administered Medications   Medication Dose Route Frequency Provider Last Rate Last Admin    acetaminophen (TYLENOL) tablet 650 mg  650 mg Oral Q4H PRN Michael Armenta DO        aspirin EC tablet 81 mg  81 mg Oral Daily Michael Armenta DO   81 mg at 02/19/24 1116    sennosides-docusate (PERICOLACE) 8.6-50 MG per tablet 2 tablet  2 tablet Oral BID Michael Armenta DO   2 " tablet at 02/19/24 2026    And    polyethylene glycol (MIRALAX) packet 17 g  17 g Oral Daily PRN BicMichael tapia DO        And    bisacodyl (DULCOLAX) EC tablet 5 mg  5 mg Oral Daily PRN BickingMichael DO        And    bisacodyl (DULCOLAX) suppository 10 mg  10 mg Rectal Daily PRN BickingMichael, DO        carvedilol (COREG) tablet 25 mg  25 mg Oral BID BickingMichael DO   25 mg at 02/19/24 2027    dextrose (D50W) (25 g/50 mL) IV injection 25 g  25 g Intravenous Q15 Min PRN BicMichael tapia DO        dextrose (GLUTOSE) oral gel 15 g  15 g Oral Q15 Min PRN BicMichael tapia DO        Enoxaparin Sodium (LOVENOX) syringe 30 mg  30 mg Subcutaneous Daily BicMichael tapia DO   30 mg at 02/18/24 0859    glucagon (GLUCAGEN) injection 1 mg  1 mg Intramuscular Q15 Min PRN Michael Armenta DO        heparin (porcine) injection 3,200 Units  3,200 Units Intracatheter PRN Nikhil Cabrera MD   3,200 Units at 02/19/24 1533    hydrALAZINE (APRESOLINE) injection 10 mg  10 mg Intravenous Q4H PRN Michael Armenta DO   10 mg at 02/16/24 1556    hydrALAZINE (APRESOLINE) tablet 50 mg  50 mg Oral Q8H BicMichael tapia DO   50 mg at 02/19/24 2027    HYDROcodone-acetaminophen (NORCO) 5-325 MG per tablet 1 tablet  1 tablet Oral Q4H PRN BicMichael tapia DO        insulin detemir (LEVEMIR) injection 5 Units  5 Units Subcutaneous Q12H BicMichael tapia DO   5 Units at 02/19/24 2026    Insulin Lispro (humaLOG) injection 2-7 Units  2-7 Units Subcutaneous 4x Daily AC & at Bedtime Michael Armenta DO   2 Units at 02/19/24 2026    labetalol (NORMODYNE,TRANDATE) injection 10 mg  10 mg Intravenous Q4H PRN Michael Armenta DO   10 mg at 02/16/24 1440    magnesium oxide (MAG-OX) tablet 400 mg  400 mg Oral Daily Michael Armenta DO   400 mg at 02/19/24 1116    NIFEdipine XL (PROCARDIA XL) 24 hr tablet 60 mg  60 mg Oral Q24H Michael Armenta DO   60 mg at 02/19/24 1117    ondansetron ODT  (ZOFRAN-ODT) disintegrating tablet 4 mg  4 mg Oral Q6H PRN Bicking, Michael K, DO        Or    ondansetron (ZOFRAN) injection 4 mg  4 mg Intravenous Q6H PRN Bicking, Michael K, DO        rosuvastatin (CRESTOR) tablet 10 mg  10 mg Oral Nightly Bicking, Michael K, DO   10 mg at 02/19/24 2027    sodium bicarbonate tablet 650 mg  650 mg Oral TID BickingThorin K, DO   650 mg at 02/19/24 1747    sodium chloride 0.9 % flush 10 mL  10 mL Intravenous PRN Bicking, Michael K, DO        sodium chloride 0.9 % flush 10 mL  10 mL Intravenous Q12H Bicking, Michael K, DO   10 mL at 02/19/24 2028    sodium chloride 0.9 % infusion 40 mL  40 mL Intravenous PRN Bicking, Michael K, DO        tamsulosin (FLOMAX) 24 hr capsule 0.4 mg  0.4 mg Oral Daily Bicking, Michael K, DO   0.4 mg at 02/19/24 1116     Orders (last 24 hrs)        Start     Ordered    02/20/24 0600  CBC & Differential  Daily       02/19/24 0911    02/20/24 0600  Renal Function Panel  Daily       02/19/24 0911    02/20/24 0600  CBC Auto Differential  PROCEDURE ONCE         02/19/24 2201    02/19/24 2030  POC Glucose Once  PROCEDURE ONCE        Comments: Complete no more than 45 minutes prior to patient eating      02/19/24 2017    02/19/24 1800  ceFAZolin 2000 mg IVPB in 100 mL NS (MBP)  Every 8 Hours         02/19/24 1040    02/19/24 1652  POC Glucose Once  PROCEDURE ONCE        Comments: Complete no more than 45 minutes prior to patient eating      02/19/24 1627    02/19/24 1354  heparin (porcine) injection 3,200 Units  As Needed         02/19/24 1354    02/19/24 1200  Incentive Spirometry  Every 2 Hours While Awake       02/19/24 1040    02/19/24 1144  POC Glucose Once  PROCEDURE ONCE        Comments: Complete no more than 45 minutes prior to patient eating      02/19/24 1114    02/19/24 1041  Notify Physician  Until Discontinued         02/19/24 1040    02/19/24 1041  Vital Signs and Neurovascular Checks - As Specified  Until Discontinued         02/19/24 1040     02/19/24 1041  Check Operative Site With Vitals  Continuous         02/19/24 1040    02/19/24 1041  Elevate HOB 30 Degrees  Continuous         02/19/24 1040    02/19/24 1041  Oxygen Therapy- Nasal Cannula; Titrate 1-6 LPM Per SpO2; 90 - 95%  Continuous         02/19/24 1040    02/19/24 1041  Place Sequential Compression Device  Once         02/19/24 1040    02/19/24 1041  Maintain Sequential Compression Device  Continuous         02/19/24 1040    02/19/24 1041  Diet: Diabetic Diets; Consistent Carbohydrate; Texture: Regular Texture (IDDSI 7); Fluid Consistency: Thin (IDDSI 0)  Diet Effective Now         02/19/24 1040    02/19/24 1041  Okay to use PermCath  Nursing Communication  Once        Comments: Okay to use PermCath    02/19/24 1040    02/19/24 1040  ondansetron ODT (ZOFRAN-ODT) disintegrating tablet 4 mg  Every 6 Hours PRN        See Hyperspace for full Linked Orders Report.    02/19/24 1040    02/19/24 1040  ondansetron (ZOFRAN) injection 4 mg  Every 6 Hours PRN        See Hyperspace for full Linked Orders Report.    02/19/24 1040    02/19/24 1040  acetaminophen (TYLENOL) tablet 650 mg  Every 4 Hours PRN         02/19/24 1040    02/19/24 1040  HYDROcodone-acetaminophen (NORCO) 5-325 MG per tablet 1 tablet  Every 4 Hours PRN         02/19/24 1040    02/19/24 1023  POC Glucose Once  PROCEDURE ONCE        Comments: Complete no more than 45 minutes prior to patient eating      02/19/24 1009    02/19/24 1006  heparin (porcine) injection  As Needed,   Status:  Discontinued         02/19/24 1006    02/19/24 1006  bupivacaine-EPINEPHrine PF (MARCAINE w/EPI) 0.5% -1:[id number] injection  As Needed,   Status:  Discontinued         02/19/24 1006    02/19/24 1005  sodium chloride 250 mL with heparin (porcine) 2,500 Units mixture  As Needed,   Status:  Discontinued         02/19/24 1006    02/19/24 1004  POC Glucose STAT  STAT,   Status:  Canceled        Comments: Post op Glucose Check on All Diabetic Patients, Notify  Anesthesia if Blood Sugar is Less Than 80 mg/dL or Greater Than 250mg/dL      02/19/24 1003    02/19/24 1004  Vital signs every 5 minutes for 15 minutes, every 15 minutes thereafter.  Once,   Status:  Canceled         02/19/24 1003    02/19/24 1004  Call Anesthesiologist for additional IV Fluid bolus for Hypotension/Tachycardia  Continuous,   Status:  Canceled         02/19/24 1003    02/19/24 1004  Notify Anesthesia of Any Acute Changes in Patient Condition  Until Discontinued,   Status:  Canceled         02/19/24 1003    02/19/24 1004  Notify Anesthesia for Unrelieved Pain  Until Discontinued,   Status:  Canceled         02/19/24 1003    02/19/24 1004  Once DC criteria to floor met, follow surgeon's orders.  Until Discontinued,   Status:  Canceled         02/19/24 1003    02/19/24 1004  Discharge patient from PACU when discharge criteria is met.  Until Discontinued,   Status:  Canceled         02/19/24 1003    02/19/24 1003  droperidol (INAPSINE) injection 0.625 mg  Once As Needed,   Status:  Discontinued        See Hyperspace for full Linked Orders Report.    02/19/24 1003    02/19/24 1003  droperidol (INAPSINE) injection 0.625 mg  Once As Needed,   Status:  Discontinued        See Hyperspace for full Linked Orders Report.    02/19/24 1003    02/19/24 1003  Apply warming blanket  As Needed,   Status:  Canceled      Comments: For a recorded temp of <36.9 C    02/19/24 1003    02/19/24 1003  ibuprofen (ADVIL,MOTRIN) tablet 600 mg  Once As Needed,   Status:  Discontinued         02/19/24 1003    02/19/24 1003  HYDROmorphone (DILAUDID) injection 0.2 mg  Every 10 Minutes PRN,   Status:  Discontinued         02/19/24 1003    02/19/24 1003  fentaNYL citrate (PF) (SUBLIMAZE) injection 25 mcg  Every 5 Minutes PRN,   Status:  Discontinued         02/19/24 1003    02/19/24 1003  naloxone (NARCAN) injection 0.04 mg  As Needed,   Status:  Discontinued         02/19/24 1003    02/19/24 1003  flumazenil (ROMAZICON) injection  0.2 mg  As Needed,   Status:  Discontinued         02/19/24 1003    02/19/24 1003  ondansetron (ZOFRAN) injection 4 mg  Every 15 Minutes PRN,   Status:  Discontinued         02/19/24 1003    02/19/24 1003  labetalol (NORMODYNE,TRANDATE) injection 5 mg  Every 5 Minutes PRN,   Status:  Discontinued         02/19/24 1003    02/19/24 1003  atropine sulfate injection 0.5 mg  Once As Needed,   Status:  Discontinued         02/19/24 1003    02/19/24 1003  HYDROcodone-acetaminophen (NORCO) 5-325 MG per tablet 1 tablet  Once As Needed,   Status:  Discontinued         02/19/24 1003    02/19/24 1000  Instructions on coughing, deep breathing, and incentive spirometry.  Every 4 Hours While Awake,   Status:  Canceled       02/19/24 0735    02/19/24 0959  FL C Arm During Surgery  1 Time Imaging         02/19/24 0958    02/19/24 0926  sodium chloride 0.9 % infusion 500 mL  Continuous         02/19/24 0924 02/19/24 0926  sodium chloride 0.9 % flush 3 mL  Every 12 Hours Scheduled,   Status:  Discontinued         02/19/24 0924    02/19/24 0926  lactated ringers infusion  Continuous,   Status:  Discontinued         02/19/24 0924 02/19/24 0924  Follow Anesthesia Guidelines / Protocol  Once,   Status:  Canceled         02/19/24 0924 02/19/24 0924  Insert Peripheral IV  Once,   Status:  Canceled         02/19/24 0924 02/19/24 0924  Maintain IV Access  Continuous,   Status:  Canceled         02/19/24 0924 02/19/24 0924  Oxygen Therapy- Nasal Cannula; Titrate 1-6 LPM Per SpO2; 90 - 95%  Continuous,   Status:  Canceled         02/19/24 0924 02/19/24 0924  Pulse Oximetry, Continuous  Continuous,   Status:  Canceled         02/19/24 0924 02/19/24 0924  Insert Peripheral IV  Once,   Status:  Canceled         02/19/24 0924 02/19/24 0924  Saline Lock & Maintain IV Access  Continuous,   Status:  Canceled         02/19/24 0924 02/19/24 0923  Vital Signs - Per Anesthesia Protocol  As Needed,   Status:  Canceled        02/19/24 0924    02/19/24 0923  sodium chloride 0.9 % flush 3-10 mL  As Needed,   Status:  Discontinued         02/19/24 0924    02/19/24 0923  sodium chloride 0.9 % infusion 40 mL  As Needed,   Status:  Discontinued         02/19/24 0924    02/19/24 0923  lidocaine PF 1% (XYLOCAINE) injection 0.5 mL  Once As Needed,   Status:  Discontinued         02/19/24 0924    02/19/24 0923  sodium chloride 0.9 % flush 3 mL  As Needed,   Status:  Discontinued         02/19/24 0924    02/19/24 0841  Hemodialysis Inpatient  Once         02/19/24 0841    02/19/24 0839  Hepatitis B Surface Antibody  STAT         02/19/24 0838    02/19/24 0839  Hepatitis Panel, Acute  STAT         02/19/24 0838    02/19/24 0830  ceFAZolin 2000 mg IVPB in 100 mL NS (MBP)  Once         02/19/24 0735    02/19/24 0812  POC Glucose Once  PROCEDURE ONCE        Comments: Complete no more than 45 minutes prior to patient eating      02/19/24 0749    02/19/24 0757  IR Insert Tunneled CV Catheter Without Port 5 Plus  1 Time Imaging         02/19/24 0756    02/19/24 0736  Follow Anesthesia Guidelines / Protocol  Once,   Status:  Canceled         02/19/24 0735    02/19/24 0736  Obtain informed consent  Once         02/19/24 0735    02/19/24 0707  Follow Anesthesia Guidelines / Protocol  Continuous         02/19/24 0707    02/19/24 0707  POC Glucose Once  Once         02/19/24 0707    02/19/24 0707  Type & Screen  Once         02/19/24 0707    02/19/24 0707  Case Request  Once         02/19/24 0707    02/19/24 0706  NPO Diet NPO Type: Strict NPO  Diet Effective Now,   Status:  Canceled         02/19/24 0705    02/18/24 1600  sodium bicarbonate tablet 650 mg  3 Times Daily         02/18/24 1346    02/16/24 1800  Dietary Nutrition Supplements Boost Glucose Control (Glucerna Shake)  Daily With Breakfast & Dinner       02/16/24 1513    02/16/24 1400  NIFEdipine XL (PROCARDIA XL) 24 hr tablet 60 mg  Every 24 Hours Scheduled         02/16/24 1303    02/15/24 2100   insulin detemir (LEVEMIR) injection 5 Units  Every 12 Hours Scheduled         02/15/24 1544    02/15/24 1630  tamsulosin (FLOMAX) 24 hr capsule 0.4 mg  Daily         02/15/24 1531    02/15/24 1630  magnesium oxide (MAG-OX) tablet 400 mg  Daily         02/15/24 1543    02/15/24 1400  hydrALAZINE (APRESOLINE) tablet 50 mg  Every 8 Hours Scheduled         02/15/24 1237    02/14/24 2100  sennosides-docusate (PERICOLACE) 8.6-50 MG per tablet 2 tablet  2 Times Daily        See Hyperspace for full Linked Orders Report.    02/14/24 1255    02/14/24 2100  rosuvastatin (CRESTOR) tablet 10 mg  Nightly         02/14/24 1632    02/14/24 2100  carvedilol (COREG) tablet 25 mg  2 Times Daily         02/14/24 1633    02/14/24 1800  Oral Care  2 Times Daily       02/14/24 1255    02/14/24 1730  Insulin Lispro (humaLOG) injection 2-7 Units  4 Times Daily Before Meals & Nightly         02/14/24 1255    02/14/24 1700  POC Glucose 4x Daily Before Meals & at Bedtime  4 Times Daily Before Meals & at Bedtime      Comments: Complete no more than 45 minutes prior to patient eating      02/14/24 1255    02/14/24 1600  Vital Signs  Every 4 Hours       02/14/24 1255    02/14/24 1345  aspirin EC tablet 81 mg  Daily         02/14/24 1255    02/14/24 1345  sodium chloride 0.9 % flush 10 mL  Every 12 Hours Scheduled         02/14/24 1255    02/14/24 1345  Enoxaparin Sodium (LOVENOX) syringe 30 mg  Daily         02/14/24 1255    02/14/24 1300  Strict Intake & Output  Every Hour       02/14/24 1255    02/14/24 1256  Intake & Output  Every Shift       02/14/24 1255    02/14/24 1256  Daily Weights  Daily       02/14/24 1255    02/14/24 1255  polyethylene glycol (MIRALAX) packet 17 g  Daily PRN        See Hyperspace for full Linked Orders Report.    02/14/24 1255    02/14/24 1255  bisacodyl (DULCOLAX) EC tablet 5 mg  Daily PRN        See Hyperspace for full Linked Orders Report.    02/14/24 1255    02/14/24 1255  bisacodyl (DULCOLAX) suppository 10 mg   Daily PRN        See Hyperspace for full Linked Orders Report.    02/14/24 1255    02/14/24 1255  sodium chloride 0.9 % flush 10 mL  As Needed,   Status:  Discontinued         02/14/24 1255    02/14/24 1255  sodium chloride 0.9 % infusion 40 mL  As Needed         02/14/24 1255    02/14/24 1255  ondansetron (ZOFRAN) injection 4 mg  Every 6 Hours PRN,   Status:  Discontinued         02/14/24 1255    02/14/24 1255  dextrose (GLUTOSE) oral gel 15 g  Every 15 Minutes PRN         02/14/24 1255    02/14/24 1255  dextrose (D50W) (25 g/50 mL) IV injection 25 g  Every 15 Minutes PRN         02/14/24 1255    02/14/24 1255  glucagon (GLUCAGEN) injection 1 mg  Every 15 Minutes PRN         02/14/24 1255    02/14/24 1255  labetalol (NORMODYNE,TRANDATE) injection 10 mg  Every 4 Hours PRN         02/14/24 1255    02/14/24 1255  hydrALAZINE (APRESOLINE) injection 10 mg  Every 4 Hours PRN         02/14/24 1255    02/14/24 0909  sodium chloride 0.9 % flush 10 mL  As Needed        See Hyperspace for full Linked Orders Report.    02/14/24 0909    Unscheduled  Follow Hypoglycemia Standing Orders For Blood Glucose <70 & Notify Provider of Treatment  As Needed      Comments: Follow Hypoglycemia Orders As Outlined in Process Instructions (Open Order Report to View Full Instructions)  Notify Provider Any Time Hypoglycemia Treatment is Administered    02/14/24 1255    --  allopurinol (ZYLOPRIM) 100 MG tablet  Daily         02/14/24 1557    --  ferrous sulfate 325 (65 FE) MG tablet  2 Times Daily With Meals         02/14/24 1557    --  Finerenone (Kerendia) 10 MG tablet  Daily         02/14/24 1557    --  insulin detemir (LEVEMIR) 100 UNIT/ML injection  2 Times Daily         02/14/24 1557    --  Insulin Lispro, 1 Unit Dial, (HumaLOG KwikPen) 100 UNIT/ML solution pen-injector  2 Times Daily         02/14/24 1557    --  lisinopril (PRINIVIL,ZESTRIL) 40 MG tablet  Daily         02/14/24 1557    --  magnesium oxide (MAG-OX) 400 MG tablet  Daily          02/14/24 1557    --  sodium bicarbonate 650 MG tablet  3 Times Daily         02/14/24 1557    --  tadalafil (CIALIS) 5 MG tablet  Daily PRN         02/14/24 1557    --  tamsulosin (FLOMAX) 0.4 MG capsule 24 hr capsule  Daily         02/14/24 1557    --  rosuvastatin (CRESTOR) 10 MG tablet  Daily         02/14/24 1559    --  SCANNED EKG         02/14/24 0000    --  SCANNED - TELEMETRY           02/14/24 0000    --  SCANNED - TELEMETRY           02/14/24 0000    --  SCANNED - TELEMETRY           02/14/24 0000                     Physician Progress Notes (last 48 hours)        Rajiv Carmona MD at 02/19/24 1305              [facility] Medicine Services  INPATIENT PROGRESS NOTE    Patient Name: Mauro TAY Amanda  Date of Admission: 2/14/2024  Today's Date: 02/19/24  Length of Stay: 5  Primary Care Physician: John Robertson MD    Subjective   Chief Complaint: ARF, HTN  Shortness of Breath       To get permacath placed today to start HD        Review of Systems   Respiratory:  Positive for shortness of breath.       All pertinent negatives and positives are as above. All other systems have been reviewed and are negative unless otherwise stated.     Objective    Temp:  [97.7 °F (36.5 °C)-98.4 °F (36.9 °C)] 98.1 °F (36.7 °C)  Heart Rate:  [81-90] 85  Resp:  [12-20] 16  BP: (135-163)/(51-68) 152/63  Physical Exam  Constitutional:       Appearance: Normal appearance.   HENT:      Head: Normocephalic and atraumatic.      Nose: Nose normal.      Mouth/Throat:      Mouth: Mucous membranes are moist.   Eyes:      Extraocular Movements: Extraocular movements intact.      Pupils: Pupils are equal, round, and reactive to light.   Cardiovascular:      Rate and Rhythm: Normal rate and regular rhythm.      Pulses: Normal pulses.      Heart sounds: Normal heart sounds.   Pulmonary:      Effort: Pulmonary effort is normal.      Breath sounds: Normal breath sounds.   Abdominal:      General:  Bowel sounds are normal.      Palpations: Abdomen is soft.   Musculoskeletal:         General: Normal range of motion.      Cervical back: Normal range of motion.   Skin:     General: Skin is warm and dry.   Neurological:      General: No focal deficit present.      Mental Status: He is alert and oriented to person, place, and time.   Psychiatric:         Mood and Affect: Mood normal.         Behavior: Behavior normal.             Results Review:  I have reviewed the labs, radiology results, and diagnostic studies.    Laboratory Data:   Results from last 7 days   Lab Units 02/19/24 0437 02/18/24 0622 02/17/24 0432   WBC 10*3/mm3 7.38 8.48 8.34   HEMOGLOBIN g/dL 8.2* 8.0* 8.0*   HEMATOCRIT % 25.0* 23.7* 24.6*   PLATELETS 10*3/mm3 227 226 236        Results from last 7 days   Lab Units 02/19/24 0437 02/18/24 0622 02/17/24 0432   SODIUM mmol/L 128* 127* 134*   POTASSIUM mmol/L 4.0 4.4 4.0   CHLORIDE mmol/L 94* 94* 99   CO2 mmol/L 21.0* 19.0* 21.0*   BUN mg/dL 78* 76* 68*   CREATININE mg/dL 5.63* 5.35* 4.91*   CALCIUM mg/dL 8.6 8.3* 9.0   BILIRUBIN mg/dL <0.2 <0.2 0.2   ALK PHOS U/L 104 100 105   ALT (SGPT) U/L 11 8 5   AST (SGOT) U/L 22 17 12   GLUCOSE mg/dL 110* 177* 133*       Culture Data:   Blood Culture   Date Value Ref Range Status   02/14/2024 No growth at 5 days  Final   02/14/2024 No growth at 5 days  Final     Urine Culture   Date Value Ref Range Status   02/16/2024 25,000 CFU/mL Mixed Mary Isolated  Final       Radiology Data:   Imaging Results (Last 24 Hours)       Procedure Component Value Units Date/Time    IR Insert Tunneled CV Catheter Without Port 5 Plus [[id number]] Resulted: 02/19/24 0957     Updated: 02/19/24 0959    FL C Arm During Surgery [[id number]] Resulted: 02/19/24 0959     Updated: 02/19/24 0959    Narrative:      This procedure was auto-finalized with no dictation required.            I have reviewed the patient's current medications.     Assessment/Plan   Assessment  Active Hospital  Problems    Diagnosis     **Hypertensive urgency     Acute on chronic renal failure     Chronic renal impairment     Controlled type 2 diabetes mellitus with complication, without long-term current use of insulin     Benign essential HTN     Mixed hyperlipidemia        Treatment Plan  - Renal following, permacath placement today and than to get HD  - trend labs daily  - SSI and levemir  - cont BP meds, adjust as needed for better control  - cont home meds as able    Conditions and Status        Condition is unchanged.     MDM Data  External documents reviewed: yes  Cardiac tracing (EKG, telemetry) interpretation: yes  Radiology interpretation: yes  Labs reviewed: yes     Decision rules/scores evaluated (example [id number], Wells, etc): yes     Discussed with: patient and family     Care Planning  Shared decision making: nephrology and patient  Code status and discussions: full code    Disposition  Social Determinants of Health that impact treatment or disposition: from home  I expect the patient to be discharged to home in 3-4 days.     Electronically signed by Rajiv Carmona MD, 02/19/24, 13:05 CST.    Electronically signed by Rajiv Carmona MD at 02/19/24 1339       Teo Pate APRN at 02/19/24 1120       Attestation signed by Nikhil Cabrera MD at 02/19/24 2045    I have independently interviewed and examined the patient and reviewed the laboratory, imaging, notes and all other records as available.  I have discussed key elements of the care plan with the APRN and agree with the findings and care plan documented above except as noted.      Subjective:  Initially consulted for evaluation and treatment of acute kidney injury. Baseline chronic kidney disease stage 4. Follows with Teo BATES in our office. Baseline creatinine approximately 2.8. History of type 2 diabetes, hypertension. Patient was started on Kerendia a few months ago. He was seen in our office on 2/7 and  creatinine had increased to 3.8 and he appeared somewhat volume depleted on exam. Patient was instructed to hold Lasix at that time and was supposed to get repeat labs this week. He presented to ER on 2/14 with dyspnea and elevated blood pressure. Creatinine 3.3 on arrival to ER. He was initially admitted to ICU and needing intravenous Cardene for the treatment of severe hypertension. Cardene has been weaned off. He is now transferred to regular floor.     Today, no overnight events.  He was seen on tolerating his initial dialysis treatment without complaint.    Dialysis   Patient was seen and evaluated on renal replacement therapy and I have personally evaluated the patient and directed the therapy  Modality: Hemodialysis  Access: Catheter  Location: right upper  QB: 250  QD: 500  UF: 600      Objective:  Vitals/labs/studies/notes all reviewed  Exam independently verified with additional comments or findings as noted:  Ext: Trace blood lower extremity edema    Assessment:  Acute kidney injury/ATN  Chronic kidney disease stage IV  Hypertension  Diabetes type 2 with diabetic nephropathy   Anemia in chronic kidney disease with iron deficiency  Hyponatremia  Metabolic acidosis    Plan:  Discussed with patient, nursing  Workup reviewed today  Monitor labs  Dialysis initiated on on 2/19 with next treatment anticipated on 2/20    Nikhil Cabrera MD  2/19/2024  20:41 CST        This documentation may indicate a split shared visit as defined in CMS Final rule (CY) 2024 Physician Fee Schedule dated 11/2/2023: for Medicare billing purposes, the “substantive portion” means more than half of the total time spent by the physician or nonphysician practitioner performing the split (or shared) visit, or a substantive part of the medical decision making.  This visit involved face to face encounters with the patient by both providers on the date of the visit.  Time spent is indicated to identify the substantive portion of  the visit, whereas the level of service may be determined by complexity of medical decision making.                      Nephrology ([facility]) Progress Note      Patient:  Mauro Patel  YOB: 1955  Date of Service: 2/19/2024  MRN: [id number]   Acct: [id number]   Primary Care Physician: John Robertson MD  Advance Directive:   Code Status and Medical Interventions:   Ordered at: 02/14/24 1202     Level Of Support Discussed With:    Patient     Code Status (Patient has no pulse and is not breathing):    CPR (Attempt to Resuscitate)     Medical Interventions (Patient has pulse or is breathing):    Full Support     Admit Date: 2/14/2024       Hospital Day: 5  Referring Provider: No ref. provider found      Patient personally seen and examined.  Complete chart including Consults, Notes, Operative Reports, Labs, Cardiology, and Radiology studies reviewed as able.        Subjective:  Mauro Patel is a 68 y.o. male for whom we were consulted for evaluation and treatment of acute kidney injury. Baseline chronic kidney disease stage 4. Follows with Teo BATES in our office. Baseline creatinine approximately 2.8. History of type 2 diabetes, hypertension.  Patient was started on Kerendia a few months ago. He was seen in our office on 2/07 and creatinine had increased to 3.8 and he appeared somewhat volume depleted on exam. Patient was instructed to hold Lasix at that time and was supposed to get repeat labs this week. He presented to ER on 2/14 with dyspnea and elevated blood pressure. Creatinine 3.3 on arrival to ER. Admitted to CCU on Cardene drip. Denied n/v/d. Denied NSAID use. Denied changes in urination    Today is awake and alert. Seen just after returning to room from permcath placement. No new complaint. Urine output nonoliguric    Allergies:  Duravent dm  [[id number]]    Home Meds:  Medications Prior to Admission   Medication Sig Dispense Refill  Last Dose    ACCU-CHEK GUIDE test strip        allopurinol (ZYLOPRIM) 100 MG tablet Take 1 tablet by mouth Daily.       ASPIRIN 81 PO Take 81 mg by mouth Daily.       carvedilol (COREG) 25 MG tablet Take 1 tablet by mouth 2 (Two) Times a Day With Meals. BID       ferrous sulfate 325 (65 FE) MG tablet Take 1 tablet by mouth 2 (Two) Times a Day With Meals.       Finerenone (Kerendia) 10 MG tablet Take 1 tablet by mouth Daily.       insulin detemir (LEVEMIR) 100 UNIT/ML injection Inject 20 Units under the skin into the appropriate area as directed 2 (Two) Times a Day. 20 units every morning and 35 units every night       Insulin Lispro, 1 Unit Dial, (HumaLOG KwikPen) 100 UNIT/ML solution pen-injector Inject 5 Units under the skin into the appropriate area as directed 2 (Two) Times a Day. At breakfast and supper       lisinopril (PRINIVIL,ZESTRIL) 40 MG tablet Take 1 tablet by mouth Daily.       losartan (COZAAR) 100 MG tablet Take 1 tablet by mouth Daily. 90 tablet 3 More than a month    magnesium oxide (MAG-OX) 400 MG tablet Take 1 tablet by mouth Daily.       rosuvastatin (CRESTOR) 10 MG tablet Take 1 tablet by mouth Daily.       sodium bicarbonate 650 MG tablet Take 2 tablets by mouth 3 (Three) Times a Day.       tadalafil (CIALIS) 5 MG tablet Take 1 tablet by mouth Daily As Needed for Erectile Dysfunction.       tamsulosin (FLOMAX) 0.4 MG capsule 24 hr capsule Take 1 capsule by mouth Daily.       vitamin D (ERGOCALCIFEROL) 22867 units capsule capsule Take 1 capsule by mouth Every 30 (Thirty) Days. Around the 15th   1/15/2024       Medicines:  Current Facility-Administered Medications   Medication Dose Route Frequency Provider Last Rate Last Admin    acetaminophen (TYLENOL) tablet 650 mg  650 mg Oral Q4H PRN BickingMichael, DO        aspirin EC tablet 81 mg  81 mg Oral Daily BickingMichael DO   81 mg at 02/19/24 1116    sennosides-docusate (PERICOLACE) 8.6-50 MG per tablet 2 tablet  2 tablet Oral BID  Michael Armenta DO   2 tablet at 02/18/24 2058    And    polyethylene glycol (MIRALAX) packet 17 g  17 g Oral Daily PRN Michael Armenta DO        And    bisacodyl (DULCOLAX) EC tablet 5 mg  5 mg Oral Daily PRN BickingMichael DO        And    bisacodyl (DULCOLAX) suppository 10 mg  10 mg Rectal Daily PRN BicMichael tapia DO        carvedilol (COREG) tablet 25 mg  25 mg Oral BID BickingMichael DO   25 mg at 02/19/24 1117    ceFAZolin 2000 mg IVPB in 100 mL NS (MBP)  2,000 mg Intravenous Q8H Michael Armenta DO        dextrose (D50W) (25 g/50 mL) IV injection 25 g  25 g Intravenous Q15 Min PRN Michael Armenta DO        dextrose (GLUTOSE) oral gel 15 g  15 g Oral Q15 Min PRN Michael Armenta DO        Enoxaparin Sodium (LOVENOX) syringe 30 mg  30 mg Subcutaneous Daily BicMichael tapia DO   30 mg at 02/18/24 0859    glucagon (GLUCAGEN) injection 1 mg  1 mg Intramuscular Q15 Min PRN Michael Armenta DO        hydrALAZINE (APRESOLINE) injection 10 mg  10 mg Intravenous Q4H PRN Michael Armenta DO   10 mg at 02/16/24 1556    hydrALAZINE (APRESOLINE) tablet 50 mg  50 mg Oral Q8H Michael Armenta DO   50 mg at 02/19/24 0602    HYDROcodone-acetaminophen (NORCO) 5-325 MG per tablet 1 tablet  1 tablet Oral Q4H PRN Michael Armenta DO        insulin detemir (LEVEMIR) injection 5 Units  5 Units Subcutaneous Q12H Michael Armenta DO   5 Units at 02/18/24 2104    Insulin Lispro (humaLOG) injection 2-7 Units  2-7 Units Subcutaneous 4x Daily AC & at Bedtime Michael Armenta DO   2 Units at 02/18/24 2103    labetalol (NORMODYNE,TRANDATE) injection 10 mg  10 mg Intravenous Q4H PRN Michael Armenta DO   10 mg at 02/16/24 1440    magnesium oxide (MAG-OX) tablet 400 mg  400 mg Oral Daily Michael Armenta DO   400 mg at 02/19/24 1116    NIFEdipine XL (PROCARDIA XL) 24 hr tablet 60 mg  60 mg Oral Q24H Michael Armenta DO   60 mg at 02/19/24 1117    ondansetron ODT (ZOFRAN-ODT)  disintegrating tablet 4 mg  4 mg Oral Q6H PRN Bicking, Michael K, DO        Or    ondansetron (ZOFRAN) injection 4 mg  4 mg Intravenous Q6H PRN Bicking, Michael K, DO        rosuvastatin (CRESTOR) tablet 10 mg  10 mg Oral Nightly Bicking, Michael K, DO   10 mg at 02/18/24 2059    sodium bicarbonate tablet 650 mg  650 mg Oral TID Bicking, Michael K, DO   650 mg at 02/19/24 1118    sodium chloride 0.9 % flush 10 mL  10 mL Intravenous PRN Bicking, Michael K, DO        sodium chloride 0.9 % flush 10 mL  10 mL Intravenous Q12H Bicking, Michael K, DO   10 mL at 02/19/24 1117    sodium chloride 0.9 % infusion 40 mL  40 mL Intravenous PRN Bicking, Michael K, DO        sodium chloride 0.9 % infusion 500 mL  500 mL Intravenous Continuous Bicking, Michael K, DO 25 mL/hr at 02/19/24 0938 Currently Infusing at 02/19/24 0938    tamsulosin (FLOMAX) 24 hr capsule 0.4 mg  0.4 mg Oral Daily Bicking, Michael K, DO   0.4 mg at 02/19/24 1116       Past Medical History:  Past Medical History:   Diagnosis Date    Chronic renal impairment 2/14/2024    Diabetes mellitus     Edema     Hyperlipidemia     Hypertension        Past Surgical History:  Past Surgical History:   Procedure Laterality Date    NO PAST SURGERIES         Family History  Family History   Problem Relation Age of Onset    Diabetes Mother     No Known Problems Father        Social History  Social History     Socioeconomic History    Marital status:    Tobacco Use    Smoking status: Never    Smokeless tobacco: Never   Vaping Use    Vaping Use: Never used   Substance and Sexual Activity    Alcohol use: No    Drug use: No    Sexual activity: Defer       Review of Systems:  History obtained from chart review and the patient  General ROS: No fever or chills  Respiratory ROS: No cough, shortness of breath, wheezing  Cardiovascular ROS: No chest pain or palpitations  Gastrointestinal ROS: No abdominal pain or melena  Genito-Urinary ROS: No dysuria or hematuria  Psych ROS: No  anxiety and depression  14 point ROS reviewed with the patient and negative except as noted above and in the HPI unless unable to obtain.    Objective:  Patient Vitals for the past 24 hrs:   BP Temp Temp src Pulse Resp SpO2 Weight   02/19/24 1116 152/63 98.1 °F (36.7 °C) Oral 85 16 98 % --   02/19/24 1025 163/68 98.1 °F (36.7 °C) Temporal 81 14 99 % --   02/19/24 1020 157/64 -- -- 81 14 99 % --   02/19/24 1015 150/65 -- -- 81 14 100 % --   02/19/24 1010 140/63 -- -- 81 16 100 % --   02/19/24 1005 137/60 -- -- 82 12 100 % --   02/19/24 1003 137/60 97.7 °F (36.5 °C) Temporal 81 16 100 % --   02/19/24 0920 -- -- -- 90 18 98 % --   02/19/24 0817 149/67 98 °F (36.7 °C) Oral 85 20 98 % --   02/19/24 0500 -- -- -- -- -- -- 98.2 kg (216 lb 6.4 oz)   02/19/24 0402 149/63 98 °F (36.7 °C) Oral 81 20 98 % --   02/19/24 0020 135/51 98.3 °F (36.8 °C) Oral 82 20 97 % --   02/18/24 2037 155/65 98.2 °F (36.8 °C) Oral 87 20 98 % --   02/18/24 1512 140/62 98.4 °F (36.9 °C) Oral 81 16 97 % --       Intake/Output Summary (Last 24 hours) at 2/19/2024 1120  Last data filed at 2/19/2024 0604  Gross per 24 hour   Intake 360 ml   Output 450 ml   Net -90 ml     General: awake/alert   Chest:  clear to auscultation bilaterally without respiratory distress  CVS: regular rate and rhythm  Abdominal: soft, nontender, positive bowel sounds  Extremities:  trace lower ext edema  Skin: warm and dry without rash      Labs:  Results from last 7 days   Lab Units 02/19/24 0437 02/18/24 0622 02/17/24 0432   WBC 10*3/mm3 7.38 8.48 8.34   HEMOGLOBIN g/dL 8.2* 8.0* 8.0*   HEMATOCRIT % 25.0* 23.7* 24.6*   PLATELETS 10*3/mm3 227 226 236         Results from last 7 days   Lab Units 02/19/24 0437 02/18/24 0622 02/17/24 0432   SODIUM mmol/L 128* 127* 134*   POTASSIUM mmol/L 4.0 4.4 4.0   CHLORIDE mmol/L 94* 94* 99   CO2 mmol/L 21.0* 19.0* 21.0*   BUN mg/dL 78* 76* 68*   CREATININE mg/dL 5.63* 5.35* 4.91*   CALCIUM mg/dL 8.6 8.3* 9.0   EGFR mL/min/1.73 10.4*  "11.0* 12.2*   BILIRUBIN mg/dL <0.2 <0.2 0.2   ALK PHOS U/L 104 100 105   ALT (SGPT) U/L 11 8 5   AST (SGOT) U/L 22 17 12   GLUCOSE mg/dL 110* 177* 133*       Radiology:   Imaging Results (Last 72 Hours)       Procedure Component Value Units Date/Time    IR Insert Tunneled CV Catheter Without Port 5 Plus [[id number]] Resulted: 02/19/24 0957     Updated: 02/19/24 0959    FL C Arm During Surgery [[id number]] Resulted: 02/19/24 0959     Updated: 02/19/24 0959    Narrative:      This procedure was auto-finalized with no dictation required.            Culture:  No results found for: \"BLOODCX\", \"URINECX\", \"WOUNDCX\", \"MRSACX\", \"RESPCX\", \"STOOLCX\"      Assessment    Acute kidney injury, ATN--now starting hemodialysis  Baseline chronic kidney disease stage 4  Hypertensive urgency  Type 2 diabetes  Anemia of CKD and iron deficiency  Hyponatremia  Metabolic acidosis    Plan:  First hemodialysis today  Monitor labs      Teo Pate, APRN  2/19/2024  11:20 CST      Electronically signed by Nikhil Cabrera MD at 02/19/24 2045       David Osborne MD at 02/18/24 1548              [facility] Medicine Services  INPATIENT PROGRESS NOTE    Patient Name: Mauro TAY [facility]  Date of Admission: 2/14/2024  Today's Date: 02/18/24  Length of Stay: 4  Primary Care Physician: John Robertson MD    Subjective   Chief Complaint: Hypertension/acute renal failure.  HPI   Creatinine is worsened today, discussed with patient plan for permacath placement and dialysis.  Blood pressures improving.  Bladder scan shows 26 cc.    Review of Systems   Constitutional:  Positive for activity change and appetite change. Negative for chills and fever.   HENT:  Negative for hearing loss, nosebleeds, tinnitus and trouble swallowing.    Eyes:  Negative for visual disturbance.   Respiratory: Shortness of breath resolved, on room air.  Negative for chest tightness and wheezing.    Cardiovascular:  Negative for chest " pain, palpitations and leg swelling.   Gastrointestinal:  Negative for abdominal distention, abdominal pain, blood in stool, constipation, diarrhea, nausea and vomiting.   Endocrine: Negative for cold intolerance, heat intolerance, polydipsia, polyphagia and polyuria.   Genitourinary:  Negative for decreased urine volume, difficulty urinating, dysuria, flank pain, frequency and hematuria.   Musculoskeletal:  Negative for arthralgias, joint swelling and myalgias.   Skin:  Negative for rash.   Allergic/Immunologic: Negative for immunocompromised state.   Neurological:  Positive for weakness. Negative for dizziness, syncope, light-headedness and headaches.   Hematological:  Negative for adenopathy. Does not bruise/bleed easily.   Psychiatric/Behavioral:  Negative for confusion and sleep disturbance. The patient is not nervous/anxious.   All pertinent negatives and positives are as above. All other systems have been reviewed and are negative unless otherwise stated.     Objective    Temp:  [97.4 °F (36.3 °C)-98.7 °F (37.1 °C)] 98.4 °F (36.9 °C)  Heart Rate:  [81-92] 81  Resp:  [14-18] 16  BP: (138-158)/(59-70) 140/62  Physical Exam  Vitals and nursing note reviewed.   HENT:      Head: Normocephalic.   Eyes:      Conjunctiva/sclera: Conjunctivae normal.      Pupils: Pupils are equal, round, and reactive to light.   Cardiovascular:      Rate and Rhythm: Normal rate and regular rhythm.      Heart sounds: Normal heart sounds.   Pulmonary:      Effort: No respiratory distress.      Comments: Diminished breath of bilateral, clear, on room air.  Patient no acute distress.  Abdominal:      General: Bowel sounds are normal. There is no distension.      Palpations: Abdomen is soft.      Tenderness: There is no abdominal tenderness.      Comments: Obesity .   Musculoskeletal:         General: No swelling.      Cervical back: Neck supple.   Skin:     General: Skin is warm and dry.      Capillary Refill: Capillary refill takes 2 to  3 seconds.      Findings: No rash.   Neurological:      General: No focal deficit present.      Mental Status: He is alert and oriented to person, place, and time.   Psychiatric:         Mood and Affect: Mood normal.         Behavior: Behavior normal.         Thought Content: Thought content normal.       Results Review:  I have reviewed the labs, radiology results, and diagnostic studies.    Laboratory Data:   Results from last 7 days   Lab Units 02/18/24 0622 02/17/24 0432 02/16/24  0354   WBC 10*3/mm3 8.48 8.34 9.25   HEMOGLOBIN g/dL 8.0* 8.0* 8.4*   HEMATOCRIT % 23.7* 24.6* 25.1*   PLATELETS 10*3/mm3 226 236 241        Results from last 7 days   Lab Units 02/18/24 0622 02/17/24 0432 02/16/24  0354   SODIUM mmol/L 127* 134* 138   POTASSIUM mmol/L 4.4 4.0 3.9   CHLORIDE mmol/L 94* 99 104   CO2 mmol/L 19.0* 21.0* 20.0*   BUN mg/dL 76* 68* 61*   CREATININE mg/dL 5.35* 4.91* 4.14*   CALCIUM mg/dL 8.3* 9.0 8.6   BILIRUBIN mg/dL <0.2 0.2 0.3   ALK PHOS U/L 100 105 109   ALT (SGPT) U/L 8 5 5   AST (SGOT) U/L 17 12 12   GLUCOSE mg/dL 177* 133* 119*       Culture Data:   Blood Culture   Date Value Ref Range Status   02/14/2024 No growth at 4 days  Preliminary   02/14/2024 No growth at 4 days  Preliminary     Urine Culture   Date Value Ref Range Status   02/16/2024 25,000 CFU/mL Mixed Mary Isolated  Final       Radiology Data:   Imaging Results (Last 24 Hours)       ** No results found for the last 24 hours. **            I have reviewed the patient's current medications.     Assessment/Plan   Assessment  Active Hospital Problems    Diagnosis     **Hypertensive urgency     Acute on chronic renal failure     Controlled type 2 diabetes mellitus with complication, without long-term current use of insulin     Benign essential HTN     Mixed hyperlipidemia        Treatment Plan  Hypertensive urgency/hyperlipidemia/history of hypertension/cardiomegaly/diastolic CHF.  Continue Cardene drip.  Patient will need to go unit for  now.   Aspirin .  Crestor.  Coreg.  Hold Lasix due to renal failure.  Hold kerendia.  Hold lisinopril due to renal failure.   Labetalol as needed.  Hydralazine as needed.  Continue hydralazine p.o..  Continue nifedipine.    Echocardiogram-ejection fraction 61 to 65%, mild concentric hypertrophy, diastolic dysfunction grade 2, normal right ventricle cavities size and systolic function, left atrial cavity moderately dilated, aortic valve exhibits sclerosis-mild thickening of aortic valve-no aortic valve regurgitation-mild aortic valve stenosis, moderate mitral annular calcification-mild mitral valve regurgitation, mild pulmonary hypertension.     Acute on chronic renal failure stage IIIb/metabolic acidosis..  Baseline creatinine 10/1/2020 2.5.  Creatinine increasing..  Nephrology consult.  Continue bicarb .    Ultrasound the kidneys-Negative for hydronephrosis or renal atrophy.   Plan for dialysis, plan for permacath placement.    Hyponatremia . Decrease water intake discussed with patient.     Anemia.  Hemoglobin stable.  Probably secondary to chronic renal failure.  No sign of GI bleed.  Iron sulfate .     Shortness of breath.  Chest x-ray-Shallow inspiration, with mild cardiomegaly, No evidence of overt CHF. No focal pulmonary infiltrates.  Patient is on room air     Elevated D-dimer.  Unable to do CTA of the chest due to renal failure.  Will start prophylaxis Lovenox for now.  Patient denies any chest pain.     Diabetes.  Hold metformin due to renal failure.  Low sliding scale for now.  Hemoglobin A1 C 7.3.  Continue Levemir .     UTI.  Rocephin x 3 days.     Nausea.  Zofran as needed.     Prostate hypertrophy.  Continue Flomax.  Bladder scan today only shows 26 cc.     Obesity.  BMI is 32.     Nutrition.  Cardiac/diabetic diet.  Magnesium oxide.     Blood cultures-no growth for 4 days.  COVID-19-negative.  Flu screen negative.  RSV-negative.  Respiratory PCR- neg .  Urine cultures-mixed arianna.     Medical  Decision Making  Number and Complexity of problems: Hypertensive urgency/acute on chronic renal failure/anemia/shortness of breath/diabetes  Differential Diagnosis: None     Conditions and Status        Condition is unchanged.     MDM Data  External documents reviewed: ER notes  Cardiac tracing (EKG, telemetry) interpretation: Sinus  Radiology interpretation: Chest x-ray  Labs reviewed: Laboratory  Any tests that were considered but not ordered: Laboratory in AM     Decision rules/scores evaluated (example [id number], Wells, etc): None     Discussed with: Patient     Care Planning  Shared decision making: Patient and wife  Code status and discussions: Full code     Disposition  Social Determinants of Health that impact treatment or disposition: From home  Estimated length of stay is 2 to 5 days.        Electronically signed by David Osborne MD at 02/18/24 1611       Davie Styles MD at 02/18/24 1343          Nephrology ([facility]) Progress Note      Patient:  Mauro Patel  YOB: 1955  Date of Service: 2/18/2024  MRN: [id number]   Acct: [id number]   Primary Care Physician: John Robertson MD  Advance Directive:   Code Status and Medical Interventions:   Ordered at: 02/14/24 1202     Level Of Support Discussed With:    Patient     Code Status (Patient has no pulse and is not breathing):    CPR (Attempt to Resuscitate)     Medical Interventions (Patient has pulse or is breathing):    Full Support     Admit Date: 2/14/2024       Hospital Day: 4  Referring Provider: No ref. provider found      Patient personally seen and examined.  Complete chart including Consults, Notes, Operative Reports, Labs, Cardiology, and Radiology studies reviewed as able.    Chief complaint: Abnormal labs.    Subjective:  Mauro Patel is a 68 y.o. male for whom we were consulted for evaluation and treatment of acute kidney injury. Baseline chronic kidney disease stage 4. Follows with Teo  Herlinda BATES in our office. Baseline creatinine approximately 2.8. History of type 2 diabetes, hypertension.  Patient was started on Kerendia a few months ago. He was seen in our office on 2/07 and creatinine had increased to 3.8 and he appeared somewhat volume depleted on exam. Patient was instructed to hold Lasix at that time and was supposed to get repeat labs this week. He presented to ER on 2/14 with dyspnea and elevated blood pressure. Creatinine 3.3 on arrival to ER.  He was initially admitted to ICU, needing intravenous Cardene for the treatment of severe hypertension.  Cardene has been weaned off.  He is now transferred to regular floor.    This afternoon patient is feeling weak.  He has noticed less urine output.  His renal function continued to worsen.  He has excellent blood pressure control at this point.    Allergies:  Duravent dm  [[id number]]    Home Meds:  Medications Prior to Admission   Medication Sig Dispense Refill Last Dose    ACCU-CHEK GUIDE test strip        allopurinol (ZYLOPRIM) 100 MG tablet Take 1 tablet by mouth Daily.       ASPIRIN 81 PO Take 81 mg by mouth Daily.       carvedilol (COREG) 25 MG tablet Take 1 tablet by mouth 2 (Two) Times a Day With Meals. BID       ferrous sulfate 325 (65 FE) MG tablet Take 1 tablet by mouth 2 (Two) Times a Day With Meals.       Finerenone (Kerendia) 10 MG tablet Take 1 tablet by mouth Daily.       insulin detemir (LEVEMIR) 100 UNIT/ML injection Inject 20 Units under the skin into the appropriate area as directed 2 (Two) Times a Day. 20 units every morning and 35 units every night       Insulin Lispro, 1 Unit Dial, (HumaLOG KwikPen) 100 UNIT/ML solution pen-injector Inject 5 Units under the skin into the appropriate area as directed 2 (Two) Times a Day. At breakfast and supper       lisinopril (PRINIVIL,ZESTRIL) 40 MG tablet Take 1 tablet by mouth Daily.       losartan (COZAAR) 100 MG tablet Take 1 tablet by mouth Daily. 90 tablet 3 More  than a month    magnesium oxide (MAG-OX) 400 MG tablet Take 1 tablet by mouth Daily.       rosuvastatin (CRESTOR) 10 MG tablet Take 1 tablet by mouth Daily.       sodium bicarbonate 650 MG tablet Take 2 tablets by mouth 3 (Three) Times a Day.       tadalafil (CIALIS) 5 MG tablet Take 1 tablet by mouth Daily As Needed for Erectile Dysfunction.       tamsulosin (FLOMAX) 0.4 MG capsule 24 hr capsule Take 1 capsule by mouth Daily.       vitamin D (ERGOCALCIFEROL) 70255 units capsule capsule Take 1 capsule by mouth Every 30 (Thirty) Days. Around the 15th   1/15/2024       Medicines:  Current Facility-Administered Medications   Medication Dose Route Frequency Provider Last Rate Last Admin    aspirin EC tablet 81 mg  81 mg Oral Daily David Osborne MD   81 mg at 02/18/24 0859    sennosides-docusate (PERICOLACE) 8.6-50 MG per tablet 2 tablet  2 tablet Oral BID David Osborne MD   2 tablet at 02/18/24 0905    And    polyethylene glycol (MIRALAX) packet 17 g  17 g Oral Daily PRN David Osborne MD        And    bisacodyl (DULCOLAX) EC tablet 5 mg  5 mg Oral Daily PRN David Osborne MD        And    bisacodyl (DULCOLAX) suppository 10 mg  10 mg Rectal Daily PRN aDvid Osborne MD        carvedilol (COREG) tablet 25 mg  25 mg Oral BID David Osborne MD   25 mg at 02/18/24 0859    cefTRIAXone (ROCEPHIN) 1,000 mg in sodium chloride 0.9 % 100 mL IVPB  1,000 mg Intravenous Q24H David Osborne  mL/hr at 02/18/24 1339 1,000 mg at 02/18/24 1339    dextrose (D50W) (25 g/50 mL) IV injection 25 g  25 g Intravenous Q15 Min PRN David Osborne MD        dextrose (GLUTOSE) oral gel 15 g  15 g Oral Q15 Min PRN David Osborne MD        Enoxaparin Sodium (LOVENOX) syringe 30 mg  30 mg Subcutaneous Daily David Osborne MD   30 mg at 02/18/24 0859    glucagon (GLUCAGEN) injection 1 mg  1 mg Intramuscular Q15 Min PRN David Osborne MD        hydrALAZINE (APRESOLINE) injection 10 mg  10 mg Intravenous Q4H PRN David Osborne MD   10  mg at 02/16/24 1556    hydrALAZINE (APRESOLINE) tablet 50 mg  50 mg Oral Q8H Davie Styles MD   50 mg at 02/18/24 1339    insulin detemir (LEVEMIR) injection 5 Units  5 Units Subcutaneous Q12H David Osborne MD   5 Units at 02/18/24 0908    Insulin Lispro (humaLOG) injection 2-7 Units  2-7 Units Subcutaneous 4x Daily AC & at Bedtime David Osborne MD   2 Units at 02/18/24 1145    labetalol (NORMODYNE,TRANDATE) injection 10 mg  10 mg Intravenous Q4H PRN David Osborne MD   10 mg at 02/16/24 1440    magnesium oxide (MAG-OX) tablet 400 mg  400 mg Oral Daily David Osborne MD   400 mg at 02/18/24 0859    mupirocin (BACTROBAN) 2 % nasal ointment 1 Application  1 application  Each Nare BID David Osborne MD   1 Application at 02/18/24 0905    niCARdipine (CARDENE) 25 mg in 250 mL NS infusion kit  5-15 mg/hr Intravenous Titrated David Osborne MD   Stopped at 02/17/24 1059    NIFEdipine XL (PROCARDIA XL) 24 hr tablet 60 mg  60 mg Oral Q24H Davie Styles MD   60 mg at 02/18/24 0859    ondansetron (ZOFRAN) injection 4 mg  4 mg Intravenous Q6H PRN David Osborne MD        rosuvastatin (CRESTOR) tablet 10 mg  10 mg Oral Nightly David Osborne MD   10 mg at 02/17/24 2209    sodium bicarbonate tablet 650 mg  650 mg Oral BID David Osborne MD   650 mg at 02/18/24 0859    sodium chloride 0.9 % flush 10 mL  10 mL Intravenous PRN David Osborne MD        sodium chloride 0.9 % flush 10 mL  10 mL Intravenous Q12H David Osborne MD   10 mL at 02/18/24 0908    sodium chloride 0.9 % flush 10 mL  10 mL Intravenous PRN David Osborne MD   10 mL at 02/16/24 0814    sodium chloride 0.9 % infusion 40 mL  40 mL Intravenous PRN David Osborne MD        tamsulosin (FLOMAX) 24 hr capsule 0.4 mg  0.4 mg Oral Daily David Osborne MD   0.4 mg at 02/18/24 0859       Past Medical History:  Past Medical History:   Diagnosis Date    Diabetes mellitus     Edema     Hyperlipidemia     Hypertension        Past Surgical History:  Past Surgical  History:   Procedure Laterality Date    NO PAST SURGERIES         Family History  Family History   Problem Relation Age of Onset    Diabetes Mother     No Known Problems Father        Social History  Social History     Socioeconomic History    Marital status:    Tobacco Use    Smoking status: Never    Smokeless tobacco: Never   Vaping Use    Vaping Use: Never used   Substance and Sexual Activity    Alcohol use: No    Drug use: No    Sexual activity: Defer       Review of Systems:  History obtained from chart review and the patient  General ROS: No fever or chills  Respiratory ROS: No cough, shortness of breath, wheezing  Cardiovascular ROS: No chest pain or palpitations  Gastrointestinal ROS: No abdominal pain or melena  Genito-Urinary ROS: No dysuria or hematuria  Psych ROS: No anxiety and depression  14 point ROS reviewed with the patient and negative except as noted above and in the HPI unless unable to obtain.    Objective:  Patient Vitals for the past 24 hrs:   BP Temp Temp src Pulse Resp SpO2 Weight   02/18/24 1100 148/66 98 °F (36.7 °C) Oral 90 14 96 % --   02/18/24 0859 145/61 -- -- 84 -- -- --   02/18/24 0726 138/59 97.9 °F (36.6 °C) Oral 81 16 98 % --   02/18/24 0450 -- -- -- -- -- -- 97.5 kg (215 lb)   02/18/24 0445 139/66 97.4 °F (36.3 °C) Oral 82 18 99 % --   02/17/24 2300 158/69 97.8 °F (36.6 °C) Oral 91 16 98 % --   02/17/24 2019 156/70 98.4 °F (36.9 °C) Oral 92 18 98 % --   02/17/24 1645 147/66 98.7 °F (37.1 °C) Oral 83 16 96 % --       Intake/Output Summary (Last 24 hours) at 2/18/2024 1343  Last data filed at 2/18/2024 1313  Gross per 24 hour   Intake 480 ml   Output 400 ml   Net 80 ml     General: awake/alert   HEENT: Normocephalic atraumatic head  Neck: Supple with no JVD or carotid bruits.  Chest:  clear to auscultation bilaterally without respiratory distress  CVS: regular rate and rhythm  Abdominal: soft, nontender, positive bowel sounds  Extremities:  trace lower ext edema  Skin: warm  "and dry without rash      Labs:  Results from last 7 days   Lab Units 02/18/24  0622 02/17/24  0432 02/16/24  0354   WBC 10*3/mm3 8.48 8.34 9.25   HEMOGLOBIN g/dL 8.0* 8.0* 8.4*   HEMATOCRIT % 23.7* 24.6* 25.1*   PLATELETS 10*3/mm3 226 236 241         Results from last 7 days   Lab Units 02/18/24  0622 02/17/24  0432 02/16/24  0354   SODIUM mmol/L 127* 134* 138   POTASSIUM mmol/L 4.4 4.0 3.9   CHLORIDE mmol/L 94* 99 104   CO2 mmol/L 19.0* 21.0* 20.0*   BUN mg/dL 76* 68* 61*   CREATININE mg/dL 5.35* 4.91* 4.14*   CALCIUM mg/dL 8.3* 9.0 8.6   EGFR mL/min/1.73 11.0* 12.2* 15.0*   BILIRUBIN mg/dL <0.2 0.2 0.3   ALK PHOS U/L 100 105 109   ALT (SGPT) U/L 8 5 5   AST (SGOT) U/L 17 12 12   GLUCOSE mg/dL 177* 133* 119*       Radiology:   Imaging Results (Last 72 Hours)       ** No results found for the last 72 hours. **            Culture:  No results found for: \"BLOODCX\", \"URINECX\", \"WOUNDCX\", \"MRSACX\", \"RESPCX\", \"STOOLCX\"      Assessment    Acute kidney injury, ATN-/worsening/due to Kerendia  Baseline chronic kidney disease stage 4  Hypertensive urgency  Type 2 diabetes with nephropathy.  Anemia of CKD and iron deficiency  Hyponatremia due to excessive water intake  Metabolic acidemia.    Plan:  Long discussion with patient, recommended dialysis.  Patient agreed to proceed with hemodialysis via permacath.  I will request a consultation with Dr. Armenta for insertion of permacath  Decrease water intake.  Sodium bicarbonate.  Initiate dialysis once he has access      Davie Styles MD  2/18/2024  13:43 CST      Electronically signed by Davie Styles MD at 02/18/24 1345          Consult Notes (last 48 hours)        Michael Armenta DO at 02/19/24 1003        Consult Orders    1. Inpatient Vascular Surgery Consult [[id number]] ordered by Davie Styles MD at 02/18/24 1346                 Mauro TAY Amanda  [id number]  [id number]  [facility] OR  Rajiv Carmona MD  2/14/2024    Chief Complaint   Patient presents with    " Shortness of Breath       HPI: Mauro Patel is a 68 y.o. male whom we were consulted for acute kidney injury/chronic kidney disease/PermCath placement.  Patient has stage IV chronic kidney disease, last estimated GFR, 23 mL.  He follows PAULO Bravo in the office.  He has progressive decline in renal function due to diabetic nephropathy.  He has poorly controlled diabetes with A1c close to 8%.  Presenting today with increasing shortness of breath.  On arrival in the emergency room his blood pressure was 200/100  Millimeters mercury.  His serum creatinine 3.3 mg with estimated GFR less than 20 mL.  He denies any nausea vomiting or diarrhea.  He denies any recent exposure to nonsteroidal agents.  He drinks about half a gallon of water a day..  His chest x-ray consistent with normal study with no infiltrate/No fluid/essentially normal chest x-ray.  He is now admitted to CCU with worsening of renal function and severe systolic and diastolic hypertension.      Past Medical History:   Diagnosis Date    Chronic renal impairment 2/14/2024    Diabetes mellitus     Edema     Hyperlipidemia     Hypertension        Past Surgical History:   Procedure Laterality Date    NO PAST SURGERIES         Family History   Problem Relation Age of Onset    Diabetes Mother     No Known Problems Father        Social History     Socioeconomic History    Marital status:    Tobacco Use    Smoking status: Never    Smokeless tobacco: Never   Vaping Use    Vaping Use: Never used   Substance and Sexual Activity    Alcohol use: No    Drug use: No    Sexual activity: Defer       Allergies   Allergen Reactions    Duravent Dm  [Phenylephrine-Dm-Gg] Rash       Hospital Medications (active)         Dose Frequency Start End    aspirin EC tablet 81 mg ([MAR Hold] since 2/19/2024  9:16 AM) 81 mg Daily 2/14/2024 --    Admin Instructions: Do not exceed 4 grams of aspirin in a 24 hr period.    If given for pain, use the following pain scale:    Mild Pain = Pain Score of 1-3, CPOT 1-2  Moderate Pain = Pain Score of 4-6, CPOT 3-4  Severe Pain = Pain Score of 7-10, CPOT 5-8    Route: Oral    atropine sulfate injection 0.5 mg 0.5 mg Once As Needed 2/19/2024 --    Admin Instructions: May repeat Atropine 0.5mg IV x1 dose in 3 minutes IF symptomatic bradycardia is not resolved.     Route: Intravenous    bisacodyl (DULCOLAX) EC tablet 5 mg ([MAR Hold] since 2/19/2024  9:16 AM) 5 mg Daily PRN 2/14/2024 --    Admin Instructions: Use if no bowel movement after 12 hours.  Swallow whole. Do not crush, split, or chew tablet.    Route: Oral    Linked Group 1: See Hyperspace for full Linked Orders Report.        bisacodyl (DULCOLAX) suppository 10 mg ([MAR Hold] since 2/19/2024  9:16 AM) 10 mg Daily PRN 2/14/2024 --    Admin Instructions: Use if no bowel movement after 12 hours.  Hold for diarrhea    Route: Rectal    Linked Group 1: See Hyperspace for full Linked Orders Report.        carvedilol (COREG) tablet 25 mg 25 mg 2 Times Daily 2/14/2024 --    Admin Instructions: Hold for SBP less than 100, DBP less than 60, or heart rate less than 50. If a dose is held, please contact the provider.  Give with food.    Route: Oral    dextrose (D50W) (25 g/50 mL) IV injection 25 g ([MAR Hold] since 2/19/2024  9:16 AM) 25 g Every 15 Minutes PRN 2/14/2024 --    Admin Instructions: Blood sugar less than 70; patient has IV access - Unresponsive, NPO or Unable To Safely Swallow    Route: Intravenous    dextrose (GLUTOSE) oral gel 15 g ([MAR Hold] since 2/19/2024  9:16 AM) 15 g Every 15 Minutes PRN 2/14/2024 --    Admin Instructions: BS<70, Patient Alert, Is not NPO, Can safely swallow.    Route: Oral    droperidol (INAPSINE) injection 0.625 mg 0.625 mg Once As Needed 2/19/2024 --    Route: Intravenous    Linked Group 2: See Hyperspace for full Linked Orders Report.        droperidol (INAPSINE) injection 0.625 mg 0.625 mg Once As Needed 2/19/2024 --    Route: Intramuscular    Linked  Group 2: See [facility] for full Linked Orders Report.        Enoxaparin Sodium (LOVENOX) syringe 30 mg ([MAR Hold] since 2/19/2024  9:16 AM) 30 mg Daily 2/14/2024 --    Admin Instructions: Give subcutaneous in abdomen only. Do not massage site after injection.    Route: Subcutaneous    fentaNYL citrate (PF) (SUBLIMAZE) injection 25 mcg 25 mcg Every 5 Minutes PRN 2/19/2024 --    Admin Instructions: Maximum total dose of fentaNYL is 100 mcg.  Contact anesthesia before admin if pt HR <50 or resp <10.  If given for pain, use the following pain scale:  Mild Pain = Pain Score of 1-3, CPOT 1-2  Moderate Pain = Pain Score of 4-6, CPOT 3-4  Severe Pain = Pain Score of 7-10, CPOT 5-8    Route: Intravenous    fentaNYL citrate (PF) (SUBLIMAZE) injection  As Needed 2/19/2024 --    Route: Intravenous    flumazenil (ROMAZICON) injection 0.2 mg 0.2 mg As Needed 2/19/2024 --    Admin Instructions: ** give IV over 15-30 seconds **    Route: Intravenous    glucagon (GLUCAGEN) injection 1 mg ([MAR Hold] since 2/19/2024  9:16 AM) 1 mg Every 15 Minutes PRN 2/14/2024 --    Admin Instructions: Blood Glucose Less Than 70 - Patient Without IV Access - Unresponsive, NPO or Unable To Safely Swallow  Reconstitute powder for injection by adding 1 mL of -supplied sterile diluent or sterile water for injection to a vial containing 1 mg of the drug, to provide solutions containing 1 mg/mL. Shake vial gently to dissolve.    Route: Intramuscular    hydrALAZINE (APRESOLINE) injection 10 mg 10 mg Every 4 Hours PRN 2/14/2024 --    Admin Instructions: Hold for SBP less than 100, DBP less than 60  Caution: Look alike/sound alike drug alert    Route: Intravenous    hydrALAZINE (APRESOLINE) tablet 50 mg 50 mg Every 8 Hours Scheduled 2/15/2024 --    Admin Instructions: Hold for SBP less than 100, DBP less than 60  Caution: Look alike/sound alike drug alert    Route: Oral    HYDROcodone-acetaminophen (NORCO) 5-325 MG per tablet 1 tablet 1 tablet  Once As Needed 2/19/2024 --    Admin Instructions: Based on patient request - if ordered for moderate or severe pain, provider allows for administration of a medication prescribed for a lower pain scale.  [ALAN]    Do not exceed 4 grams of acetaminophen in a 24 hr period. Max dose of 2gm for AST/ALT greater than 120 units/L        If given for pain, use the following pain scale:   Mild Pain = Pain Score of 1-3, CPOT 1-2  Moderate Pain = Pain Score of 4-6, CPOT 3-4  Severe Pain = Pain Score of 7-10, CPOT 5-8    Route: Oral    HYDROmorphone (DILAUDID) injection 0.2 mg 0.2 mg Every 10 Minutes PRN 2/19/2024 --    Admin Instructions: Maximum total dose of hydromorphone is 2 mg.  If given for pain, use the following pain scale:  Mild Pain = Pain Score of 1-3, CPOT 1-2  Moderate Pain = Pain Score of 4-6, CPOT 3-4  Severe Pain = Pain Score of 7-10, CPOT 5-8    Route: Intravenous    ibuprofen (ADVIL,MOTRIN) tablet 600 mg 600 mg Once As Needed 2/19/2024 --    Admin Instructions: If given for pain, use the following pain scale:  Mild Pain = Pain Score of 1-3, CPOT 1-2  Moderate Pain = Pain Score of 4-6, CPOT 3-4  Severe Pain = Pain Score of 7-10, CPOT 5-8    Route: Oral    insulin detemir (LEVEMIR) injection 5 Units ([MAR Hold] since 2/19/2024  9:16 AM) 5 Units Every 12 Hours Scheduled 2/15/2024 --    Admin Instructions:     Route: Subcutaneous    Insulin Lispro (humaLOG) injection 2-7 Units ([MAR Hold] since 2/19/2024  9:16 AM) 2-7 Units 4 Times Daily Before Meals & Nightly 2/14/2024 --    Admin Instructions: Correction Insulin - Low Dose - Total Insulin Dose Less Than 40 units/day (Lean, Elderly or Renal Patients)    Blood Glucose 150-199 mg/dL - 2 units  Blood Glucose 200-249 mg/dL - 3 units  Blood Glucose 250-299 mg/dL - 4 units  Blood Glucose 300-349 mg/dL - 5 units  Blood Glucose 350-400 mg/dL - 6 units  Blood Glucose Greater Than 400 mg/dL - 7 units & Call Provider   Caution: Look alike/sound alike drug alert    Route:  "Subcutaneous    labetalol (NORMODYNE,TRANDATE) injection 10 mg 10 mg Every 4 Hours PRN 2/14/2024 --    Admin Instructions: As needed for SBP greater than 160  Give IV Push over 2 minutes.    Route: Intravenous    labetalol (NORMODYNE,TRANDATE) injection 5 mg 5 mg Every 5 Minutes PRN 2/19/2024 --    Admin Instructions: Hold for heart rate less than 60.  Give IV Push over 2 minutes.    Route: Intravenous    lactated ringers infusion 100 mL/hr Continuous 2/19/2024 --    Route: Intravenous    lidocaine PF 2% (XYLOCAINE) injection  As Needed 2/19/2024 --    Route: Intravenous    magnesium oxide (MAG-OX) tablet 400 mg ([MAR Hold] since 2/19/2024  9:16 AM) 400 mg Daily 2/15/2024 --    Admin Instructions: Each 400mg of magnesium oxide is equal to 241.3mg of elemental magnesium.    Route: Oral    naloxone (NARCAN) injection 0.04 mg 0.04 mg As Needed 2/19/2024 --    Admin Instructions: Prepare Narcan slush by diluting 0.4mg of Naloxone in 9mL of NS (final concentration of 0.04mg/mL).  Administer 1mL of slush IV every 3 minutes until Resp Rate is above 12 per minute and patient is responsive to verbal stimuli or a total of 10mL (0.4mg) had been administered.     Route: Intravenous    NIFEdipine XL (PROCARDIA XL) 24 hr tablet 60 mg 60 mg Every 24 Hours Scheduled 2/16/2024 --    Admin Instructions: Hold for SBP less than 100, DBP less than 60  Caution: Look alike/sound alike drug alert. Avoid grapefruit juice. Swallow whole. Do not crush, split or chew.    Route: Oral    ondansetron (ZOFRAN) injection 4 mg ([MAR Hold] since 2/19/2024  9:16 AM) 4 mg Every 6 Hours PRN 2/14/2024 --    Admin Instructions: \"If multiple N/V medications ordered, use in the following order: Ondansetron, Prochlorperazine, Promethazine. Use PO unless patient refuses or patient unable to swallow.\"    Route: Intravenous    ondansetron (ZOFRAN) injection 4 mg 4 mg Every 15 Minutes PRN 2/19/2024 --    Admin Instructions: Max total dose of 8mg in 6 hours.   If " BOTH ondansetron (ZOFRAN) and promethazine (PHENERGAN) are ordered use ondansetron first and THEN promethazine IF ondansetron is ineffective.    Route: Intravenous    polyethylene glycol (MIRALAX) packet 17 g ([MAR Hold] since 2/19/2024  9:16 AM) 17 g Daily PRN 2/14/2024 --    Admin Instructions: Use if no bowel movement after 12 hours. Mix in 6-8 ounces of water.  Use 4-8 ounces of water, tea, or juice for each 17 gram dose.    Route: Oral    Linked Group 1: See Hyperspace for full Linked Orders Report.        Propofol (DIPRIVAN) injection  Continuous PRN 2/19/2024 --    Route: Intravenous    rosuvastatin (CRESTOR) tablet 10 mg ([MAR Hold] since 2/19/2024  9:16 AM) 10 mg Nightly 2/14/2024 --    Admin Instructions: Avoid grapefruit juice.    Route: Oral    sennosides-docusate (PERICOLACE) 8.6-50 MG per tablet 2 tablet ([MAR Hold] since 2/19/2024  9:16 AM) 2 tablet 2 Times Daily 2/14/2024 --    Admin Instructions: HOLD MEDICATION IF PATIENT HAS HAD BOWEL MOVEMENT. Start bowel management regimen if patient has not had a bowel movement after 12 hours.    Route: Oral    Linked Group 1: See Hyperspace for full Linked Orders Report.        sodium bicarbonate tablet 650 mg ([MAR Hold] since 2/19/2024  9:16 AM) 650 mg 3 Times Daily 2/18/2024 --    Route: Oral    sodium chloride 0.9 % flush 10 mL ([MAR Hold] since 2/19/2024  9:16 AM) 10 mL As Needed 2/14/2024 --    Route: Intravenous    Linked Group 3: See Hyperspace for full Linked Orders Report.        sodium chloride 0.9 % flush 10 mL ([MAR Hold] since 2/19/2024  9:16 AM) 10 mL Every 12 Hours Scheduled 2/14/2024 --    Route: Intravenous    sodium chloride 0.9 % flush 10 mL ([MAR Hold] since 2/19/2024  9:16 AM) 10 mL As Needed 2/14/2024 --    Route: Intravenous    sodium chloride 0.9 % infusion 40 mL ([MAR Hold] since 2/19/2024  9:16 AM) 40 mL As Needed 2/14/2024 --    Admin Instructions: Following administration of an IV intermittent medication, flush line with 40mL NS at  "100mL/hr.    Route: Intravenous    sodium chloride 0.9 % infusion 500 mL 500 mL Continuous 2/19/2024 2/20/2024    Route: Intravenous    tamsulosin (FLOMAX) 24 hr capsule 0.4 mg ([MAR Hold] since 2/19/2024  9:16 AM) 0.4 mg Daily 2/15/2024 --    Admin Instructions: Do not crush or chew the capsules or tablets. The drug may not work as designed if the capsule or tablet is crushed or chewed. Swallow whole.    Route: Oral            Review of Systems   Constitutional: Negative.    HENT: Negative.     Eyes: Negative.    Respiratory: Negative.     Cardiovascular: Negative.    Gastrointestinal: Negative.    Endocrine: Negative.    Genitourinary: Negative.    Musculoskeletal: Negative.    Skin: Negative.    Allergic/Immunologic: Negative.    Neurological: Negative.    Hematological: Negative.    Psychiatric/Behavioral: Negative.     All other systems reviewed and are negative.      /67 (BP Location: Right arm, Patient Position: Lying)   Pulse 90   Temp 98 °F (36.7 °C) (Oral)   Resp 18   Ht 172.7 cm (68\")   Wt 98.2 kg (216 lb 6.4 oz)   SpO2 98%   BMI 32.90 kg/m²     Physical Exam  Vitals and nursing note reviewed.   Constitutional:       Appearance: He is well-developed.   HENT:      Head: Normocephalic and atraumatic.   Eyes:      General: No scleral icterus.     Pupils: Pupils are equal, round, and reactive to light.   Neck:      Thyroid: No thyromegaly.      Vascular: No carotid bruit or JVD.   Cardiovascular:      Rate and Rhythm: Normal rate and regular rhythm.      Pulses:           Carotid pulses are 2+ on the right side and 2+ on the left side.       Femoral pulses are 2+ on the right side and 2+ on the left side.       Popliteal pulses are 2+ on the right side and 2+ on the left side.        Dorsalis pedis pulses are 2+ on the right side and 2+ on the left side.        Posterior tibial pulses are 2+ on the right side and 2+ on the left side.      Heart sounds: Normal heart sounds.   Pulmonary:      " Effort: Pulmonary effort is normal.      Breath sounds: Decreased breath sounds present.   Abdominal:      General: Bowel sounds are normal. There is no distension or abdominal bruit.      Palpations: Abdomen is soft. There is no mass.      Tenderness: There is no abdominal tenderness.   Musculoskeletal:         General: Normal range of motion.      Cervical back: Neck supple.   Lymphadenopathy:      Cervical: No cervical adenopathy.   Skin:     General: Skin is warm and dry.   Neurological:      Mental Status: He is alert and oriented to person, place, and time.      Cranial Nerves: No cranial nerve deficit.      Sensory: No sensory deficit.         Laboratory Data:  Results from last 7 days   Lab Units 02/19/24 0437 02/18/24 0622 02/17/24 0432   WBC 10*3/mm3 7.38 8.48 8.34   HEMOGLOBIN g/dL 8.2* 8.0* 8.0*   HEMATOCRIT % 25.0* 23.7* 24.6*   PLATELETS 10*3/mm3 227 226 236       Results from last 7 days   Lab Units 02/19/24 0437 02/18/24 0622 02/17/24 0432   SODIUM mmol/L 128* 127* 134*   POTASSIUM mmol/L 4.0 4.4 4.0   CHLORIDE mmol/L 94* 94* 99   CO2 mmol/L 21.0* 19.0* 21.0*   BUN mg/dL 78* 76* 68*   CREATININE mg/dL 5.63* 5.35* 4.91*   CALCIUM mg/dL 8.6 8.3* 9.0   BILIRUBIN mg/dL <0.2 <0.2 0.2   ALK PHOS U/L 104 100 105   ALT (SGPT) U/L 11 8 5   AST (SGOT) U/L 22 17 12   GLUCOSE mg/dL 110* 177* 133*             Diagnostic Data:  Imaging Results (Last 24 Hours)       Procedure Component Value Units Date/Time    IR Insert Tunneled CV Catheter Without Port 5 Plus [[id number]] Resulted: 02/19/24 0957     Updated: 02/19/24 0959    FL C Arm During Surgery [[id number]] Resulted: 02/19/24 0959     Updated: 02/19/24 0959    Narrative:      This procedure was auto-finalized with no dictation required.            Impression:    Hypertensive urgency    Controlled type 2 diabetes mellitus with complication, without long-term current use of insulin    Benign essential HTN    Mixed hyperlipidemia    Acute on chronic renal  failure    Chronic renal impairment      Plan: After thoroughly evaluating Mauro Patel, I believe the best course of action is to proceed with PermCath placement the operating room this morning. The risks and benefits were explained at great length to the patient which include but are not limited to bleeding, infection, vessel damage, nerve damage and pneumothorax. The patient understands the risks and wishes for me to proceed.   Thank you for allowing me to see Mauro Patel in consult. Please feel free to reach out with any questions or concerns.    Michael Armenta DO  2/19/2024  10:04 CST       Electronically signed by Michael Armenta DO at 02/19/24 1003

## 2024-02-20 NOTE — PLAN OF CARE
Goal Outcome Evaluation:              Outcome Evaluation: VS WNL. Patient received permacath placement and dialysis yesterday. 1L fluid removed. No c/o pain at this time. BUN & creatinine still above patient's baseline. UOP minimal. Will continue to monitor. NSR 73-87.

## 2024-02-21 ENCOUNTER — READMISSION MANAGEMENT (OUTPATIENT)
Dept: CALL CENTER | Facility: HOSPITAL | Age: 69
End: 2024-02-21
Payer: MEDICARE

## 2024-02-21 VITALS
HEART RATE: 88 BPM | RESPIRATION RATE: 18 BRPM | OXYGEN SATURATION: 98 % | DIASTOLIC BLOOD PRESSURE: 62 MMHG | HEIGHT: 68 IN | WEIGHT: 214.6 LBS | BODY MASS INDEX: 32.52 KG/M2 | SYSTOLIC BLOOD PRESSURE: 170 MMHG | TEMPERATURE: 98 F

## 2024-02-21 PROBLEM — Z99.2 ESRD (END STAGE RENAL DISEASE) ON DIALYSIS: Status: ACTIVE | Noted: 2024-02-21

## 2024-02-21 PROBLEM — N18.6 ESRD (END STAGE RENAL DISEASE) ON DIALYSIS: Status: ACTIVE | Noted: 2024-02-21

## 2024-02-21 LAB
ALBUMIN SERPL-MCNC: 2.8 G/DL (ref 3.5–5.2)
ANION GAP SERPL CALCULATED.3IONS-SCNC: 10 MMOL/L (ref 5–15)
BASOPHILS # BLD AUTO: 0.06 10*3/MM3 (ref 0–0.2)
BASOPHILS NFR BLD AUTO: 0.9 % (ref 0–1.5)
BUN SERPL-MCNC: 34 MG/DL (ref 8–23)
BUN/CREAT SERPL: 9.9 (ref 7–25)
CALCIUM SPEC-SCNC: 8.3 MG/DL (ref 8.6–10.5)
CHLORIDE SERPL-SCNC: 95 MMOL/L (ref 98–107)
CO2 SERPL-SCNC: 26 MMOL/L (ref 22–29)
CREAT SERPL-MCNC: 3.44 MG/DL (ref 0.76–1.27)
DEPRECATED RDW RBC AUTO: 49.1 FL (ref 37–54)
EGFRCR SERPLBLD CKD-EPI 2021: 18.7 ML/MIN/1.73
EOSINOPHIL # BLD AUTO: 0.23 10*3/MM3 (ref 0–0.4)
EOSINOPHIL NFR BLD AUTO: 3.4 % (ref 0.3–6.2)
ERYTHROCYTE [DISTWIDTH] IN BLOOD BY AUTOMATED COUNT: 14.6 % (ref 12.3–15.4)
GLUCOSE BLDC GLUCOMTR-MCNC: 159 MG/DL (ref 70–130)
GLUCOSE SERPL-MCNC: 137 MG/DL (ref 65–99)
HCT VFR BLD AUTO: 23.3 % (ref 37.5–51)
HGB BLD-MCNC: 7.6 G/DL (ref 13–17.7)
IMM GRANULOCYTES # BLD AUTO: 0.04 10*3/MM3 (ref 0–0.05)
IMM GRANULOCYTES NFR BLD AUTO: 0.6 % (ref 0–0.5)
LYMPHOCYTES # BLD AUTO: 1.46 10*3/MM3 (ref 0.7–3.1)
LYMPHOCYTES NFR BLD AUTO: 21.4 % (ref 19.6–45.3)
MCH RBC QN AUTO: 30.2 PG (ref 26.6–33)
MCHC RBC AUTO-ENTMCNC: 32.6 G/DL (ref 31.5–35.7)
MCV RBC AUTO: 92.5 FL (ref 79–97)
MONOCYTES # BLD AUTO: 1.01 10*3/MM3 (ref 0.1–0.9)
MONOCYTES NFR BLD AUTO: 14.8 % (ref 5–12)
NEUTROPHILS NFR BLD AUTO: 4.03 10*3/MM3 (ref 1.7–7)
NEUTROPHILS NFR BLD AUTO: 58.9 % (ref 42.7–76)
NRBC BLD AUTO-RTO: 0 /100 WBC (ref 0–0.2)
PHOSPHATE SERPL-MCNC: 3.5 MG/DL (ref 2.5–4.5)
PLATELET # BLD AUTO: 236 10*3/MM3 (ref 140–450)
PMV BLD AUTO: 10.1 FL (ref 6–12)
POTASSIUM SERPL-SCNC: 3.7 MMOL/L (ref 3.5–5.2)
RBC # BLD AUTO: 2.52 10*6/MM3 (ref 4.14–5.8)
SODIUM SERPL-SCNC: 131 MMOL/L (ref 136–145)
WBC NRBC COR # BLD AUTO: 6.83 10*3/MM3 (ref 3.4–10.8)

## 2024-02-21 PROCEDURE — 80069 RENAL FUNCTION PANEL: CPT | Performed by: SURGERY

## 2024-02-21 PROCEDURE — 25010000002 HEPARIN (PORCINE) PER 1000 UNITS: Performed by: INTERNAL MEDICINE

## 2024-02-21 PROCEDURE — 82948 REAGENT STRIP/BLOOD GLUCOSE: CPT

## 2024-02-21 PROCEDURE — 63710000001 INSULIN LISPRO (HUMAN) PER 5 UNITS: Performed by: SURGERY

## 2024-02-21 PROCEDURE — 25010000002 ENOXAPARIN PER 10 MG: Performed by: SURGERY

## 2024-02-21 PROCEDURE — 63710000001 INSULIN DETEMIR PER 5 UNITS: Performed by: SURGERY

## 2024-02-21 PROCEDURE — 85025 COMPLETE CBC W/AUTO DIFF WBC: CPT | Performed by: SURGERY

## 2024-02-21 RX ORDER — HYDRALAZINE HYDROCHLORIDE 50 MG/1
50 TABLET, FILM COATED ORAL EVERY 8 HOURS SCHEDULED
Qty: 90 TABLET | Refills: 0 | Status: SHIPPED | OUTPATIENT
Start: 2024-02-21

## 2024-02-21 RX ADMIN — HEPARIN SODIUM 3200 UNITS: 1000 INJECTION, SOLUTION INTRAVENOUS; SUBCUTANEOUS at 13:19

## 2024-02-21 RX ADMIN — MAGNESIUM GLUCONATE 500 MG ORAL TABLET 400 MG: 500 TABLET ORAL at 08:23

## 2024-02-21 RX ADMIN — HYDRALAZINE HYDROCHLORIDE 50 MG: 50 TABLET ORAL at 05:39

## 2024-02-21 RX ADMIN — SODIUM BICARBONATE 650 MG: 650 TABLET ORAL at 08:23

## 2024-02-21 RX ADMIN — INSULIN LISPRO 2 UNITS: 100 INJECTION, SOLUTION INTRAVENOUS; SUBCUTANEOUS at 08:22

## 2024-02-21 RX ADMIN — TAMSULOSIN HYDROCHLORIDE 0.4 MG: 0.4 CAPSULE ORAL at 08:23

## 2024-02-21 RX ADMIN — NIFEDIPINE 60 MG: 60 TABLET, FILM COATED, EXTENDED RELEASE ORAL at 08:23

## 2024-02-21 RX ADMIN — ENOXAPARIN SODIUM 30 MG: 30 INJECTION SUBCUTANEOUS at 08:24

## 2024-02-21 RX ADMIN — ASPIRIN 81 MG: 81 TABLET, COATED ORAL at 08:23

## 2024-02-21 RX ADMIN — DOCUSATE SODIUM 50 MG AND SENNOSIDES 8.6 MG 2 TABLET: 8.6; 5 TABLET, FILM COATED ORAL at 08:23

## 2024-02-21 RX ADMIN — INSULIN DETEMIR 5 UNITS: 100 INJECTION, SOLUTION SUBCUTANEOUS at 08:23

## 2024-02-21 RX ADMIN — HYDRALAZINE HYDROCHLORIDE 50 MG: 50 TABLET ORAL at 14:49

## 2024-02-21 RX ADMIN — Medication 10 ML: at 08:24

## 2024-02-21 RX ADMIN — CARVEDILOL 25 MG: 25 TABLET, FILM COATED ORAL at 08:23

## 2024-02-21 NOTE — DISCHARGE SUMMARY
[facility]         DISCHARGE SUMMARY    Patient Name: Mauro Patel  : 1955  MRN: [id number]    Date of Admission: 2024  Date of Discharge:  24  Primary Care Physician: John Robertson MD    Consults       Date and Time Order Name Status Description    2024  1:46 PM Inpatient Vascular Surgery Consult Completed     2024 12:55 PM Inpatient Nephrology Consult              Presenting Problem:   Hypertensive urgency [I16.0]    Active and Resolved Hospital Problems:  Active Hospital Problems    Diagnosis POA    **ESRD (end stage renal disease) on dialysis [N18.6, Z99.2] Not Applicable    Hypertensive urgency [I16.0] Yes    Acute on chronic renal failure [N17.9, N18.9] Yes    Chronic renal impairment [N18.9] Yes    Controlled type 2 diabetes mellitus with complication, without long-term current use of insulin [E11.8] Yes    Benign essential HTN [I10] Yes    Mixed hyperlipidemia [E78.2] Yes      Resolved Hospital Problems   No resolved problems to display.         Hospital Course     Hospital Course:  Mauro Patel is a 68 y.o. male presented to ER complaining of shortness of breath. Patient has been having shortness of breath symptom for about a week off-and-on.  Upon evaluation patient blood pressure is 200/100, patient was given clonidine on resolved, currently requiring Cardene drip. Patient has a past medical history of stage IV CKD, diabetes type 2, edema, hyperlipidemia, hypertension.   Nephrology was consulted and he was weaned off of cardene drip to PO BP meds. It was deemed that patient needed to be started on HD, so vascular surgery was consulted for permacath placement which was done and than patient got HD done on . Case management setup outpatient HD for patient. Renal has cleared patient for discharge and he received HD today as well. His BP meds have been adjusted and he will go home on those meds.         DISCHARGE Follow Up Recommendations for labs  and diagnostics: PCP and renal       Day of Discharge     Vital Signs:  Temp:  [98 °F (36.7 °C)-98.7 °F (37.1 °C)] 98 °F (36.7 °C)  Heart Rate:  [86-88] 88  Resp:  [16-18] 18  BP: (149-170)/(59-68) 170/62  Physical Exam:  Constitutional: Awake, alert   Eyes: PERRLA, sclerae anicteric, no conjunctival injection   HENT: NCAT, mucous membranes moist   Neck: Supple, no thyromegaly, no lymphadenopathy, trachea midline   Respiratory: Clear to auscultation bilaterally, nonlabored respirations    Cardiovascular: RRR, no murmurs, rubs, or gallops, palpable pedal pulses bilaterally   Gastrointestinal: Positive bowel sounds, soft, nontender, nondistended   Musculoskeletal: No bilateral ankle edema, no clubbing or cyanosis to extremities   Psychiatric: Appropriate affect, cooperative   Neurologic: Oriented x 3, strength symmetric in all extremities, Cranial Nerves grossly intact to confrontation, speech clear   Skin: No rashes     Pertinent  and/or Most Recent Results     LAB RESULTS:      Lab 02/21/24 0327 02/20/24  0712 02/19/24  0437 02/18/24  0622 02/17/24  0432   WBC 6.83 8.46 7.38 8.48 8.34   HEMOGLOBIN 7.6* 7.9* 8.2* 8.0* 8.0*   HEMATOCRIT 23.3* 24.1* 25.0* 23.7* 24.6*   PLATELETS 236 248 227 226 236   NEUTROS ABS 4.03 5.64 4.61 5.64 5.85   IMMATURE GRANS (ABS) 0.04 0.05 0.05 0.06* 0.05   LYMPHS ABS 1.46 1.61 1.80 1.71 1.67   MONOS ABS 1.01* 0.92* 0.69 0.81 0.59   EOS ABS 0.23 0.19 0.18 0.21 0.15   MCV 92.5 93.8 92.3 90.1 93.9         Lab 02/21/24  0327 02/20/24  0712 02/19/24  0437 02/18/24  0622 02/17/24  0432 02/16/24  0354 02/15/24  0529   SODIUM 131* 130* 128* 127* 134*   < > 137   POTASSIUM 3.7 4.4 4.0 4.4 4.0   < > 3.7   CHLORIDE 95* 93* 94* 94* 99   < > 101   CO2 26.0 24.0 21.0* 19.0* 21.0*   < > 22.0   ANION GAP 10.0 13.0 13.0 14.0 14.0   < > 14.0   BUN 34* 59* 78* 76* 68*   < > 58*   CREATININE 3.44* 4.61* 5.63* 5.35* 4.91*   < > 4.03*   EGFR 18.7* 13.2* 10.4* 11.0* 12.2*   < > 15.5*   GLUCOSE 137* 148* 110*  177* 133*   < > 161*   CALCIUM 8.3* 8.2* 8.6 8.3* 9.0   < > 9.5   PHOSPHORUS 3.5 4.4  --   --   --   --   --    HEMOGLOBIN A1C  --   --   --   --   --   --  7.30*   TSH  --   --   --   --   --   --  2.380    < > = values in this interval not displayed.         Lab 02/21/24  0327 02/20/24  0712 02/19/24  0437 02/18/24  0622 02/17/24  0432 02/16/24  0354 02/15/24  0529   TOTAL PROTEIN  --   --  6.6 6.5 7.0 7.0 8.1   ALBUMIN 2.8* 3.2* 3.0* 3.1* 3.1* 3.1* 3.6   GLOBULIN  --   --  3.6 3.4 3.9 3.9 4.5   ALT (SGPT)  --   --  11 8 5 5 6   AST (SGOT)  --   --  22 17 12 12 13   BILIRUBIN  --   --  <0.2 <0.2 0.2 0.3 0.4   ALK PHOS  --   --  104 100 105 109 130*             Lab 02/15/24  0529   CHOLESTEROL 152   LDL CHOL 99   HDL CHOL 28*   TRIGLYCERIDES 137         Lab 02/19/24  0841 02/15/24  0529   FERRITIN  --  338.60   ABO TYPING O  --    RH TYPING Positive  --    ANTIBODY SCREEN Negative  --          Brief Urine Lab Results  (Last result in the past 365 days)        Color   Clarity   Blood   Leuk Est   Nitrite   Protein   CREAT   Urine HCG        02/16/24 1231 Dark Yellow   Turbid   Moderate (2+)   Large (3+)   Negative   >=300 mg/dL (3+)                 Microbiology Results (last 10 days)       Procedure Component Value - Date/Time    Urine Culture - Urine, Urine, Clean Catch [[id number]] Collected: 02/16/24 1231    Lab Status: Final result Specimen: Urine, Clean Catch Updated: 02/17/24 1310     Urine Culture 25,000 CFU/mL Mixed Mary Isolated    Narrative:      Specimen contains mixed organisms of questionable pathogenicity suggestive of contamination. If symptoms persist, suggest recollection.  Colonization of the urinary tract without infection is common. Treatment is discouraged unless the patient is symptomatic, pregnant, or undergoing an invasive urologic procedure.    Respiratory Panel PCR w/COVID-19(SARS-CoV-2) YANET/RAMA/MORAIMA/PAD/COR/SHANIA In-House, NP Swab in UTM/VTM, 2 HR TAT - Swab, Nasopharynx [[id number]]  (Normal)  Collected: 02/15/24 1558    Lab Status: Final result Specimen: Swab from Nasopharynx Updated: 02/15/24 1659     ADENOVIRUS, PCR Not Detected     Coronavirus 229E Not Detected     Coronavirus HKU1 Not Detected     Coronavirus NL63 Not Detected     Coronavirus OC43 Not Detected     COVID19 Not Detected     Human Metapneumovirus Not Detected     Human Rhinovirus/Enterovirus Not Detected     Influenza A PCR Not Detected     Influenza B PCR Not Detected     Parainfluenza Virus 1 Not Detected     Parainfluenza Virus 2 Not Detected     Parainfluenza Virus 3 Not Detected     Parainfluenza Virus 4 Not Detected     RSV, PCR Not Detected     Bordetella pertussis pcr Not Detected     Bordetella parapertussis PCR Not Detected     Chlamydophila pneumoniae PCR Not Detected     Mycoplasma pneumo by PCR Not Detected    Narrative:      In the setting of a positive respiratory panel with a viral infection PLUS a negative procalcitonin without other underlying concern for bacterial infection, consider observing off antibiotics or discontinuation of antibiotics and continue supportive care. If the respiratory panel is positive for atypical bacterial infection (Bordetella pertussis, Chlamydophila pneumoniae, or Mycoplasma pneumoniae), consider antibiotic de-escalation to target atypical bacterial infection.    Blood Culture - Blood, Arm, Left [[id number]]  (Normal) Collected: 02/14/24 0924    Lab Status: Final result Specimen: Blood from Arm, Left Updated: 02/19/24 0945     Blood Culture No growth at 5 days    Blood Culture - Blood, Hand, Digit Left [[id number]]  (Normal) Collected: 02/14/24 0915    Lab Status: Final result Specimen: Blood from Hand, Digit Left Updated: 02/19/24 0945     Blood Culture No growth at 5 days    COVID-19, FLU A/B, RSV PCR 1 HR TAT - Swab, Nasopharynx [[id number]]  (Normal) Collected: 02/14/24 0912    Lab Status: Final result Specimen: Swab from Nasopharynx Updated: 02/14/24 1032     COVID19 Not Detected      Influenza A PCR Not Detected     Influenza B PCR Not Detected     RSV, PCR Not Detected    Narrative:      Fact sheet for providers: https://www.fda.gov/media/787789/download    Fact sheet for patients: https://www.fda.gov/media/646974/download    Test performed by PCR.            US Renal Bilateral    Result Date: 2/14/2024  Impression:  Negative for hydronephrosis or renal atrophy.  This report was signed and finalized on 2/14/2024 2:53 PM by Dr. Kenny Tate MD.      XR Chest 1 View    Result Date: 2/14/2024  Impression: Shallow inspiration, with mild cardiomegaly. No evidence of overt CHF. No focal pulmonary infiltrates.  This report was signed and finalized on 2/14/2024 9:47 AM by Dr. Hemanth Barclay MD.               Results for orders placed during the hospital encounter of 02/14/24    Adult Transthoracic Echo Complete W/ Cont if Necessary Per Protocol    Interpretation Summary    Left ventricular systolic function is normal. Left ventricular ejection fraction appears to be 61 - 65%.    Left ventricular wall thickness is consistent with mild asymmetric hypertrophy    Left ventricular diastolic function is consistent with (grade II w/high LAP) pseudonormalization.    Normal right ventricular cavity size and systolic function noted.    The left atrial cavity is moderately dilated.    The aortic valve exhibits sclerosis. There is mild thickening of the aortic valve. No aortic valve regurgitation is present. Mild aortic valve stenosis is present.    Moderate mitral annular calcification is present. Mild mitral valve regurgitation is present    Mild pulmonary hypertension is present.      Labs Pending at Discharge: none        Discharge Details        Discharge Medications        New Medications        Instructions Start Date   hydrALAZINE 50 MG tablet  Commonly known as: APRESOLINE   50 mg, Oral, Every 8 Hours Scheduled      NIFEdipine CC 60 MG 24 hr tablet  Commonly known as: ADALAT CC   60 mg, Oral, Every  24 Hours Scheduled   Start Date: February 22, 2024            Changes to Medications        Instructions Start Date   insulin detemir 100 UNIT/ML injection  Commonly known as: LEVEMIR  What changed:   how much to take  when to take this  additional instructions   5 Units, Subcutaneous, Every 12 Hours Scheduled             Continue These Medications        Instructions Start Date   Accu-Chek Guide test strip  Generic drug: glucose blood   No dose, route, or frequency recorded.      ASPIRIN 81 PO   Take 81 mg by mouth Daily.      carvedilol 25 MG tablet  Commonly known as: COREG   25 mg, Oral, 2 Times Daily With Meals, BID      ferrous sulfate 325 (65 FE) MG tablet   325 mg, Oral, 2 Times Daily With Meals      HumaLOG KwikPen 100 UNIT/ML solution pen-injector  Generic drug: Insulin Lispro (1 Unit Dial)   5 Units, Subcutaneous, 2 Times Daily, At breakfast and supper      magnesium oxide 400 MG tablet  Commonly known as: MAG-OX   400 mg, Oral, Daily      rosuvastatin 10 MG tablet  Commonly known as: CRESTOR   10 mg, Oral, Daily      sodium bicarbonate 650 MG tablet   1,300 mg, Oral, 3 Times Daily      tamsulosin 0.4 MG capsule 24 hr capsule  Commonly known as: FLOMAX   1 capsule, Oral, Daily      vitamin D 1.25 MG (59207 UT) capsule capsule  Commonly known as: ERGOCALCIFEROL   50,000 Units, Oral, Every 30 Days, Around the 15th              Stop These Medications      allopurinol 100 MG tablet  Commonly known as: ZYLOPRIM     Kerendia 10 MG tablet  Generic drug: Finerenone     lisinopril 40 MG tablet  Commonly known as: PRINIVIL,ZESTRIL     losartan 100 MG tablet  Commonly known as: COZAAR     tadalafil 5 MG tablet  Commonly known as: CIALIS              Allergies   Allergen Reactions    Duravent Dm  [Phenylephrine-Dm-Gg] Rash         Discharge Disposition:   Home or Self Care    Discharge Condition: stable    Diet:  Hospital:  Diet Order   Procedures    Diet: Diabetic Diets; Consistent Carbohydrate; Texture: Regular  Texture (IDDSI 7); Fluid Consistency: Thin (IDDSI 0)         Discharge Activity: as tolerated        CODE STATUS:  Code Status and Medical Interventions:   Ordered at: 02/14/24 1202     Level Of Support Discussed With:    Patient     Code Status (Patient has no pulse and is not breathing):    CPR (Attempt to Resuscitate)     Medical Interventions (Patient has pulse or is breathing):    Full Support         No future appointments.        Time spent on Discharge including face to face service:  45 minutes    Rajiv Carmona MD

## 2024-02-21 NOTE — CASE MANAGEMENT/SOCIAL WORK
Continued Stay Note   [facility]     Patient Name: Mauro Patel  MRN: [id number]  Today's Date: 2/21/2024    Admit Date: 2/14/2024    Plan: Outpatient HD   Discharge Plan       Row Name 02/21/24 1415       Plan    Plan Comments Informed family in room of tx time for dialysis TTS at 6am.                   Discharge Codes    No documentation.                 Expected Discharge Date and Time       Expected Discharge Date Expected Discharge Time    Feb 21, 2024               ABY GarciaW

## 2024-02-21 NOTE — PROGRESS NOTES
Pt tolerated 3rd dialysis tx well, without complications. 3.5 L removed from pt. Lumens flushed, hep locked and capped. Pt stable.     RIJ CVC, dressing changed on 2/20/24.

## 2024-02-21 NOTE — PROGRESS NOTES
Nephrology ([facility]) Progress Note      Patient:  Mauro Patel  YOB: 1955  Date of Service: 2/20/2024  MRN: [id number]   Acct: [id number]   Primary Care Physician: John Robertson MD  Advance Directive:   Code Status and Medical Interventions:   Ordered at: 02/14/24 1202     Level Of Support Discussed With:    Patient     Code Status (Patient has no pulse and is not breathing):    CPR (Attempt to Resuscitate)     Medical Interventions (Patient has pulse or is breathing):    Full Support     Admit Date: 2/14/2024       Hospital Day: 6  Referring Provider: No ref. provider found      Patient personally seen and examined.  Complete chart including Consults, Notes, Operative Reports, Labs, Cardiology, and Radiology studies reviewed as able.        Subjective:  Mauro Patel is a 68 y.o. male for whom we were consulted for evaluation and treatment of acute kidney injury. Baseline chronic kidney disease stage 4. Follows with Teo BATES in our office. Baseline creatinine approximately 2.8. History of type 2 diabetes, hypertension. Patient was started on Kerendia a few months ago. He was seen in our office on 2/7 and creatinine had increased to 3.8 and he appeared somewhat volume depleted on exam. Patient was instructed to hold Lasix at that time and was supposed to get repeat labs this week. He presented to ER on 2/14 with dyspnea and elevated blood pressure. Creatinine 3.3 on arrival to ER. He was initially admitted to ICU and needing intravenous Cardene for the treatment of severe hypertension. Cardene has been weaned off. He is now transferred to regular floor.      Today, no overnight events.  Tolerated second dialysis treatment today without issue.  Seen with family.  Tolerating diet.    Allergies:  Duravent dm  [phenylephrine-dm-gg]    Home Meds:  Medications Prior to Admission   Medication Sig Dispense Refill Last Dose    ACCU-CHEK GUIDE test strip         allopurinol (ZYLOPRIM) 100 MG tablet Take 1 tablet by mouth Daily.       ASPIRIN 81 PO Take 81 mg by mouth Daily.       carvedilol (COREG) 25 MG tablet Take 1 tablet by mouth 2 (Two) Times a Day With Meals. BID       ferrous sulfate 325 (65 FE) MG tablet Take 1 tablet by mouth 2 (Two) Times a Day With Meals.       Finerenone (Kerendia) 10 MG tablet Take 1 tablet by mouth Daily.       insulin detemir (LEVEMIR) 100 UNIT/ML injection Inject 20 Units under the skin into the appropriate area as directed 2 (Two) Times a Day. 20 units every morning and 35 units every night       Insulin Lispro, 1 Unit Dial, (HumaLOG KwikPen) 100 UNIT/ML solution pen-injector Inject 5 Units under the skin into the appropriate area as directed 2 (Two) Times a Day. At breakfast and supper       lisinopril (PRINIVIL,ZESTRIL) 40 MG tablet Take 1 tablet by mouth Daily.       losartan (COZAAR) 100 MG tablet Take 1 tablet by mouth Daily. 90 tablet 3 More than a month    magnesium oxide (MAG-OX) 400 MG tablet Take 1 tablet by mouth Daily.       rosuvastatin (CRESTOR) 10 MG tablet Take 1 tablet by mouth Daily.       sodium bicarbonate 650 MG tablet Take 2 tablets by mouth 3 (Three) Times a Day.       tadalafil (CIALIS) 5 MG tablet Take 1 tablet by mouth Daily As Needed for Erectile Dysfunction.       tamsulosin (FLOMAX) 0.4 MG capsule 24 hr capsule Take 1 capsule by mouth Daily.       vitamin D (ERGOCALCIFEROL) 96017 units capsule capsule Take 1 capsule by mouth Every 30 (Thirty) Days. Around the 15th   1/15/2024       Medicines:  Current Facility-Administered Medications   Medication Dose Route Frequency Provider Last Rate Last Admin    acetaminophen (TYLENOL) tablet 650 mg  650 mg Oral Q4H PRN Michael Armenta DO        aspirin EC tablet 81 mg  81 mg Oral Daily Michael Armenta DO   81 mg at 02/20/24 1047    sennosides-docusate (PERICOLACE) 8.6-50 MG per tablet 2 tablet  2 tablet Oral BID Michael Armenta DO   2 tablet at 02/20/24 1954     And    polyethylene glycol (MIRALAX) packet 17 g  17 g Oral Daily PRN BicMichael tapia DO        And    bisacodyl (DULCOLAX) EC tablet 5 mg  5 mg Oral Daily PRN BickingMichael DO        And    bisacodyl (DULCOLAX) suppository 10 mg  10 mg Rectal Daily PRN BickingMichael DO        carvedilol (COREG) tablet 25 mg  25 mg Oral BID BicMichael tapia DO   25 mg at 02/20/24 1046    dextrose (D50W) (25 g/50 mL) IV injection 25 g  25 g Intravenous Q15 Min PRN BicMichael tapia DO        dextrose (GLUTOSE) oral gel 15 g  15 g Oral Q15 Min PRN Michael Armenta DO        Enoxaparin Sodium (LOVENOX) syringe 30 mg  30 mg Subcutaneous Daily BicMichael tapia DO   30 mg at 02/20/24 1055    glucagon (GLUCAGEN) injection 1 mg  1 mg Intramuscular Q15 Min PRN Michael Armenta DO        heparin (porcine) injection 3,200 Units  3,200 Units Intracatheter PRN Nikhil Cabrera MD   3,200 Units at 02/20/24 1531    hydrALAZINE (APRESOLINE) injection 10 mg  10 mg Intravenous Q4H PRN Michael Armenta DO   10 mg at 02/16/24 1556    hydrALAZINE (APRESOLINE) tablet 50 mg  50 mg Oral Q8H BicMichael tapia DO   50 mg at 02/20/24 1705    HYDROcodone-acetaminophen (NORCO) 5-325 MG per tablet 1 tablet  1 tablet Oral Q4H PRN Michael Armenta DO        insulin detemir (LEVEMIR) injection 5 Units  5 Units Subcutaneous Q12H BicMichael tapia DO   5 Units at 02/20/24 1953    Insulin Lispro (humaLOG) injection 2-7 Units  2-7 Units Subcutaneous 4x Daily AC & at Bedtime Michael Armenta DO   2 Units at 02/20/24 1952    labetalol (NORMODYNE,TRANDATE) injection 10 mg  10 mg Intravenous Q4H PRN Michael Armenta DO   10 mg at 02/16/24 1440    magnesium oxide (MAG-OX) tablet 400 mg  400 mg Oral Daily Michael Armenta, DO   400 mg at 02/20/24 1046    NIFEdipine XL (PROCARDIA XL) 24 hr tablet 60 mg  60 mg Oral Q24H Michael Armenta, DO   60 mg at 02/20/24 1045    ondansetron ODT (ZOFRAN-ODT) disintegrating  tablet 4 mg  4 mg Oral Q6H PRN Michael Armenta DO        Or    ondansetron (ZOFRAN) injection 4 mg  4 mg Intravenous Q6H PRN BicMichael tapia DO        rosuvastatin (CRESTOR) tablet 10 mg  10 mg Oral Nightly BicMichael tapia, DO   10 mg at 02/20/24 1954    sodium bicarbonate tablet 650 mg  650 mg Oral TID BicMichael tapia, DO   650 mg at 02/20/24 1953    sodium chloride 0.9 % flush 10 mL  10 mL Intravenous PRN Michael Armenta, DO        sodium chloride 0.9 % flush 10 mL  10 mL Intravenous Q12H Bicking, Michael K, DO   10 mL at 02/20/24 1046    sodium chloride 0.9 % infusion 40 mL  40 mL Intravenous PRN Michael Armenta DO        tamsulosin (FLOMAX) 24 hr capsule 0.4 mg  0.4 mg Oral Daily Michael Armenta, DO   0.4 mg at 02/20/24 1046       Past Medical History:  Past Medical History:   Diagnosis Date    Chronic renal impairment 2/14/2024    Diabetes mellitus     Edema     Hyperlipidemia     Hypertension        Past Surgical History:  Past Surgical History:   Procedure Laterality Date    INSERTION HEMODIALYSIS CATHETER N/A 2/19/2024    Procedure: HEMODIALYSIS CATHETER INSERTION;  Surgeon: Michael Armenta DO;  Location: [facility] OR;  Service: Vascular;  Laterality: N/A;    NO PAST SURGERIES         Family History  Family History   Problem Relation Age of Onset    Diabetes Mother     No Known Problems Father        Social History  Social History     Socioeconomic History    Marital status:    Tobacco Use    Smoking status: Never    Smokeless tobacco: Never   Vaping Use    Vaping Use: Never used   Substance and Sexual Activity    Alcohol use: No    Drug use: No    Sexual activity: Defer       Review of Systems:  History obtained from chart review and the patient  General ROS: No fever or chills  Respiratory ROS: No cough, shortness of breath, wheezing  Cardiovascular ROS: No chest pain or palpitations  Gastrointestinal ROS: No abdominal pain or melena  Genito-Urinary ROS: No dysuria or  hematuria  Psych ROS: No anxiety and depression  14 point ROS reviewed with the patient and negative except as noted above and in the HPI unless unable to obtain.    Objective:  Patient Vitals for the past 24 hrs:   BP Temp Temp src Pulse Resp SpO2 Weight   02/20/24 1931 149/59 98.5 °F (36.9 °C) Oral 88 16 98 % --   02/20/24 1619 153/67 98.2 °F (36.8 °C) Oral 86 16 95 % --   02/20/24 1117 154/66 98 °F (36.7 °C) Oral 88 14 97 % --   02/20/24 0731 161/68 97.9 °F (36.6 °C) Oral 88 14 96 % --   02/20/24 0523 -- -- -- -- -- -- 98.8 kg (217 lb 12.8 oz)   02/20/24 0344 134/55 98.3 °F (36.8 °C) Oral 80 16 96 % --   02/19/24 2336 148/60 98.6 °F (37 °C) Oral 79 16 97 % --       Intake/Output Summary (Last 24 hours) at 2/20/2024 2041  Last data filed at 2/20/2024 1804  Gross per 24 hour   Intake 1300 ml   Output 275 ml   Net 1025 ml     General: awake/alert   Chest:  clear to auscultation bilaterally without respiratory distress  CVS: regular rate and rhythm  Abdominal: soft, nontender, positive bowel sounds  Extremities: no cyanosis or edema  Skin: warm and dry without rash      Labs:  Results from last 7 days   Lab Units 02/20/24 0712 02/19/24 0437 02/18/24 0622   WBC 10*3/mm3 8.46 7.38 8.48   HEMOGLOBIN g/dL 7.9* 8.2* 8.0*   HEMATOCRIT % 24.1* 25.0* 23.7*   PLATELETS 10*3/mm3 248 227 226         Results from last 7 days   Lab Units 02/20/24 0712 02/19/24 0437 02/18/24 0622 02/17/24  0432   SODIUM mmol/L 130* 128* 127* 134*   POTASSIUM mmol/L 4.4 4.0 4.4 4.0   CHLORIDE mmol/L 93* 94* 94* 99   CO2 mmol/L 24.0 21.0* 19.0* 21.0*   BUN mg/dL 59* 78* 76* 68*   CREATININE mg/dL 4.61* 5.63* 5.35* 4.91*   CALCIUM mg/dL 8.2* 8.6 8.3* 9.0   EGFR mL/min/1.73 13.2* 10.4* 11.0* 12.2*   BILIRUBIN mg/dL  --  <0.2 <0.2 0.2   ALK PHOS U/L  --  104 100 105   ALT (SGPT) U/L  --  11 8 5   AST (SGOT) U/L  --  22 17 12   GLUCOSE mg/dL 148* 110* 177* 133*       Radiology:   Imaging Results (Last 72 Hours)       Procedure Component Value  Units Date/Time    IR Insert Tunneled CV Catheter Without Port 5 Plus [[id number]] Collected: 02/20/24 0707     Updated: 02/20/24 1244    Narrative:      Performed by Dr. Armenta. Please see procedure note.     This report was signed and finalized on 2/20/2024 12:41 PM by Dr. Michael Armenta MD.       FL C Arm During Surgery [[id number]] Collected: 02/20/24 0707     Updated: 02/20/24 1244    Narrative:      Performed by Dr. Armenta. Please see procedure note.     This report was signed and finalized on 2/20/2024 12:41 PM by Dr. Michael Armenta MD.               Culture:  Blood Culture   Date Value Ref Range Status   02/14/2024 No growth at 5 days  Final   02/14/2024 No growth at 5 days  Final     Urine Culture   Date Value Ref Range Status   02/16/2024 25,000 CFU/mL Mixed Mary Isolated  Final         Assessment   Acute kidney injury/ATN  Chronic kidney disease stage IV  Hypertension  Diabetes type 2 with diabetic nephropathy   Anemia in chronic kidney disease with iron deficiency  Hyponatremia  Metabolic acidosis     Plan:  Discussed with patient, nursing  Workup reviewed today  Monitor labs  Dialysis initiated on on 2/19 with next treatment anticipated on 2/21 or 22 as needed        Nikhil Cabrera MD  2/20/2024  20:41 CST

## 2024-02-21 NOTE — PLAN OF CARE
Goal Outcome Evaluation:              Outcome Evaluation: NTN follow up. Pt in HD. Average PO 83% of meals, 813 mL oral fluid/day, and 358 mL UOP/day. Weight gain 14.8lb during this admission, net positive 3129.1mL. Glu 120-198 with consistent CHO meal plan and Boost Glucose Control twice daily. HD planned at discharge; will monitor while inpatient for renal MNT edu and try to provide prior to discharge. NTN following per protocol.

## 2024-02-22 NOTE — PAYOR COMM NOTE
"REF:     [id number]     [facility]  FAX  [phone]    Mauro Cat (68 y.o. Male)       Date of Birth   1955    Social Security Number       Address   [address] [place] [address]    Home Phone   [phone]    MRN   [id number]       Caodaism   Sabianism    Marital Status                               Admission Date   2/14/24    Admission Type   Emergency    Admitting Provider   Rajiv Carmona MD    Attending Provider       Department, Room/Bed   [facility] 4B, 440/1       Discharge Date   2/21/2024    Discharge Disposition   Home or Self Care    Discharge Destination                                 Attending Provider: (none)   Allergies: Duravent Dm  [Phenylephrine-[id number]]    Isolation: None   Infection: None   Code Status: Prior    Ht: 172.7 cm (68\")   Wt: 97.3 kg (214 lb 9.6 oz)    Admission Cmt: None   Principal Problem: ESRD (end stage renal disease) on dialysis [N18.6,Z99.2]                   Active Insurance as of 2/14/2024       Primary Coverage       Payor Plan Insurance Group Employer/Plan Group    ANTHEM MEDICARE REPLACEMENT ANTHEM MEDICARE ADVANTAGE [id number]       Payor Plan Address Payor Plan Phone Number Payor Plan Fax Number Effective Dates    [address] [phone]  1/1/2022 - None Entered    [facility] [address]         Subscriber Name Subscriber Birth Date Member ID       MAURO CAT 1955 [id number]                     Emergency Contacts        (Rel.) Home Phone Work Phone Mobile Phone    osbaldo cat (Spouse) [phone] -- --                 Physician Progress Notes (last 48 hours)        Nikhil Cabrera MD at 02/20/24 1725          Nephrology ([facility]) Progress Note      Patient:  Mauro Cat  YOB: 1955  Date of Service: 2/20/2024  MRN: [id number]   Acct: [id number]   Primary Care Physician: John Robertson MD  Advance Directive: "   Code Status and Medical Interventions:   Ordered at: 02/14/24 1202     Level Of Support Discussed With:    Patient     Code Status (Patient has no pulse and is not breathing):    CPR (Attempt to Resuscitate)     Medical Interventions (Patient has pulse or is breathing):    Full Support     Admit Date: 2/14/2024       Hospital Day: 6  Referring Provider: No ref. provider found      Patient personally seen and examined.  Complete chart including Consults, Notes, Operative Reports, Labs, Cardiology, and Radiology studies reviewed as able.        Subjective:  Mauro Patel is a 68 y.o. male for whom we were consulted for evaluation and treatment of acute kidney injury. Baseline chronic kidney disease stage 4. Follows with Teo BATES in our office. Baseline creatinine approximately 2.8. History of type 2 diabetes, hypertension. Patient was started on Kerendia a few months ago. He was seen in our office on 2/7 and creatinine had increased to 3.8 and he appeared somewhat volume depleted on exam. Patient was instructed to hold Lasix at that time and was supposed to get repeat labs this week. He presented to ER on 2/14 with dyspnea and elevated blood pressure. Creatinine 3.3 on arrival to ER. He was initially admitted to ICU and needing intravenous Cardene for the treatment of severe hypertension. Cardene has been weaned off. He is now transferred to regular floor.      Today, no overnight events.  Tolerated second dialysis treatment today without issue.  Seen with family.  Tolerating diet.    Allergies:  Duravent dm  [phenylephrine-dm-gg]    Home Meds:  Medications Prior to Admission   Medication Sig Dispense Refill Last Dose    ACCU-CHEK GUIDE test strip        allopurinol (ZYLOPRIM) 100 MG tablet Take 1 tablet by mouth Daily.       ASPIRIN 81 PO Take 81 mg by mouth Daily.       carvedilol (COREG) 25 MG tablet Take 1 tablet by mouth 2 (Two) Times a Day With Meals. BID       ferrous sulfate 325 (65 FE) MG  tablet Take 1 tablet by mouth 2 (Two) Times a Day With Meals.       Finerenone (Kerendia) 10 MG tablet Take 1 tablet by mouth Daily.       insulin detemir (LEVEMIR) 100 UNIT/ML injection Inject 20 Units under the skin into the appropriate area as directed 2 (Two) Times a Day. 20 units every morning and 35 units every night       Insulin Lispro, 1 Unit Dial, (HumaLOG KwikPen) 100 UNIT/ML solution pen-injector Inject 5 Units under the skin into the appropriate area as directed 2 (Two) Times a Day. At breakfast and supper       lisinopril (PRINIVIL,ZESTRIL) 40 MG tablet Take 1 tablet by mouth Daily.       losartan (COZAAR) 100 MG tablet Take 1 tablet by mouth Daily. 90 tablet 3 More than a month    magnesium oxide (MAG-OX) 400 MG tablet Take 1 tablet by mouth Daily.       rosuvastatin (CRESTOR) 10 MG tablet Take 1 tablet by mouth Daily.       sodium bicarbonate 650 MG tablet Take 2 tablets by mouth 3 (Three) Times a Day.       tadalafil (CIALIS) 5 MG tablet Take 1 tablet by mouth Daily As Needed for Erectile Dysfunction.       tamsulosin (FLOMAX) 0.4 MG capsule 24 hr capsule Take 1 capsule by mouth Daily.       vitamin D (ERGOCALCIFEROL) 75978 units capsule capsule Take 1 capsule by mouth Every 30 (Thirty) Days. Around the 15th   1/15/2024       Medicines:  Current Facility-Administered Medications   Medication Dose Route Frequency Provider Last Rate Last Admin    acetaminophen (TYLENOL) tablet 650 mg  650 mg Oral Q4H PRN Bicking, Michael K, DO        aspirin EC tablet 81 mg  81 mg Oral Daily BickingMichael, DO   81 mg at 02/20/24 1047    sennosides-docusate (PERICOLACE) 8.6-50 MG per tablet 2 tablet  2 tablet Oral BID Bicking, Michael K, DO   2 tablet at 02/20/24 1954    And    polyethylene glycol (MIRALAX) packet 17 g  17 g Oral Daily PRN Bicking, Michael K, DO        And    bisacodyl (DULCOLAX) EC tablet 5 mg  5 mg Oral Daily PRN Bicking, Michael K, DO        And    bisacodyl (DULCOLAX) suppository 10 mg  10 mg  Rectal Daily PRN BicMichael tapia DO        carvedilol (COREG) tablet 25 mg  25 mg Oral BID Michael Armenta DO   25 mg at 02/20/24 1046    dextrose (D50W) (25 g/50 mL) IV injection 25 g  25 g Intravenous Q15 Min PRN Michael Armenta DO        dextrose (GLUTOSE) oral gel 15 g  15 g Oral Q15 Min PRN Michael Armenta, DO        Enoxaparin Sodium (LOVENOX) syringe 30 mg  30 mg Subcutaneous Daily BicMichael tapia DO   30 mg at 02/20/24 1055    glucagon (GLUCAGEN) injection 1 mg  1 mg Intramuscular Q15 Min PRN Michael Armenta DO        heparin (porcine) injection 3,200 Units  3,200 Units Intracatheter PRN Nikhil Cabrera MD   3,200 Units at 02/20/24 1531    hydrALAZINE (APRESOLINE) injection 10 mg  10 mg Intravenous Q4H PRN Michael Armenta DO   10 mg at 02/16/24 1556    hydrALAZINE (APRESOLINE) tablet 50 mg  50 mg Oral Q8H Michael Armenta DO   50 mg at 02/20/24 1705    HYDROcodone-acetaminophen (NORCO) 5-325 MG per tablet 1 tablet  1 tablet Oral Q4H PRN Michael Armenta DO        insulin detemir (LEVEMIR) injection 5 Units  5 Units Subcutaneous Q12H Michael Armenta DO   5 Units at 02/20/24 1953    Insulin Lispro (humaLOG) injection 2-7 Units  2-7 Units Subcutaneous 4x Daily AC & at Bedtime Michael Armenta DO   2 Units at 02/20/24 1952    labetalol (NORMODYNE,TRANDATE) injection 10 mg  10 mg Intravenous Q4H PRN Michael Armenta DO   10 mg at 02/16/24 1440    magnesium oxide (MAG-OX) tablet 400 mg  400 mg Oral Daily Michael Armenta DO   400 mg at 02/20/24 1046    NIFEdipine XL (PROCARDIA XL) 24 hr tablet 60 mg  60 mg Oral Q24H Michael Armenta DO   60 mg at 02/20/24 1045    ondansetron ODT (ZOFRAN-ODT) disintegrating tablet 4 mg  4 mg Oral Q6H PRN BickingMichael DO        Or    ondansetron (ZOFRAN) injection 4 mg  4 mg Intravenous Q6H PRN BickingMichael DO        rosuvastatin (CRESTOR) tablet 10 mg  10 mg Oral Nightly BickingMichael DO   10 mg at  02/20/24 1954    sodium bicarbonate tablet 650 mg  650 mg Oral TID Michael Armenta DO   650 mg at 02/20/24 1953    sodium chloride 0.9 % flush 10 mL  10 mL Intravenous PRN Michael Armenta, DO        sodium chloride 0.9 % flush 10 mL  10 mL Intravenous Q12H Bic, Michael K, DO   10 mL at 02/20/24 1046    sodium chloride 0.9 % infusion 40 mL  40 mL Intravenous PRN Michael Armenta DO        tamsulosin (FLOMAX) 24 hr capsule 0.4 mg  0.4 mg Oral Daily Michael Armenta, DO   0.4 mg at 02/20/24 1046       Past Medical History:  Past Medical History:   Diagnosis Date    Chronic renal impairment 2/14/2024    Diabetes mellitus     Edema     Hyperlipidemia     Hypertension        Past Surgical History:  Past Surgical History:   Procedure Laterality Date    INSERTION HEMODIALYSIS CATHETER N/A 2/19/2024    Procedure: HEMODIALYSIS CATHETER INSERTION;  Surgeon: Michael Armenta DO;  Location: [facility] HYBRID OR;  Service: Vascular;  Laterality: N/A;    NO PAST SURGERIES         Family History  Family History   Problem Relation Age of Onset    Diabetes Mother     No Known Problems Father        Social History  Social History     Socioeconomic History    Marital status:    Tobacco Use    Smoking status: Never    Smokeless tobacco: Never   Vaping Use    Vaping Use: Never used   Substance and Sexual Activity    Alcohol use: No    Drug use: No    Sexual activity: Defer       Review of Systems:  History obtained from chart review and the patient  General ROS: No fever or chills  Respiratory ROS: No cough, shortness of breath, wheezing  Cardiovascular ROS: No chest pain or palpitations  Gastrointestinal ROS: No abdominal pain or melena  Genito-Urinary ROS: No dysuria or hematuria  Psych ROS: No anxiety and depression  14 point ROS reviewed with the patient and negative except as noted above and in the HPI unless unable to obtain.    Objective:  Patient Vitals for the past 24 hrs:   BP Temp Temp src Pulse Resp SpO2  Weight   02/20/24 1931 149/59 98.5 °F (36.9 °C) Oral 88 16 98 % --   02/20/24 1619 153/67 98.2 °F (36.8 °C) Oral 86 16 95 % --   02/20/24 1117 154/66 98 °F (36.7 °C) Oral 88 14 97 % --   02/20/24 0731 161/68 97.9 °F (36.6 °C) Oral 88 14 96 % --   02/20/24 0523 -- -- -- -- -- -- 98.8 kg (217 lb 12.8 oz)   02/20/24 0344 134/55 98.3 °F (36.8 °C) Oral 80 16 96 % --   02/19/24 2336 148/60 98.6 °F (37 °C) Oral 79 16 97 % --       Intake/Output Summary (Last 24 hours) at 2/20/2024 2041  Last data filed at 2/20/2024 1804  Gross per 24 hour   Intake 1300 ml   Output 275 ml   Net 1025 ml     General: awake/alert   Chest:  clear to auscultation bilaterally without respiratory distress  CVS: regular rate and rhythm  Abdominal: soft, nontender, positive bowel sounds  Extremities: no cyanosis or edema  Skin: warm and dry without rash      Labs:  Results from last 7 days   Lab Units 02/20/24  0712 02/19/24 0437 02/18/24 0622   WBC 10*3/mm3 8.46 7.38 8.48   HEMOGLOBIN g/dL 7.9* 8.2* 8.0*   HEMATOCRIT % 24.1* 25.0* 23.7*   PLATELETS 10*3/mm3 248 227 226         Results from last 7 days   Lab Units 02/20/24  0712 02/19/24  0437 02/18/24  0622 02/17/24  0432   SODIUM mmol/L 130* 128* 127* 134*   POTASSIUM mmol/L 4.4 4.0 4.4 4.0   CHLORIDE mmol/L 93* 94* 94* 99   CO2 mmol/L 24.0 21.0* 19.0* 21.0*   BUN mg/dL 59* 78* 76* 68*   CREATININE mg/dL 4.61* 5.63* 5.35* 4.91*   CALCIUM mg/dL 8.2* 8.6 8.3* 9.0   EGFR mL/min/1.73 13.2* 10.4* 11.0* 12.2*   BILIRUBIN mg/dL  --  <0.2 <0.2 0.2   ALK PHOS U/L  --  104 100 105   ALT (SGPT) U/L  --  11 8 5   AST (SGOT) U/L  --  22 17 12   GLUCOSE mg/dL 148* 110* 177* 133*       Radiology:   Imaging Results (Last 72 Hours)       Procedure Component Value Units Date/Time    IR Insert Tunneled CV Catheter Without Port 5 Plus [[id number]] Collected: 02/20/24 0707     Updated: 02/20/24 1244    Narrative:      Performed by Dr. Armenta. Please see procedure note.     This report was signed and finalized on  2/20/2024 12:41 PM by Dr. Michael Armenta MD.       FL C Arm During Surgery [[id number]] Collected: 02/20/24 0707     Updated: 02/20/24 1244    Narrative:      Performed by Dr. Armenta. Please see procedure note.     This report was signed and finalized on 2/20/2024 12:41 PM by Dr. Michael Armenta MD.               Culture:  Blood Culture   Date Value Ref Range Status   02/14/2024 No growth at 5 days  Final   02/14/2024 No growth at 5 days  Final     Urine Culture   Date Value Ref Range Status   02/16/2024 25,000 CFU/mL Mixed Mary Isolated  Final         Assessment   Acute kidney injury/ATN  Chronic kidney disease stage IV  Hypertension  Diabetes type 2 with diabetic nephropathy   Anemia in chronic kidney disease with iron deficiency  Hyponatremia  Metabolic acidosis     Plan:  Discussed with patient, nursing  Workup reviewed today  Monitor labs  Dialysis initiated on on 2/19 with next treatment anticipated on 2/21 or 22 as needed        Nikhil Cabrera MD  2/20/2024  20:41 CST        Electronically signed by Nikhil Cabrera MD at 02/20/24 2043       Rajiv Carmona MD at 02/20/24 1138              [facility]  INPATIENT PROGRESS NOTE    Patient Name: Mauro TAY Amanda  Date of Admission: 2/14/2024  Today's Date: 02/20/24  Length of Stay: 6  Primary Care Physician: John Robertson MD    Subjective   Chief Complaint: ARF, HTN  Shortness of Breath       Had HD yesterday and tolerated it fine        Review of Systems   Respiratory:  Positive for shortness of breath.       All pertinent negatives and positives are as above. All other systems have been reviewed and are negative unless otherwise stated.     Objective    Temp:  [97.9 °F (36.6 °C)-98.6 °F (37 °C)] 98 °F (36.7 °C)  Heart Rate:  [79-99] 88  Resp:  [14-16] 14  BP: (134-161)/(55-68) 154/66  Physical Exam  Constitutional:       Appearance: Normal appearance.   HENT:      Head:  Normocephalic and atraumatic.      Nose: Nose normal.      Mouth/Throat:      Mouth: Mucous membranes are moist.   Eyes:      Extraocular Movements: Extraocular movements intact.      Pupils: Pupils are equal, round, and reactive to light.   Cardiovascular:      Rate and Rhythm: Normal rate and regular rhythm.      Pulses: Normal pulses.      Heart sounds: Normal heart sounds.   Pulmonary:      Effort: Pulmonary effort is normal.      Breath sounds: Normal breath sounds.   Abdominal:      General: Bowel sounds are normal.      Palpations: Abdomen is soft.   Musculoskeletal:         General: Normal range of motion.      Cervical back: Normal range of motion.   Skin:     General: Skin is warm and dry.   Neurological:      General: No focal deficit present.      Mental Status: He is alert and oriented to person, place, and time.   Psychiatric:         Mood and Affect: Mood normal.         Behavior: Behavior normal.             Results Review:  I have reviewed the labs, radiology results, and diagnostic studies.    Laboratory Data:   Results from last 7 days   Lab Units 02/20/24  0712 02/19/24  0437 02/18/24  0622   WBC 10*3/mm3 8.46 7.38 8.48   HEMOGLOBIN g/dL 7.9* 8.2* 8.0*   HEMATOCRIT % 24.1* 25.0* 23.7*   PLATELETS 10*3/mm3 248 227 226        Results from last 7 days   Lab Units 02/20/24  0712 02/19/24  0437 02/18/24  0622 02/17/24  0432   SODIUM mmol/L 130* 128* 127* 134*   POTASSIUM mmol/L 4.4 4.0 4.4 4.0   CHLORIDE mmol/L 93* 94* 94* 99   CO2 mmol/L 24.0 21.0* 19.0* 21.0*   BUN mg/dL 59* 78* 76* 68*   CREATININE mg/dL 4.61* 5.63* 5.35* 4.91*   CALCIUM mg/dL 8.2* 8.6 8.3* 9.0   BILIRUBIN mg/dL  --  <0.2 <0.2 0.2   ALK PHOS U/L  --  104 100 105   ALT (SGPT) U/L  --  11 8 5   AST (SGOT) U/L  --  22 17 12   GLUCOSE mg/dL 148* 110* 177* 133*       Culture Data:   Blood Culture   Date Value Ref Range Status   02/14/2024 No growth at 5 days  Final   02/14/2024 No growth at 5 days  Final     Urine Culture   Date Value  Ref Range Status   2024 25,000 CFU/mL Mixed Mary Isolated  Final       Radiology Data:   Imaging Results (Last 24 Hours)       Procedure Component Value Units Date/Time    IR Insert Tunneled CV Catheter Without Port 5 Plus [[id number]] Collected: 24     Updated: 24    Narrative:      Performed by Dr. Armenta. Please see procedure note.               I have reviewed the patient's current medications.     Assessment/Plan   Assessment  Active Hospital Problems    Diagnosis     **Hypertensive urgency     Acute on chronic renal failure     Chronic renal impairment     Controlled type 2 diabetes mellitus with complication, without long-term current use of insulin     Benign essential HTN     Mixed hyperlipidemia        Treatment Plan  - Renal following, received HD yesterday along with permacath  - trend labs daily  - SSI and levemir  - cont BP meds, adjust as needed for better control  - cont home meds as able  - supportive care  - case management will need to setup outpatient HD    Conditions and Status        Condition is unchanged.     MDM Data  External documents reviewed: yes  Cardiac tracing (EKG, telemetry) interpretation: yes  Radiology interpretation: yes  Labs reviewed: yes     Decision rules/scores evaluated (example [id number], Wells, etc): yes     Discussed with: patient and family     Care Planning  Shared decision making: nephrology and patient  Code status and discussions: full code    Disposition  Social Determinants of Health that impact treatment or disposition: from home  I expect the patient to be discharged to home in 3-4 days.     Electronically signed by Rajiv Carmona MD, 24, 11:38 CST.    Electronically signed by Rajiv Carmona MD at 24 1138          Discharge Summary        Rajiv Carmona MD at 24 1315                         [facility]         DISCHARGE SUMMARY    Patient Name: Mauro Patel  : 1955  MRN:  [id number]    Date of Admission: 2/14/2024  Date of Discharge:  2/21/24  Primary Care Physician: John Robertson MD    Consults       Date and Time Order Name Status Description    2/18/2024  1:46 PM Inpatient Vascular Surgery Consult Completed     2/14/2024 12:55 PM Inpatient Nephrology Consult              Presenting Problem:   Hypertensive urgency [I16.0]    Active and Resolved Hospital Problems:  Active Hospital Problems    Diagnosis POA    **ESRD (end stage renal disease) on dialysis [N18.6, Z99.2] Not Applicable    Hypertensive urgency [I16.0] Yes    Acute on chronic renal failure [N17.9, N18.9] Yes    Chronic renal impairment [N18.9] Yes    Controlled type 2 diabetes mellitus with complication, without long-term current use of insulin [E11.8] Yes    Benign essential HTN [I10] Yes    Mixed hyperlipidemia [E78.2] Yes      Resolved Hospital Problems   No resolved problems to display.         Hospital Course     Hospital Course:  Mauro Patel is a 68 y.o. male presented to ER complaining of shortness of breath. Patient has been having shortness of breath symptom for about a week off-and-on.  Upon evaluation patient blood pressure is 200/100, patient was given clonidine on resolved, currently requiring Cardene drip. Patient has a past medical history of stage IV CKD, diabetes type 2, edema, hyperlipidemia, hypertension.   Nephrology was consulted and he was weaned off of cardene drip to PO BP meds. It was deemed that patient needed to be started on HD, so vascular surgery was consulted for permacath placement which was done and than patient got HD done on 2/19. Case management setup outpatient HD for patient. Renal has cleared patient for discharge and he received HD today as well. His BP meds have been adjusted and he will go home on those meds.         DISCHARGE Follow Up Recommendations for labs and diagnostics: PCP and renal       Day of Discharge     Vital Signs:  Temp:  [98 °F (36.7 °C)-98.7 °F (37.1  °C)] 98 °F (36.7 °C)  Heart Rate:  [86-88] 88  Resp:  [16-18] 18  BP: (149-170)/(59-68) 170/62  Physical Exam:  Constitutional: Awake, alert   Eyes: PERRLA, sclerae anicteric, no conjunctival injection   HENT: NCAT, mucous membranes moist   Neck: Supple, no thyromegaly, no lymphadenopathy, trachea midline   Respiratory: Clear to auscultation bilaterally, nonlabored respirations    Cardiovascular: RRR, no murmurs, rubs, or gallops, palpable pedal pulses bilaterally   Gastrointestinal: Positive bowel sounds, soft, nontender, nondistended   Musculoskeletal: No bilateral ankle edema, no clubbing or cyanosis to extremities   Psychiatric: Appropriate affect, cooperative   Neurologic: Oriented x 3, strength symmetric in all extremities, Cranial Nerves grossly intact to confrontation, speech clear   Skin: No rashes     Pertinent  and/or Most Recent Results     LAB RESULTS:      Lab 02/21/24  0327 02/20/24  0712 02/19/24  0437 02/18/24  0622 02/17/24  0432   WBC 6.83 8.46 7.38 8.48 8.34   HEMOGLOBIN 7.6* 7.9* 8.2* 8.0* 8.0*   HEMATOCRIT 23.3* 24.1* 25.0* 23.7* 24.6*   PLATELETS 236 248 227 226 236   NEUTROS ABS 4.03 5.64 4.61 5.64 5.85   IMMATURE GRANS (ABS) 0.04 0.05 0.05 0.06* 0.05   LYMPHS ABS 1.46 1.61 1.80 1.71 1.67   MONOS ABS 1.01* 0.92* 0.69 0.81 0.59   EOS ABS 0.23 0.19 0.18 0.21 0.15   MCV 92.5 93.8 92.3 90.1 93.9         Lab 02/21/24  0327 02/20/24  0712 02/19/24  0437 02/18/24  0622 02/17/24  0432 02/16/24  0354 02/15/24  0529   SODIUM 131* 130* 128* 127* 134*   < > 137   POTASSIUM 3.7 4.4 4.0 4.4 4.0   < > 3.7   CHLORIDE 95* 93* 94* 94* 99   < > 101   CO2 26.0 24.0 21.0* 19.0* 21.0*   < > 22.0   ANION GAP 10.0 13.0 13.0 14.0 14.0   < > 14.0   BUN 34* 59* 78* 76* 68*   < > 58*   CREATININE 3.44* 4.61* 5.63* 5.35* 4.91*   < > 4.03*   EGFR 18.7* 13.2* 10.4* 11.0* 12.2*   < > 15.5*   GLUCOSE 137* 148* 110* 177* 133*   < > 161*   CALCIUM 8.3* 8.2* 8.6 8.3* 9.0   < > 9.5   PHOSPHORUS 3.5 4.4  --   --   --   --   --     HEMOGLOBIN A1C  --   --   --   --   --   --  7.30*   TSH  --   --   --   --   --   --  2.380    < > = values in this interval not displayed.         Lab 02/21/24  0327 02/20/24  0712 02/19/24  0437 02/18/24  0622 02/17/24  0432 02/16/24  0354 02/15/24  0529   TOTAL PROTEIN  --   --  6.6 6.5 7.0 7.0 8.1   ALBUMIN 2.8* 3.2* 3.0* 3.1* 3.1* 3.1* 3.6   GLOBULIN  --   --  3.6 3.4 3.9 3.9 4.5   ALT (SGPT)  --   --  11 8 5 5 6   AST (SGOT)  --   --  22 17 12 12 13   BILIRUBIN  --   --  <0.2 <0.2 0.2 0.3 0.4   ALK PHOS  --   --  104 100 105 109 130*             Lab 02/15/24  0529   CHOLESTEROL 152   LDL CHOL 99   HDL CHOL 28*   TRIGLYCERIDES 137         Lab 02/19/24  0841 02/15/24  0529   FERRITIN  --  338.60   ABO TYPING O  --    RH TYPING Positive  --    ANTIBODY SCREEN Negative  --          Brief Urine Lab Results  (Last result in the past 365 days)        Color   Clarity   Blood   Leuk Est   Nitrite   Protein   CREAT   Urine HCG        02/16/24 1231 Dark Yellow   Turbid   Moderate (2+)   Large (3+)   Negative   >=300 mg/dL (3+)                 Microbiology Results (last 10 days)       Procedure Component Value - Date/Time    Urine Culture - Urine, Urine, Clean Catch [[id number]] Collected: 02/16/24 1231    Lab Status: Final result Specimen: Urine, Clean Catch Updated: 02/17/24 1310     Urine Culture 25,000 CFU/mL Mixed Mary Isolated    Narrative:      Specimen contains mixed organisms of questionable pathogenicity suggestive of contamination. If symptoms persist, suggest recollection.  Colonization of the urinary tract without infection is common. Treatment is discouraged unless the patient is symptomatic, pregnant, or undergoing an invasive urologic procedure.    Respiratory Panel PCR w/COVID-19(SARS-CoV-2) YANET/RAMA/MORAIMA/PAD/COR/SHANIA In-House, NP Swab in UTM/VTM, 2 HR TAT - Swab, Nasopharynx [[id number]]  (Normal) Collected: 02/15/24 6745    Lab Status: Final result Specimen: Swab from Nasopharynx Updated: 02/15/24 0921      ADENOVIRUS, PCR Not Detected     Coronavirus 229E Not Detected     Coronavirus HKU1 Not Detected     Coronavirus NL63 Not Detected     Coronavirus OC43 Not Detected     COVID19 Not Detected     Human Metapneumovirus Not Detected     Human Rhinovirus/Enterovirus Not Detected     Influenza A PCR Not Detected     Influenza B PCR Not Detected     Parainfluenza Virus 1 Not Detected     Parainfluenza Virus 2 Not Detected     Parainfluenza Virus 3 Not Detected     Parainfluenza Virus 4 Not Detected     RSV, PCR Not Detected     Bordetella pertussis pcr Not Detected     Bordetella parapertussis PCR Not Detected     Chlamydophila pneumoniae PCR Not Detected     Mycoplasma pneumo by PCR Not Detected    Narrative:      In the setting of a positive respiratory panel with a viral infection PLUS a negative procalcitonin without other underlying concern for bacterial infection, consider observing off antibiotics or discontinuation of antibiotics and continue supportive care. If the respiratory panel is positive for atypical bacterial infection (Bordetella pertussis, Chlamydophila pneumoniae, or Mycoplasma pneumoniae), consider antibiotic de-escalation to target atypical bacterial infection.    Blood Culture - Blood, Arm, Left [[id number]]  (Normal) Collected: 02/14/24 0924    Lab Status: Final result Specimen: Blood from Arm, Left Updated: 02/19/24 0945     Blood Culture No growth at 5 days    Blood Culture - Blood, Hand, Digit Left [[id number]]  (Normal) Collected: 02/14/24 0915    Lab Status: Final result Specimen: Blood from Hand, Digit Left Updated: 02/19/24 0945     Blood Culture No growth at 5 days    COVID-19, FLU A/B, RSV PCR 1 HR TAT - Swab, Nasopharynx [[id number]]  (Normal) Collected: 02/14/24 0912    Lab Status: Final result Specimen: Swab from Nasopharynx Updated: 02/14/24 1032     COVID19 Not Detected     Influenza A PCR Not Detected     Influenza B PCR Not Detected     RSV, PCR Not Detected    Narrative:      Fact  sheet for providers: https://www.fda.gov/media/552155/download    Fact sheet for patients: https://www.fda.gov/media/253177/download    Test performed by [facility]            US Renal Bilateral    Result Date: 2/14/2024  Impression:  Negative for hydronephrosis or renal atrophy.  This report was signed and finalized on 2/14/2024 2:53 PM by Dr. Kenny Tate MD.      XR Chest 1 View    Result Date: 2/14/2024  Impression: Shallow inspiration, with mild cardiomegaly. No evidence of overt CHF. No focal pulmonary infiltrates.  This report was signed and finalized on 2/14/2024 9:47 AM by Dr. Hemanth Barclay MD.               Results for orders placed during the hospital encounter of 02/14/24    Adult Transthoracic Echo Complete W/ Cont if Necessary Per Protocol    Interpretation Summary    Left ventricular systolic function is normal. Left ventricular ejection fraction appears to be 61 - 65%.    Left ventricular wall thickness is consistent with mild asymmetric hypertrophy    Left ventricular diastolic function is consistent with (grade II w/high LAP) pseudonormalization.    Normal right ventricular cavity size and systolic function noted.    The left atrial cavity is moderately dilated.    The aortic valve exhibits sclerosis. There is mild thickening of the aortic valve. No aortic valve regurgitation is present. Mild aortic valve stenosis is present.    Moderate mitral annular calcification is present. Mild mitral valve regurgitation is present    Mild pulmonary hypertension is present.      Labs Pending at Discharge: none        Discharge Details        Discharge Medications        New Medications        Instructions Start Date   hydrALAZINE 50 MG tablet  Commonly known as: APRESOLINE   50 mg, Oral, Every 8 Hours Scheduled      NIFEdipine CC 60 MG 24 hr tablet  Commonly known as: ADALAT CC   60 mg, Oral, Every 24 Hours Scheduled   Start Date: February 22, 2024            Changes to Medications        Instructions Start  Date   insulin detemir 100 UNIT/ML injection  Commonly known as: LEVEMIR  What changed:   how much to take  when to take this  additional instructions   5 Units, Subcutaneous, Every 12 Hours Scheduled             Continue These Medications        Instructions Start Date   Accu-Chek Guide test strip  Generic drug: glucose blood   No dose, route, or frequency recorded.      ASPIRIN 81 PO   Take 81 mg by mouth Daily.      carvedilol 25 MG tablet  Commonly known as: COREG   25 mg, Oral, 2 Times Daily With Meals, BID      ferrous sulfate 325 (65 FE) MG tablet   325 mg, Oral, 2 Times Daily With Meals      HumaLOG KwikPen 100 UNIT/ML solution pen-injector  Generic drug: Insulin Lispro (1 Unit Dial)   5 Units, Subcutaneous, 2 Times Daily, At breakfast and supper      magnesium oxide 400 MG tablet  Commonly known as: MAG-OX   400 mg, Oral, Daily      rosuvastatin 10 MG tablet  Commonly known as: CRESTOR   10 mg, Oral, Daily      sodium bicarbonate 650 MG tablet   1,300 mg, Oral, 3 Times Daily      tamsulosin 0.4 MG capsule 24 hr capsule  Commonly known as: FLOMAX   1 capsule, Oral, Daily      vitamin D 1.25 MG (57406 UT) capsule capsule  Commonly known as: ERGOCALCIFEROL   50,000 Units, Oral, Every 30 Days, Around the 15th              Stop These Medications      allopurinol 100 MG tablet  Commonly known as: ZYLOPRIM     Kerendia 10 MG tablet  Generic drug: Finerenone     lisinopril 40 MG tablet  Commonly known as: PRINIVIL,ZESTRIL     losartan 100 MG tablet  Commonly known as: COZAAR     tadalafil 5 MG tablet  Commonly known as: CIALIS              Allergies   Allergen Reactions    Duravent Dm  [Phenylephrine-Dm-Gg] Rash         Discharge Disposition:   Home or Self Care    Discharge Condition: stable    Diet:  Hospital:  Diet Order   Procedures    Diet: Diabetic Diets; Consistent Carbohydrate; Texture: Regular Texture (IDDSI 7); Fluid Consistency: Thin (IDDSI 0)         Discharge Activity: as tolerated        CODE  STATUS:  Code Status and Medical Interventions:   Ordered at: 02/14/24 1202     Level Of Support Discussed With:    Patient     Code Status (Patient has no pulse and is not breathing):    CPR (Attempt to Resuscitate)     Medical Interventions (Patient has pulse or is breathing):    Full Support         No future appointments.        Time spent on Discharge including face to face service:  45 minutes    Camila Carmona MD      Electronically signed by Camila Carmona MD at 02/21/24 1321       Discharge Order (From admission, onward)       Start     Ordered    02/21/24 1311  Discharge patient  Once        Expected Discharge Date: 02/21/24   Discharge Disposition: Home or Self Care   Physician of Record for Attribution - Please select from Treatment Team: CAMILA CARMONA [[id number]]   Review needed by CMO to determine Physician of Record: No      Question Answer Comment   Physician of Record for Attribution - Please select from Treatment Team CAMILA CARMONA    Review needed by CMO to determine Physician of Record No        02/21/24 7469

## 2024-02-22 NOTE — OUTREACH NOTE
Prep Survey      Flowsheet Row Responses   [facility] facility patient discharged from? [facility]   Is LACE score < 7 ? No   Eligibility Readm Mgmt   Discharge diagnosis ESRD (end stage renal disease) on dialysis, hemodialysis catheter inserted & HD started   Does the patient have one of the following disease processes/diagnoses(primary or secondary)? General Surgery   Does the patient have Home health ordered? No   Is there a DME ordered? No   Prep survey completed? Yes            Carly Fontanez Registered Nurse

## 2024-02-23 ENCOUNTER — READMISSION MANAGEMENT (OUTPATIENT)
Dept: CALL CENTER | Facility: HOSPITAL | Age: 69
End: 2024-02-23
Payer: MEDICARE

## 2024-02-23 NOTE — OUTREACH NOTE
General Surgery Week 1 Survey      Flowsheet Row Responses   [facility] patient discharged from? [facility]   Does the patient have one of the following disease processes/diagnoses(primary or secondary)? General Surgery   Week 1 attempt successful? Yes   Call start time [phone]   Call end time [phone]   Discharge diagnosis ESRD (end stage renal disease) on dialysis, hemodialysis catheter inserted & HD started   Person spoke with today (if not patient) and relationship PATIENT AND SPOUSE   Meds reviewed with patient/caregiver? Yes   Does the patient have all medications related to this admission filled (includes all antibiotics, pain medications, etc.) Yes   Prescription comments NO CONCERNS OR QUESTIONS NOTED.   Is the patient taking all medications as directed (includes completed medication regime)? Yes   Medication comments HAVE TO CHANGE SOME MEDICATION TIMES BECAUSE OF DIALYSIS   Does the patient have a follow up appointment scheduled with their surgeon? Yes   Has home health visited the patient within 72 hours of discharge? N/A   Psychosocial issues? No   Did the patient receive a copy of their discharge instructions? Yes   Nursing interventions Reviewed instructions with patient   What is the patient's perception of their health status since discharge? Improving   Nursing interventions Nurse provided patient education   Is the patient /caregiver able to teach back basic post-op care? Keep incision areas clean,dry and protected   Is the patient/caregiver able to teach back signs and symptoms of incisional infection? Increased redness, swelling or pain at the incisonal site, Increased drainage or bleeding, Incisional warmth, Pus or odor from incision, Fever   Is the patient/caregiver able to teach back steps to recovery at home? Set small, achievable goals for return to baseline health, Rest and rebuild strength, gradually increase activity   Additional teach back comments T/Th/S at 6:00AM   Week 1 call completed?  Yes   Graduated Yes   Wrap up additional comments Spouse reports patient is doing very well, We discussed diet changes. Reports very pleased with hospital care. no concerns or questions noted.   Call end time [phone]            Alma BRUCE - Registered Nurse

## 2024-02-26 ENCOUNTER — APPOINTMENT (OUTPATIENT)
Dept: GENERAL RADIOLOGY | Facility: HOSPITAL | Age: 69
End: 2024-02-26
Payer: MEDICARE

## 2024-02-26 ENCOUNTER — APPOINTMENT (OUTPATIENT)
Dept: NUCLEAR MEDICINE | Facility: HOSPITAL | Age: 69
End: 2024-02-26
Payer: MEDICARE

## 2024-02-26 ENCOUNTER — NURSE TRIAGE (OUTPATIENT)
Dept: CALL CENTER | Facility: HOSPITAL | Age: 69
End: 2024-02-26
Payer: MEDICARE

## 2024-02-26 ENCOUNTER — HOSPITAL ENCOUNTER (OUTPATIENT)
Facility: HOSPITAL | Age: 69
Setting detail: OBSERVATION
Discharge: HOME | End: 2024-02-27
Attending: FAMILY MEDICINE | Admitting: FAMILY MEDICINE
Payer: MEDICARE

## 2024-02-26 DIAGNOSIS — R07.9 CHEST PAIN, UNSPECIFIED TYPE: Primary | ICD-10-CM

## 2024-02-26 LAB
ALBUMIN SERPL-MCNC: 3.4 G/DL (ref 3.5–5.2)
ALBUMIN/GLOB SERPL: 0.9 G/DL
ALP SERPL-CCNC: 113 U/L (ref 39–117)
ALT SERPL W P-5'-P-CCNC: 8 U/L (ref 1–41)
ANION GAP SERPL CALCULATED.3IONS-SCNC: 11 MMOL/L (ref 5–15)
AST SERPL-CCNC: 24 U/L (ref 1–40)
BASOPHILS # BLD AUTO: 0.06 10*3/MM3 (ref 0–0.2)
BASOPHILS NFR BLD AUTO: 0.9 % (ref 0–1.5)
BILIRUB SERPL-MCNC: 0.3 MG/DL (ref 0–1.2)
BUN SERPL-MCNC: 31 MG/DL (ref 8–23)
BUN/CREAT SERPL: 7.9 (ref 7–25)
CALCIUM SPEC-SCNC: 8.8 MG/DL (ref 8.6–10.5)
CHLORIDE SERPL-SCNC: 90 MMOL/L (ref 98–107)
CO2 SERPL-SCNC: 32 MMOL/L (ref 22–29)
CREAT SERPL-MCNC: 3.9 MG/DL (ref 0.76–1.27)
D DIMER PPP FEU-MCNC: 2.49 MCGFEU/ML (ref 0–0.67)
DEPRECATED RDW RBC AUTO: 50.2 FL (ref 37–54)
EGFRCR SERPLBLD CKD-EPI 2021: 16.1 ML/MIN/1.73
EOSINOPHIL # BLD AUTO: 0.1 10*3/MM3 (ref 0–0.4)
EOSINOPHIL NFR BLD AUTO: 1.4 % (ref 0.3–6.2)
ERYTHROCYTE [DISTWIDTH] IN BLOOD BY AUTOMATED COUNT: 14.7 % (ref 12.3–15.4)
GEN 5 2HR TROPONIN T REFLEX: 97 NG/L
GLOBULIN UR ELPH-MCNC: 3.9 GM/DL
GLUCOSE BLDC GLUCOMTR-MCNC: 106 MG/DL (ref 70–130)
GLUCOSE BLDC GLUCOMTR-MCNC: 155 MG/DL (ref 70–130)
GLUCOSE SERPL-MCNC: 137 MG/DL (ref 65–99)
HCT VFR BLD AUTO: 24.8 % (ref 37.5–51)
HGB BLD-MCNC: 8.2 G/DL (ref 13–17.7)
IMM GRANULOCYTES # BLD AUTO: 0.04 10*3/MM3 (ref 0–0.05)
IMM GRANULOCYTES NFR BLD AUTO: 0.6 % (ref 0–0.5)
LYMPHOCYTES # BLD AUTO: 1.66 10*3/MM3 (ref 0.7–3.1)
LYMPHOCYTES NFR BLD AUTO: 24 % (ref 19.6–45.3)
MCH RBC QN AUTO: 30.8 PG (ref 26.6–33)
MCHC RBC AUTO-ENTMCNC: 33.1 G/DL (ref 31.5–35.7)
MCV RBC AUTO: 93.2 FL (ref 79–97)
MONOCYTES # BLD AUTO: 0.92 10*3/MM3 (ref 0.1–0.9)
MONOCYTES NFR BLD AUTO: 13.3 % (ref 5–12)
NEUTROPHILS NFR BLD AUTO: 4.15 10*3/MM3 (ref 1.7–7)
NEUTROPHILS NFR BLD AUTO: 59.8 % (ref 42.7–76)
NRBC BLD AUTO-RTO: 0 /100 WBC (ref 0–0.2)
NT-PROBNP SERPL-MCNC: 6445 PG/ML (ref 0–900)
PLATELET # BLD AUTO: 259 10*3/MM3 (ref 140–450)
PMV BLD AUTO: 9.9 FL (ref 6–12)
POTASSIUM SERPL-SCNC: 3.6 MMOL/L (ref 3.5–5.2)
PROT SERPL-MCNC: 7.3 G/DL (ref 6–8.5)
RBC # BLD AUTO: 2.66 10*6/MM3 (ref 4.14–5.8)
SODIUM SERPL-SCNC: 133 MMOL/L (ref 136–145)
TROPONIN T DELTA: -1 NG/L
TROPONIN T SERPL HS-MCNC: 98 NG/L
WBC NRBC COR # BLD AUTO: 6.93 10*3/MM3 (ref 3.4–10.8)

## 2024-02-26 PROCEDURE — 25010000002 NITROGLYCERIN 200 MCG/ML SOLUTION

## 2024-02-26 PROCEDURE — A9540 TC99M MAA: HCPCS | Performed by: STUDENT IN AN ORGANIZED HEALTH CARE EDUCATION/TRAINING PROGRAM

## 2024-02-26 PROCEDURE — 83880 ASSAY OF NATRIURETIC PEPTIDE: CPT | Performed by: PHYSICIAN ASSISTANT

## 2024-02-26 PROCEDURE — 93010 ELECTROCARDIOGRAM REPORT: CPT | Performed by: HOSPITALIST

## 2024-02-26 PROCEDURE — G0378 HOSPITAL OBSERVATION PER HR: HCPCS

## 2024-02-26 PROCEDURE — 36415 COLL VENOUS BLD VENIPUNCTURE: CPT

## 2024-02-26 PROCEDURE — 93005 ELECTROCARDIOGRAM TRACING: CPT

## 2024-02-26 PROCEDURE — 82948 REAGENT STRIP/BLOOD GLUCOSE: CPT

## 2024-02-26 PROCEDURE — 0 TECHNETIUM ALBUMIN AGGREGATED: Performed by: STUDENT IN AN ORGANIZED HEALTH CARE EDUCATION/TRAINING PROGRAM

## 2024-02-26 PROCEDURE — 96366 THER/PROPH/DIAG IV INF ADDON: CPT

## 2024-02-26 PROCEDURE — 85379 FIBRIN DEGRADATION QUANT: CPT | Performed by: PHYSICIAN ASSISTANT

## 2024-02-26 PROCEDURE — 63710000001 INSULIN DETEMIR PER 5 UNITS: Performed by: FAMILY MEDICINE

## 2024-02-26 PROCEDURE — 63710000001 INSULIN LISPRO (HUMAN) PER 5 UNITS: Performed by: FAMILY MEDICINE

## 2024-02-26 PROCEDURE — 96365 THER/PROPH/DIAG IV INF INIT: CPT

## 2024-02-26 PROCEDURE — 71045 X-RAY EXAM CHEST 1 VIEW: CPT

## 2024-02-26 PROCEDURE — 78580 LUNG PERFUSION IMAGING: CPT

## 2024-02-26 PROCEDURE — 84484 ASSAY OF TROPONIN QUANT: CPT | Performed by: PHYSICIAN ASSISTANT

## 2024-02-26 PROCEDURE — 93005 ELECTROCARDIOGRAM TRACING: CPT | Performed by: FAMILY MEDICINE

## 2024-02-26 PROCEDURE — 80053 COMPREHEN METABOLIC PANEL: CPT | Performed by: PHYSICIAN ASSISTANT

## 2024-02-26 PROCEDURE — 99285 EMERGENCY DEPT VISIT HI MDM: CPT

## 2024-02-26 PROCEDURE — 85025 COMPLETE CBC W/AUTO DIFF WBC: CPT | Performed by: PHYSICIAN ASSISTANT

## 2024-02-26 RX ORDER — LISINOPRIL 40 MG/1
40 TABLET ORAL DAILY
COMMUNITY
End: 2024-02-26

## 2024-02-26 RX ORDER — HYDRALAZINE HYDROCHLORIDE 50 MG/1
50 TABLET, FILM COATED ORAL EVERY 8 HOURS SCHEDULED
Status: DISCONTINUED | OUTPATIENT
Start: 2024-02-26 | End: 2024-02-27 | Stop reason: HOSPADM

## 2024-02-26 RX ORDER — ROSUVASTATIN CALCIUM 10 MG/1
10 TABLET, COATED ORAL NIGHTLY
Status: DISCONTINUED | OUTPATIENT
Start: 2024-02-26 | End: 2024-02-27 | Stop reason: HOSPADM

## 2024-02-26 RX ORDER — SODIUM CHLORIDE 0.9 % (FLUSH) 0.9 %
10 SYRINGE (ML) INJECTION EVERY 12 HOURS SCHEDULED
Status: DISCONTINUED | OUTPATIENT
Start: 2024-02-26 | End: 2024-02-27 | Stop reason: HOSPADM

## 2024-02-26 RX ORDER — ASPIRIN 325 MG
325 TABLET ORAL ONCE
Status: COMPLETED | OUTPATIENT
Start: 2024-02-26 | End: 2024-02-26

## 2024-02-26 RX ORDER — CARVEDILOL 25 MG/1
25 TABLET ORAL 2 TIMES DAILY WITH MEALS
Status: DISCONTINUED | OUTPATIENT
Start: 2024-02-26 | End: 2024-02-27 | Stop reason: HOSPADM

## 2024-02-26 RX ORDER — NIFEDIPINE 60 MG/1
60 TABLET, EXTENDED RELEASE ORAL
Status: DISCONTINUED | OUTPATIENT
Start: 2024-02-26 | End: 2024-02-27 | Stop reason: HOSPADM

## 2024-02-26 RX ORDER — SODIUM CHLORIDE 0.9 % (FLUSH) 0.9 %
10 SYRINGE (ML) INJECTION AS NEEDED
Status: DISCONTINUED | OUTPATIENT
Start: 2024-02-26 | End: 2024-02-27 | Stop reason: HOSPADM

## 2024-02-26 RX ORDER — SODIUM CHLORIDE 9 MG/ML
40 INJECTION, SOLUTION INTRAVENOUS AS NEEDED
Status: DISCONTINUED | OUTPATIENT
Start: 2024-02-26 | End: 2024-02-27 | Stop reason: HOSPADM

## 2024-02-26 RX ORDER — SODIUM CHLORIDE 0.9 % (FLUSH) 0.9 %
10 SYRINGE (ML) INJECTION AS NEEDED
Status: DISCONTINUED | OUTPATIENT
Start: 2024-02-26 | End: 2024-02-27 | Stop reason: SDUPTHER

## 2024-02-26 RX ORDER — NICOTINE POLACRILEX 4 MG
15 LOZENGE BUCCAL
Status: DISCONTINUED | OUTPATIENT
Start: 2024-02-26 | End: 2024-02-27 | Stop reason: HOSPADM

## 2024-02-26 RX ORDER — ASPIRIN 81 MG/1
81 TABLET ORAL DAILY
Status: DISCONTINUED | OUTPATIENT
Start: 2024-02-27 | End: 2024-02-27 | Stop reason: HOSPADM

## 2024-02-26 RX ORDER — CLONIDINE HYDROCHLORIDE 0.1 MG/1
0.2 TABLET ORAL 2 TIMES DAILY
COMMUNITY

## 2024-02-26 RX ORDER — BISACODYL 5 MG/1
5 TABLET, DELAYED RELEASE ORAL DAILY PRN
Status: DISCONTINUED | OUTPATIENT
Start: 2024-02-26 | End: 2024-02-27 | Stop reason: HOSPADM

## 2024-02-26 RX ORDER — ONDANSETRON 2 MG/ML
4 INJECTION INTRAMUSCULAR; INTRAVENOUS EVERY 6 HOURS PRN
Status: DISCONTINUED | OUTPATIENT
Start: 2024-02-26 | End: 2024-02-27 | Stop reason: HOSPADM

## 2024-02-26 RX ORDER — BISACODYL 10 MG
10 SUPPOSITORY, RECTAL RECTAL DAILY PRN
Status: DISCONTINUED | OUTPATIENT
Start: 2024-02-26 | End: 2024-02-27 | Stop reason: HOSPADM

## 2024-02-26 RX ORDER — NITROGLYCERIN 20 MG/100ML
5-200 INJECTION INTRAVENOUS
Status: DISCONTINUED | OUTPATIENT
Start: 2024-02-26 | End: 2024-02-27 | Stop reason: HOSPADM

## 2024-02-26 RX ORDER — FUROSEMIDE 40 MG/1
40 TABLET ORAL DAILY
COMMUNITY

## 2024-02-26 RX ORDER — DEXTROSE MONOHYDRATE 25 G/50ML
25 INJECTION, SOLUTION INTRAVENOUS
Status: DISCONTINUED | OUTPATIENT
Start: 2024-02-26 | End: 2024-02-27 | Stop reason: HOSPADM

## 2024-02-26 RX ORDER — SODIUM BICARBONATE 650 MG/1
1300 TABLET ORAL 3 TIMES DAILY
Status: DISCONTINUED | OUTPATIENT
Start: 2024-02-26 | End: 2024-02-27 | Stop reason: HOSPADM

## 2024-02-26 RX ORDER — FERROUS SULFATE 325(65) MG
325 TABLET ORAL 2 TIMES DAILY WITH MEALS
Status: DISCONTINUED | OUTPATIENT
Start: 2024-02-26 | End: 2024-02-27 | Stop reason: HOSPADM

## 2024-02-26 RX ORDER — NITROGLYCERIN 20 MG/100ML
INJECTION INTRAVENOUS
Status: COMPLETED
Start: 2024-02-26 | End: 2024-02-26

## 2024-02-26 RX ORDER — INSULIN LISPRO 100 [IU]/ML
2-7 INJECTION, SOLUTION INTRAVENOUS; SUBCUTANEOUS
Status: DISCONTINUED | OUTPATIENT
Start: 2024-02-26 | End: 2024-02-27 | Stop reason: HOSPADM

## 2024-02-26 RX ORDER — POLYETHYLENE GLYCOL 3350 17 G/17G
17 POWDER, FOR SOLUTION ORAL DAILY PRN
Status: DISCONTINUED | OUTPATIENT
Start: 2024-02-26 | End: 2024-02-27 | Stop reason: HOSPADM

## 2024-02-26 RX ORDER — NITROGLYCERIN 0.4 MG/1
0.4 TABLET SUBLINGUAL
Status: COMPLETED | OUTPATIENT
Start: 2024-02-26 | End: 2024-02-26

## 2024-02-26 RX ORDER — TAMSULOSIN HYDROCHLORIDE 0.4 MG/1
0.4 CAPSULE ORAL DAILY
Status: DISCONTINUED | OUTPATIENT
Start: 2024-02-26 | End: 2024-02-27 | Stop reason: HOSPADM

## 2024-02-26 RX ORDER — ALUMINA, MAGNESIA, AND SIMETHICONE 2400; 2400; 240 MG/30ML; MG/30ML; MG/30ML
15 SUSPENSION ORAL EVERY 6 HOURS PRN
Status: DISCONTINUED | OUTPATIENT
Start: 2024-02-26 | End: 2024-02-27

## 2024-02-26 RX ORDER — ACETAMINOPHEN 325 MG/1
650 TABLET ORAL EVERY 4 HOURS PRN
Status: DISCONTINUED | OUTPATIENT
Start: 2024-02-26 | End: 2024-02-27 | Stop reason: HOSPADM

## 2024-02-26 RX ORDER — AMOXICILLIN 250 MG
2 CAPSULE ORAL 2 TIMES DAILY PRN
Status: DISCONTINUED | OUTPATIENT
Start: 2024-02-26 | End: 2024-02-27 | Stop reason: HOSPADM

## 2024-02-26 RX ADMIN — TAMSULOSIN HYDROCHLORIDE 0.4 MG: 0.4 CAPSULE ORAL at 18:08

## 2024-02-26 RX ADMIN — NITROGLYCERIN 5 MCG/MIN: 20 INJECTION INTRAVENOUS at 14:26

## 2024-02-26 RX ADMIN — ASPIRIN 325 MG: 325 TABLET, FILM COATED ORAL at 09:28

## 2024-02-26 RX ADMIN — SODIUM BICARBONATE 1300 MG: 650 TABLET ORAL at 18:08

## 2024-02-26 RX ADMIN — NITROGLYCERIN 0.4 MG: 0.4 TABLET SUBLINGUAL at 10:37

## 2024-02-26 RX ADMIN — SODIUM BICARBONATE 1300 MG: 650 TABLET ORAL at 20:28

## 2024-02-26 RX ADMIN — ROSUVASTATIN CALCIUM 10 MG: 10 TABLET, COATED ORAL at 20:27

## 2024-02-26 RX ADMIN — Medication 10 ML: at 20:28

## 2024-02-26 RX ADMIN — HYDRALAZINE HYDROCHLORIDE 50 MG: 50 TABLET ORAL at 21:10

## 2024-02-26 RX ADMIN — NITROGLYCERIN 0.4 MG: 0.4 TABLET SUBLINGUAL at 11:06

## 2024-02-26 RX ADMIN — CARVEDILOL 25 MG: 25 TABLET, FILM COATED ORAL at 18:09

## 2024-02-26 RX ADMIN — KIT FOR THE PREPARATION OF TECHNETIUM TC 99M ALBUMIN AGGREGATED 1 DOSE: 2.5 INJECTION, POWDER, FOR SOLUTION INTRAVENOUS at 11:30

## 2024-02-26 RX ADMIN — NIFEDIPINE 60 MG: 60 TABLET, FILM COATED, EXTENDED RELEASE ORAL at 18:07

## 2024-02-26 RX ADMIN — FERROUS SULFATE TAB 325 MG (65 MG ELEMENTAL FE) 325 MG: 325 (65 FE) TAB at 18:07

## 2024-02-26 RX ADMIN — INSULIN LISPRO 2 UNITS: 100 INJECTION, SOLUTION INTRAVENOUS; SUBCUTANEOUS at 20:28

## 2024-02-26 RX ADMIN — NITROGLYCERIN 0.4 MG: 0.4 TABLET SUBLINGUAL at 10:14

## 2024-02-26 RX ADMIN — MAGNESIUM GLUCONATE 500 MG ORAL TABLET 400 MG: 500 TABLET ORAL at 18:08

## 2024-02-26 RX ADMIN — INSULIN DETEMIR 5 UNITS: 100 INJECTION, SOLUTION SUBCUTANEOUS at 20:28

## 2024-02-26 NOTE — ED PROVIDER NOTES
Subjective   History of Present Illness    Patient is a pleasant 88-year-old gentleman who presents to ED with wife.  Chief complaint is sudden onset chest pain.     Patient does have significant medical history of recent worsening renal failure with hemodialysis catheter placement with his last hospitalization 1 week ago, COPD, diabetes mellitus type 2 insulin-dependent, edema, hypertensive urgency with improved blood pressure, hypertension, hyperlipidemia.    Patient describes that he was was already awake today when he felt a gradual discomfort described as a tightness and heaviness in the middle of her chest going through and through to his back.  He denies any further radiating pain.  He denies any nausea, near-syncope or syncope.  He denies diaphoresis.  He does complain of worsening shortness of breath just sitting there without any previous PND or orthopnea.  He does complain of fatigue.  The patient describes he was in the hospital last week due to his blood pressure being 200 systolic.  They have adjusted his medications and started on hemodialysis with last treatment completed 2 days ago.  Patient said the treatment was uneventful.  He denies any fever or cough.  He denies any previous chest pain or other cardiac history.  Today, they did note some edema in his lower extremities is worse than normal.  He denies any history of congestive heart failure.  He denies any abdominal pain.  Currently, he rates his pain as severe.  He does not recall his chest discomfort being worse with exertion.    Review of Systems   Constitutional:  Positive for activity change.   HENT: Negative.     Respiratory:  Positive for chest tightness and shortness of breath.    Cardiovascular:  Positive for chest pain.   Gastrointestinal: Negative.    Genitourinary: Negative.    Musculoskeletal: Negative.    Skin: Negative.    Neurological: Negative.    Psychiatric/Behavioral: Negative.     All other systems reviewed and are  negative.      Past Medical History:   Diagnosis Date    Chronic renal impairment 2/14/2024    Diabetes mellitus     Edema     Hyperlipidemia     Hypertension        Allergies   Allergen Reactions    Duravent Dm  [Phenylephrine-Dm-Gg] Rash       Past Surgical History:   Procedure Laterality Date    INSERTION HEMODIALYSIS CATHETER N/A 2/19/2024    Procedure: HEMODIALYSIS CATHETER INSERTION;  Surgeon: Michael Armenta DO;  Location:  [facility];  Service: Vascular;  Laterality: N/A;    NO PAST SURGERIES         Family History   Problem Relation Age of Onset    Diabetes Mother     No Known Problems Father        Social History     Socioeconomic History    Marital status:    Tobacco Use    Smoking status: Never    Smokeless tobacco: Never   Vaping Use    Vaping Use: Never used   Substance and Sexual Activity    Alcohol use: No    Drug use: No    Sexual activity: Defer       Prior to Admission medications    Medication Sig Start Date End Date Taking? Authorizing Provider   ACCU-CHEK GUIDE test strip  7/18/19   Tristan Barkley MD   ASPIRIN 81 PO Take 81 mg by mouth Daily.    Tristan Barkley MD   carvedilol (COREG) 25 MG tablet Take 1 tablet by mouth 2 (Two) Times a Day With Meals. BID 8/15/19   Tristan Barkley MD   ferrous sulfate 325 (65 FE) MG tablet Take 1 tablet by mouth 2 (Two) Times a Day With Meals.    Tristan Barkley MD   hydrALAZINE (APRESOLINE) 50 MG tablet Take 1 tablet by mouth Every 8 (Eight) Hours. 2/21/24   Rajiv Carmona MD   insulin detemir (LEVEMIR) 100 UNIT/ML injection Inject 5 Units under the skin into the appropriate area as directed Every 12 (Twelve) Hours. 2/21/24   Rajiv Carmona MD   Insulin Lispro, 1 Unit Dial, (HumaLOG KwikPen) 100 UNIT/ML solution pen-injector Inject 5 Units under the skin into the appropriate area as directed 2 (Two) Times a Day. At breakfast and supper    Tristan Barkley MD   magnesium oxide (MAG-OX) 400 MG tablet Take 1  "tablet by mouth Daily.    Tristan Barkley MD   NIFEdipine XL (ADALAT CC) 60 MG 24 hr tablet Take 1 tablet by mouth Daily. 2/22/24   Rajiv Carmona MD   rosuvastatin (CRESTOR) 10 MG tablet Take 1 tablet by mouth Daily.    Tristan Barkley MD   sodium bicarbonate 650 MG tablet Take 2 tablets by mouth 3 (Three) Times a Day.    Tristan Barkley MD   tamsulosin (FLOMAX) 0.4 MG capsule 24 hr capsule Take 1 capsule by mouth Daily.    Tristan Barkley MD   vitamin D (ERGOCALCIFEROL) 27124 units capsule capsule Take 1 capsule by mouth Every 30 (Thirty) Days. Around the 15th 8/2/19   Tristan Barkley MD       Medications   sodium chloride 0.9 % flush 10 mL (has no administration in time range)   nitroglycerin (TRIDIL) 200 mcg/ml infusion (5 mcg/min Intravenous New Bag 2/26/24 1426)   nitroglycerin (NITROSTAT) SL tablet 0.4 mg (0.4 mg Sublingual Given 2/26/24 1106)   aspirin tablet 325 mg (325 mg Oral Given 2/26/24 0928)   technetium albumin aggregated (MAA) solution 1 dose (1 dose Intravenous Given 2/26/24 1130)       /72   Pulse 81   Temp 98 °F (36.7 °C)   Resp 18   Ht 172.7 cm (68\")   Wt 96.2 kg (212 lb)   SpO2 98%   BMI 32.23 kg/m²       Objective   Physical Exam  Vitals reviewed.   Constitutional:       Appearance: He is well-developed.      Interventions: He is not intubated.  HENT:      Head: Normocephalic and atraumatic.   Eyes:      Pupils: Pupils are equal, round, and reactive to light.   Cardiovascular:      Rate and Rhythm: Normal rate and regular rhythm.      Pulses:           Carotid pulses are  on the right side with bruit.     Heart sounds: Murmur heard.   Pulmonary:      Effort: Pulmonary effort is normal. No tachypnea, bradypnea, accessory muscle usage or respiratory distress. He is not intubated.      Breath sounds: Normal breath sounds. No stridor. No decreased breath sounds, wheezing, rhonchi or rales.   Chest:      Chest wall: No tenderness.   Abdominal:      " General: Bowel sounds are normal.      Palpations: Abdomen is soft.   Musculoskeletal:         General: Normal range of motion.      Cervical back: Normal range of motion and neck supple.      Right lower leg: Tenderness present. Edema present.      Left lower leg: Tenderness present. Edema present.      Comments: 3+ pitting edema to bilateral lower extremities   Skin:     General: Skin is warm.      Capillary Refill: Capillary refill takes less than 2 seconds.   Neurological:      General: No focal deficit present.      Mental Status: He is alert.   Psychiatric:         Mood and Affect: Mood normal.         Behavior: Behavior normal.         Procedures         Lab Results (last 24 hours)       Procedure Component Value Units Date/Time    CBC & Differential [[id number]]  (Abnormal) Collected: 02/26/24 0923    Specimen: Blood Updated: 02/26/24 0938    Narrative:      The following orders were created for panel order CBC & Differential.  Procedure                               Abnormality         Status                     ---------                               -----------         ------                     CBC Auto Differential[[id number]]        Abnormal            Final result                 Please view results for these tests on the individual orders.    Comprehensive Metabolic Panel [[id number]]  (Abnormal) Collected: 02/26/24 0923    Specimen: Blood Updated: 02/26/24 1007     Glucose 137 mg/dL      BUN 31 mg/dL      Creatinine 3.90 mg/dL      Sodium 133 mmol/L      Potassium 3.6 mmol/L      Comment: Specimen hemolyzed.  Result may be falsely elevated.        Chloride 90 mmol/L      CO2 32.0 mmol/L      Calcium 8.8 mg/dL      Total Protein 7.3 g/dL      Albumin 3.4 g/dL      ALT (SGPT) 8 U/L      Comment: Specimen hemolyzed.  Result may  be falsely elevated.        AST (SGOT) 24 U/L      Comment: Specimen hemolyzed.  Result may be falsely elevated.        Alkaline Phosphatase 113 U/L      Total Bilirubin 0.3 mg/dL       Globulin 3.9 gm/dL      A/G Ratio 0.9 g/dL      BUN/Creatinine Ratio 7.9     Anion Gap 11.0 mmol/L      eGFR 16.1 mL/min/1.73     Narrative:      GFR Normal >60  Chronic Kidney Disease <60  Kidney Failure <15      BNP [[id number]]  (Abnormal) Collected: 02/26/24 0923    Specimen: Blood Updated: 02/26/24 0955     proBNP 6,445.0 pg/mL     Narrative:      This assay is used as an aid in the diagnosis of individuals suspected of having heart failure. It can be used as an aid in the diagnosis of acute decompensated heart failure (ADHF) in patients presenting with signs and symptoms of ADHF to the emergency department (ED). In addition, NT-proBNP of <300 pg/mL indicates ADHF is not likely.    Age Range Result Interpretation  NT-proBNP Concentration (pg/mL:      <50             Positive            >450                   Gray                 300-450                    Negative             <300    50-75           Positive            >900                  Gray                300-900                  Negative            <300      >75             Positive            >1800                  Gray                300-1800                  Negative            <300    D-dimer, Quantitative [[id number]]  (Abnormal) Collected: 02/26/24 0923    Specimen: Blood Updated: 02/26/24 0952     D-Dimer, Quantitative 2.49 MCGFEU/mL     Narrative:      According to the assay 's published package insert, a normal (<0.50 MCGFEU/mL) D-dimer result in conjunction with a non-high clinical probability assessment, excludes deep vein thrombosis (DVT) and pulmonary embolism (PE) with high sensitivity.    D-dimer values increase with age and this can make VTE exclusion of an older population difficult. To address this, the [employer], based on best available evidence and recent guidelines, recommends that clinicians use age-adjusted D-dimer thresholds in patients greater than 50 years of age with: a) a low probability of  "PE who do not meet all Pulmonary Embolism Rule Out Criteria, or b) in those with intermediate probability of PE.   The formula for an age-adjusted D-dimer cut-off is \"age/100\".  For example, a 60 year old patient would have an age-adjusted cut-off of 0.60 MCGFEU/mL and an 80 year old 0.80 MCGFEU/mL.    High Sensitivity Troponin T [[id number]]  (Abnormal) Collected: 02/26/24 0923    Specimen: Blood Updated: 02/26/24 1003     HS Troponin T 98 ng/L      Comment: Specimen hemolyzed.  Results may be falsely decreased.       Narrative:      High Sensitive Troponin T Reference Range:  <14.0 ng/L- Negative Female for AMI  <22.0 ng/L- Negative Male for AMI  >=14 - Abnormal Female indicating possible myocardial injury.  >=22 - Abnormal Male indicating possible myocardial injury.   Clinicians would have to utilize clinical acumen, EKG, Troponin, and serial changes to determine if it is an Acute Myocardial Infarction or myocardial injury due to an underlying chronic condition.         CBC Auto Differential [[id number]]  (Abnormal) Collected: 02/26/24 0923    Specimen: Blood Updated: 02/26/24 0938     WBC 6.93 10*3/mm3      RBC 2.66 10*6/mm3      Hemoglobin 8.2 g/dL      Hematocrit 24.8 %      MCV 93.2 fL      MCH 30.8 pg      MCHC 33.1 g/dL      RDW 14.7 %      RDW-SD 50.2 fl      MPV 9.9 fL      Platelets 259 10*3/mm3      Neutrophil % 59.8 %      Lymphocyte % 24.0 %      Monocyte % 13.3 %      Eosinophil % 1.4 %      Basophil % 0.9 %      Immature Grans % 0.6 %      Neutrophils, Absolute 4.15 10*3/mm3      Lymphocytes, Absolute 1.66 10*3/mm3      Monocytes, Absolute 0.92 10*3/mm3      Eosinophils, Absolute 0.10 10*3/mm3      Basophils, Absolute 0.06 10*3/mm3      Immature Grans, Absolute 0.04 10*3/mm3      nRBC 0.0 /100 WBC     High Sensitivity Troponin T 2Hr [[id number]]  (Abnormal) Collected: 02/26/24 1223    Specimen: Blood Updated: 02/26/24 1319     HS Troponin T 97 ng/L      Troponin T Delta -1 ng/L     Narrative:      High " Sensitive Troponin T Reference Range:  <14.0 ng/L- Negative Female for AMI  <22.0 ng/L- Negative Male for AMI  >=14 - Abnormal Female indicating possible myocardial injury.  >=22 - Abnormal Male indicating possible myocardial injury.   Clinicians would have to utilize clinical acumen, EKG, Troponin, and serial changes to determine if it is an Acute Myocardial Infarction or myocardial injury due to an underlying chronic condition.                 NM Lung Scan Perfusion Particulate    Result Date: 2/26/2024  Narrative: EXAMINATION: NM LUNG SCAN PERFUSION PARTICULATE-  2/26/2024 10:32 AM  HISTORY: cp sob  After the intravenous injection of 5.50 mCi of technetium 99m macroaggregated albumin, the images of the lungs bilaterally are obtained in anterior, posterior, both oblique and lateral projections with the help of scintillation camera.  The ventilation scans were not obtained.  There is no previous study for comparison. The correlation made with chest radiograph obtained earlier today.  There is normal perfusion in both lungs. No segmental or subsegmental defects noted.      Impression: 1. Low probability of pulmonary embolism.   This report was signed and finalized on 2/26/2024 11:50 AM by Dr. Vianey Porter MD.      XR Chest 1 View    Result Date: 2/26/2024  Narrative: EXAMINATION: XR CHEST 1 VW-  2/26/2024 9:31 AM  HISTORY: Chest pain and shortness of breath.  FINDINGS: Today's exam is compared to previous study of 2/14/2024. I dialysis catheter is in place via right-sided approach. The lungs are fully expanded and clear. No effusion or free air is demonstrated.      Impression: 1.. Dialysis catheter well-positioned. No complications. 2. Lungs are fully expanded and clear.  This report was signed and finalized on 2/26/2024 9:32 AM by Dr. Juan Carlos Lora MD.      IR Insert Tunneled CV Catheter Without Port 5 Plus, FL C Arm During Surgery    Result Date: 2/20/2024  Narrative: Performed by Dr. Armenta. Please see  procedure note.  This report was signed and finalized on 2/20/2024 12:41 PM by Dr. Michael Armenta MD.      Adult Transthoracic Echo Complete W/ Cont if Necessary Per Protocol    Result Date: 2/14/2024  Narrative:   Left ventricular systolic function is normal. Left ventricular ejection fraction appears to be 61 - 65%.   Left ventricular wall thickness is consistent with mild asymmetric hypertrophy   Left ventricular diastolic function is consistent with (grade II w/high LAP) pseudonormalization.   Normal right ventricular cavity size and systolic function noted.   The left atrial cavity is moderately dilated.   The aortic valve exhibits sclerosis. There is mild thickening of the aortic valve. No aortic valve regurgitation is present. Mild aortic valve stenosis is present.   Moderate mitral annular calcification is present. Mild mitral valve regurgitation is present   Mild pulmonary hypertension is present.     US Renal Bilateral    Result Date: 2/14/2024  Narrative: EXAM/TECHNIQUE: US RENAL BILATERAL-  INDICATION: Acute on chronic renal failure, stage IIIb.; I16.0-Hypertensive urgency; N18.9-Chronic kidney disease, unspecified; I50.9-Heart failure, unspecified; D64.9-Anemia, unspecified  COMPARISON: None  FINDINGS:  Right kidney is 10.6 cm in length and demonstrates normal cortical thickness and echogenicity. No shadowing calculi or hydronephrosis. No right renal cyst or mass.  Left kidney is 9.8 cm in length and demonstrates normal cortical thickness and echogenicity. No shadowing calculi or hydronephrosis. No left renal cyst or mass.  No focal urinary bladder abnormality.      Impression:  Negative for hydronephrosis or renal atrophy.  This report was signed and finalized on 2/14/2024 2:53 PM by Dr. Kenny Tate MD.      XR Chest 1 View    Result Date: 2/14/2024  Narrative: EXAMINATION: XR CHEST 1 VW- 2/14/2024 9:44 AM  HISTORY: Shortness of breath.  COMPARISON: There are no correlative imaging studies for  comparison.  REPORT: The lungs are hypoaerated, no focal infiltrate or pulmonary consolidation is identified. No pneumothorax or effusion is identified. Mild cardiomegaly is present. The osseous structures show no acute findings.      Impression: Shallow inspiration, with mild cardiomegaly. No evidence of overt CHF. No focal pulmonary infiltrates.  This report was signed and finalized on 2/14/2024 9:47 AM by Dr. Hemanth Barclay MD.       ED Course  ED Course as of 02/26/24 1450   Mon Feb 26, 2024   1414 Upon further reassessment, the patient reports his pain is significantly resolved but he still feels some back discomfort.  I will go and place him on nitroglycerin drip.  I discussed at length all the test results with the patient, wife, and daughter.  His high sensitive troponin did jump up 20 points but with today's evaluation, it dropped down to -1 delta from 98-97.  His heart score is 7.  His D-dimer is elevated probably secondary his chronic renal failure.  VQ scan was completed as low probability for PE.  Differentials have been discussed at length.  I have a call out to hospitalist at this time for further admission. [TK]   [id number] I did speak with Dr. Ortega, hospitalist on-call, who is gracious to admit the patient under their care. [TK]      ED Course User Index  [TK] Caitlyn Heaton PA         HEART Score (for prediction of 6-week risk of major adverse cardiac event) reviewed and/or performed as part of the patient evaluation and treatment planning process.  The result associated with this review/performance is: 7      MDM  Number of Diagnoses or Management Options  Chest pain, unspecified type  Diagnosis management comments: Differential Diagnosis:  I considered chest wall pain, muscle strain, costochondritis, pleurisy, rib fracture, herpes zoster, cardiovascular etiology, myocardial infarction, intermediate coronary syndrome, unstable angina, angina, aortic dissection, pericarditis,  pulmonary etiology, pulmonary embolism, pneumonia, pneumothorax, lung cancer, gastroesophageal reflux disease, esophagitis, esophageal spasm and gastrointestinal etiology as a possible cause of chest pain in this patient. This is a partial list of diagnoses considered.            Amount and/or Complexity of Data Reviewed  Decide to obtain previous medical records or to obtain history from someone other than the patient: yes        Final diagnoses:   Chest pain, unspecified type       Disposition: Patient will be discharged in stable condition       Cailtyn Heaton PA  02/26/24 8515

## 2024-02-26 NOTE — ED NOTES
Nursing report ED to floor  Mauro TAY Amanda  68 y.o.  male    HPI:   Chief Complaint   Patient presents with    Shortness of Breath    Chest Pain       Admitting doctor:   Jitendra Stuart DO    Consulting provider(s):  Consults       Date and Time Order Name Status Description    2/18/2024  1:46 PM Inpatient Vascular Surgery Consult Completed              Admitting diagnosis:   The encounter diagnosis was Chest pain, unspecified type.    Code status:   Current Code Status       Date Active Code Status Order ID Comments User Context       2/26/2024 1543 CPR (Attempt to Resuscitate) [id number]  Jitendra Stuart DO ED        Question Answer    Code Status (Patient has no pulse and is not breathing) CPR (Attempt to Resuscitate)    Medical Interventions (Patient has pulse or is breathing) Full Support    Level Of Support Discussed With Patient                    Allergies:   Duravent dm  [[id number]]    Intake and Output  No intake or output data in the 24 hours ending 02/26/24 1550    Weight:       02/26/24  0851   Weight: 96.2 kg (212 lb)       Most recent vitals:   Vitals:    02/26/24 1452 02/26/24 1501 02/26/24 1511 02/26/24 1531   BP:  163/61 160/58 160/57   Pulse:  81 82 79   Resp:       Temp:       SpO2: 94% 92% 95% 94%   Weight:       Height:         Oxygen Therapy: .    Active LDAs/IV Access:   Lines, Drains & Airways       Active LDAs       Name Placement date Placement time Site Days    Peripheral IV 02/26/24 0923 Left Antecubital 02/26/24  0923  Antecubital  less than 1    Hemodialysis Cath Double 02/19/24  1007  Internal Jugular  7                    Labs (abnormal labs have a star):   Labs Reviewed   COMPREHENSIVE METABOLIC PANEL - Abnormal; Notable for the following components:       Result Value    Glucose 137 (*)     BUN 31 (*)     Creatinine 3.90 (*)     Sodium 133 (*)     Chloride 90 (*)     CO2 32.0 (*)     Albumin 3.4 (*)     eGFR 16.1 (*)     All other components within normal  limits    Narrative:     GFR Normal >60  Chronic Kidney Disease <60  Kidney Failure <15     BNP (IN-HOUSE) - Abnormal; Notable for the following components:    proBNP 6,445.0 (*)     All other components within normal limits    Narrative:     This assay is used as an aid in the diagnosis of individuals suspected of having heart failure. It can be used as an aid in the diagnosis of acute decompensated heart failure (ADHF) in patients presenting with signs and symptoms of ADHF to the emergency department (ED). In addition, NT-proBNP of <300 pg/mL indicates ADHF is not likely.    Age Range Result Interpretation  NT-proBNP Concentration (pg/mL:      <50             Positive            >450                   Gray                 300-450                    Negative             <300    50-75           Positive            >900                  Gray                300-900                  Negative            <300      >75             Positive            >1800                  Gray                300-1800                  Negative            <300   D-DIMER, QUANTITATIVE - Abnormal; Notable for the following components:    D-Dimer, Quantitative 2.49 (*)     All other components within normal limits    Narrative:     According to the assay 's published package insert, a normal (<0.50 MCGFEU/mL) D-dimer result in conjunction with a non-high clinical probability assessment, excludes deep vein thrombosis (DVT) and pulmonary embolism (PE) with high sensitivity.    D-dimer values increase with age and this can make VTE exclusion of an older population difficult. To address this, the [employer], based on best available evidence and recent guidelines, recommends that clinicians use age-adjusted D-dimer thresholds in patients greater than 50 years of age with: a) a low probability of PE who do not meet all Pulmonary Embolism Rule Out Criteria, or b) in those with intermediate probability of PE.   The  "formula for an age-adjusted D-dimer cut-off is \"age/100\".  For example, a 60 year old patient would have an age-adjusted cut-off of 0.60 MCGFEU/mL and an 80 year old 0.80 MCGFEU/mL.   TROPONIN - Abnormal; Notable for the following components:    HS Troponin T 98 (*)     All other components within normal limits    Narrative:     High Sensitive Troponin T Reference Range:  <14.0 ng/L- Negative Female for AMI  <22.0 ng/L- Negative Male for AMI  >=14 - Abnormal Female indicating possible myocardial injury.  >=22 - Abnormal Male indicating possible myocardial injury.   Clinicians would have to utilize clinical acumen, EKG, Troponin, and serial changes to determine if it is an Acute Myocardial Infarction or myocardial injury due to an underlying chronic condition.        CBC WITH AUTO DIFFERENTIAL - Abnormal; Notable for the following components:    RBC 2.66 (*)     Hemoglobin 8.2 (*)     Hematocrit 24.8 (*)     Monocyte % 13.3 (*)     Immature Grans % 0.6 (*)     Monocytes, Absolute 0.92 (*)     All other components within normal limits   HIGH SENSITIVITIY TROPONIN T 2HR - Abnormal; Notable for the following components:    HS Troponin T 97 (*)     All other components within normal limits    Narrative:     High Sensitive Troponin T Reference Range:  <14.0 ng/L- Negative Female for AMI  <22.0 ng/L- Negative Male for AMI  >=14 - Abnormal Female indicating possible myocardial injury.  >=22 - Abnormal Male indicating possible myocardial injury.   Clinicians would have to utilize clinical acumen, EKG, Troponin, and serial changes to determine if it is an Acute Myocardial Infarction or myocardial injury due to an underlying chronic condition.        CBC AND DIFFERENTIAL    Narrative:     The following orders were created for panel order CBC & Differential.  Procedure                               Abnormality         Status                     ---------                               -----------         ------                   "   CBC Auto Differential[[id number]]        Abnormal            Final result                 Please view results for these tests on the individual orders.       Meds given in ED:   Medications   sodium chloride 0.9 % flush 10 mL (has no administration in time range)   nitroglycerin (TRIDIL) 200 mcg/ml infusion (10 mcg/min Intravenous Currently Infusing 2/26/24 5267)   nitroglycerin (NITROSTAT) SL tablet 0.4 mg (0.4 mg Sublingual Given 2/26/24 1106)   aspirin tablet 325 mg (325 mg Oral Given 2/26/24 0928)   technetium albumin aggregated (MAA) solution 1 dose (1 dose Intravenous Given 2/26/24 1130)     nitroglycerin, 5-200 mcg/min, Last Rate: 10 mcg/min (02/26/24 1457)         NIH Stroke Scale:       Isolation/Infection(s):  No active isolations   No active infections     COVID Testing  Collected .  Resulted .    Nursing report ED to floor:  Mental status: A&O3  Ambulatory status: standby  Precautions: none    ED nurse phone extentsion- ..

## 2024-02-26 NOTE — PROGRESS NOTES
"Enter Query Response Below      Query Response: yes             If applicable, please update the problem list.     Patient: Mauro Patel        : 1955  Account: [id number]           Admit Date: 2024        How to Respond to this query:       a. Click New Note     b. Answer query within the yellow box.                c. Update the Problem List, if applicable.      If you have any questions about this query contact me at: [email or web address]     Dr. Carmona,     68-year-old male with past medical history of stage IV CKD admitted 24 with hypertensive urgency and acute on chronic renal failure per H&P. Nephrology notes (2/15-) include \"Acute Kidney Injury, ATN.\" Per Nephrology note , \"Dialysis initiated on  with next treatment anticipated on .\" Discharge summary includes \"Acute on chronic renal failure.\"  Creatinine: 3.35 (), 4.03 (2/15), 4.14 (), 4.91 (), 5.35 (), 5.63 (), 4.61 (), 3.44 ()  Treatment: IV NS infusion (-15, ), IV Lasix (2/15), \"Dialysis initiated on \" per Nephrology note .    Do you agree with the diagnosis of ATN as documented by Nephrologist?    -Yes  -No  -Other (specify) ___________  -Unable to determine    By submitting this query, we are merely seeking further clarification of documentation to accurately reflect all conditions that you are monitoring, evaluating, treating or that extend the hospitalization or utilize additional resources of care. Please utilize your independent clinical judgment when addressing the question(s) above.     This query and your response, once completed, will be entered into the legal medical record.    Sincerely,  Jerad JURADO, RN   Clinical Documentation Integrity Program  "

## 2024-02-26 NOTE — PROGRESS NOTES
"Enter Query Response Below      Query Response: hypertensive urgency             If applicable, please update the problem list.       Patient: Mauro Patel        : 1955  Account: [id number]           Admit Date: 2024        How to Respond to this query:       a. Click New Note     b. Answer query within the yellow box.                c. Update the Problem List, if applicable.      If you have any questions about this query contact me at: [email or web address]     Dr. Carmona,     68-year-old male with past medical history of stage IV CKD and HTN admitted 24 with hypertensive urgency and acute on chronic renal failure per H&P. ED provider note states \"Patient arrived hypertensive.  He was given clonidine.  Blood pressure remained significantly elevated.  Patient was started on a Cardene drip.\" and also includes \"Workup most consistent with hypertensive emergency.\" Discharge summary includes \"hypertensive urgency.\"  212/88 ( 0845)  216/102 ( 0911)  215/99 ( 0916)  225/93 ( 0931)  203/94 ( 1117)  Treatment: Cardene gtt (-), IV Hydralazine (-), IV Labetalol (2/15-), Norvasc (), Catapres ()    Please clarify diagnosis treated/monitored:    -Hypertensive urgency  -Hypertensive emergency  -Other- specify _______  -Unable to determine       By submitting this query, we are merely seeking further clarification of documentation to accurately reflect all conditions that you are monitoring, evaluating, treating or that extend the hospitalization or utilize additional resources of care. Please utilize your independent clinical judgment when addressing the question(s) above.     This query and your response, once completed, will be entered into the legal medical record.    Sincerely,  Jerad JURADO, RN  Clinical Documentation Integrity Program   "

## 2024-02-26 NOTE — TELEPHONE ENCOUNTER
Patient's wife states patient woke up this morning with increased shortness of breath. Not in respiratory distress. Denies any other symptoms. Has follow-up appointment with Dr. Robertson and dialysis tomorrow. Advised patient's wife to call Dr. Robertson's office to see if patient can be seen today. Go to ER if shortness of breath worsens. Patient's wife verbalizes agreement with plan.    Reason for Disposition   [1] MILD difficulty breathing (e.g., minimal/no SOB at rest, SOB with walking, pulse <100) AND [2] NEW-onset or WORSE than normal    Additional Information   Negative: SEVERE difficulty breathing (e.g., struggling for each breath, speaks in single words)   Negative: [1] Breathing stopped AND [2] hasn't returned   Negative: Choking on something   Negative: Bluish (or gray) lips or face now   Negative: Difficult to awaken or acting confused (e.g., disoriented, slurred speech)   Negative: Passed out (i.e., lost consciousness, collapsed and was not responding)   Negative: Wheezing started suddenly after medicine, an allergic food or bee sting   Negative: Stridor (harsh sound while breathing in)   Negative: Slow, shallow and weak breathing   Negative: Sounds like a life-threatening emergency to the triager   Negative: Chest pain   Negative: [1] Wheezing (high pitched whistling sound) AND [2] previous asthma attacks or use of asthma medicines   Negative: [1] Difficulty breathing AND [2] only present when coughing   Negative: [1] Difficulty breathing AND [2] only from stuffy or runny nose   Negative: [1] Difficulty breathing AND [2] within 14 days of COVID-19 Exposure   Negative: [1] MODERATE difficulty breathing (e.g., speaks in phrases, SOB even at rest, pulse 100-120) AND [2] NEW-onset or WORSE than normal   Negative: Oxygen level (e.g., pulse oximetry) 90 percent or lower   Negative: Wheezing can be heard across the room   Negative: Drooling or spitting out saliva (because can't swallow)   Negative: History of prior  "\"blood clot\" in leg or lungs (i.e., deep vein thrombosis, pulmonary embolism)   Negative: History of inherited increased risk of blood clots (e.g., Factor 5 Leiden, Anti-thrombin 3, Protein C or Protein S deficiency, Prothrombin mutation)   Negative: Major surgery in the past month   Negative: Hip or leg fracture (broken bone) in past month (or had cast on leg or ankle in past month)   Negative: Illness requiring prolonged bedrest in past month (e.g., immobilization, long hospital stay)   Negative: Long-distance travel in past month (e.g., car, bus, train, plane; with trip lasting 6 or more hours)   Negative: Cancer treatment in past six months (or has cancer now)   Negative: Extra heartbeats, irregular heart beating, or heart is beating very fast  (i.e., \"palpitations\")   Negative: Fever > 103 F (39.4 C)   Negative: [1] Fever > 101 F (38.3 C) AND [2] age > 60 years   Negative: [1] Fever > 100.0 F (37.8 C) AND [2] bedridden (e.g., CVA, chronic illness, recovering from surgery)   Negative: [1] Fever > 100.0 F (37.8 C) AND [2] diabetes mellitus or weak immune system (e.g., HIV positive, cancer chemo, splenectomy, organ transplant, chronic steroids)   Negative: [1] Periods where breathing stops and then resumes normally AND [2] bedridden (e.g., CVA)   Negative: Pregnant or postpartum (from 0 to 6 weeks after delivery)   Negative: Patient sounds very sick or weak to the triager    Answer Assessment - Initial Assessment Questions  1. RESPIRATORY STATUS: \"Describe your breathing?\" (e.g., wheezing, shortness of breath, unable to speak, severe coughing)       Shortness of breath  2. ONSET: \"When did this breathing problem begin?\"       This morning  3. PATTERN \"Does the difficult breathing come and go, or has it been constant since it started?\"       Constant   4. SEVERITY: \"How bad is your breathing?\" (e.g., mild, moderate, severe)     - MILD: No SOB at rest, mild SOB with walking, speaks normally in sentences, can lie " "down, no retractions, pulse < 100.     - MODERATE: SOB at rest, SOB with minimal exertion and prefers to sit, cannot lie down flat, speaks in phrases, mild retractions, audible wheezing, pulse 100-120.     - SEVERE: Very SOB at rest, speaks in single words, struggling to breathe, sitting hunched forward, retractions, pulse > 120       Moderate  5. RECURRENT SYMPTOM: \"Have you had difficulty breathing before?\" If Yes, ask: \"When was the last time?\" and \"What happened that time?\"       Yes, see chart  6. CARDIAC HISTORY: \"Do you have any history of heart disease?\" (e.g., heart attack, angina, bypass surgery, angioplasty)       No   7. LUNG HISTORY: \"Do you have any history of lung disease?\"  (e.g., pulmonary embolus, asthma, emphysema)      No   8. CAUSE: \"What do you think is causing the breathing problem?\"       Unknown   9. OTHER SYMPTOMS: \"Do you have any other symptoms? (e.g., dizziness, runny nose, cough, chest pain, fever)      No   10. O2 SATURATION MONITOR:  \"Do you use an oxygen saturation monitor (pulse oximeter) at home?\" If Yes, ask: \"What is your reading (oxygen level) today?\" \"What is your usual oxygen saturation reading?\" (e.g., 95%)        Not available  11. PREGNANCY: \"Is there any chance you are pregnant?\" \"When was your last menstrual period?\"        NA  12. TRAVEL: \"Have you traveled out of the country in the last month?\" (e.g., travel history, exposures)        NA    Protocols used: Breathing Difficulty-ADULT-AH    "

## 2024-02-27 ENCOUNTER — APPOINTMENT (OUTPATIENT)
Dept: CARDIOLOGY | Facility: HOSPITAL | Age: 69
End: 2024-02-27
Payer: MEDICARE

## 2024-02-27 VITALS
OXYGEN SATURATION: 99 % | SYSTOLIC BLOOD PRESSURE: 155 MMHG | BODY MASS INDEX: 31.84 KG/M2 | HEIGHT: 68 IN | TEMPERATURE: 97.7 F | HEART RATE: 92 BPM | DIASTOLIC BLOOD PRESSURE: 61 MMHG | WEIGHT: 210.1 LBS | RESPIRATION RATE: 18 BRPM

## 2024-02-27 LAB
BH CV STRESS BP STAGE 1: NORMAL
BH CV STRESS BP STAGE 2: NORMAL
BH CV STRESS BP STAGE 3: NORMAL
BH CV STRESS BP STAGE 4: NORMAL
BH CV STRESS DOB - ATROPINE STAGE 4: 1
BH CV STRESS DOSE DOBUTAMINE STAGE 1: 10
BH CV STRESS DOSE DOBUTAMINE STAGE 2: 20
BH CV STRESS DOSE DOBUTAMINE STAGE 3: 30
BH CV STRESS DOSE DOBUTAMINE STAGE 4: 40
BH CV STRESS DURATION MIN STAGE 1: 3
BH CV STRESS DURATION MIN STAGE 2: 3
BH CV STRESS DURATION MIN STAGE 3: 3
BH CV STRESS DURATION MIN STAGE 4: 2
BH CV STRESS DURATION SEC STAGE 1: 0
BH CV STRESS DURATION SEC STAGE 2: 0
BH CV STRESS DURATION SEC STAGE 3: 0
BH CV STRESS DURATION SEC STAGE 4: 50
BH CV STRESS HR STAGE 1: 90
BH CV STRESS HR STAGE 2: 108
BH CV STRESS HR STAGE 3: 113
BH CV STRESS HR STAGE 4: 134
BH CV STRESS PROTOCOL 1: NORMAL
BH CV STRESS RECOVERY BP: NORMAL MMHG
BH CV STRESS RECOVERY HR: 103 BPM
BH CV STRESS STAGE 1: 1
BH CV STRESS STAGE 2: 2
BH CV STRESS STAGE 3: 3
BH CV STRESS STAGE 4: 4
GLUCOSE BLDC GLUCOMTR-MCNC: 95 MG/DL (ref 70–130)
GLUCOSE BLDC GLUCOMTR-MCNC: 95 MG/DL (ref 70–130)
MAXIMAL PREDICTED HEART RATE: 153 BPM
PERCENT MAX PREDICTED HR: 87.58 %
QT INTERVAL: 408 MS
QT INTERVAL: 416 MS
QTC INTERVAL: 465 MS
QTC INTERVAL: 479 MS
STRESS BASELINE BP: NORMAL MMHG
STRESS BASELINE HR: 86 BPM
STRESS PERCENT HR: 103 %
STRESS POST EXERCISE DUR MIN: 11 MIN
STRESS POST EXERCISE DUR SEC: 50 SEC
STRESS POST PEAK BP: NORMAL MMHG
STRESS POST PEAK HR: 134 BPM
STRESS TARGET HR: 130 BPM

## 2024-02-27 PROCEDURE — 25510000001 PERFLUTREN 6.52 MG/ML SUSPENSION: Performed by: INTERNAL MEDICINE

## 2024-02-27 PROCEDURE — 93350 STRESS TTE ONLY: CPT | Performed by: INTERNAL MEDICINE

## 2024-02-27 PROCEDURE — 25010000002 ATROPINE SULFATE: Performed by: INTERNAL MEDICINE

## 2024-02-27 PROCEDURE — 93017 CV STRESS TEST TRACING ONLY: CPT

## 2024-02-27 PROCEDURE — 82948 REAGENT STRIP/BLOOD GLUCOSE: CPT

## 2024-02-27 PROCEDURE — 93350 STRESS TTE ONLY: CPT

## 2024-02-27 PROCEDURE — 93018 CV STRESS TEST I&R ONLY: CPT | Performed by: INTERNAL MEDICINE

## 2024-02-27 PROCEDURE — 96366 THER/PROPH/DIAG IV INF ADDON: CPT

## 2024-02-27 PROCEDURE — G0378 HOSPITAL OBSERVATION PER HR: HCPCS

## 2024-02-27 PROCEDURE — 25010000002 HEPARIN (PORCINE) PER 1000 UNITS: Performed by: INTERNAL MEDICINE

## 2024-02-27 PROCEDURE — G0257 UNSCHED DIALYSIS ESRD PT HOS: HCPCS

## 2024-02-27 PROCEDURE — 0 DOBUTAMINE PER 250 MG: Performed by: INTERNAL MEDICINE

## 2024-02-27 PROCEDURE — 93352 ADMIN ECG CONTRAST AGENT: CPT | Performed by: INTERNAL MEDICINE

## 2024-02-27 RX ORDER — DOBUTAMINE HYDROCHLORIDE 100 MG/100ML
10-50 INJECTION INTRAVENOUS CONTINUOUS
Status: DISCONTINUED | OUTPATIENT
Start: 2024-02-27 | End: 2024-02-27 | Stop reason: HOSPADM

## 2024-02-27 RX ORDER — HEPARIN SODIUM 1000 [USP'U]/ML
3600 INJECTION, SOLUTION INTRAVENOUS; SUBCUTANEOUS ONCE
Status: COMPLETED | OUTPATIENT
Start: 2024-02-27 | End: 2024-02-27

## 2024-02-27 RX ORDER — METOPROLOL TARTRATE 1 MG/ML
5 INJECTION, SOLUTION INTRAVENOUS ONCE
Status: DISCONTINUED | OUTPATIENT
Start: 2024-02-27 | End: 2024-02-27 | Stop reason: HOSPADM

## 2024-02-27 RX ADMIN — CARVEDILOL 25 MG: 25 TABLET, FILM COATED ORAL at 07:37

## 2024-02-27 RX ADMIN — HYDRALAZINE HYDROCHLORIDE 50 MG: 50 TABLET ORAL at 05:52

## 2024-02-27 RX ADMIN — HYDRALAZINE HYDROCHLORIDE 50 MG: 50 TABLET ORAL at 13:13

## 2024-02-27 RX ADMIN — ASPIRIN 81 MG: 81 TABLET, COATED ORAL at 07:38

## 2024-02-27 RX ADMIN — SODIUM BICARBONATE 1300 MG: 650 TABLET ORAL at 07:38

## 2024-02-27 RX ADMIN — FERROUS SULFATE TAB 325 MG (65 MG ELEMENTAL FE) 325 MG: 325 (65 FE) TAB at 07:38

## 2024-02-27 RX ADMIN — TAMSULOSIN HYDROCHLORIDE 0.4 MG: 0.4 CAPSULE ORAL at 07:38

## 2024-02-27 RX ADMIN — SODIUM BICARBONATE 1300 MG: 650 TABLET ORAL at 15:56

## 2024-02-27 RX ADMIN — HEPARIN SODIUM 3600 UNITS: 1000 INJECTION, SOLUTION INTRAVENOUS; SUBCUTANEOUS at 11:39

## 2024-02-27 RX ADMIN — PERFLUTREN 8.48 MG: 6.52 INJECTION, SUSPENSION INTRAVENOUS at 13:53

## 2024-02-27 RX ADMIN — MAGNESIUM GLUCONATE 500 MG ORAL TABLET 400 MG: 500 TABLET ORAL at 07:37

## 2024-02-27 RX ADMIN — ATROPINE SULFATE 1 MG: 0.1 INJECTION INTRAVENOUS at 14:13

## 2024-02-27 RX ADMIN — NIFEDIPINE 60 MG: 60 TABLET, FILM COATED, EXTENDED RELEASE ORAL at 07:37

## 2024-02-27 RX ADMIN — DOBUTAMINE HYDROCHLORIDE 10 MCG/KG/MIN: 100 INJECTION INTRAVENOUS at 13:53

## 2024-02-27 NOTE — PLAN OF CARE
Problem: Adult Inpatient Plan of Care  Goal: Plan of Care Review  Outcome: Ongoing, Progressing  Flowsheets  Taken 2/27/2024 1615 by Pricilla Junior RN  Progress: improving  Outcome Evaluation: Pt. with no c/o chest pain today, nitro drip d/c. Went for dialysis today, pulled 2L off. Stress echo with no significant findings. R chest permacath c/d/i. Tele- S/ST , 1st degree block. No complaints a this time, pt to be discharged.

## 2024-02-27 NOTE — PLAN OF CARE
Goal Outcome Evaluation:  Plan of Care Reviewed With: patient        Progress: no change  Outcome Evaluation: Patient admitted from ER with chest pain. Nitro gtt infusing at 10 mcg/min. NPO after midnight for stress test tomorrow. Nephro consulted, patient has dialysis T,Th,Sat. Right chest permacath c/d/i.

## 2024-02-27 NOTE — PLAN OF CARE
Goal Outcome Evaluation:         Pt remains on a nitro drip. Reports no chest pain all shift. Continues to have nitro drip as ordered. No titration completed at this time. HR and vital stable. Pt reported that he very seldom urinates. But he does use the urinal when he needs to pee. Family to remain at beside to assist with needs.

## 2024-02-27 NOTE — DISCHARGE SUMMARY
"    [facility] Medicine Services  DISCHARGE SUMMARY       Date of Admission: 2/26/2024  Date of Discharge:  2/27/2024  Primary Care Physician: John Robertson MD    Discharge Diagnoses:  Active Hospital Problems    Diagnosis     **Chest pain     ESRD (end stage renal disease) on dialysis     Controlled type 2 diabetes mellitus with complication, without long-term current use of insulin     Mixed hyperlipidemia          Presenting Problem/History of Present Illness:  Chest pain [R07.9]     Chief Complaint on Day of Discharge:   No complaint    History of Present Illness on Day of Discharge:   The patient has completed his stress test with the interpretation being low risk for ischemia.  He had an episode of elevated blood pressure transiently today.  He completed his dialysis session as scheduled.  He should follow-up with his PCP either this week or next for further evaluation of chest discomfort origin and for titration of antihypertensive medication if necessary.    Hospital Course  This 68-year-old male presents to the emergency department with his wife with a chief complaint of sudden onset of chest pain. He states that he felt as though his chest was \"blowing up\" as in swelling. He denies radiation into the arm or neck. He was last in the hospital about 2 weeks ago due uncontrolled hypertension. He has had some edema of his lower extremities which is likely secondary to the potent vasodilatory effect of Procardia. He had a negative stress test about 1 year ago. Echocardiogram from his recent hospital admission was unremarkable. Workup in the emergency department reveals a troponin chronically elevated in the 90s. BNP elevated at 6445 which is a chronic finding. D-dimer also chronically elevated at 2.49. Creatinine is 3.9 with BUN 31 and sodium 133. CBC is unremarkable except hemoglobin 8.2. VQ scan was performed and was low probability for PE. Chest x-ray shows no acute " "change.     Treatment Plan  Admit to telemetry  Stress echo in a.m.  Nephrology consultation regarding maintenance dialysis      Consults:   Nephrology:  Assessment    Acute kidney injury on hemodialysis  Baseline chronic kidney disease stage 4  Type 2 diabetes  Hypertension  Hyponatremia  Anemia  Chest pain     Plan:   Routine dialysis today.   Noted plans for stress test today  Further assessment and plan pending Dr Gray's evaluation of patient        Thank you for the consult, we appreciate the opportunity to provide care to your patients.  Feel free to contact me if I can be of any further assistance.       PAULO Sands      Result Review    Result Review:  I have personally reviewed the results from the time of this admission to 2/27/2024 16:14 CST and agree with these findings:  []  Laboratory  []  Microbiology  []  Radiology  []  EKG/Telemetry   []  Cardiology/Vascular   []  Pathology  []  Old records  []  Other:    Condition on Discharge:    Stable and appropriate for discharge    Physical Exam on Discharge:  BP (!) 181/59   Pulse 84   Temp 97.8 °F (36.6 °C) (Oral)   Resp 18   Ht 172.7 cm (68\")   Wt 95.3 kg (210 lb 1.6 oz)   SpO2 98%   BMI 31.95 kg/m²   Physical Exam     Constitutional:       General: He is not in acute distress.     Appearance: Normal appearance. He is normal weight.   HENT:      Head: Normocephalic and atraumatic.      Right Ear: External ear normal.      Left Ear: External ear normal.      Nose: Nose normal.      Mouth/Throat:      Mouth: Mucous membranes are moist.      Pharynx: Oropharynx is clear.   Eyes:      General: No scleral icterus.     Extraocular Movements: Extraocular movements intact.      Conjunctiva/sclera: Conjunctivae normal.      Pupils: Pupils are equal, round, and reactive to light.   Cardiovascular:      Rate and Rhythm: Normal rate and regular rhythm.      Pulses: Normal pulses.      Heart sounds: Normal heart sounds. No murmur " heard.  Pulmonary:      Effort: Pulmonary effort is normal. No respiratory distress.      Breath sounds: Normal breath sounds.   Abdominal:      General: Abdomen is flat. Bowel sounds are normal.      Palpations: Abdomen is soft. There is no mass.      Tenderness: There is no abdominal tenderness.   Musculoskeletal:         General: Normal range of motion.      Right lower leg: Edema (1/4) present.      Left lower leg: Edema (1/4) present.   Skin:     General: Skin is warm and dry.      Coloration: Skin is not pale.   Neurological:      General: No focal deficit present.      Mental Status: He is alert and oriented to person, place, and time. Mental status is at baseline.   Psychiatric:         Mood and Affect: Mood normal.         Judgment: Judgment normal.     Discharge Disposition:  Home or Self Care    Discharge Medications:     Discharge Medications        Continue These Medications        Instructions Start Date   ASPIRIN 81 PO   Take 81 mg by mouth Daily.      carvedilol 25 MG tablet  Commonly known as: COREG   25 mg, Oral, 2 Times Daily With Meals, BID      cloNIDine 0.1 MG tablet  Commonly known as: CATAPRES   0.2 mg, Oral, 2 Times Daily      ferrous sulfate 325 (65 FE) MG tablet   325 mg, Oral, 2 Times Daily With Meals      furosemide 40 MG tablet  Commonly known as: LASIX   40 mg, Oral, Daily      HumaLOG KwikPen 100 UNIT/ML solution pen-injector  Generic drug: Insulin Lispro (1 Unit Dial)   5 Units, Subcutaneous, 2 Times Daily, At breakfast and supper      hydrALAZINE 50 MG tablet  Commonly known as: APRESOLINE   50 mg, Oral, Every 8 Hours Scheduled      insulin detemir 100 UNIT/ML injection  Commonly known as: LEVEMIR   5 Units, Subcutaneous, Every 12 Hours Scheduled      magnesium oxide 400 MG tablet  Commonly known as: MAG-OX   400 mg, Oral, Daily      NIFEdipine CC 60 MG 24 hr tablet  Commonly known as: ADALAT CC   60 mg, Oral, Every 24 Hours Scheduled      rosuvastatin 10 MG tablet  Commonly known  as: CRESTOR   10 mg, Oral, Nightly      sodium bicarbonate 650 MG tablet   1,300 mg, Oral, 3 Times Daily      tamsulosin 0.4 MG capsule 24 hr capsule  Commonly known as: FLOMAX   1 capsule, Oral, Daily      vitamin D 1.25 MG (37616 UT) capsule capsule  Commonly known as: ERGOCALCIFEROL   50,000 Units, Oral, Every 30 Days, Around the 15th                Discharge Diet:   Diet Instructions       Diet: Diabetic Diets; Consistent Carbohydrate; Thin (IDDSI 0)      Discharge Diet: Diabetic Diets    Diabetic Diet: Consistent Carbohydrate    Fluid Consistency: Thin (IDDSI 0)            Discharge Care Plan / Instructions:   Discharge home    Activity at Discharge:   Activity Instructions       Activity as Tolerated              Follow-up Appointments:  Follow-up with PCP either this week or next for further assessment of chest discomfort and possible titration of antihypertensive medication    Electronically signed by Jitendra Stuart DO, 02/27/24, 16:14 CST.    Time: Discharge less than 30 min    Part of this note may be an electronic transcription/translation of spoken language to printed text using the Dragon Dictation system.

## 2024-02-27 NOTE — CONSULTS
Nephrology ([facility]) Consult Note      Patient:  Mauro Patel  YOB: 1955  Date of Service: 2/27/2024  MRN: [id number]   Acct: [id number]   Primary Care Physician: John Robertson MD  Advance Directive:   Code Status and Medical Interventions:   Ordered at: 02/26/24 1543     Level Of Support Discussed With:    Patient     Code Status (Patient has no pulse and is not breathing):    CPR (Attempt to Resuscitate)     Medical Interventions (Patient has pulse or is breathing):    Full Support     Admit Date: 2/26/2024       Hospital Day: 0  Referring Provider: Jitendra Stuart DO      Patient personally seen and examined.  Complete chart including Consults, Notes, Operative Reports, Labs, Cardiology, and Radiology studies reviewed as able.        Subjective:  Mauro Patel is a 68 y.o. male for whom we were consulted for evaluation and treatment of acute kidney injury on hemodialysis. Baseline chronic kidney disease stage 4 with prior baseline creatinine approximately 2.8.  History of type 2 diabetes, hypertension.  Recently admitted to [facility] for TREY and hypertensive urgency. Renal function did not recover so he was started on hemodialysis on 2/19. Discharged on 2/21 after completing 3 inpatient HD treatments. No issues reported with outpatient dialysis. Returned to ER on 2/26 due to chest pain. Admitted for cardiac evaluation. Currently patient is seen during dialysis. No complaint at time of exam. Denies pain or dyspnea. Mild lower extremity edema. Planning for stress test later today.     Dialysis   Patient was seen and evaluated on renal replacement therapy and I have personally evaluated the patient and directed the therapy  Modality: Hemodialysis  Access: Catheter  Location: right upper  QB: 450  QD: 700  UF: 2500    Allergies:  Duravent dm  [[id number]]    Home Meds:  Medications Prior to Admission   Medication Sig Dispense Refill Last Dose     ASPIRIN 81 PO Take 81 mg by mouth Daily.       carvedilol (COREG) 25 MG tablet Take 1 tablet by mouth 2 (Two) Times a Day With Meals. BID       cloNIDine (CATAPRES) 0.1 MG tablet Take 2 tablets by mouth 2 (Two) Times a Day.       ferrous sulfate 325 (65 FE) MG tablet Take 1 tablet by mouth 2 (Two) Times a Day With Meals.       furosemide (LASIX) 40 MG tablet Take 1 tablet by mouth Daily.       hydrALAZINE (APRESOLINE) 50 MG tablet Take 1 tablet by mouth Every 8 (Eight) Hours. 90 tablet 0     insulin detemir (LEVEMIR) 100 UNIT/ML injection Inject 5 Units under the skin into the appropriate area as directed Every 12 (Twelve) Hours. 10 mL 1     Insulin Lispro, 1 Unit Dial, (HumaLOG KwikPen) 100 UNIT/ML solution pen-injector Inject 5 Units under the skin into the appropriate area as directed 2 (Two) Times a Day. At breakfast and supper       magnesium oxide (MAG-OX) 400 MG tablet Take 1 tablet by mouth Daily.       NIFEdipine XL (ADALAT CC) 60 MG 24 hr tablet Take 1 tablet by mouth Daily. 30 tablet 0     rosuvastatin (CRESTOR) 10 MG tablet Take 1 tablet by mouth Every Night.       sodium bicarbonate 650 MG tablet Take 2 tablets by mouth 3 (Three) Times a Day.       tamsulosin (FLOMAX) 0.4 MG capsule 24 hr capsule Take 1 capsule by mouth Daily.       vitamin D (ERGOCALCIFEROL) 59119 units capsule capsule Take 1 capsule by mouth Every 30 (Thirty) Days. Around the 15th          Medicines:  Current Facility-Administered Medications   Medication Dose Route Frequency Provider Last Rate Last Admin    acetaminophen (TYLENOL) tablet 650 mg  650 mg Oral Q4H PRN Jitendra Stuart DO        aspirin EC tablet 81 mg  81 mg Oral Daily Jitendra Stuart DO   81 mg at 02/27/24 0738    sennosides-docusate (PERICOLACE) 8.6-50 MG per tablet 2 tablet  2 tablet Oral BID PRN Jitendra Stuart DO        And    polyethylene glycol (MIRALAX) packet 17 g  17 g Oral Daily PRN Jitendra Stuart DO        And    bisacodyl (DULCOLAX) EC  tablet 5 mg  5 mg Oral Daily PRN Jitendra Stuart DO        And    bisacodyl (DULCOLAX) suppository 10 mg  10 mg Rectal Daily PRN Jitendra Stuart DO        carvedilol (COREG) tablet 25 mg  25 mg Oral BID With Meals Jitendra Stuart DO   25 mg at 02/27/24 0737    dextrose (D50W) (25 g/50 mL) IV injection 25 g  25 g Intravenous Q15 Min PRN Jitendra Stuart DO        dextrose (GLUTOSE) oral gel 15 g  15 g Oral Q15 Min PRN Jitendra Stuart DO        ferrous sulfate tablet 325 mg  325 mg Oral BID With Meals Jitendra Stuart DO   325 mg at 02/27/24 0738    glucagon (GLUCAGEN) injection 1 mg  1 mg Intramuscular Q15 Min PRN Jitendra Stuart DO        hydrALAZINE (APRESOLINE) tablet 50 mg  50 mg Oral Q8H Jitendra Stuart DO   50 mg at 02/27/24 0552    insulin detemir (LEVEMIR) injection 5 Units  5 Units Subcutaneous Q12H Jitendra Stuart DO   5 Units at 02/26/24 2028    Insulin Lispro (humaLOG) injection 2-7 Units  2-7 Units Subcutaneous 4x Daily AC & at Bedtime Jitendra Stuart DO   2 Units at 02/26/24 2028    magnesium oxide (MAG-OX) tablet 400 mg  400 mg Oral Daily Jitendra Stuart DO   400 mg at 02/27/24 0737    NIFEdipine XL (PROCARDIA XL) 24 hr tablet 60 mg  60 mg Oral Q24H Jitendra Stuart DO   60 mg at 02/27/24 0737    nitroglycerin (TRIDIL) 200 mcg/ml infusion  5-200 mcg/min Intravenous Titrated Jitendra Stuart DO 3 mL/hr at 02/26/24 1646 10 mcg/min at 02/26/24 1646    ondansetron (ZOFRAN) injection 4 mg  4 mg Intravenous Q6H PRN Jitendra Stuart DO        rosuvastatin (CRESTOR) tablet 10 mg  10 mg Oral Nightly Jitendra Stuart DO   10 mg at 02/26/24 2027    sodium bicarbonate tablet 1,300 mg  1,300 mg Oral TID Jitendra Stuart DO   1,300 mg at 02/27/24 0738    sodium chloride 0.9 % flush 10 mL  10 mL Intravenous PRN Jitendra Stuart DO        sodium chloride 0.9 % flush 10 mL  10 mL Intravenous Q12H Jitendra Stuart DO   10 mL at 02/26/24  2028    sodium chloride 0.9 % flush 10 mL  10 mL Intravenous PRN Jitendra Stuart DO        sodium chloride 0.9 % infusion 40 mL  40 mL Intravenous PRN Jitendra Stuart DO        tamsulosin (FLOMAX) 24 hr capsule 0.4 mg  0.4 mg Oral Daily Jitendra Stuart DO   0.4 mg at 02/27/24 0738       Past Medical History:  Past Medical History:   Diagnosis Date    Chronic renal impairment 2/14/2024    Diabetes mellitus     Edema     Hyperlipidemia     Hypertension        Past Surgical History:  Past Surgical History:   Procedure Laterality Date    INSERTION HEMODIALYSIS CATHETER N/A 2/19/2024    Procedure: HEMODIALYSIS CATHETER INSERTION;  Surgeon: Michael Armenta DO;  Location:  [facility];  Service: Vascular;  Laterality: N/A;    NO PAST SURGERIES         Family History  Family History   Problem Relation Age of Onset    Diabetes Mother     No Known Problems Father        Social History  Social History     Socioeconomic History    Marital status:    Tobacco Use    Smoking status: Never    Smokeless tobacco: Never   Vaping Use    Vaping Use: Never used   Substance and Sexual Activity    Alcohol use: No    Drug use: No    Sexual activity: Defer         Review of Systems:  History obtained from chart review and the patient  General ROS: No fever or chills  Respiratory ROS: No cough, shortness of breath, wheezing  Cardiovascular ROS: chest pain (on admission)  Gastrointestinal ROS: No abdominal pain or melena  Genito-Urinary ROS: No dysuria or hematuria  Psych ROS: No anxiety and depression  14 point ROS reviewed with the patient and negative except as noted above and in the HPI unless unable to obtain.      Objective:  Patient Vitals for the past 24 hrs:   BP Temp Temp src Pulse Resp SpO2 Weight   02/27/24 0700 164/62 98.7 °F (37.1 °C) Oral 77 18 98 % --   02/27/24 0416 154/64 98 °F (36.7 °C) Oral 82 16 97 % --   02/26/24 2335 129/56 97.8 °F (36.6 °C) Oral 72 16 94 % --   02/1955 169/59 98.1 °F  (36.7 °C) Oral 81 18 95 % --   02/26/24 1654 169/68 98.4 °F (36.9 °C) Oral 82 20 97 % 95.3 kg (210 lb 2 oz)   02/26/24 1531 160/57 -- -- 79 -- 94 % --   02/26/24 1511 160/58 -- -- 82 -- 95 % --   02/26/24 1501 163/61 -- -- 81 -- 92 % --   02/26/24 1452 -- -- -- -- -- 94 % --   02/26/24 1451 143/58 -- -- 81 -- -- --   02/26/24 1421 151/72 -- -- 81 -- 98 % --   02/26/24 1316 157/65 -- -- 79 -- 95 % --   02/26/24 1216 148/62 -- -- 81 -- 98 % --   02/26/24 1116 141/60 -- -- 76 -- 96 % --   02/26/24 1106 -- -- -- 79 -- -- --   02/26/24 1101 136/62 -- -- 76 -- 97 % --   02/26/24 1037 141/65 -- -- -- -- -- --   02/26/24 1001 145/64 -- -- 78 -- 95 % --       Intake/Output Summary (Last 24 hours) at 2/27/2024 0954  Last data filed at 2/27/2024 0700  Gross per 24 hour   Intake 220 ml   Output 200 ml   Net 20 ml     General: awake/alert   Chest:  clear to auscultation bilaterally without respiratory distress  CVS: regular rate and rhythm  Abdominal: soft, nontender, positive bowel sounds  Extremities:  1+ lower extremity edema  Skin: warm and dry without rash      Labs:  Results from last 7 days   Lab Units 02/26/24  0923 02/21/24  0327   WBC 10*3/mm3 6.93 6.83   HEMOGLOBIN g/dL 8.2* 7.6*   HEMATOCRIT % 24.8* 23.3*   PLATELETS 10*3/mm3 259 236         Results from last 7 days   Lab Units 02/26/24  0923 02/21/24  0327   SODIUM mmol/L 133* 131*   POTASSIUM mmol/L 3.6 3.7   CHLORIDE mmol/L 90* 95*   CO2 mmol/L 32.0* 26.0   BUN mg/dL 31* 34*   CREATININE mg/dL 3.90* 3.44*   CALCIUM mg/dL 8.8 8.3*   EGFR mL/min/1.73 16.1* 18.7*   BILIRUBIN mg/dL 0.3  --    ALK PHOS U/L 113  --    ALT (SGPT) U/L 8  --    AST (SGOT) U/L 24  --    GLUCOSE mg/dL 137* 137*       Radiology:   Imaging Results (Last 72 Hours)       Procedure Component Value Units Date/Time    NM Lung Scan Perfusion Particulate [[id number]] Collected: 02/26/24 1148     Updated: 02/26/24 1153    Narrative:      EXAMINATION: NM LUNG SCAN PERFUSION PARTICULATE-     2/26/2024  "10:32 AM     HISTORY: cp sob     After the intravenous injection of 5.50 mCi of technetium 99m  macroaggregated albumin, the images of the lungs bilaterally are  obtained in anterior, posterior, both oblique and lateral projections  with the help of scintillation camera.     The ventilation scans were not obtained.     There is no previous study for comparison. The correlation made with  chest radiograph obtained earlier today.     There is normal perfusion in both lungs. No segmental or subsegmental  defects noted.       Impression:      1. Low probability of pulmonary embolism.        This report was signed and finalized on 2/26/2024 11:50 AM by Dr. Viaeny Porter MD.       XR Chest 1 View [[id number]] Collected: 02/26/24 0931     Updated: 02/26/24 0935    Narrative:      EXAMINATION: XR CHEST 1 VW-  2/26/2024 9:31 AM     HISTORY: Chest pain and shortness of breath.     FINDINGS: Today's exam is compared to previous study of 2/14/2024. I  dialysis catheter is in place via right-sided approach. The lungs are  fully expanded and clear. No effusion or free air is demonstrated.       Impression:      1.. Dialysis catheter well-positioned. No complications.  2. Lungs are fully expanded and clear.     This report was signed and finalized on 2/26/2024 9:32 AM by Dr. Juan Carlos Lora MD.               Culture:  No results found for: \"BLOODCX\", \"URINECX\", \"WOUNDCX\", \"MRSACX\", \"RESPCX\", \"STOOLCX\"      Assessment    Acute kidney injury on hemodialysis  Baseline chronic kidney disease stage 4  Type 2 diabetes  Hypertension  Hyponatremia  Anemia  Chest pain    Plan:   Routine dialysis today.   Noted plans for stress test today  Further assessment and plan pending Dr Gray's evaluation of patient      Thank you for the consult, we appreciate the opportunity to provide care to your patients.  Feel free to contact me if I can be of any further assistance.      Teo Pate, APRN  2/27/2024  09:54 CST  "

## 2024-02-27 NOTE — H&P
"    [facility]  HISTORY AND PHYSICAL    Date of Admission: 2/26/2024  Primary Care Physician: John Robertson MD    Subjective   Primary Historian: The patient    Chief Complaint: Chest pain    History of Present Illness    This 68-year-old male presents to the emergency department with his wife with a chief complaint of sudden onset of chest pain.  He states that he felt as though his chest was \"blowing up\" as in swelling.  He denies radiation into the arm or neck.  He was last in the hospital about 2 weeks ago due uncontrolled hypertension.  He has had some edema of his lower extremities which is likely secondary to the potent vasodilatory effect of Procardia.  He had a negative stress test about 1 year ago.  Echocardiogram from his recent hospital admission was unremarkable.  Workup in the emergency department reveals a troponin chronically elevated in the 90s.  BNP elevated at 6445 which is a chronic finding.  D-dimer also chronically elevated at 2.49.  Creatinine is 3.9 with BUN 31 and sodium 133.  CBC is unremarkable except hemoglobin 8.2.  VQ scan was performed and was low probability for PE.  Chest x-ray shows no acute change.    Review of Systems   Constitutional: Negative.    HENT: Negative.     Eyes: Negative.    Respiratory: Negative.     Cardiovascular:  Positive for chest pain and leg swelling.   Gastrointestinal: Negative.    Endocrine: Negative.    Genitourinary: Negative.    Musculoskeletal: Negative.    Skin: Negative.    Allergic/Immunologic: Negative.    Neurological: Negative.    Hematological: Negative.    Psychiatric/Behavioral: Negative.        Otherwise complete ROS reviewed and negative except as mentioned in the HPI.    Past Medical History:   Past Medical History:   Diagnosis Date    Chronic renal impairment 2/14/2024    Diabetes mellitus     Edema     Hyperlipidemia     Hypertension      Past Surgical History:  Past Surgical History: "   Procedure Laterality Date    INSERTION HEMODIALYSIS CATHETER N/A 2/19/2024    Procedure: HEMODIALYSIS CATHETER INSERTION;  Surgeon: Michael Armenta DO;  Location: [facility] OR;  Service: Vascular;  Laterality: N/A;    NO PAST SURGERIES       Social History:  reports that he has never smoked. He has never used smokeless tobacco. He reports that he does not drink alcohol and does not use drugs.    Family History: family history includes Diabetes in his mother; No Known Problems in his father.       Allergies:  Allergies   Allergen Reactions    Duravent Dm  [Phenylephrine-Dm-Gg] Rash       Medications:  Prior to Admission medications    Medication Sig Start Date End Date Taking? Authorizing Provider   ASPIRIN 81 PO Take 81 mg by mouth Daily.   Yes Tristan Barkley MD   carvedilol (COREG) 25 MG tablet Take 1 tablet by mouth 2 (Two) Times a Day With Meals. BID 8/15/19  Yes Tristan Barkley MD   cloNIDine (CATAPRES) 0.1 MG tablet Take 2 tablets by mouth 2 (Two) Times a Day.   Yes Tristan Barkley MD   ferrous sulfate 325 (65 FE) MG tablet Take 1 tablet by mouth 2 (Two) Times a Day With Meals.   Yes Tristan Barkley MD   furosemide (LASIX) 40 MG tablet Take 1 tablet by mouth Daily.   Yes Tristan Barkley MD   hydrALAZINE (APRESOLINE) 50 MG tablet Take 1 tablet by mouth Every 8 (Eight) Hours. 2/21/24  Yes Rajiv Carmona MD   insulin detemir (LEVEMIR) 100 UNIT/ML injection Inject 5 Units under the skin into the appropriate area as directed Every 12 (Twelve) Hours. 2/21/24  Yes Rajiv Carmona MD   Insulin Lispro, 1 Unit Dial, (HumaLOG KwikPen) 100 UNIT/ML solution pen-injector Inject 5 Units under the skin into the appropriate area as directed 2 (Two) Times a Day. At breakfast and supper   Yes Tristan Barkley MD   magnesium oxide (MAG-OX) 400 MG tablet Take 1 tablet by mouth Daily.   Yes Tristan Barkley MD   NIFEdipine XL (ADALAT CC) 60 MG 24 hr tablet Take 1 tablet by  "mouth Daily. 2/22/24  Yes Rajiv Carmona MD   rosuvastatin (CRESTOR) 10 MG tablet Take 1 tablet by mouth Every Night.   Yes Tristan Barkley MD   sodium bicarbonate 650 MG tablet Take 2 tablets by mouth 3 (Three) Times a Day.   Yes Tristan Barkley MD   tamsulosin (FLOMAX) 0.4 MG capsule 24 hr capsule Take 1 capsule by mouth Daily.   Yes Tristan Barkley MD   vitamin D (ERGOCALCIFEROL) 29476 units capsule capsule Take 1 capsule by mouth Every 30 (Thirty) Days. Around the 15th 8/2/19  Yes Tristan Barkley MD   lisinopril (PRINIVIL,ZESTRIL) 40 MG tablet Take 1 tablet by mouth Daily.  2/26/24 Yes Tristan Barkley MD   ACCU-CHEK GUIDE test strip  7/18/19 2/26/24  Tristan Barkley MD     I have utilized all available immediate resources to obtain, update, or review the patient's current medications (including all prescriptions, over-the-counter products, herbals, cannabis/cannabidiol products, and vitamin/mineral/dietary (nutritional) supplements).    Objective     Vital Signs: /68 (BP Location: Left arm, Patient Position: Lying)   Pulse 82   Temp 98.4 °F (36.9 °C) (Oral)   Resp 20   Ht 172.7 cm (68\")   Wt 95.3 kg (210 lb 2 oz)   SpO2 97%   BMI 31.95 kg/m²   Physical Exam  Constitutional:       General: He is not in acute distress.     Appearance: Normal appearance. He is normal weight.   HENT:      Head: Normocephalic and atraumatic.      Right Ear: External ear normal.      Left Ear: External ear normal.      Nose: Nose normal.      Mouth/Throat:      Mouth: Mucous membranes are moist.      Pharynx: Oropharynx is clear.   Eyes:      General: No scleral icterus.     Extraocular Movements: Extraocular movements intact.      Conjunctiva/sclera: Conjunctivae normal.      Pupils: Pupils are equal, round, and reactive to light.   Cardiovascular:      Rate and Rhythm: Normal rate and regular rhythm.      Pulses: Normal pulses.      Heart sounds: Normal heart sounds. No murmur " heard.  Pulmonary:      Effort: Pulmonary effort is normal. No respiratory distress.      Breath sounds: Normal breath sounds.   Abdominal:      General: Abdomen is flat. Bowel sounds are normal.      Palpations: Abdomen is soft. There is no mass.      Tenderness: There is no abdominal tenderness.   Musculoskeletal:         General: Normal range of motion.      Right lower leg: Edema (2/4) present.      Left lower leg: Edema (2/4) present.   Skin:     General: Skin is warm and dry.      Coloration: Skin is not pale.   Neurological:      General: No focal deficit present.      Mental Status: He is alert and oriented to person, place, and time. Mental status is at baseline.   Psychiatric:         Mood and Affect: Mood normal.         Judgment: Judgment normal.        Results Reviewed:  Lab Results (last 24 hours)       Procedure Component Value Units Date/Time    POC Glucose Once [[id number]]  (Normal) Collected: 02/26/24 1647    Specimen: Blood Updated: 02/26/24 1658     Glucose 106 mg/dL      Comment: : [id number] [facility] ID: [id number]       High Sensitivity Troponin T 2Hr [[id number]]  (Abnormal) Collected: 02/26/24 1223    Specimen: Blood Updated: 02/26/24 1319     HS Troponin T 97 ng/L      Troponin T Delta -1 ng/L     Narrative:      High Sensitive Troponin T Reference Range:  <14.0 ng/L- Negative Female for AMI  <22.0 ng/L- Negative Male for AMI  >=14 - Abnormal Female indicating possible myocardial injury.  >=22 - Abnormal Male indicating possible myocardial injury.   Clinicians would have to utilize clinical acumen, EKG, Troponin, and serial changes to determine if it is an Acute Myocardial Infarction or myocardial injury due to an underlying chronic condition.         Comprehensive Metabolic Panel [[id number]]  (Abnormal) Collected: 02/26/24 0923    Specimen: Blood Updated: 02/26/24 1007     Glucose 137 mg/dL      BUN 31 mg/dL      Creatinine 3.90 mg/dL      Sodium 133 mmol/L      Potassium  3.6 mmol/L      Comment: Specimen hemolyzed.  Result may be falsely elevated.        Chloride 90 mmol/L      CO2 32.0 mmol/L      Calcium 8.8 mg/dL      Total Protein 7.3 g/dL      Albumin 3.4 g/dL      ALT (SGPT) 8 U/L      Comment: Specimen hemolyzed.  Result may  be falsely elevated.        AST (SGOT) 24 U/L      Comment: Specimen hemolyzed.  Result may be falsely elevated.        Alkaline Phosphatase 113 U/L      Total Bilirubin 0.3 mg/dL      Globulin 3.9 gm/dL      A/G Ratio 0.9 g/dL      BUN/Creatinine Ratio 7.9     Anion Gap 11.0 mmol/L      eGFR 16.1 mL/min/1.73     Narrative:      GFR Normal >60  Chronic Kidney Disease <60  Kidney Failure <15      High Sensitivity Troponin T [[id number]]  (Abnormal) Collected: 02/26/24 0923    Specimen: Blood Updated: 02/26/24 1003     HS Troponin T 98 ng/L      Comment: Specimen hemolyzed.  Results may be falsely decreased.       Narrative:      High Sensitive Troponin T Reference Range:  <14.0 ng/L- Negative Female for AMI  <22.0 ng/L- Negative Male for AMI  >=14 - Abnormal Female indicating possible myocardial injury.  >=22 - Abnormal Male indicating possible myocardial injury.   Clinicians would have to utilize clinical acumen, EKG, Troponin, and serial changes to determine if it is an Acute Myocardial Infarction or myocardial injury due to an underlying chronic condition.         BNP [[id number]]  (Abnormal) Collected: 02/26/24 0923    Specimen: Blood Updated: 02/26/24 0955     proBNP 6,445.0 pg/mL     Narrative:      This assay is used as an aid in the diagnosis of individuals suspected of having heart failure. It can be used as an aid in the diagnosis of acute decompensated heart failure (ADHF) in patients presenting with signs and symptoms of ADHF to the emergency department (ED). In addition, NT-proBNP of <300 pg/mL indicates ADHF is not likely.    Age Range Result Interpretation  NT-proBNP Concentration (pg/mL:      <50             Positive            >450           "         Pavon                 300-450                    Negative             <300    50-75           Positive            >900                  Gray                300-900                  Negative            <300      >75             Positive            >1800                  Gray                300-1800                  Negative            <300    D-dimer, Quantitative [[id number]]  (Abnormal) Collected: 02/26/24 0923    Specimen: Blood Updated: 02/26/24 0952     D-Dimer, Quantitative 2.49 MCGFEU/mL     Narrative:      According to the assay 's published package insert, a normal (<0.50 MCGFEU/mL) D-dimer result in conjunction with a non-high clinical probability assessment, excludes deep vein thrombosis (DVT) and pulmonary embolism (PE) with high sensitivity.    D-dimer values increase with age and this can make VTE exclusion of an older population difficult. To address this, the [employer], based on best available evidence and recent guidelines, recommends that clinicians use age-adjusted D-dimer thresholds in patients greater than 50 years of age with: a) a low probability of PE who do not meet all Pulmonary Embolism Rule Out Criteria, or b) in those with intermediate probability of PE.   The formula for an age-adjusted D-dimer cut-off is \"age/100\".  For example, a 60 year old patient would have an age-adjusted cut-off of 0.60 MCGFEU/mL and an 80 year old 0.80 MCGFEU/mL.    CBC & Differential [[id number]]  (Abnormal) Collected: 02/26/24 0923    Specimen: Blood Updated: 02/26/24 0938    Narrative:      The following orders were created for panel order CBC & Differential.  Procedure                               Abnormality         Status                     ---------                               -----------         ------                     CBC Auto Differential[[id number]]        Abnormal            Final result                 Please view results for these tests on the individual " orders.    CBC Auto Differential [[id number]]  (Abnormal) Collected: 02/26/24 0923    Specimen: Blood Updated: 02/26/24 0938     WBC 6.93 10*3/mm3      RBC 2.66 10*6/mm3      Hemoglobin 8.2 g/dL      Hematocrit 24.8 %      MCV 93.2 fL      MCH 30.8 pg      MCHC 33.1 g/dL      RDW 14.7 %      RDW-SD 50.2 fl      MPV 9.9 fL      Platelets 259 10*3/mm3      Neutrophil % 59.8 %      Lymphocyte % 24.0 %      Monocyte % 13.3 %      Eosinophil % 1.4 %      Basophil % 0.9 %      Immature Grans % 0.6 %      Neutrophils, Absolute 4.15 10*3/mm3      Lymphocytes, Absolute 1.66 10*3/mm3      Monocytes, Absolute 0.92 10*3/mm3      Eosinophils, Absolute 0.10 10*3/mm3      Basophils, Absolute 0.06 10*3/mm3      Immature Grans, Absolute 0.04 10*3/mm3      nRBC 0.0 /100 WBC           Imaging Results (Last 24 Hours)       Procedure Component Value Units Date/Time    NM Lung Scan Perfusion Particulate [[id number]] Collected: 02/26/24 1148     Updated: 02/26/24 1153    Narrative:      EXAMINATION: NM LUNG SCAN PERFUSION PARTICULATE-     2/26/2024 10:32 AM     HISTORY: cp sob     After the intravenous injection of 5.50 mCi of technetium 99m  macroaggregated albumin, the images of the lungs bilaterally are  obtained in anterior, posterior, both oblique and lateral projections  with the help of scintillation camera.     The ventilation scans were not obtained.     There is no previous study for comparison. The correlation made with  chest radiograph obtained earlier today.     There is normal perfusion in both lungs. No segmental or subsegmental  defects noted.       Impression:      1. Low probability of pulmonary embolism.        This report was signed and finalized on 2/26/2024 11:50 AM by Dr. Vianey Porter MD.       XR Chest 1 View [[id number]] Collected: 02/26/24 0931     Updated: 02/26/24 0935    Narrative:      EXAMINATION: XR CHEST 1 VW-  2/26/2024 9:31 AM     HISTORY: Chest pain and shortness of breath.     FINDINGS: Today's  exam is compared to previous study of 2/14/2024. I  dialysis catheter is in place via right-sided approach. The lungs are  fully expanded and clear. No effusion or free air is demonstrated.       Impression:      1.. Dialysis catheter well-positioned. No complications.  2. Lungs are fully expanded and clear.     This report was signed and finalized on 2/26/2024 9:32 AM by Dr. Juan Carlos Lora MD.             I have personally reviewed and interpreted the radiology studies and ECG obtained at time of admission.     Assessment / Plan   Assessment:   Active Hospital Problems    Diagnosis     **Chest pain     ESRD (end stage renal disease) on dialysis     Controlled type 2 diabetes mellitus with complication, without long-term current use of insulin     Mixed hyperlipidemia        Treatment Plan  Admit to telemetry  Stress echo in a.m.  Nephrology consultation regarding maintenance dialysis    Medical Decision Making  Number and Complexity of problems:   Chest pain, acute, high complexity  ESRD on dialysis, chronic, high complexity  T2DM, chronic, moderate complexity    Differential Diagnosis: Anxiety, GERD, PUD, costochondritis    Conditions and Status        Condition is improving.     [facility] Data  External documents reviewed: Care Everywhere documentation  Cardiac tracing (EKG, telemetry) interpretation: See HPI  Radiology interpretation: See HPI  Labs reviewed: See HPI  Any tests that were considered but not ordered: None     Decision rules/scores evaluated (example [id number], [place], etc): None     Discussed with: The patient     Care Planning  Shared decision making: The patient and his wife  Code status and discussions: Full code    Disposition  Social Determinants of Health that impact treatment or disposition: None noted  Estimated length of stay is 1 day.     I confirmed that the patient's advanced care plan is present, code status is documented, and a surrogate decision maker is listed in the patient's  medical record.     The patient's surrogate decision maker is his wife.     The patient was seen and examined by me on 2/26/2024 at 1830.    Electronically signed by Jitendra Stuart DO, 02/26/24, 18:49 CST.

## 2024-02-28 ENCOUNTER — READMISSION MANAGEMENT (OUTPATIENT)
Dept: CALL CENTER | Facility: HOSPITAL | Age: 69
End: 2024-02-28
Payer: MEDICARE

## 2024-02-28 NOTE — OUTREACH NOTE
Prep Survey      Flowsheet Row Responses   [facility] facility patient discharged from? [place]   Is LACE score < 7 ? No   Eligibility Readm Mgmt   Discharge diagnosis Chest pain   Does the patient have one of the following disease processes/diagnoses(primary or secondary)? Other   Does the patient have Home health ordered? No   Is there a DME ordered? No   Medication alerts for this patient see avs   Prep survey completed? Yes            Cori GONZALEZ - Registered Nurse

## 2024-03-01 ENCOUNTER — READMISSION MANAGEMENT (OUTPATIENT)
Dept: CALL CENTER | Facility: HOSPITAL | Age: 69
End: 2024-03-01
Payer: MEDICARE

## 2024-03-01 NOTE — OUTREACH NOTE
Medical Week 1 Survey      Flowsheet Row Responses   [facility] facility patient discharged from? [place]   Does the patient have one of the following disease processes/diagnoses(primary or secondary)? Other   Week 1 attempt successful? No   Unsuccessful attempts Attempt 1            Alma BRUCE - Registered Nurse

## 2024-03-05 ENCOUNTER — READMISSION MANAGEMENT (OUTPATIENT)
Dept: CALL CENTER | Facility: HOSPITAL | Age: 69
End: 2024-03-05
Payer: MEDICARE

## 2024-03-05 NOTE — OUTREACH NOTE
Medical Week 1 Survey      Flowsheet Row Responses   [facility] patient discharged from? [place]   Does the patient have one of the following disease processes/diagnoses(primary or secondary)? Other   Week 1 attempt successful? No   Unsuccessful attempts Attempt 2            Naomy Fontanez Registered Nurse

## 2024-03-15 ENCOUNTER — READMISSION MANAGEMENT (OUTPATIENT)
Dept: CALL CENTER | Facility: HOSPITAL | Age: 69
End: 2024-03-15
Payer: MEDICARE

## 2024-03-15 NOTE — OUTREACH NOTE
Medical Week 3 Survey      Flowsheet Row Responses   [facility] [place] patient discharged from? [place]   Does the patient have one of the following disease processes/diagnoses(primary or secondary)? Other   Week 3 attempt successful? No   Unsuccessful attempts Attempt 1   Revoke Decline to participate  [3 calls no answer]            Willie KANG - Registered Nurse

## 2024-04-25 ENCOUNTER — APPOINTMENT (OUTPATIENT)
Dept: CT IMAGING | Facility: HOSPITAL | Age: 69
End: 2024-04-25
Payer: MEDICARE

## 2024-04-25 ENCOUNTER — HOSPITAL ENCOUNTER (OUTPATIENT)
Facility: HOSPITAL | Age: 69
Setting detail: OBSERVATION
Discharge: HOME | End: 2024-04-26
Attending: EMERGENCY MEDICINE | Admitting: FAMILY MEDICINE
Payer: MEDICARE

## 2024-04-25 ENCOUNTER — APPOINTMENT (OUTPATIENT)
Dept: GENERAL RADIOLOGY | Facility: HOSPITAL | Age: 69
End: 2024-04-25
Payer: MEDICARE

## 2024-04-25 DIAGNOSIS — E11.8 CONTROLLED TYPE 2 DIABETES MELLITUS WITH COMPLICATION, WITH LONG-TERM CURRENT USE OF INSULIN: ICD-10-CM

## 2024-04-25 DIAGNOSIS — Z79.4 CONTROLLED TYPE 2 DIABETES MELLITUS WITH COMPLICATION, WITH LONG-TERM CURRENT USE OF INSULIN: ICD-10-CM

## 2024-04-25 DIAGNOSIS — M54.9 INTRACTABLE BACK PAIN: ICD-10-CM

## 2024-04-25 DIAGNOSIS — N18.6 ACUTE RENAL FAILURE SUPERIMPOSED ON CHRONIC KIDNEY DISEASE, ON CHRONIC DIALYSIS, UNSPECIFIED ACUTE RENAL FAILURE TYPE: ICD-10-CM

## 2024-04-25 DIAGNOSIS — Z99.2 ACUTE RENAL FAILURE SUPERIMPOSED ON CHRONIC KIDNEY DISEASE, ON CHRONIC DIALYSIS, UNSPECIFIED ACUTE RENAL FAILURE TYPE: ICD-10-CM

## 2024-04-25 DIAGNOSIS — I16.0 HYPERTENSIVE URGENCY: ICD-10-CM

## 2024-04-25 DIAGNOSIS — Z99.2 ESRD (END STAGE RENAL DISEASE) ON DIALYSIS: ICD-10-CM

## 2024-04-25 DIAGNOSIS — I10 BENIGN ESSENTIAL HTN: ICD-10-CM

## 2024-04-25 DIAGNOSIS — N18.5 CHRONIC RENAL IMPAIRMENT, STAGE 5: ICD-10-CM

## 2024-04-25 DIAGNOSIS — E78.2 MIXED HYPERLIPIDEMIA: ICD-10-CM

## 2024-04-25 DIAGNOSIS — R52 INTRACTABLE PAIN: Primary | ICD-10-CM

## 2024-04-25 DIAGNOSIS — N17.9 ACUTE RENAL FAILURE SUPERIMPOSED ON CHRONIC KIDNEY DISEASE, ON CHRONIC DIALYSIS, UNSPECIFIED ACUTE RENAL FAILURE TYPE: ICD-10-CM

## 2024-04-25 DIAGNOSIS — N18.6 ESRD (END STAGE RENAL DISEASE) ON DIALYSIS: ICD-10-CM

## 2024-04-25 DIAGNOSIS — R07.9 CHEST PAIN, UNSPECIFIED TYPE: ICD-10-CM

## 2024-04-25 DIAGNOSIS — N18.9 CHRONIC KIDNEY DISEASE, UNSPECIFIED CKD STAGE: ICD-10-CM

## 2024-04-25 DIAGNOSIS — E66.01 SEVERE OBESITY (BMI 35.0-39.9) WITH COMORBIDITY: ICD-10-CM

## 2024-04-25 DIAGNOSIS — R06.09 DOE (DYSPNEA ON EXERTION): ICD-10-CM

## 2024-04-25 DIAGNOSIS — R79.89 ELEVATED TROPONIN: Chronic | ICD-10-CM

## 2024-04-25 PROBLEM — E11.9 CONTROLLED TYPE 2 DIABETES MELLITUS, WITH LONG-TERM CURRENT USE OF INSULIN: Status: ACTIVE | Noted: 2019-09-16

## 2024-04-25 LAB
ALBUMIN SERPL-MCNC: 3.7 G/DL (ref 3.5–5.2)
ALBUMIN/GLOB SERPL: 0.9 G/DL
ALP SERPL-CCNC: 102 U/L (ref 39–117)
ALT SERPL W P-5'-P-CCNC: 14 U/L (ref 1–41)
AMPHET+METHAMPHET UR QL: NEGATIVE
AMPHETAMINES UR QL: NEGATIVE
ANION GAP SERPL CALCULATED.3IONS-SCNC: 9 MMOL/L (ref 5–15)
AST SERPL-CCNC: 29 U/L (ref 1–40)
BARBITURATES UR QL SCN: NEGATIVE
BASOPHILS # BLD AUTO: 0.07 10*3/MM3 (ref 0–0.2)
BASOPHILS NFR BLD AUTO: 1.5 % (ref 0–1.5)
BENZODIAZ UR QL SCN: NEGATIVE
BILIRUB SERPL-MCNC: 0.4 MG/DL (ref 0–1.2)
BUN SERPL-MCNC: 21 MG/DL (ref 8–23)
BUN/CREAT SERPL: 8.8 (ref 7–25)
BUPRENORPHINE SERPL-MCNC: NEGATIVE NG/ML
CALCIUM SPEC-SCNC: 9.1 MG/DL (ref 8.6–10.5)
CANNABINOIDS SERPL QL: NEGATIVE
CHLORIDE SERPL-SCNC: 94 MMOL/L (ref 98–107)
CO2 SERPL-SCNC: 31 MMOL/L (ref 22–29)
COCAINE UR QL: NEGATIVE
CREAT SERPL-MCNC: 2.4 MG/DL (ref 0.76–1.27)
CRP SERPL-MCNC: 1.54 MG/DL (ref 0–0.5)
DEPRECATED RDW RBC AUTO: 46.7 FL (ref 37–54)
EGFRCR SERPLBLD CKD-EPI 2021: 28.7 ML/MIN/1.73
EOSINOPHIL # BLD AUTO: 0.15 10*3/MM3 (ref 0–0.4)
EOSINOPHIL NFR BLD AUTO: 3.2 % (ref 0.3–6.2)
ERYTHROCYTE [DISTWIDTH] IN BLOOD BY AUTOMATED COUNT: 13.6 % (ref 12.3–15.4)
ETHANOL UR QL: <0.01 %
FENTANYL UR-MCNC: NEGATIVE NG/ML
GEN 5 2HR TROPONIN T REFLEX: 115 NG/L
GLOBULIN UR ELPH-MCNC: 4.3 GM/DL
GLUCOSE BLDC GLUCOMTR-MCNC: 239 MG/DL (ref 70–130)
GLUCOSE BLDC GLUCOMTR-MCNC: 246 MG/DL (ref 70–130)
GLUCOSE SERPL-MCNC: 199 MG/DL (ref 65–99)
HCT VFR BLD AUTO: 37.2 % (ref 37.5–51)
HGB BLD-MCNC: 12.2 G/DL (ref 13–17.7)
IMM GRANULOCYTES # BLD AUTO: 0.05 10*3/MM3 (ref 0–0.05)
IMM GRANULOCYTES NFR BLD AUTO: 1.1 % (ref 0–0.5)
INR PPP: 1 (ref 0.91–1.09)
LYMPHOCYTES # BLD AUTO: 1.04 10*3/MM3 (ref 0.7–3.1)
LYMPHOCYTES NFR BLD AUTO: 21.8 % (ref 19.6–45.3)
MAGNESIUM SERPL-MCNC: 1.9 MG/DL (ref 1.6–2.4)
MCH RBC QN AUTO: 30.1 PG (ref 26.6–33)
MCHC RBC AUTO-ENTMCNC: 32.8 G/DL (ref 31.5–35.7)
MCV RBC AUTO: 91.9 FL (ref 79–97)
METHADONE UR QL SCN: NEGATIVE
MONOCYTES # BLD AUTO: 0.45 10*3/MM3 (ref 0.1–0.9)
MONOCYTES NFR BLD AUTO: 9.5 % (ref 5–12)
NEUTROPHILS NFR BLD AUTO: 3 10*3/MM3 (ref 1.7–7)
NEUTROPHILS NFR BLD AUTO: 62.9 % (ref 42.7–76)
NRBC BLD AUTO-RTO: 0 /100 WBC (ref 0–0.2)
NT-PROBNP SERPL-MCNC: 4731 PG/ML (ref 0–900)
OPIATES UR QL: NEGATIVE
OXYCODONE UR QL SCN: NEGATIVE
PCP UR QL SCN: NEGATIVE
PLATELET # BLD AUTO: 230 10*3/MM3 (ref 140–450)
PMV BLD AUTO: 9.9 FL (ref 6–12)
POTASSIUM SERPL-SCNC: 3.9 MMOL/L (ref 3.5–5.2)
PROT SERPL-MCNC: 8 G/DL (ref 6–8.5)
PROTHROMBIN TIME: 13.6 SECONDS (ref 11.8–14.8)
RBC # BLD AUTO: 4.05 10*6/MM3 (ref 4.14–5.8)
SODIUM SERPL-SCNC: 134 MMOL/L (ref 136–145)
TRICYCLICS UR QL SCN: NEGATIVE
TROPONIN T DELTA: -4 NG/L
TROPONIN T SERPL HS-MCNC: 119 NG/L
WBC NRBC COR # BLD AUTO: 4.76 10*3/MM3 (ref 3.4–10.8)

## 2024-04-25 PROCEDURE — 71045 X-RAY EXAM CHEST 1 VIEW: CPT

## 2024-04-25 PROCEDURE — G0378 HOSPITAL OBSERVATION PER HR: HCPCS

## 2024-04-25 PROCEDURE — 86140 C-REACTIVE PROTEIN: CPT | Performed by: EMERGENCY MEDICINE

## 2024-04-25 PROCEDURE — 25010000002 ONDANSETRON PER 1 MG: Performed by: EMERGENCY MEDICINE

## 2024-04-25 PROCEDURE — 85610 PROTHROMBIN TIME: CPT | Performed by: EMERGENCY MEDICINE

## 2024-04-25 PROCEDURE — 36415 COLL VENOUS BLD VENIPUNCTURE: CPT

## 2024-04-25 PROCEDURE — 96375 TX/PRO/DX INJ NEW DRUG ADDON: CPT

## 2024-04-25 PROCEDURE — 72128 CT CHEST SPINE W/O DYE: CPT

## 2024-04-25 PROCEDURE — 84484 ASSAY OF TROPONIN QUANT: CPT | Performed by: EMERGENCY MEDICINE

## 2024-04-25 PROCEDURE — 63710000001 INSULIN GLARGINE PER 5 UNITS: Performed by: NURSE PRACTITIONER

## 2024-04-25 PROCEDURE — 99285 EMERGENCY DEPT VISIT HI MDM: CPT

## 2024-04-25 PROCEDURE — 80307 DRUG TEST PRSMV CHEM ANLYZR: CPT | Performed by: EMERGENCY MEDICINE

## 2024-04-25 PROCEDURE — 83880 ASSAY OF NATRIURETIC PEPTIDE: CPT | Performed by: EMERGENCY MEDICINE

## 2024-04-25 PROCEDURE — 0 HYDROMORPHONE 1 MG/ML SOLUTION: Performed by: EMERGENCY MEDICINE

## 2024-04-25 PROCEDURE — 96374 THER/PROPH/DIAG INJ IV PUSH: CPT

## 2024-04-25 PROCEDURE — 93005 ELECTROCARDIOGRAM TRACING: CPT | Performed by: EMERGENCY MEDICINE

## 2024-04-25 PROCEDURE — 82948 REAGENT STRIP/BLOOD GLUCOSE: CPT

## 2024-04-25 PROCEDURE — 85025 COMPLETE CBC W/AUTO DIFF WBC: CPT | Performed by: EMERGENCY MEDICINE

## 2024-04-25 PROCEDURE — 83735 ASSAY OF MAGNESIUM: CPT | Performed by: NURSE PRACTITIONER

## 2024-04-25 PROCEDURE — 63710000001 INSULIN LISPRO (HUMAN) PER 5 UNITS: Performed by: NURSE PRACTITIONER

## 2024-04-25 PROCEDURE — 80053 COMPREHEN METABOLIC PANEL: CPT | Performed by: EMERGENCY MEDICINE

## 2024-04-25 PROCEDURE — 93010 ELECTROCARDIOGRAM REPORT: CPT | Performed by: INTERNAL MEDICINE

## 2024-04-25 PROCEDURE — 82077 ASSAY SPEC XCP UR&BREATH IA: CPT | Performed by: EMERGENCY MEDICINE

## 2024-04-25 RX ORDER — NIFEDIPINE 10 MG/1
10 CAPSULE ORAL ONCE
Status: COMPLETED | OUTPATIENT
Start: 2024-04-25 | End: 2024-04-25

## 2024-04-25 RX ORDER — ASPIRIN 81 MG/1
81 TABLET, CHEWABLE ORAL DAILY
Status: DISCONTINUED | OUTPATIENT
Start: 2024-04-25 | End: 2024-04-26 | Stop reason: HOSPADM

## 2024-04-25 RX ORDER — CYCLOBENZAPRINE HCL 10 MG
5 TABLET ORAL EVERY 8 HOURS SCHEDULED
Status: DISCONTINUED | OUTPATIENT
Start: 2024-04-25 | End: 2024-04-26 | Stop reason: HOSPADM

## 2024-04-25 RX ORDER — HYDRALAZINE HYDROCHLORIDE 50 MG/1
50 TABLET, FILM COATED ORAL EVERY 8 HOURS SCHEDULED
Status: DISCONTINUED | OUTPATIENT
Start: 2024-04-25 | End: 2024-04-26 | Stop reason: HOSPADM

## 2024-04-25 RX ORDER — NICOTINE POLACRILEX 4 MG
15 LOZENGE BUCCAL
Status: DISCONTINUED | OUTPATIENT
Start: 2024-04-25 | End: 2024-04-26 | Stop reason: HOSPADM

## 2024-04-25 RX ORDER — NIFEDIPINE 60 MG/1
60 TABLET, EXTENDED RELEASE ORAL
Status: DISCONTINUED | OUTPATIENT
Start: 2024-04-25 | End: 2024-04-25

## 2024-04-25 RX ORDER — NIFEDIPINE 60 MG/1
60 TABLET, EXTENDED RELEASE ORAL
Status: DISCONTINUED | OUTPATIENT
Start: 2024-04-26 | End: 2024-04-26 | Stop reason: HOSPADM

## 2024-04-25 RX ORDER — ONDANSETRON 2 MG/ML
4 INJECTION INTRAMUSCULAR; INTRAVENOUS ONCE
Status: COMPLETED | OUTPATIENT
Start: 2024-04-25 | End: 2024-04-25

## 2024-04-25 RX ORDER — SODIUM BICARBONATE 650 MG/1
1300 TABLET ORAL 3 TIMES DAILY
Status: DISCONTINUED | OUTPATIENT
Start: 2024-04-25 | End: 2024-04-26 | Stop reason: HOSPADM

## 2024-04-25 RX ORDER — TAMSULOSIN HYDROCHLORIDE 0.4 MG/1
0.4 CAPSULE ORAL DAILY
Status: DISCONTINUED | OUTPATIENT
Start: 2024-04-25 | End: 2024-04-26 | Stop reason: HOSPADM

## 2024-04-25 RX ORDER — HYDROXYZINE HYDROCHLORIDE 25 MG/1
25 TABLET, FILM COATED ORAL ONCE
Status: DISCONTINUED | OUTPATIENT
Start: 2024-04-25 | End: 2024-04-26 | Stop reason: HOSPADM

## 2024-04-25 RX ORDER — CLONIDINE HYDROCHLORIDE 0.1 MG/1
0.2 TABLET ORAL 2 TIMES DAILY
Status: DISCONTINUED | OUTPATIENT
Start: 2024-04-25 | End: 2024-04-26 | Stop reason: HOSPADM

## 2024-04-25 RX ORDER — ACETAMINOPHEN 325 MG/1
650 TABLET ORAL EVERY 4 HOURS PRN
Status: DISCONTINUED | OUTPATIENT
Start: 2024-04-25 | End: 2024-04-26 | Stop reason: HOSPADM

## 2024-04-25 RX ORDER — ONDANSETRON 2 MG/ML
4 INJECTION INTRAMUSCULAR; INTRAVENOUS ONCE
Status: DISCONTINUED | OUTPATIENT
Start: 2024-04-25 | End: 2024-04-26 | Stop reason: HOSPADM

## 2024-04-25 RX ORDER — SODIUM CHLORIDE 0.9 % (FLUSH) 0.9 %
10 SYRINGE (ML) INJECTION AS NEEDED
Status: DISCONTINUED | OUTPATIENT
Start: 2024-04-25 | End: 2024-04-26 | Stop reason: HOSPADM

## 2024-04-25 RX ORDER — FUROSEMIDE 20 MG/1
40 TABLET ORAL DAILY
Status: DISCONTINUED | OUTPATIENT
Start: 2024-04-25 | End: 2024-04-26 | Stop reason: HOSPADM

## 2024-04-25 RX ORDER — SODIUM CHLORIDE 0.9 % (FLUSH) 0.9 %
10 SYRINGE (ML) INJECTION EVERY 12 HOURS SCHEDULED
Status: DISCONTINUED | OUTPATIENT
Start: 2024-04-25 | End: 2024-04-26 | Stop reason: HOSPADM

## 2024-04-25 RX ORDER — FERROUS SULFATE 325(65) MG
325 TABLET ORAL 2 TIMES DAILY WITH MEALS
Status: DISCONTINUED | OUTPATIENT
Start: 2024-04-25 | End: 2024-04-26 | Stop reason: HOSPADM

## 2024-04-25 RX ORDER — ONDANSETRON 2 MG/ML
4 INJECTION INTRAMUSCULAR; INTRAVENOUS EVERY 6 HOURS PRN
Status: DISCONTINUED | OUTPATIENT
Start: 2024-04-25 | End: 2024-04-26 | Stop reason: HOSPADM

## 2024-04-25 RX ORDER — DEXTROSE MONOHYDRATE 25 G/50ML
25 INJECTION, SOLUTION INTRAVENOUS
Status: DISCONTINUED | OUTPATIENT
Start: 2024-04-25 | End: 2024-04-26 | Stop reason: HOSPADM

## 2024-04-25 RX ORDER — INSULIN LISPRO 100 [IU]/ML
2-9 INJECTION, SOLUTION INTRAVENOUS; SUBCUTANEOUS
Status: DISCONTINUED | OUTPATIENT
Start: 2024-04-25 | End: 2024-04-26 | Stop reason: HOSPADM

## 2024-04-25 RX ORDER — SODIUM CHLORIDE 9 MG/ML
40 INJECTION, SOLUTION INTRAVENOUS AS NEEDED
Status: DISCONTINUED | OUTPATIENT
Start: 2024-04-25 | End: 2024-04-26 | Stop reason: HOSPADM

## 2024-04-25 RX ORDER — HYDROXYZINE HYDROCHLORIDE 25 MG/1
25 TABLET, FILM COATED ORAL 3 TIMES DAILY PRN
Status: DISCONTINUED | OUTPATIENT
Start: 2024-04-25 | End: 2024-04-26 | Stop reason: HOSPADM

## 2024-04-25 RX ORDER — OXYCODONE HYDROCHLORIDE AND ACETAMINOPHEN 5; 325 MG/1; MG/1
1 TABLET ORAL EVERY 4 HOURS PRN
Status: DISCONTINUED | OUTPATIENT
Start: 2024-04-25 | End: 2024-04-26 | Stop reason: HOSPADM

## 2024-04-25 RX ORDER — IBUPROFEN 600 MG/1
1 TABLET ORAL
Status: DISCONTINUED | OUTPATIENT
Start: 2024-04-25 | End: 2024-04-26 | Stop reason: HOSPADM

## 2024-04-25 RX ORDER — AMOXICILLIN 250 MG
2 CAPSULE ORAL 2 TIMES DAILY PRN
Status: DISCONTINUED | OUTPATIENT
Start: 2024-04-25 | End: 2024-04-26 | Stop reason: HOSPADM

## 2024-04-25 RX ORDER — CARVEDILOL 25 MG/1
25 TABLET ORAL 2 TIMES DAILY WITH MEALS
Status: DISCONTINUED | OUTPATIENT
Start: 2024-04-25 | End: 2024-04-26 | Stop reason: HOSPADM

## 2024-04-25 RX ORDER — BISACODYL 10 MG
10 SUPPOSITORY, RECTAL RECTAL DAILY PRN
Status: DISCONTINUED | OUTPATIENT
Start: 2024-04-25 | End: 2024-04-26 | Stop reason: HOSPADM

## 2024-04-25 RX ORDER — ONDANSETRON 4 MG/1
4 TABLET, ORALLY DISINTEGRATING ORAL EVERY 6 HOURS PRN
Status: DISCONTINUED | OUTPATIENT
Start: 2024-04-25 | End: 2024-04-26 | Stop reason: HOSPADM

## 2024-04-25 RX ORDER — POLYETHYLENE GLYCOL 3350 17 G/17G
17 POWDER, FOR SOLUTION ORAL DAILY PRN
Status: DISCONTINUED | OUTPATIENT
Start: 2024-04-25 | End: 2024-04-26 | Stop reason: HOSPADM

## 2024-04-25 RX ORDER — ROSUVASTATIN CALCIUM 10 MG/1
10 TABLET, COATED ORAL NIGHTLY
Status: DISCONTINUED | OUTPATIENT
Start: 2024-04-25 | End: 2024-04-26 | Stop reason: HOSPADM

## 2024-04-25 RX ORDER — ONDANSETRON 2 MG/ML
4 INJECTION INTRAMUSCULAR; INTRAVENOUS ONCE
Status: DISCONTINUED | OUTPATIENT
Start: 2024-04-25 | End: 2024-04-25

## 2024-04-25 RX ORDER — BISACODYL 5 MG/1
5 TABLET, DELAYED RELEASE ORAL DAILY PRN
Status: DISCONTINUED | OUTPATIENT
Start: 2024-04-25 | End: 2024-04-26 | Stop reason: HOSPADM

## 2024-04-25 RX ADMIN — DICLOFENAC SODIUM 2 G: 10 GEL TOPICAL at 21:13

## 2024-04-25 RX ADMIN — CLONIDINE HYDROCHLORIDE 0.2 MG: 0.1 TABLET ORAL at 21:13

## 2024-04-25 RX ADMIN — HYDROMORPHONE HYDROCHLORIDE 1 MG: 1 INJECTION, SOLUTION INTRAMUSCULAR; INTRAVENOUS; SUBCUTANEOUS at 14:54

## 2024-04-25 RX ADMIN — SODIUM BICARBONATE 1300 MG: 650 TABLET ORAL at 21:13

## 2024-04-25 RX ADMIN — CYCLOBENZAPRINE HYDROCHLORIDE 5 MG: 10 TABLET, FILM COATED ORAL at 21:12

## 2024-04-25 RX ADMIN — Medication 10 ML: at 21:19

## 2024-04-25 RX ADMIN — TAMSULOSIN HYDROCHLORIDE 0.4 MG: 0.4 CAPSULE ORAL at 18:12

## 2024-04-25 RX ADMIN — HYDRALAZINE HYDROCHLORIDE 50 MG: 50 TABLET ORAL at 21:13

## 2024-04-25 RX ADMIN — INSULIN LISPRO 4 UNITS: 100 INJECTION, SOLUTION INTRAVENOUS; SUBCUTANEOUS at 21:14

## 2024-04-25 RX ADMIN — INSULIN GLARGINE 5 UNITS: 100 INJECTION, SOLUTION SUBCUTANEOUS at 21:14

## 2024-04-25 RX ADMIN — ROSUVASTATIN CALCIUM 10 MG: 10 TABLET, FILM COATED ORAL at 21:13

## 2024-04-25 RX ADMIN — FUROSEMIDE 40 MG: 20 TABLET ORAL at 18:12

## 2024-04-25 RX ADMIN — OXYCODONE AND ACETAMINOPHEN 1 TABLET: 5; 325 TABLET ORAL at 18:11

## 2024-04-25 RX ADMIN — HYDROXYZINE HYDROCHLORIDE 25 MG: 25 TABLET ORAL at 18:11

## 2024-04-25 RX ADMIN — NIFEDIPINE 10 MG: 10 CAPSULE ORAL at 14:09

## 2024-04-25 RX ADMIN — ONDANSETRON 4 MG: 2 INJECTION INTRAMUSCULAR; INTRAVENOUS at 14:54

## 2024-04-25 NOTE — ED PROVIDER NOTES
Subjective   History of Present Illness  Patient is a 69-year-old gentleman who got dialyzed today and he came to the ED screaming episodically and rhythmic configuration because of this episodic pain that he is having in the left upper scapular area which reproducible by touching the area there is no rashes there is no erythema there is no cellulitis in this area there is no evidence of any trauma.  There is no anterior chest pain there is no shortness of breath oxygen saturation 96%.  He is hypertensive.  Neurovascular examination upper extremities are within normal limits.    Neurovasc examination lower extremity within normal limits.  And no obvious deformities noted.  There is no distended jugular veins.    Back Pain  Pain location: Upper left scapular area pain.  Quality:  Aching and burning  Radiates to:  Does not radiate  Pain severity:  Severe  Onset quality:  Gradual  Timing:  Constant  Progression:  Worsening  Chronicity:  New  Context: not emotional stress, not falling, not jumping from heights, not MCA, not MVA, not occupational injury, not recent illness, not recent injury and not twisting    Relieved by:  Nothing  Worsened by:  Nothing  Ineffective treatments:  None tried  Associated symptoms: no abdominal pain, no abdominal swelling, no bladder incontinence, no bowel incontinence, no chest pain, no dysuria, no fever, no headaches, no leg pain, no numbness, no paresthesias, no perianal numbness, no tingling, no weakness and no weight loss    Risk factors: no hx of cancer and not obese        Review of Systems   Constitutional: Negative.  Negative for chills, fatigue, fever and weight loss.   HENT: Negative.  Negative for congestion.    Respiratory: Negative.  Negative for cough, chest tightness and stridor.    Cardiovascular: Negative.  Negative for chest pain.   Gastrointestinal: Negative.  Negative for abdominal distention, abdominal pain, bowel incontinence, nausea and vomiting.   Endocrine:  Negative.    Genitourinary: Negative.  Negative for bladder incontinence, difficulty urinating, dysuria and flank pain.   Musculoskeletal: Negative.  Positive for back pain.   Skin: Negative.  Negative for color change.   Neurological: Negative.  Negative for dizziness, tingling, weakness, numbness, headaches and paresthesias.   All other systems reviewed and are negative.      Past Medical History:   Diagnosis Date    Chronic renal impairment 02/14/2024    Diabetes mellitus     Edema     Hyperlipidemia     Hypertension        Allergies   Allergen Reactions    Duravent Dm  [Phenylephrine-Dm-Gg] Rash       Past Surgical History:   Procedure Laterality Date    INSERTION HEMODIALYSIS CATHETER N/A 2/19/2024    Procedure: HEMODIALYSIS CATHETER INSERTION;  Surgeon: Michael Armenta DO;  Location: [facility] HYBRID OR;  Service: Vascular;  Laterality: N/A;    NO PAST SURGERIES         Family History   Problem Relation Age of Onset    Diabetes Mother     No Known Problems Father        Social History     Socioeconomic History    Marital status:    Tobacco Use    Smoking status: Never    Smokeless tobacco: Never   Vaping Use    Vaping status: Never Used   Substance and Sexual Activity    Alcohol use: No    Drug use: No    Sexual activity: Defer           Objective   Physical Exam  Vitals and nursing note reviewed. Exam conducted with a chaperone present.   Constitutional:       General: He is not in acute distress.     Appearance: Normal appearance. He is well-developed. He is not toxic-appearing.   HENT:      Head: Normocephalic and atraumatic.      Nose: Nose normal.      Mouth/Throat:      Mouth: Mucous membranes are moist.      Pharynx: Uvula midline.   Eyes:      General: Lids are normal. Lids are everted, no foreign bodies appreciated.      Conjunctiva/sclera: Conjunctivae normal.      Pupils: Pupils are equal, round, and reactive to light.   Neck:      Vascular: Normal carotid pulses. No carotid bruit or JVD.       Trachea: Trachea and phonation normal. No tracheal deviation.   Cardiovascular:      Rate and Rhythm: Normal rate and regular rhythm.      Chest Wall: PMI is not displaced.      Pulses: Normal pulses.      Heart sounds: Normal heart sounds.      No gallop.   Pulmonary:      Effort: Pulmonary effort is normal. No tachypnea, accessory muscle usage or respiratory distress.      Breath sounds: Normal breath sounds. No stridor. No decreased breath sounds, wheezing, rhonchi or rales.   Chest:      Comments: Upper left posterior chest wall area around the scapula is tender to touch reproduces pain.  There is no crepitus no subcu emphysema there is no evidence of cellulitis induration there is no evidence of any rash.  L-spine and T-spine are negative for step-off laxity or tenderness.  Abdominal:      General: Bowel sounds are normal. There is no distension.      Palpations: Abdomen is soft.      Tenderness: There is no abdominal tenderness.   Musculoskeletal:         General: No swelling. Normal range of motion.      Cervical back: Full passive range of motion without pain, normal range of motion and neck supple. No rigidity.      Comments: Lower extremity exam bilaterally is unremarkable.  There is no right or left calf tenderness .  There is no palpable venous cord.  No obvious difference in the size of the legs.  No pitting edema.  The dorsalis pedis and posterior tibial femoral and popliteal pulses are palpable and +2 bilaterally.  Homans sign is negative   Skin:     General: Skin is warm and dry.      Capillary Refill: Capillary refill takes less than 2 seconds.      Coloration: Skin is not jaundiced or pale.      Nails: There is no clubbing.   Neurological:      General: No focal deficit present.      Mental Status: He is alert and oriented to person, place, and time.      GCS: GCS eye subscore is 4. GCS verbal subscore is 5. GCS motor subscore is 6.      Cranial Nerves: No cranial nerve deficit.      Motor: Motor  function is intact.      Gait: Gait normal.      Deep Tendon Reflexes: Reflexes are normal and symmetric. Reflexes normal.   Psychiatric:         Speech: Speech normal.         Behavior: Behavior normal.         Procedures           ED Course  ED Course as of 04/25/24 1658   Thu Apr 25, 2024   1655 Patient has been repeatedly screaming in pain over here and then he subsides and stops as if he is pain-free and starts having the same episode again I am not sure whether this is secondary to side effects of dialysis.  His the delta tropes are elevated but they are chronically elevated his EKG does not show any evidence of any acute ischemia.  Drug screen is negative.  And his chemistry essentially to baseline.  I have discussed this case with the hospitalist service [TS]   1656  the patient will be admitted to the medicine service for further evaluation assessment. [TS]   1656 Neurovasc examination of the upper extremity within normal limits.  There is no radial femoral delay there is no diastolic murmurs I do not suspect aortic dissection in this patient at this time.  Will require further evaluation. [TS]      ED Course User Index  [TS] Aroldo Anderson MD                                             Medical Decision Making  Upper scapular pain    Problems Addressed:  Chronic kidney disease, unspecified CKD stage: chronic illness or injury  Intractable pain: acute illness or injury     Details: Patient is having recurrent pain in the scapular area neurovasc examination negative.  Lab workup essentially negative.  Nonischemic EKG will admit for control of pain and further evaluation.  Discussed with medicine    Amount and/or Complexity of Data Reviewed  Labs: ordered.     Details: Labs reviewed  Radiology: ordered.  ECG/medicine tests: ordered.  Discussion of management or test interpretation with external provider(s): Discussed with the hospitalist    Risk  Prescription drug management.  Decision regarding  hospitalization.  Risk Details: Will be admitted for control intractable pain        Final diagnoses:   Intractable pain   Chronic kidney disease, unspecified CKD stage       ED Disposition  ED Disposition       ED Disposition   Decision to Admit    Condition   --    Comment   Level of Care: Telemetry [5]   Diagnosis: Intractable back pain [136032]   Admitting Physician: ASHLEY BLANCO [[id number]]   Attending Physician: ASHLEY BLANCO [[id number]]                 No follow-up provider specified.       Medication List      No changes were made to your prescriptions during this visit.            Aroldo Anderson MD  04/25/24 6043       Aroldo Anderson MD  04/25/24 [id number]

## 2024-04-25 NOTE — H&P
[facility] Medicine Services  HISTORY AND PHYSICAL    Date of Admission: 4/25/2024  Primary Care Physician: John Robertson MD    Subjective   Primary Historian: Patient and his wife    Chief Complaint: Intractable back pain    History of Present Illness  Mauro Patel is a 69-year-old male with a history of chronic renal impairment, currently on Tuesday, Thursday, Saturday hemodialysis.  Diabetes mellitus on long-term insulin, chronic edema, hyperlipidemia, and hypertension.  Patient was brought in by his wife this afternoon after complaints of high thoracic back pain x 2 days.  Initially had intermittent achiness in the back and he was treating it with Tylenol with minimal relief.  His full dialysis today he went home to rest and woke up from his nap screaming and pain and has been unable to find any relief over the last several hours.  Patient continues to state he has had no chest pain,, palpitation, nausea, vomiting, or pain.  Patient states the pain is constant, achy in nature, unrelieved with position change, he has no complaints of headache, numbness or tingling in any extremities, loss of bowel or bladder, or shortness of breath.  Initially it was thought that the pain was in his shoulder, patient had no pain with range of motion, negative lift test, and stated that the pain is unchanged with movement of the shoulder.    The workup revealed a chest x-ray with mild pulmonary vascular congestion, initial at bedtime troponin of 119, subsequent of 115 with a negative for delta, has chronic elevated troponin with baseline of 90.  BNP of 4731, which is below previous reading of 6700. CRP of 1.54, negative drug screen, and EKG was negative for any new finding since previous EKG in February.  Patient is in extreme pain, screaming in between questions, he is able to participate in history when prompted.  Was given Dilaudid and Zofran with no relief, and is mildly hypertensive  at 157/66.  Saturations within normal limits on room air.  Patient and family are aware that he will have a stat CT of the thoracic spine given new findings, with subsequent treatment per results.  Patient will be admitted for continued evaluation and treatment.    Review of Systems   Otherwise complete ROS reviewed and negative except as mentioned in the HPI.    Past Medical History:   Past Medical History:   Diagnosis Date    Chronic renal impairment 02/14/2024    Diabetes mellitus     Edema     Hyperlipidemia     Hypertension      Past Surgical History:  Past Surgical History:   Procedure Laterality Date    INSERTION HEMODIALYSIS CATHETER N/A 2/19/2024    Procedure: HEMODIALYSIS CATHETER INSERTION;  Surgeon: Michael Armenta DO;  Location: [facility] HYBRID OR;  Service: Vascular;  Laterality: N/A;    NO PAST SURGERIES       Social History:  reports that he has never smoked. He has never used smokeless tobacco. He reports that he does not drink alcohol and does not use drugs.    Family History: family history includes Diabetes in his mother; No Known Problems in his father.       Allergies:  Allergies   Allergen Reactions    Duravent Dm  [Phenylephrine-Dm-Gg] Rash       Medications:  Prior to Admission medications    Medication Sig Start Date End Date Taking? Authorizing Provider   ASPIRIN 81 PO Take 81 mg by mouth Daily.    Tristan Barkley MD   carvedilol (COREG) 25 MG tablet Take 1 tablet by mouth 2 (Two) Times a Day With Meals. BID 8/15/19   Tristan Barkley MD   cloNIDine (CATAPRES) 0.1 MG tablet Take 2 tablets by mouth 2 (Two) Times a Day.    Tristan Barkley MD   ferrous sulfate 325 (65 FE) MG tablet Take 1 tablet by mouth 2 (Two) Times a Day With Meals.    Tristan Barkley MD   furosemide (LASIX) 40 MG tablet Take 1 tablet by mouth Daily.    Tristan Barkley MD   hydrALAZINE (APRESOLINE) 50 MG tablet Take 1 tablet by mouth Every 8 (Eight) Hours. 2/21/24   Rajiv Carmona MD  "  insulin detemir (LEVEMIR) 100 UNIT/ML injection Inject 5 Units under the skin into the appropriate area as directed Every 12 (Twelve) Hours. 2/21/24   Rajiv Carmona MD   Insulin Lispro, 1 Unit Dial, (HumaLOG KwikPen) 100 UNIT/ML solution pen-injector Inject 5 Units under the skin into the appropriate area as directed 2 (Two) Times a Day. At breakfast and supper    Tristan Barkley MD   magnesium oxide (MAG-OX) 400 MG tablet Take 1 tablet by mouth Daily.    Tristan Barkley MD   NIFEdipine XL (ADALAT CC) 60 MG 24 hr tablet Take 1 tablet by mouth Daily. 2/22/24   Rajiv Carmona MD   rosuvastatin (CRESTOR) 10 MG tablet Take 1 tablet by mouth Every Night.    Tristan Barkley MD   sodium bicarbonate 650 MG tablet Take 2 tablets by mouth 3 (Three) Times a Day.    Tristan Barkley MD   tamsulosin (FLOMAX) 0.4 MG capsule 24 hr capsule Take 1 capsule by mouth Daily.    Tristan Barkley MD   vitamin D (ERGOCALCIFEROL) 15546 units capsule capsule Take 1 capsule by mouth Every 30 (Thirty) Days. Around the 15th 8/2/19   Tristan Barkley MD I have utilized all available immediate resources to obtain, update, or review the patient's current medications (including all prescriptions, over-the-counter products, herbals, cannabis/cannabidiol products, and vitamin/mineral/dietary (nutritional) supplements).    Objective     Vital Signs: /66 (BP Location: Left arm, Patient Position: Lying)   Pulse 90   Temp 98.1 °F (36.7 °C) (Oral)   Resp 18   Ht 172.7 cm (67.99\")   Wt 88.7 kg (195 lb 9.6 oz)   SpO2 98%   BMI 29.75 kg/m²   Physical Exam  Constitutional:       Appearance: He is ill-appearing.   HENT:      Head: Normocephalic and atraumatic.      Nose: No congestion or rhinorrhea.      Mouth/Throat:      Mouth: Mucous membranes are moist.      Pharynx: Oropharynx is clear.   Eyes:      Pupils: Pupils are equal, round, and reactive to light.   Cardiovascular:      Rate and Rhythm: " Normal rate and regular rhythm.      Pulses: Normal pulses.      Heart sounds: Normal heart sounds.   Pulmonary:      Effort: Pulmonary effort is normal. No respiratory distress.      Breath sounds: No wheezing.   Abdominal:      General: Abdomen is flat.      Tenderness: There is no abdominal tenderness.   Genitourinary:     Comments: Voiding per urinal  Musculoskeletal:         General: Tenderness present.      Right shoulder: Normal. No tenderness.      Left shoulder: No tenderness.      Cervical back: Normal. No rigidity or tenderness.      Thoracic back: Spasms and tenderness present. Normal range of motion.      Lumbar back: Normal.      Comments: Initial presentation reported from ED physician with left shoulder, patient had no complaints of pain with range of motion, negative liftoff test.    During assessment patient stated pain was in the upper thoracic spine.  Patient had tenderness to palpation, spasms, and tightness.   Lymphadenopathy:      Cervical: No cervical adenopathy.   Skin:     General: Skin is warm and dry.      Capillary Refill: Capillary refill takes less than 2 seconds.   Neurological:      General: No focal deficit present.      Mental Status: He is alert.   Psychiatric:         Attention and Perception: He is inattentive.         Mood and Affect: Mood is anxious.         Speech: Speech normal.         Behavior: Behavior is hyperactive.         Cognition and Memory: Cognition and memory normal.         Judgment: Judgment normal.          Results Reviewed:  Lab Results (last 24 hours)       Procedure Component Value Units Date/Time    High Sensitivity Troponin T 2Hr [[id number]]  (Abnormal) Collected: 04/25/24 1551    Specimen: Blood Updated: 04/25/24 1618     HS Troponin T 115 ng/L      Troponin T Delta -4 ng/L     BNP [[id number]]  (Abnormal) Collected: 04/25/24 1356    Specimen: Blood Updated: 04/25/24 1617     proBNP 4,731.0 pg/mL     Fentanyl, Urine - Straight Cath [[id number]]  (Normal)  Collected: 04/25/24 1513    Specimen: Urine from Straight Cath Updated: 04/25/24 1530     Fentanyl, Urine Negative    Narrative:      Urine Drug Screen - Straight Cath [[id number]]  (Normal) Collected: 04/25/24 1513    Specimen: Urine from Straight Cath Updated: 04/25/24 1529     THC, Screen, Urine Negative     Phencyclidine (PCP), Urine Negative     Cocaine Screen, Urine Negative     Methamphetamine, Ur Negative     Opiate Screen Negative     Amphetamine Screen, Urine Negative     Benzodiazepine Screen, Urine Negative     Tricyclic Antidepressants Screen Negative     Methadone Screen, Urine Negative     Barbiturates Screen, Urine Negative     Oxycodone Screen, Urine Negative     Buprenorphine, Screen, Urine Negative    High Sensitivity Troponin T [[id number]]  (Abnormal) Collected: 04/25/24 1356    Specimen: Blood Updated: 04/25/24 1443     HS Troponin T 119 ng/L      Comment: Specimen hemolyzed.  Results may be falsely decreased.       Protime-INR [[id number]]  (Normal) Collected: 04/25/24 1356    Specimen: Blood Updated: 04/25/24 1433     Protime 13.6 Seconds      INR 1.00    Comprehensive Metabolic Panel [[id number]]  (Abnormal) Collected: 04/25/24 1356    Specimen: Blood Updated: 04/25/24 1433     Glucose 199 mg/dL      BUN 21 mg/dL      Creatinine 2.40 mg/dL      Sodium 134 mmol/L      Potassium 3.9 mmol/L      Comment: Specimen hemolyzed.  Result may be falsely elevated.        Chloride 94 mmol/L      CO2 31.0 mmol/L      Calcium 9.1 mg/dL      Total Protein 8.0 g/dL      Albumin 3.7 g/dL      ALT (SGPT) 14 U/L      Comment: Specimen hemolyzed.  Result may  be falsely elevated.        AST (SGOT) 29 U/L      Comment: Specimen hemolyzed.  Result may be falsely elevated.        Alkaline Phosphatase 102 U/L      Total Bilirubin 0.4 mg/dL      Globulin 4.3 gm/dL      A/G Ratio 0.9 g/dL      BUN/Creatinine Ratio 8.8     Anion Gap 9.0 mmol/L      eGFR 28.7 mL/min/1.73     C-reactive Protein [[id number]]  (Abnormal)  Collected: 04/25/24 1356    Specimen: Blood Updated: 04/25/24 1422     C-Reactive Protein 1.54 mg/dL     Ethanol [[id number]] Collected: 04/25/24 1356    Specimen: Blood Updated: 04/25/24 1416     Ethanol % <0.010 %     Narrative:      Not for legal purposes. Chain of Custody not followed.     CBC & Differential [[id number]]  (Abnormal) Collected: 04/25/24 1356    Specimen: Blood Updated: 04/25/24 1410    CBC Auto Differential [[id number]]  (Abnormal) Collected: 04/25/24 1356    Specimen: Blood Updated: 04/25/24 1410     WBC 4.76 10*3/mm3      RBC 4.05 10*6/mm3      Hemoglobin 12.2 g/dL      Hematocrit 37.2 %      MCV 91.9 fL      MCH 30.1 pg      MCHC 32.8 g/dL      RDW 13.6 %      RDW-SD 46.7 fl      MPV 9.9 fL      Platelets 230 10*3/mm3      Neutrophil % 62.9 %      Lymphocyte % 21.8 %      Monocyte % 9.5 %      Eosinophil % 3.2 %      Basophil % 1.5 %      Immature Grans % 1.1 %      Neutrophils, Absolute 3.00 10*3/mm3      Lymphocytes, Absolute 1.04 10*3/mm3      Monocytes, Absolute 0.45 10*3/mm3      Eosinophils, Absolute 0.15 10*3/mm3      Basophils, Absolute 0.07 10*3/mm3      Immature Grans, Absolute 0.05 10*3/mm3      nRBC 0.0 /100 WBC           Imaging Results (Last 24 Hours)       Procedure Component Value Units Date/Time    XR Chest 1 View [[id number]] Collected: 04/25/24 1440     Updated: 04/25/24 1444    Narrative:      EXAMINATION: XR CHEST 1 VW-  4/25/2024 2:41 PM     HISTORY: Chest pain     FINDINGS: Today's exam is compared to previous study of 2/26/2024. A  dialysis catheter is in place. There is cardiomegaly with vascular  distribution consistent with volume overload. A mild element of  interstitial edema is present. No consolidative pneumonia or effusion.       Impression:      1. Dialysis catheter well-positioned.  2. Mild pulmonary vascular congestion suggesting an element of volume  overload.     This report was signed and finalized on 4/25/2024 2:41 PM by Dr. Juan Carlos Lora MD.              I have personally reviewed and interpreted the radiology studies and ECG obtained at time of admission.     Assessment / Plan   Assessment:   Active Hospital Problems    Diagnosis     **intractable thoracic back pain     Elevated troponin     ESRD (end stage renal disease) on dialysis     Controlled type 2 diabetes mellitus with complication, without long-term current use of insulin     Benign essential HTN        Treatment Plan  The patient will be admitted to Dr. Osborne's service here at [facility].   Intractable thoracic back pain-CT of the thoracic spine, will reevaluate post results.  Tylenol as needed for mild pain, Percocet as needed for moderate to severe pain Flexeril, Scheduled 3 times daily, Voltaren gel 4 times daily and.  PT/OT initiated.  Essential hypertension-resumption of patient's home antihypertensive medications, and reevaluate in a.m. when pain is stabilized.  End-stage renal disease on chronic hemodialysis-Tuesday/Thursday/Saturday hemodialysis.,  Consult nephrology for continuation of care.  Elevated troponin-chronic elevation, baseline of 90, currently 119 with subsequent 115.  Probable relation to chronic kidney disease.  Patient had a negative stress test and an echocardiogram in February 2023, with normal ejection fraction, mild pulmonary hypertension, and mild aortic valve stenosis.  Controlled diabetes type 2 without complication, with long-term current use of insulin-Levemir 5 units every 12 restarted, Accu-Cheks before meals and at bedtime with low-dose sliding scale insulin.  VTE prophylaxis with SCDs.  Home medications resumed and restarted as appropriate.  Labs in AM.      Medical Decision Making  Number and Complexity of problems: 5  Differential Diagnosis: Muscle strain    Conditions and Status          Condition is unchanged.       [facility] Data  External documents reviewed: Epic review  Cardiac tracing (EKG, telemetry) interpretation: Review  Radiology interpretation:  Reviewed  Labs reviewed: Review  Any tests that were considered but not ordered: None     Decision rules/scores evaluated (example [id number], Wells, etc): None     Discussed with: Dr. Osborne, patient, his wife Kathryn and daughter     Care Planning  Shared decision making: , patient, his wife Kathryn and their daughter.  Code status and discussions: Full code per patient    Disposition  Social Determinants of Health that impact treatment or disposition: None  Estimated length of stay is 1 to 2 days.     I confirmed that the patient's advanced care plan is present, code status is documented, and a surrogate decision maker is listed in the patient's medical record.     The patient's surrogate decision maker is wife Kathryn.     The patient was seen and examined by me on 4/25/2024 at 4:59pm  Electronically signed by Gaviota Sinclair, PAULO Student, 04/25/24, 18:00 CDT.

## 2024-04-26 VITALS
TEMPERATURE: 98.1 F | OXYGEN SATURATION: 99 % | DIASTOLIC BLOOD PRESSURE: 72 MMHG | HEIGHT: 68 IN | WEIGHT: 189.8 LBS | RESPIRATION RATE: 16 BRPM | BODY MASS INDEX: 28.76 KG/M2 | SYSTOLIC BLOOD PRESSURE: 158 MMHG | HEART RATE: 82 BPM

## 2024-04-26 LAB
ANION GAP SERPL CALCULATED.3IONS-SCNC: 8 MMOL/L (ref 5–15)
BUN SERPL-MCNC: 36 MG/DL (ref 8–23)
BUN/CREAT SERPL: 10.3 (ref 7–25)
CALCIUM SPEC-SCNC: 8.9 MG/DL (ref 8.6–10.5)
CHLORIDE SERPL-SCNC: 95 MMOL/L (ref 98–107)
CO2 SERPL-SCNC: 30 MMOL/L (ref 22–29)
CREAT SERPL-MCNC: 3.51 MG/DL (ref 0.76–1.27)
DEPRECATED RDW RBC AUTO: 48 FL (ref 37–54)
EGFRCR SERPLBLD CKD-EPI 2021: 18.2 ML/MIN/1.73
ERYTHROCYTE [DISTWIDTH] IN BLOOD BY AUTOMATED COUNT: 14 % (ref 12.3–15.4)
GLUCOSE BLDC GLUCOMTR-MCNC: 130 MG/DL (ref 70–130)
GLUCOSE SERPL-MCNC: 141 MG/DL (ref 65–99)
HBA1C MFR BLD: 5.3 % (ref 4.8–5.6)
HCT VFR BLD AUTO: 31 % (ref 37.5–51)
HGB BLD-MCNC: 9.9 G/DL (ref 13–17.7)
MCH RBC QN AUTO: 30 PG (ref 26.6–33)
MCHC RBC AUTO-ENTMCNC: 31.9 G/DL (ref 31.5–35.7)
MCV RBC AUTO: 93.9 FL (ref 79–97)
PLATELET # BLD AUTO: 199 10*3/MM3 (ref 140–450)
PMV BLD AUTO: 9.8 FL (ref 6–12)
POTASSIUM SERPL-SCNC: 3.8 MMOL/L (ref 3.5–5.2)
QT INTERVAL: 414 MS
QTC INTERVAL: 489 MS
RBC # BLD AUTO: 3.3 10*6/MM3 (ref 4.14–5.8)
SODIUM SERPL-SCNC: 133 MMOL/L (ref 136–145)
WBC NRBC COR # BLD AUTO: 5.77 10*3/MM3 (ref 3.4–10.8)

## 2024-04-26 PROCEDURE — G0378 HOSPITAL OBSERVATION PER HR: HCPCS

## 2024-04-26 PROCEDURE — 85027 COMPLETE CBC AUTOMATED: CPT | Performed by: NURSE PRACTITIONER

## 2024-04-26 PROCEDURE — 97165 OT EVAL LOW COMPLEX 30 MIN: CPT | Performed by: OCCUPATIONAL THERAPIST

## 2024-04-26 PROCEDURE — 63710000001 INSULIN GLARGINE PER 5 UNITS: Performed by: NURSE PRACTITIONER

## 2024-04-26 PROCEDURE — 80048 BASIC METABOLIC PNL TOTAL CA: CPT | Performed by: NURSE PRACTITIONER

## 2024-04-26 PROCEDURE — 83036 HEMOGLOBIN GLYCOSYLATED A1C: CPT | Performed by: NURSE PRACTITIONER

## 2024-04-26 PROCEDURE — 82948 REAGENT STRIP/BLOOD GLUCOSE: CPT

## 2024-04-26 RX ORDER — CYCLOBENZAPRINE HCL 5 MG
5 TABLET ORAL 3 TIMES DAILY PRN
Qty: 30 TABLET | Refills: 0 | Status: SHIPPED | OUTPATIENT
Start: 2024-04-26

## 2024-04-26 RX ORDER — OXYCODONE HYDROCHLORIDE AND ACETAMINOPHEN 5; 325 MG/1; MG/1
1 TABLET ORAL EVERY 6 HOURS PRN
Qty: 12 TABLET | Refills: 0 | Status: SHIPPED | OUTPATIENT
Start: 2024-04-26 | End: 2024-04-30

## 2024-04-26 RX ADMIN — CLONIDINE HYDROCHLORIDE 0.2 MG: 0.1 TABLET ORAL at 09:49

## 2024-04-26 RX ADMIN — MAGNESIUM GLUCONATE 500 MG ORAL TABLET 400 MG: 500 TABLET ORAL at 09:38

## 2024-04-26 RX ADMIN — CYCLOBENZAPRINE HYDROCHLORIDE 5 MG: 10 TABLET, FILM COATED ORAL at 06:07

## 2024-04-26 RX ADMIN — FERROUS SULFATE TAB 325 MG (65 MG ELEMENTAL FE) 325 MG: 325 (65 FE) TAB at 09:38

## 2024-04-26 RX ADMIN — INSULIN GLARGINE 5 UNITS: 100 INJECTION, SOLUTION SUBCUTANEOUS at 09:49

## 2024-04-26 RX ADMIN — HYDRALAZINE HYDROCHLORIDE 50 MG: 50 TABLET ORAL at 06:07

## 2024-04-26 RX ADMIN — Medication 10 ML: at 09:51

## 2024-04-26 RX ADMIN — CARVEDILOL 25 MG: 25 TABLET, FILM COATED ORAL at 09:38

## 2024-04-26 RX ADMIN — SODIUM BICARBONATE 1300 MG: 650 TABLET ORAL at 09:37

## 2024-04-26 RX ADMIN — NIFEDIPINE 60 MG: 60 TABLET, FILM COATED, EXTENDED RELEASE ORAL at 09:37

## 2024-04-26 RX ADMIN — TAMSULOSIN HYDROCHLORIDE 0.4 MG: 0.4 CAPSULE ORAL at 09:37

## 2024-04-26 RX ADMIN — ASPIRIN 81 MG: 81 TABLET, CHEWABLE ORAL at 09:49

## 2024-04-26 RX ADMIN — FUROSEMIDE 40 MG: 20 TABLET ORAL at 09:49

## 2024-04-26 RX ADMIN — DICLOFENAC SODIUM 2 G: 10 GEL TOPICAL at 09:38

## 2024-04-26 NOTE — CASE MANAGEMENT/SOCIAL WORK
Discharge Planning Assessment   [facility]     Patient Name: Mauro Patel  MRN: [id number]  Today's Date: 4/26/2024    Admit Date: 4/25/2024        Discharge Needs Assessment       Row Name 04/26/24 1028       Living Environment    People in Home spouse    Current Living Arrangements home    Potentially Unsafe Housing Conditions none    In the past 12 months has the electric, gas, oil, or water company threatened to shut off services in your home? No    Primary Care Provided by self    Provides Primary Care For no one    Family Caregiver if Needed spouse    Quality of Family Relationships helpful;involved;supportive    Able to Return to Prior Arrangements yes       Resource/Environmental Concerns    Resource/Environmental Concerns none       Transportation Needs    In the past 12 months, has lack of transportation kept you from medical appointments or from getting medications? no    In the past 12 months, has lack of transportation kept you from meetings, work, or from getting things needed for daily living? No       Food Insecurity    Within the past 12 months, you worried that your food would run out before you got the money to buy more. Never true    Within the past 12 months, the food you bought just didn't last and you didn't have money to get more. Never true       Transition Planning    Patient/Family Anticipates Transition to home with family    Patient/Family Anticipated Services at Transition none    Transportation Anticipated family or friend will provide       Discharge Needs Assessment    Readmission Within the Last 30 Days no previous admission in last 30 days    Equipment Currently Used at Home none    Concerns to be Addressed no discharge needs identified;denies needs/concerns at this time    Equipment Needed After Discharge none    Discharge Coordination/Progress Patient lives independently with wife.  Denies any current needs. CM/SS will follow.                   Discharge Plan    No  documentation.                 Continued Care and Services - Admitted Since 4/25/2024    No active coordination exists for this encounter.          Demographic Summary    No documentation.                  Functional Status    No documentation.                  Psychosocial    No documentation.                  Abuse/Neglect    No documentation.                  Legal    No documentation.                  Substance Abuse    No documentation.                  Patient Forms    No documentation.                     Trini Roca RN

## 2024-04-26 NOTE — PLAN OF CARE
Goal Outcome Evaluation:  Plan of Care Reviewed With: patient, spouse        Progress: improving  Outcome Evaluation: dc home

## 2024-04-26 NOTE — PLAN OF CARE
Goal Outcome Evaluation:  Plan of Care Reviewed With: patient           Outcome Evaluation: OT eval completed. Pt presents alert and oriented x4, sitting up in bed. He reports at baseline being independent with all adls and mobility. Today he was able to complete all bed mobility, sit <> stand t/fs, functional mobility, and lower body dressing Independently. At this time, pt does not display deficits which require skilled OT services. OT to sign off and anticipate d/c home with spouse.      Anticipated Discharge Disposition (OT): home

## 2024-04-26 NOTE — THERAPY DISCHARGE NOTE
Acute Care - Occupational Therapy Discharge  [facility]    Patient Name: Mauro Patel  : 1955    MRN: [id number]                              Today's Date: 2024       Admit Date: 2024    Visit Dx:     ICD-10-CM ICD-9-CM   1. Intractable pain  R52 780.96   2. Chronic kidney disease, unspecified CKD stage  N18.9 585.9     Patient Active Problem List   Diagnosis    Controlled type 2 diabetes mellitus, with long-term current use of insulin    Benign essential HTN    Severe obesity (BMI 35.0-39.9) with comorbidity    Mixed hyperlipidemia    MUNGUIA (dyspnea on exertion)    Hypertensive urgency    Acute on chronic renal failure    Chronic renal impairment    ESRD (end stage renal disease) on dialysis    Chest pain    intractable thoracic back pain    Elevated troponin     Past Medical History:   Diagnosis Date    Chronic renal impairment 2024    Diabetes mellitus     Edema     Hyperlipidemia     Hypertension      Past Surgical History:   Procedure Laterality Date    INSERTION HEMODIALYSIS CATHETER N/A 2024    Procedure: HEMODIALYSIS CATHETER INSERTION;  Surgeon: Michael Armenta DO;  Location: [facility];  Service: Vascular;  Laterality: N/A;    NO PAST SURGERIES        General Information       Row Name 24 0836          OT Time and Intention    Document Type evaluation  -JJ     Mode of Treatment occupational therapy  -JJ       Row Name 24 0836          General Information    Patient Profile Reviewed yes  -JJ     Prior Level of Function independent:;all household mobility;transfer;bed mobility;ADL's  -JJ       Row Name 24 0836          Living Environment    People in Home spouse  -JJ       Row Name 24 0836          Home Main Entrance    Number of Stairs, Main Entrance three  -JJ     Stair Railings, Main Entrance none  -JJ       Row Name 24 0836          Stairs Within Home, Primary    Number of Stairs, Within Home, Primary none  -JJ     Stair Railings,  Within Home, Primary none  -               User Key  (r) = Recorded By, (t) = Taken By, (c) = Cosigned By      Initials Name Provider Type    Lidia Choudhary OTR/L, CSRS Occupational Therapist                   Mobility/ADL's       Row Name 04/26/24 08          Bed Mobility    Bed Mobility supine-sit;sit-supine  -JJ     Supine-Sit [place] (Bed Mobility) independent  -J     Sit-Supine [place] (Bed Mobility) independent  -JJ       Row Name 04/26/24 [facility]          Transfers    Transfers sit-stand transfer;stand-sit transfer  -[facility] Name 04/26/24 [facility]          Sit-Stand Transfer    Sit-Stand [place] (Transfers) independent  -       Row Name 04/26/24 08          Stand-Sit Transfer    Stand-Sit [place] (Transfers) independent  -JJ       Row Name 04/26/24 [facility]          Functional Mobility    Functional Mobility- Ind. Level supervision required  -JJ       Row Name 04/26/24 [facility]          Activities of Daily Living    BADL Assessment/Intervention lower body dressing  -JJ       Row Name 04/26/24 [facility]          Lower Body Dressing Assessment/Training    [place] Level (Lower Body Dressing) don;socks;independent  -     Position (Lower Body Dressing) edge of bed sitting  -               User Key  (r) = Recorded By, (t) = Taken By, (c) = Cosigned By      Initials Name Provider Type    Lidia Choudhary OTR/L, CSRS Occupational Therapist                   Obj/Interventions       Row Name 04/26/24 0836          Sensory Assessment (Somatosensory)    Sensory Assessment (Somatosensory) UE sensation intact  -JJ       Row Name 04/26/24 [facility]          Vision Assessment/Intervention    Visual Impairment/Limitations WFL  -[facility] Name 04/26/24 0836          Range of Motion Comprehensive    General Range of Motion bilateral upper extremity ROM L  -[facility] Name 04/26/24 0836          Strength Comprehensive (MMT)    General Manual Muscle Testing (MMT) Assessment no strength  deficits identified  -       Row Name 04/26/24 08          Balance    Balance Assessment sitting static balance;sitting dynamic balance;standing dynamic balance;standing static balance  -JJ     Static Sitting Balance independent  -JJ     Dynamic Sitting Balance independent  -JJ     Static Standing Balance independent  -JJ     Dynamic Standing Balance independent  -JJ               User Key  (r) = Recorded By, (t) = Taken By, (c) = Cosigned By      Initials Name Provider Type    Lidia Choudhary, OTR/L, CSRS Occupational Therapist                   Goals/Plan    No documentation.                  Clinical Impression       Row Name 04/26/24 08          Pain Assessment    Pretreatment Pain Rating 0/10 - no pain  -J     Posttreatment Pain Rating 0/10 - no pain  -J       Row Name 04/26/24 08          Plan of Care Review    Plan of Care Reviewed With patient  -     Outcome Evaluation OT eval completed. Pt presents alert and oriented x4, sitting up in bed. He reports at baseline being independent with all adls and mobility. Today he was able to complete all bed mobility, sit <> stand t/fs, functional mobility, and lower body dressing Independently. At this time, pt does not display deficits which require skilled OT services. OT to sign off and anticipate d/c home with spouse.  -       Row Name 04/26/24 08          Therapy Assessment/Plan (OT)    Criteria for Skilled Therapeutic Interventions Met (OT) no;does not meet criteria for skilled intervention  -     Therapy Frequency (OT) evaluation only  -       Row Name 04/26/24 08          Therapy Plan Review/Discharge Plan (OT)    Anticipated Discharge Disposition (OT) home  -       Row Name 04/26/24 08          Positioning and Restraints    Pre-Treatment Position in bed  -JJ     Post Treatment Position bed  -JJ     In Bed notified nsg;call light within reach;encouraged to call for assist;sitting  -               User Key  (r) = Recorded By, (t)  = Taken By, (c) = Cosigned By      Initials Name Provider Type    Lidia Choudhary OTR/L, CSRS Occupational Therapist                   Outcome Measures       Row Name 04/26/24 0836          How much help from another is currently needed...    Putting on and taking off regular lower body clothing? 4  -JJ     Bathing (including washing, rinsing, and drying) 4  -JJ     Toileting (which includes using toilet bed pan or urinal) 4  -JJ     Putting on and taking off regular upper body clothing 4  -JJ     Taking care of personal grooming (such as brushing teeth) 4  -JJ     Eating meals 4  -JJ     AM-PAC 6 Clicks Score (OT) 24  -JJ       Row Name 04/26/24 0836          Functional Assessment    Outcome Measure Options AM-PAC 6 Clicks Daily Activity (OT)  -JJ               User Key  (r) = Recorded By, (t) = Taken By, (c) = Cosigned By      Initials Name Provider Type    Lidia Choudhary OTR/L, CSRS Occupational Therapist                  Occupational Therapy Education       Title: PT OT SLP Therapies (In Progress)       Topic: Occupational Therapy (In Progress)       Point: ADL training (Done)       Description:   Instruct learner(s) on proper safety adaptation and remediation techniques during self care or transfers.   Instruct in proper use of assistive devices.                  Learning Progress Summary             Patient Acceptance, E, VU by NGUYEN at 4/26/2024 0914                         Point: Home exercise program (Not Started)       Description:   Instruct learner(s) on appropriate technique for monitoring, assisting and/or progressing therapeutic exercises/activities.                  Learner Progress:  Not documented in this visit.              Point: Precautions (Done)       Description:   Instruct learner(s) on prescribed precautions during self-care and functional transfers.                  Learning Progress Summary             Patient Acceptance, E, VU by NGUYEN at 4/26/2024 0914                          Point: Body mechanics (Done)       Description:   Instruct learner(s) on proper positioning and spine alignment during self-care, functional mobility activities and/or exercises.                  Learning Progress Summary             Patient Acceptance, E, VU by NGUYEN at 4/26/2024 0914                                         User Key       Initials Effective Dates Name Provider Type Discipline     07/11/23 -  Lidia Zelaya OTR/L, MOSHE Occupational Therapist OT                  OT Recommendation and Plan  Therapy Frequency (OT): evaluation only  Plan of Care Review  Plan of Care Reviewed With: patient  Outcome Evaluation: OT eval completed. Pt presents alert and oriented x4, sitting up in bed. He reports at baseline being independent with all adls and mobility. Today he was able to complete all bed mobility, sit <> stand t/fs, functional mobility, and lower body dressing Independently. At this time, pt does not display deficits which require skilled OT services. OT to sign off and anticipate d/c home with spouse.  Plan of Care Reviewed With: patient  Outcome Evaluation: OT eval completed. Pt presents alert and oriented x4, sitting up in bed. He reports at baseline being independent with all adls and mobility. Today he was able to complete all bed mobility, sit <> stand t/fs, functional mobility, and lower body dressing Independently. At this time, pt does not display deficits which require skilled OT services. OT to sign off and anticipate d/c home with spouse.     Time Calculation:         Time Calculation- OT       Row Name 04/26/24 0836             Time Calculation- OT    OT Start Time 0836  -      OT Stop Time 0915  -      OT Time Calculation (min) 39 min  -      OT Received On 04/26/24  -                User Key  (r) = Recorded By, (t) = Taken By, (c) = Cosigned By      Initials Name Provider Type    Lidia Choudhary, OTR/L, MOSHE Occupational Therapist                  Therapy Charges for Today        Code Description Service Date Service Provider Modifiers Qty    94880349387 HC OT EVAL LOW COMPLEXITY 3 4/26/2024 Lidia Zelaya OTR/L, CSRS GO 1               OT Discharge Summary  Anticipated Discharge Disposition (OT): home  Reason for Discharge: Independent  Outcomes Achieved: Other (eval x1)  Discharge Destination: Home    ZENAIDA Bob, MOSHE  4/26/2024

## 2024-04-26 NOTE — PLAN OF CARE
Goal Outcome Evaluation:  Plan of Care Reviewed With: patient        Progress: no change  Outcome Evaluation: VSS. S 78-93 1 degree on tele. No c/o pain this shift. Pt has rested well this shift. Safety maintained.

## 2024-04-26 NOTE — DISCHARGE SUMMARY
[facility] Medicine Services  DISCHARGE SUMMARY       Date of Admission: 4/25/2024  Date of Discharge:  4/26/2024  Primary Care Physician: John Robertson MD    Presenting Problem/History of Present Illness:      Final Discharge Diagnoses:  Active Hospital Problems    Diagnosis     **intractable thoracic back pain     Elevated troponin     ESRD (end stage renal disease) on dialysis     Benign essential HTN     Controlled type 2 diabetes mellitus, with long-term current use of insulin        Pertinent Test Results:   Results for orders placed during the hospital encounter of 02/26/24    Adult Stress Echo W/ Cont or Stress Agent if Necessary Per Protocol    Interpretation Summary  Dobutamine stress echocardiogram is low risk for ischemia.      Imaging Results (All)       Procedure Component Value Units Date/Time    CT Thoracic Spine Without Contrast [[id number]] Collected: 04/25/24 1925     Updated: 04/25/24 1929    Narrative:      EXAMINATION: CT THORACIC SPINE WO CONTRAST-      4/25/2024 6:09 PM     HISTORY: Mid-back pain; R52-Pain, unspecified; N18.9-Chronic kidney  disease, unspecified     In order to have a CT radiation dose as low as reasonably achievable  Automated Exposure Control was utilized for adjustment of the mA and/or  KV according to patient size.     CT Dose DLP = 707.21 mGy.cm.  (If there are multiple studies performed at the same time this  represents the total dose).     Noncontrast thoracic spine CT.  Axial, sagittal, and coronal sequences.     No comparison.     Minimal RIGHT convex scoliosis.  Normal lordotic curvature.     Multilevel disc space narrowing and endplate spurring.     No compression fracture or malalignment.     No bony stenosis is seen.     Small pleural effusions noted.       Impression:      1. Degenerative disc and endplate changes throughout the thoracic spine.  2. Small amounts of pleural fluid.  3. No acute bony abnormality.     This  report was signed and finalized on 4/25/2024 7:26 PM by Dr. Scot Rivera MD.       XR Chest 1 View [[id number]] Collected: 04/25/24 1440     Updated: 04/25/24 1444    Narrative:      EXAMINATION: XR CHEST 1 VW-  4/25/2024 2:41 PM     HISTORY: Chest pain     FINDINGS: Today's exam is compared to previous study of 2/26/2024. A  dialysis catheter is in place. There is cardiomegaly with vascular  distribution consistent with volume overload. A mild element of  interstitial edema is present. No consolidative pneumonia or effusion.       Impression:      1. Dialysis catheter well-positioned.  2. Mild pulmonary vascular congestion suggesting an element of volume  overload.     This report was signed and finalized on 4/25/2024 2:41 PM by Dr. Juan Carlos Lora MD.             LAB RESULTS:      Lab 04/26/24  0436 04/25/24  1356   WBC 5.77 4.76   HEMOGLOBIN 9.9* 12.2*   HEMATOCRIT 31.0* 37.2*   PLATELETS 199 230   NEUTROS ABS  --  3.00   IMMATURE GRANS (ABS)  --  0.05   LYMPHS ABS  --  1.04   MONOS ABS  --  0.45   EOS ABS  --  0.15   MCV 93.9 91.9   CRP  --  1.54*   PROTIME  --  13.6         Lab 04/26/24  0436 04/25/24  1551 04/25/24  1356   SODIUM 133*  --  134*   POTASSIUM 3.8  --  3.9   CHLORIDE 95*  --  94*   CO2 30.0*  --  31.0*   ANION GAP 8.0  --  9.0   BUN 36*  --  21   CREATININE 3.51*  --  2.40*   EGFR 18.2*  --  28.7*   GLUCOSE 141*  --  199*   CALCIUM 8.9  --  9.1   MAGNESIUM  --  1.9  --    HEMOGLOBIN A1C 5.30  --   --          Lab 04/25/24  1356   TOTAL PROTEIN 8.0   ALBUMIN 3.7   GLOBULIN 4.3   ALT (SGPT) 14   AST (SGOT) 29   BILIRUBIN 0.4   ALK PHOS 102         Lab 04/25/24  1551 04/25/24  1356   PROBNP  --  4,731.0*   HSTROP T 115* 119*   PROTIME  --  13.6   INR  --  1.00                 Brief Urine Lab Results  (Last result in the past 365 days)        Color   Clarity   Blood   Leuk Est   Nitrite   Protein   CREAT   Urine HCG        02/16/24 1231 Dark Yellow   Turbid   Moderate (2+)   Large (3+)   Negative    >=300 mg/dL (3+)                 Microbiology Results (last 10 days)       ** No results found for the last 240 hours. **        HPI .  Mauro Patel is a 69-year-old male with a history of chronic renal impairment, currently on Tuesday, Thursday, Saturday hemodialysis.  Diabetes mellitus on long-term insulin, chronic edema, hyperlipidemia, and hypertension.  Patient was brought in by his wife this afternoon after complaints of high thoracic back pain x 2 days.  Initially had intermittent achiness in the back and he was treating it with Tylenol with minimal relief.  His full dialysis today he went home to rest and woke up from his nap screaming and pain and has been unable to find any relief over the last several hours.  Patient continues to state he has had no chest pain,, palpitation, nausea, vomiting, or pain.  Patient states the pain is constant, achy in nature, unrelieved with position change, he has no complaints of headache, numbness or tingling in any extremities, loss of bowel or bladder, or shortness of breath.  Initially it was thought that the pain was in his shoulder, patient had no pain with range of motion, negative lift test, and stated that the pain is unchanged with movement of the shoulder.     The workup revealed a chest x-ray with mild pulmonary vascular congestion, initial at bedtime troponin of 119, subsequent of 115 with a negative for delta, has chronic elevated troponin with baseline of 90.  BNP of 4731, which is below previous reading of 6700. CRP of 1.54, negative drug screen, and EKG was negative for any new finding since previous EKG in February.  Patient is in extreme pain, screaming in between questions, he is able to participate in history when prompted.  Was given Dilaudid and Zofran with no relief, and is mildly hypertensive at 157/66.  Saturations within normal limits on room air.  Patient and family are aware that he will have a stat CT of the thoracic spine given new findings,  with subsequent treatment per results.  Patient will be admitted for continued evaluation and treatment.    Hospital Course:   Intractable thoracic back pain.  Thoracic back pain for last 2 days.  Tylenol at home with minimal relief.  Flexeril as needed.  Voltaren gel for 4 times a day.  Percocet as needed.  Back pain resolved today.  Chest x-ray- Dialysis catheter well-positioned, Mild pulmonary vascular congestion suggesting an element of volume overload.  CT scan thoracic- Degenerative disc and endplate changes throughout the thoracic spine, Small amounts of pleural fluid, No acute bony abnormality.     Chronic end-stage renal disease on dialysis.  Increasing creatinine.  Nephrology consult.  Patient used to get dialysis Tuesday Thursday Saturday.  Bicarb .     Elevated troponin type II secondary to renal failure/hypertension/hyperlipidemia.  Baseline troponin in the 90s, currently the last 2 is 119 and 115.  No sign of chest pain.  Aspirin . Coreg.  Catapres . Lasix.  Hydralazine . nifedipine.  Crestor.  Echo February 2023-ejection fraction 61 to 65%, mild asymmetrical hypertrophy, diastolic dysfunction grade 2, right ventricle size and function is normal.  Left atrial cavity is moderately dilated, aortic valve exhibits sclerosis-mild thickening of aortic valve-no aortic valve regurgitation-mild aortic valve stenosis, mild pulmonary hypertension.     Diabetes.  Levemir . Low sliding scale.  Hemoglobin A1c 5.3.     Anemia . iron sulfate.  Decrease in hemoglobin.  No sign of acute bleed     Prostate hypertrophy.  Flomax.     Hyponatremia.  Stable.     Anxiety . Atarax as needed     Nausea.  Zofran as needed..     SCD.     Nutrition.  Magnesium oxide.  Cardiac/diabetic diet.     Deconditioning.  PT and OT consult.    Vital signs stable, afebrile.  Plan to discharge patient home today.  Follow-up with PCP 1 week.  Continue dialysis scheduled for tomorrow.    Physical Exam on Discharge:  /72 (BP Location: Left  "arm, Patient Position: Lying) Comment: Kaitlyn RN notified  Pulse 82   Temp 98.1 °F (36.7 °C) (Oral)   Resp 16   Ht 172.7 cm (67.99\")   Wt 86.1 kg (189 lb 12.8 oz)   SpO2 99%   BMI 28.87 kg/m²   Physical Exam  Vitals and nursing note reviewed.   HENT:      Head: Normocephalic and atraumatic.   Eyes:      Conjunctiva/sclera: Conjunctivae normal.      Pupils: Pupils are equal, round, and reactive to light.   Cardiovascular:      Rate and Rhythm: Normal rate and regular rhythm.      Heart sounds: Normal heart sounds.   Pulmonary:      Effort: Pulmonary effort is normal. No respiratory distress.      Breath sounds: Normal breath sounds.   Abdominal:      General: Bowel sounds are normal. There is no distension.      Palpations: Abdomen is soft.      Tenderness: There is no abdominal tenderness.   Musculoskeletal:         General: No swelling.      Cervical back: Neck supple.   Skin:     General: Skin is warm and dry.      Capillary Refill: Capillary refill takes 2 to 3 seconds.      Findings: No rash.   Neurological:      General: No focal deficit present.      Mental Status: He is alert and oriented to person, place, and time.      Motor: Weakness present.   Psychiatric:         Mood and Affect: Mood normal.         Behavior: Behavior normal.         Thought Content: Thought content normal.         Judgment: Judgment normal.           Condition on Discharge: Stable.    Discharge Disposition: Discharge home with family  Home or Self Care    Discharge Medications:     Discharge Medications        New Medications        Instructions Start Date   cyclobenzaprine 5 MG tablet  Commonly known as: FLEXERIL   5 mg, Oral, 3 Times Daily PRN      Diclofenac Sodium 1 % gel gel  Commonly known as: VOLTAREN   2 g, Topical, 4 Times Daily      oxyCODONE-acetaminophen 5-325 MG per tablet  Commonly known as: PERCOCET   1 tablet, Oral, Every 6 Hours PRN             Continue These Medications        Instructions Start Date   aspirin " 81 MG EC tablet   Take 1 tablet by mouth Daily.      carvedilol 25 MG tablet  Commonly known as: COREG   25 mg, Oral, 2 Times Daily With Meals, BID      cloNIDine 0.1 MG tablet  Commonly known as: CATAPRES   0.2 mg, Oral, 2 Times Daily      ferrous sulfate 325 (65 FE) MG tablet   325 mg, Oral, 2 Times Daily With Meals      furosemide 40 MG tablet  Commonly known as: LASIX   40 mg, Oral, Daily      HumaLOG KwikPen 100 UNIT/ML solution pen-injector  Generic drug: Insulin Lispro (1 Unit Dial)   5 Units, Subcutaneous, 2 Times Daily, At breakfast and supper      hydrALAZINE 50 MG tablet  Commonly known as: APRESOLINE   50 mg, Oral, Every 8 Hours Scheduled      insulin detemir 100 UNIT/ML injection  Commonly known as: LEVEMIR   5 Units, Subcutaneous, Every 12 Hours Scheduled      magnesium oxide 400 MG tablet  Commonly known as: MAG-OX   400 mg, Oral, Daily      NIFEdipine CC 60 MG 24 hr tablet  Commonly known as: ADALAT CC   60 mg, Oral, Every 24 Hours Scheduled      rosuvastatin 10 MG tablet  Commonly known as: CRESTOR   10 mg, Oral, Nightly      sodium bicarbonate 650 MG tablet   1,300 mg, Oral, 3 Times Daily      tamsulosin 0.4 MG capsule 24 hr capsule  Commonly known as: FLOMAX   1 capsule, Oral, Daily      vitamin D 1.25 MG (91499 UT) capsule capsule  Commonly known as: ERGOCALCIFEROL   50,000 Units, Oral, Every 30 Days, Around the 15th               Discharge Diet:   Diet Instructions       Advance Diet As Tolerated -Target Diet: Renal/cardiac diet/diabetic .      Target Diet: Renal/cardiac diet/diabetic .            Activity at Discharge:   Activity Instructions       Activity as Tolerated              Follow-up Appointments:   Follow-up with PCP 1 week.  Follow with dialysis tomorrow.    Electronically signed by David Osborne MD, 04/26/24, 11:31 CDT.    Time: Greater than 30 minutes.

## 2024-04-26 NOTE — PLAN OF CARE
"Goal Outcome Evaluation:  Arrived to complete PT eval.  Pt reports he is back to his baseline and his pain is controlled.  \"Ready to go home.\"  Pt w/ no needs for PT at this time and PT to sign off.  Please reconsult if anything changes.                                              "

## 2024-07-16 ENCOUNTER — PREP FOR PROCEDURE (OUTPATIENT)
Dept: SURGERY | Age: 69
End: 2024-07-16

## 2024-07-16 ENCOUNTER — OFFICE VISIT (OUTPATIENT)
Dept: SURGERY | Age: 69
End: 2024-07-16
Payer: COMMERCIAL

## 2024-07-16 VITALS
OXYGEN SATURATION: 99 % | HEIGHT: 68 IN | TEMPERATURE: 98 F | HEART RATE: 75 BPM | BODY MASS INDEX: 31.16 KG/M2 | WEIGHT: 205.6 LBS

## 2024-07-16 DIAGNOSIS — K42.9 UMBILICAL HERNIA WITHOUT OBSTRUCTION AND WITHOUT GANGRENE: ICD-10-CM

## 2024-07-16 DIAGNOSIS — N18.6 END STAGE RENAL DISEASE (HCC): Primary | ICD-10-CM

## 2024-07-16 DIAGNOSIS — N18.6 END STAGE RENAL DISEASE (HCC): ICD-10-CM

## 2024-07-16 PROCEDURE — 1123F ACP DISCUSS/DSCN MKR DOCD: CPT | Performed by: SURGERY

## 2024-07-16 PROCEDURE — 99203 OFFICE O/P NEW LOW 30 MIN: CPT | Performed by: SURGERY

## 2024-07-16 RX ORDER — SODIUM BICARBONATE 650 MG/1
1300 TABLET ORAL 3 TIMES DAILY
COMMUNITY

## 2024-07-16 RX ORDER — TAMSULOSIN HYDROCHLORIDE 0.4 MG/1
0.4 CAPSULE ORAL DAILY
COMMUNITY

## 2024-07-16 NOTE — H&P (VIEW-ONLY)
SUBJECTIVE:  Mr. Butcher is a 69 y.o. male who presents today to discuss Peritoneal Dialysis. He is presently receiving hemodialysis and wants to know about other options. He has not had a home visit but states he has space to keep his equipment. He has questions about how frequently he has to do this and how long it takes.       Past Medical History:   Diagnosis Date    Chronic kidney disease     Diabetes mellitus (HCC)     Essential hypertension     Gout     History of colonic polyps     Hyperlipidemia     Other disorders of kidney and ureter in diseases classified elsewhere      Past Surgical History:   Procedure Laterality Date    COLONOSCOPY       Current Outpatient Medications   Medication Sig Dispense Refill    tamsulosin (FLOMAX) 0.4 MG capsule 1 capsule      sodium bicarbonate 650 MG tablet sodium bicarbonate 650 mg tablet   Take 2 tablets 3 times a day by oral route.      hydrALAZINE (APRESOLINE) 50 MG tablet Take 1 tablet by mouth 2 times daily 90 tablet 3    ASPIRIN 81 PO aspirin 81 mg tablet,delayed release   Take 1 tablet every day by oral route.      allopurinol (ZYLOPRIM) 300 MG tablet allopurinol 300 mg tablet      amLODIPine (NORVASC) 10 MG tablet amlodipine 10 mg tablet      atorvastatin (LIPITOR) 20 MG tablet atorvastatin 20 mg tablet      carvedilol (COREG) 25 MG tablet carvedilol 25 mg tablet      magnesium oxide (MAG-OX) 140 MG CAPS Magnesium-Oxide 400 mg tablet   Take 1 tablet every day by oral route.      ferrous gluconate (FERGON) 225 (27 Fe) MG tablet ferrous gluconate 324 mg (38 mg iron) tablet   Take 1 tablet twice a day by oral route.      insulin detemir (LEVEMIR) 100 UNIT/ML injection vial Inject 30 Units into the skin nightly       No current facility-administered medications for this visit.     Allergies: Phenylephrine-[id number]    Family History   Problem Relation Age of Onset    Other Neg Hx         He denies any family history of blood dyscrasias.       Social History     Tobacco

## 2024-07-19 ENCOUNTER — HOSPITAL ENCOUNTER (OUTPATIENT)
Dept: PREADMISSION TESTING | Age: 69
Discharge: HOME | End: 2024-07-23
Payer: COMMERCIAL

## 2024-07-19 RX ORDER — LANOLIN ALCOHOL/MO/W.PET/CERES
400 CREAM (GRAM) TOPICAL DAILY
COMMUNITY

## 2024-07-22 ENCOUNTER — ANESTHESIA EVENT (OUTPATIENT)
Dept: OPERATING ROOM | Age: 69
End: 2024-07-22
Payer: COMMERCIAL

## 2024-07-22 ENCOUNTER — HOSPITAL ENCOUNTER (OUTPATIENT)
Age: 69
Setting detail: OUTPATIENT SURGERY
Discharge: HOME | End: 2024-07-22
Attending: SURGERY | Admitting: SURGERY
Payer: COMMERCIAL

## 2024-07-22 ENCOUNTER — ANESTHESIA (OUTPATIENT)
Dept: OPERATING ROOM | Age: 69
End: 2024-07-22
Payer: COMMERCIAL

## 2024-07-22 VITALS
DIASTOLIC BLOOD PRESSURE: 72 MMHG | TEMPERATURE: 97.3 F | HEIGHT: 68 IN | RESPIRATION RATE: 20 BRPM | OXYGEN SATURATION: 100 % | WEIGHT: 205 LBS | BODY MASS INDEX: 31.07 KG/M2 | SYSTOLIC BLOOD PRESSURE: 173 MMHG | HEART RATE: 70 BPM

## 2024-07-22 DIAGNOSIS — N18.6 END STAGE RENAL DISEASE (HCC): Primary | ICD-10-CM

## 2024-07-22 LAB
ANION GAP SERPL CALCULATED.3IONS-SCNC: 17 MMOL/L (ref 7–19)
ANISOCYTOSIS BLD QL SMEAR: ABNORMAL
APTT PPP: 29.2 SEC (ref 26–36.2)
BASOPHILS # BLD: 0.1 K/UL (ref 0–0.2)
BASOPHILS NFR BLD: 1 % (ref 0–1)
BUN SERPL-MCNC: 61 MG/DL (ref 8–23)
CALCIUM SERPL-MCNC: 9.1 MG/DL (ref 8.8–10.2)
CHLORIDE SERPL-SCNC: 96 MMOL/L (ref 98–111)
CO2 SERPL-SCNC: 25 MMOL/L (ref 22–29)
CREAT SERPL-MCNC: 5.7 MG/DL (ref 0.5–1.2)
EOSINOPHIL # BLD: 0.2 K/UL (ref 0–0.6)
EOSINOPHIL NFR BLD: 2.4 % (ref 0–5)
ERYTHROCYTE [DISTWIDTH] IN BLOOD BY AUTOMATED COUNT: 14.2 % (ref 11.5–14.5)
GLUCOSE BLD-MCNC: 138 MG/DL (ref 70–99)
GLUCOSE SERPL-MCNC: 140 MG/DL (ref 74–109)
HCT VFR BLD AUTO: 37.3 % (ref 42–52)
HGB BLD-MCNC: 11.6 G/DL (ref 14–18)
HYPOCHROMIA BLD QL SMEAR: ABNORMAL
IMM GRANULOCYTES # BLD: 0 K/UL
INR PPP: 1.02 (ref 0.88–1.18)
LYMPHOCYTES # BLD: 1.7 K/UL (ref 1.1–4.5)
LYMPHOCYTES NFR BLD: 24.4 % (ref 20–40)
MCH RBC QN AUTO: 30.2 PG (ref 27–31)
MCHC RBC AUTO-ENTMCNC: 31.1 G/DL (ref 33–37)
MCV RBC AUTO: 97.1 FL (ref 80–94)
MICROCYTES BLD QL SMEAR: ABNORMAL
MONOCYTES # BLD: 0.8 K/UL (ref 0–0.9)
MONOCYTES NFR BLD: 11.7 % (ref 0–10)
NEUTROPHILS # BLD: 4.1 K/UL (ref 1.5–7.5)
NEUTS SEG NFR BLD: 60.1 % (ref 50–65)
PERFORMED ON: ABNORMAL
PLATELET # BLD AUTO: 208 K/UL (ref 130–400)
PLATELET SLIDE REVIEW: ADEQUATE
PMV BLD AUTO: 9.2 FL (ref 9.4–12.4)
POTASSIUM SERPL-SCNC: 4.7 MMOL/L (ref 3.5–4.9)
PROTHROMBIN TIME: 13.1 SEC (ref 12–14.6)
RBC # BLD AUTO: 3.84 M/UL (ref 4.7–6.1)
SODIUM SERPL-SCNC: 138 MMOL/L (ref 136–145)
STOMATOCYTES BLD QL SMEAR: ABNORMAL
WBC # BLD AUTO: 6.8 K/UL (ref 4.8–10.8)

## 2024-07-22 PROCEDURE — 3700000000 HC ANESTHESIA ATTENDED CARE: Performed by: SURGERY

## 2024-07-22 PROCEDURE — 2500000003 HC RX 250 WO HCPCS: Performed by: SURGERY

## 2024-07-22 PROCEDURE — C9290 INJ, BUPIVACAINE LIPOSOME: HCPCS | Performed by: SURGERY

## 2024-07-22 PROCEDURE — 85025 COMPLETE CBC W/AUTO DIFF WBC: CPT

## 2024-07-22 PROCEDURE — 3700000001 HC ADD 15 MINUTES (ANESTHESIA): Performed by: SURGERY

## 2024-07-22 PROCEDURE — C2628 CATHETER, OCCLUSION: HCPCS | Performed by: SURGERY

## 2024-07-22 PROCEDURE — 85610 PROTHROMBIN TIME: CPT

## 2024-07-22 PROCEDURE — 7100000011 HC PHASE II RECOVERY - ADDTL 15 MIN: Performed by: SURGERY

## 2024-07-22 PROCEDURE — A4217 STERILE WATER/SALINE, 500 ML: HCPCS | Performed by: SURGERY

## 2024-07-22 PROCEDURE — 2580000003 HC RX 258: Performed by: ANESTHESIOLOGY

## 2024-07-22 PROCEDURE — 7100000010 HC PHASE II RECOVERY - FIRST 15 MIN: Performed by: SURGERY

## 2024-07-22 PROCEDURE — 7100000000 HC PACU RECOVERY - FIRST 15 MIN: Performed by: SURGERY

## 2024-07-22 PROCEDURE — 2500000003 HC RX 250 WO HCPCS: Performed by: NURSE ANESTHETIST, CERTIFIED REGISTERED

## 2024-07-22 PROCEDURE — 80048 BASIC METABOLIC PNL TOTAL CA: CPT

## 2024-07-22 PROCEDURE — 82962 GLUCOSE BLOOD TEST: CPT

## 2024-07-22 PROCEDURE — 3600000014 HC SURGERY LEVEL 4 ADDTL 15MIN: Performed by: SURGERY

## 2024-07-22 PROCEDURE — C1750 CATH, HEMODIALYSIS,LONG-TERM: HCPCS | Performed by: SURGERY

## 2024-07-22 PROCEDURE — 6360000002 HC RX W HCPCS: Performed by: NURSE ANESTHETIST, CERTIFIED REGISTERED

## 2024-07-22 PROCEDURE — 7100000001 HC PACU RECOVERY - ADDTL 15 MIN: Performed by: SURGERY

## 2024-07-22 PROCEDURE — 2580000003 HC RX 258: Performed by: SURGERY

## 2024-07-22 PROCEDURE — 85730 THROMBOPLASTIN TIME PARTIAL: CPT

## 2024-07-22 PROCEDURE — 2709999900 HC NON-CHARGEABLE SUPPLY: Performed by: SURGERY

## 2024-07-22 PROCEDURE — 3600000004 HC SURGERY LEVEL 4 BASE: Performed by: SURGERY

## 2024-07-22 PROCEDURE — 6360000002 HC RX W HCPCS: Performed by: SURGERY

## 2024-07-22 RX ORDER — SODIUM CHLORIDE 450 MG/100ML
INJECTION, SOLUTION INTRAVENOUS CONTINUOUS
Status: DISCONTINUED | OUTPATIENT
Start: 2024-07-22 | End: 2024-07-22 | Stop reason: HOSPADM

## 2024-07-22 RX ORDER — HALOPERIDOL 5 MG/ML
1 INJECTION INTRAMUSCULAR
Status: DISCONTINUED | OUTPATIENT
Start: 2024-07-22 | End: 2024-07-22 | Stop reason: HOSPADM

## 2024-07-22 RX ORDER — DEXTROSE MONOHYDRATE 100 MG/ML
INJECTION, SOLUTION INTRAVENOUS CONTINUOUS PRN
Status: DISCONTINUED | OUTPATIENT
Start: 2024-07-22 | End: 2024-07-22 | Stop reason: HOSPADM

## 2024-07-22 RX ORDER — DROPERIDOL 2.5 MG/ML
0.62 INJECTION, SOLUTION INTRAMUSCULAR; INTRAVENOUS
Status: DISCONTINUED | OUTPATIENT
Start: 2024-07-22 | End: 2024-07-22 | Stop reason: HOSPADM

## 2024-07-22 RX ORDER — FENTANYL CITRATE 50 UG/ML
INJECTION, SOLUTION INTRAMUSCULAR; INTRAVENOUS PRN
Status: DISCONTINUED | OUTPATIENT
Start: 2024-07-22 | End: 2024-07-22 | Stop reason: SDUPTHER

## 2024-07-22 RX ORDER — IPRATROPIUM BROMIDE AND ALBUTEROL SULFATE 2.5; .5 MG/3ML; MG/3ML
1 SOLUTION RESPIRATORY (INHALATION)
Status: DISCONTINUED | OUTPATIENT
Start: 2024-07-22 | End: 2024-07-22 | Stop reason: HOSPADM

## 2024-07-22 RX ORDER — PROPOFOL 10 MG/ML
INJECTION, EMULSION INTRAVENOUS PRN
Status: DISCONTINUED | OUTPATIENT
Start: 2024-07-22 | End: 2024-07-22 | Stop reason: SDUPTHER

## 2024-07-22 RX ORDER — LABETALOL HYDROCHLORIDE 5 MG/ML
10 INJECTION, SOLUTION INTRAVENOUS
Status: COMPLETED | OUTPATIENT
Start: 2024-07-22 | End: 2024-07-22

## 2024-07-22 RX ORDER — HYDROMORPHONE HYDROCHLORIDE 1 MG/ML
0.5 INJECTION, SOLUTION INTRAMUSCULAR; INTRAVENOUS; SUBCUTANEOUS EVERY 5 MIN PRN
Status: DISCONTINUED | OUTPATIENT
Start: 2024-07-22 | End: 2024-07-22 | Stop reason: HOSPADM

## 2024-07-22 RX ORDER — OXYCODONE HYDROCHLORIDE AND ACETAMINOPHEN 5; 325 MG/1; MG/1
1 TABLET ORAL EVERY 6 HOURS PRN
Qty: 12 TABLET | Refills: 0 | Status: SHIPPED | OUTPATIENT
Start: 2024-07-22 | End: 2024-07-25

## 2024-07-22 RX ORDER — MAGNESIUM HYDROXIDE 1200 MG/15ML
LIQUID ORAL CONTINUOUS PRN
Status: COMPLETED | OUTPATIENT
Start: 2024-07-22 | End: 2024-07-22

## 2024-07-22 RX ORDER — ONDANSETRON 2 MG/ML
INJECTION INTRAMUSCULAR; INTRAVENOUS PRN
Status: DISCONTINUED | OUTPATIENT
Start: 2024-07-22 | End: 2024-07-22 | Stop reason: SDUPTHER

## 2024-07-22 RX ORDER — ACETAMINOPHEN 325 MG/1
650 TABLET ORAL
Status: DISCONTINUED | OUTPATIENT
Start: 2024-07-22 | End: 2024-07-22 | Stop reason: HOSPADM

## 2024-07-22 RX ORDER — OXYCODONE HYDROCHLORIDE 5 MG/1
10 TABLET ORAL PRN
Status: DISCONTINUED | OUTPATIENT
Start: 2024-07-22 | End: 2024-07-22 | Stop reason: HOSPADM

## 2024-07-22 RX ORDER — SODIUM CHLORIDE 0.9 % (FLUSH) 0.9 %
5-40 SYRINGE (ML) INJECTION EVERY 12 HOURS SCHEDULED
Status: DISCONTINUED | OUTPATIENT
Start: 2024-07-22 | End: 2024-07-22 | Stop reason: HOSPADM

## 2024-07-22 RX ORDER — OXYCODONE HYDROCHLORIDE 5 MG/1
5 TABLET ORAL PRN
Status: DISCONTINUED | OUTPATIENT
Start: 2024-07-22 | End: 2024-07-22 | Stop reason: HOSPADM

## 2024-07-22 RX ORDER — FENTANYL CITRATE 50 UG/ML
25 INJECTION, SOLUTION INTRAMUSCULAR; INTRAVENOUS EVERY 5 MIN PRN
Status: DISCONTINUED | OUTPATIENT
Start: 2024-07-22 | End: 2024-07-22 | Stop reason: HOSPADM

## 2024-07-22 RX ORDER — DIPHENHYDRAMINE HYDROCHLORIDE 50 MG/ML
12.5 INJECTION INTRAMUSCULAR; INTRAVENOUS
Status: DISCONTINUED | OUTPATIENT
Start: 2024-07-22 | End: 2024-07-22 | Stop reason: HOSPADM

## 2024-07-22 RX ORDER — NALOXONE HYDROCHLORIDE 0.4 MG/ML
INJECTION, SOLUTION INTRAMUSCULAR; INTRAVENOUS; SUBCUTANEOUS PRN
Status: DISCONTINUED | OUTPATIENT
Start: 2024-07-22 | End: 2024-07-22 | Stop reason: HOSPADM

## 2024-07-22 RX ORDER — LIDOCAINE HYDROCHLORIDE 10 MG/ML
INJECTION, SOLUTION EPIDURAL; INFILTRATION; INTRACAUDAL; PERINEURAL PRN
Status: DISCONTINUED | OUTPATIENT
Start: 2024-07-22 | End: 2024-07-22 | Stop reason: SDUPTHER

## 2024-07-22 RX ORDER — ROCURONIUM BROMIDE 10 MG/ML
INJECTION, SOLUTION INTRAVENOUS PRN
Status: DISCONTINUED | OUTPATIENT
Start: 2024-07-22 | End: 2024-07-22 | Stop reason: SDUPTHER

## 2024-07-22 RX ORDER — GLUCAGON 1 MG/ML
1 KIT INJECTION PRN
Status: DISCONTINUED | OUTPATIENT
Start: 2024-07-22 | End: 2024-07-22 | Stop reason: HOSPADM

## 2024-07-22 RX ORDER — SODIUM CHLORIDE 0.9 % (FLUSH) 0.9 %
5-40 SYRINGE (ML) INJECTION PRN
Status: DISCONTINUED | OUTPATIENT
Start: 2024-07-22 | End: 2024-07-22 | Stop reason: HOSPADM

## 2024-07-22 RX ORDER — SODIUM CHLORIDE 9 MG/ML
INJECTION, SOLUTION INTRAVENOUS PRN
Status: DISCONTINUED | OUTPATIENT
Start: 2024-07-22 | End: 2024-07-22 | Stop reason: HOSPADM

## 2024-07-22 RX ORDER — BUPIVACAINE HYDROCHLORIDE AND EPINEPHRINE 2.5; 5 MG/ML; UG/ML
INJECTION, SOLUTION INFILTRATION; PERINEURAL PRN
Status: DISCONTINUED | OUTPATIENT
Start: 2024-07-22 | End: 2024-07-22 | Stop reason: ALTCHOICE

## 2024-07-22 RX ORDER — MIDAZOLAM HYDROCHLORIDE 2 MG/2ML
2 INJECTION, SOLUTION INTRAMUSCULAR; INTRAVENOUS
Status: DISCONTINUED | OUTPATIENT
Start: 2024-07-22 | End: 2024-07-22 | Stop reason: HOSPADM

## 2024-07-22 RX ADMIN — SODIUM CHLORIDE: 4.5 INJECTION, SOLUTION INTRAVENOUS at 07:48

## 2024-07-22 RX ADMIN — SUGAMMADEX 200 MG: 100 INJECTION, SOLUTION INTRAVENOUS at 10:28

## 2024-07-22 RX ADMIN — CEFAZOLIN 2 G: 10 INJECTION, POWDER, FOR SOLUTION INTRAVENOUS at 09:57

## 2024-07-22 RX ADMIN — LIDOCAINE HYDROCHLORIDE 50 MG: 10 INJECTION, SOLUTION EPIDURAL; INFILTRATION; INTRACAUDAL; PERINEURAL at 09:53

## 2024-07-22 RX ADMIN — LABETALOL HYDROCHLORIDE 10 MG: 5 INJECTION, SOLUTION INTRAVENOUS at 11:18

## 2024-07-22 RX ADMIN — PROPOFOL 150 MG: 10 INJECTION, EMULSION INTRAVENOUS at 09:54

## 2024-07-22 RX ADMIN — FENTANYL CITRATE 100 MCG: 0.05 INJECTION, SOLUTION INTRAMUSCULAR; INTRAVENOUS at 09:51

## 2024-07-22 RX ADMIN — ROCURONIUM BROMIDE 50 MG: 10 INJECTION, SOLUTION INTRAVENOUS at 09:54

## 2024-07-22 RX ADMIN — ONDANSETRON 4 MG: 2 INJECTION INTRAMUSCULAR; INTRAVENOUS at 10:21

## 2024-07-22 RX ADMIN — LABETALOL HYDROCHLORIDE 10 MG: 5 INJECTION, SOLUTION INTRAVENOUS at 11:31

## 2024-07-22 NOTE — PROGRESS NOTES
Patient to room 4. He is alert, oriented and able to make needs known. VSS. He denies pain. Wife at bedside.

## 2024-07-22 NOTE — OP NOTE
Operative Note      Patient: Ender Butcher  YOB: 1955  MRN: [id number]    Date of Procedure: 7/22/2024    Pre-Op Diagnosis Codes:     * End stage renal disease (HCC) [N18.6]     * Umbilical hernia [K42.9]    Post-Op Diagnosis: Same       Procedure(s):  LAPAROSCOPIC INSERTION PERITONEAL DIALYSIS CATHETER WITH OMENTOPEXY  LAPAROSCOPIC UMBILICAL HERNIA REPAIR    Surgeon(s):  Basia Dao DO    Assistant:   First Assistant: Joy Cazares RN    Anesthesia: General    Estimated Blood Loss (mL): Minimal    Complications: None    Specimens:   * No specimens in log *    Implants:  * No implants in log *      Drains: * No LDAs found *    Findings:  Infection Present At Time Of Surgery (PATOS) (choose all levels that have infection present):  No infection present  Other Findings: omentopexy performed. No umbilical peritoneal defect for hernia repair       Indications: The patient presented with a history of chronic kidney disease, with up coming need for peritoneal dialysis. They present at this time for placement of a peritoneal dialysis catheter.    Procedure Details      After the risks and benefits of the procedure were explained, informed consent was obtained, and the patient was taken to the operating room. The patient was placed in supine position and general anesthesia was begun. The abdomen was prepped and draped in normal sterile fashion. Preoperative antibiotics had been given. A time out was performed.     Local anesthetic was injected and stab incision was made in the LUQ. The skin was grasped and elevated and the verus needle was inserted. The abdomen was insufflated and the 5mm trochar was inserted in the left upper quadrant using the optiview technique. The camera was inserted and an additional port was placed on the patient's left side. The abdominal cavity was inspected. The omentum was then inspected and found to reach down in to the pelvis. It was decided to go ahead and pexy this to

## 2024-07-22 NOTE — ANESTHESIA POSTPROCEDURE EVALUATION
Department of Anesthesiology  Postprocedure Note    Patient: Ender Butcher  MRN: [id number]  YOB: 1955  Date of evaluation: 7/22/2024    Procedure Summary       Date: 07/22/24 Room / Location: [address]    Anesthesia Start: 0942 Anesthesia Stop:     Procedures:       LAPAROSCOPIC INSERTION PERITONEAL DIALYSIS CATHETER WITH OMENTOPEXY      LAPAROSCOPIC UMBILICAL HERNIA REPAIR Diagnosis:       End stage renal disease (HCC)      Umbilical hernia      (End stage renal disease (HCC) [N18.6])      (Umbilical hernia [K42.9])    Surgeons: Baisa Dao DO Responsible Provider: Ani Mane APRN - CRNA    Anesthesia Type: general ASA Status: 4            Anesthesia Type: No value filed.    Silvino Phase I:      Silvino Phase II:      Anesthesia Post Evaluation    Patient location during evaluation: PACU  Patient participation: complete - patient participated  Level of consciousness: awake  Pain score: 0  Airway patency: patent  Nausea & Vomiting: no nausea and no vomiting  Cardiovascular status: blood pressure returned to baseline and hemodynamically stable  Respiratory status: acceptable, room air, spontaneous ventilation and nonlabored ventilation  Hydration status: euvolemic  Comments: Vital signs are within acceptable limits and stable, SBAR handoff/transfer of care to RN.    Patient Handoff Status:to PACU RN  Level of Response: --awake  Oxygen device: --RA  Airway Status: --patent  BP: --188/88  Pulse: --87  SpO2: --96%  Respirations: --15  Temp: --98.2  Temp Source: --tempor  Multimodal analgesia pain management approach    No notable events documented.

## 2024-07-22 NOTE — ANESTHESIA PRE PROCEDURE
Continuous Drea Argueta MD 40 mL/hr at 07/22/24 0748 New Bag at 07/22/24 0748       Allergies:    Allergies   Allergen Reactions   • Phenylephrine-Dm-Gg Rash       Problem List:    Patient Active Problem List   Diagnosis Code   • Iron (Fe) deficiency anemia D50.9   • Acute kidney injury superimposed on chronic kidney disease (HCC) N17.9, N18.9   • JOSEE (acute kidney injury) (HCC) N17.9   • Hyperlipidemia E78.5   • Essential hypertension I10   • Diabetes mellitus (HCC) E11.9   • Chronic kidney disease N18.9   • End stage renal disease (HCC) N18.6   • Umbilical hernia K42.9       Past Medical History:        Diagnosis Date   • Chronic kidney disease    • Diabetes mellitus (HCC)    • Essential hypertension    • Gout    • Hemodialysis patient (HCC)     kathy teresa sat at [facility]   • History of colonic polyps    • Hyperlipidemia    • Other disorders of kidney and ureter in diseases classified elsewhere        Past Surgical History:        Procedure Laterality Date   • COLONOSCOPY     • DIALYSIS CATHETER INSERTION      permacath       Social History:    Social History     Tobacco Use   • Smoking status: Never   • Smokeless tobacco: Never   Substance Use Topics   • Alcohol use: Never                                Counseling given: Not Answered      Vital Signs (Current):   Vitals:    07/22/24 0747   BP: (!) 156/80   Pulse: 72   Resp: 18   Temp: 97.4 °F (36.3 °C)   TempSrc: Temporal   SpO2: 99%   Weight: 93 kg (205 lb)   Height: 1.727 m (5' 8\")                                              BP Readings from Last 3 Encounters:   07/22/24 (!) 156/80   10/22/20 (!) 141/63   10/02/20 (!) 141/78       NPO Status: Time of last liquid consumption: 0000                        Time of last solid consumption: 0000                        Date of last liquid consumption: 07/21/24                        Date of last solid food consumption: 07/21/24    BMI:   Wt Readings from Last 3 Encounters:   07/22/24 93 kg (205 lb)   07/16/24 93.3 kg

## 2024-07-22 NOTE — PROGRESS NOTES
All discharge instructions discussed with patient and wife. [place] state understanding. VSS. B/p remains somewhat elevate. B/p meds to be taken when they get home.

## 2024-07-22 NOTE — DISCHARGE INSTR - ACTIVITY
No heavy lifting.  Keep dressing clean and dry. Sponge bath only.   Do not soak in tub.     If you have signs of infection, call you doctor. These include:   -Increased pain, swelling, warmth, or redness  -Red streaks leading from the incision  -Pus draining from the incision  -A fever > 100.4

## 2024-07-22 NOTE — INTERVAL H&P NOTE
Update History & Physical    The patient's History and Physical of July 16, 2024 was reviewed with the patient and I examined the patient. There was no change. The surgical site was confirmed by the patient and me.     Plan: The risks, benefits, expected outcome, and alternative to the recommended procedure have been discussed with the patient. Patient understands and wants to proceed with the procedure.     Electronically signed by Basia Dao DO on 7/22/2024 at 9:06 AM

## 2024-07-25 NOTE — PROGRESS NOTES
CLINICAL PHARMACY NOTE: MEDS TO BEDS    Total # of Prescriptions Filled: 1   The following medications were delivered to the patient:  Discharge Medication List as of 7/22/2024 12:57 PM       Oxycodone/acetaminophen 5/325mg- take 1 tablet by mouth every 6 hours as needed for pain for up to 3 days. #12         Additional Documentation:  Handed to patients family at [facility]. Paid with cash

## 2024-08-07 ENCOUNTER — OFFICE VISIT (OUTPATIENT)
Dept: SURGERY | Age: 69
End: 2024-08-07

## 2024-08-07 VITALS
OXYGEN SATURATION: 99 % | TEMPERATURE: 97 F | HEIGHT: 68 IN | WEIGHT: 203.2 LBS | BODY MASS INDEX: 30.8 KG/M2 | HEART RATE: 85 BPM

## 2024-08-07 DIAGNOSIS — Z09 POSTOPERATIVE EXAMINATION: Primary | ICD-10-CM

## 2024-08-07 PROCEDURE — 99024 POSTOP FOLLOW-UP VISIT: CPT | Performed by: SURGERY

## 2024-08-07 NOTE — PROGRESS NOTES
Postop Progress Note    Subjective    Ender Butcher presents to the office for postop follow up 2 weeks s/p laparoscopic peritoneal dialysis cathter insertion. He states it has flushed twice and he is doing well.    Objective    Vitals:    08/07/24 1146   Pulse: 85   Temp: 97 °F (36.1 °C)   SpO2: 99%     General: alert, cooperative and no distress  Incision: healing well    Assessment  Doing well postoperatively.    Plan  1. Continue any current medications  2. Wound care discussed  3. Pt is to increase activities as tolerated  4. Usual diet  5. Follow up: as needed    Electronically signed by Basia Dao DO on 8/7/2024 at 5:04 PM

## 2024-09-03 ENCOUNTER — TELEPHONE (OUTPATIENT)
Dept: VASCULAR SURGERY | Facility: CLINIC | Age: 69
End: 2024-09-03
Payer: MEDICARE

## 2024-09-04 ENCOUNTER — OFFICE VISIT (OUTPATIENT)
Dept: VASCULAR SURGERY | Facility: CLINIC | Age: 69
End: 2024-09-04
Payer: MEDICARE

## 2024-09-04 VITALS
SYSTOLIC BLOOD PRESSURE: 138 MMHG | BODY MASS INDEX: 32.28 KG/M2 | HEART RATE: 97 BPM | HEIGHT: 68 IN | WEIGHT: 213 LBS | DIASTOLIC BLOOD PRESSURE: 78 MMHG | OXYGEN SATURATION: 100 %

## 2024-09-04 DIAGNOSIS — Z99.2 ESRD (END STAGE RENAL DISEASE) ON DIALYSIS: Primary | ICD-10-CM

## 2024-09-04 DIAGNOSIS — N18.6 ESRD (END STAGE RENAL DISEASE) ON DIALYSIS: Primary | ICD-10-CM

## 2024-09-04 PROCEDURE — 3075F SYST BP GE 130 - 139MM HG: CPT | Performed by: NURSE PRACTITIONER

## 2024-09-04 PROCEDURE — 1160F RVW MEDS BY RX/DR IN RCRD: CPT | Performed by: NURSE PRACTITIONER

## 2024-09-04 PROCEDURE — 3078F DIAST BP <80 MM HG: CPT | Performed by: NURSE PRACTITIONER

## 2024-09-04 PROCEDURE — 99213 OFFICE O/P EST LOW 20 MIN: CPT | Performed by: NURSE PRACTITIONER

## 2024-09-04 PROCEDURE — 1159F MED LIST DOCD IN RCRD: CPT | Performed by: NURSE PRACTITIONER

## 2024-09-04 NOTE — PROGRESS NOTES
"09/04/2024      John Robertson MD  [address] [facility] [place] [address]        Mauro TAY Amanda  1955    Chief Complaint   Patient presents with    Follow-up     Patient here today for permcath removal. Placed 2/19/24.     catheter removal       Dear John Robertson MD:         I had the pleasure of seeing your patient in the office today for follow up.  As you recall, the patient is a 69 y.o. male who was previously seen while hospitalized in February.  He was admitted for acute on chronic kidney disease and we were consulted for PermCath placement.  PermCath was placed on 2/19/2024.  He had a peritoneal dialysis catheter placed by Dr. Riojas at [facility] on 7/22/2024.    Review of Systems   Constitutional: Negative.    HENT: Negative.     Eyes: Negative.    Respiratory: Negative.     Cardiovascular: Negative.    Gastrointestinal: Negative.    Endocrine: Negative.    Genitourinary: Negative.    Musculoskeletal: Negative.    Skin: Negative.    Allergic/Immunologic: Negative.    Neurological: Negative.    Hematological: Negative.    Psychiatric/Behavioral: Negative.     All other systems reviewed and are negative.      /78   Pulse 97   Ht 172.7 cm (68\")   Wt 96.6 kg (213 lb)   SpO2 100%   BMI 32.39 kg/m²   Physical Exam  Vitals and nursing note reviewed.   Constitutional:       Appearance: He is well-developed.   HENT:      Head: Normocephalic and atraumatic.   Eyes:      General: No scleral icterus.     Pupils: Pupils are equal, round, and reactive to light.   Neck:      Thyroid: No thyromegaly.      Comments: Right IJ PermCath  Cardiovascular:      Rate and Rhythm: Normal rate and regular rhythm.      Heart sounds: Normal heart sounds.   Pulmonary:      Effort: Pulmonary effort is normal.      Breath sounds: Normal breath sounds.   Abdominal:      General: Bowel sounds are normal.      Palpations: Abdomen is soft.      Comments: Peritoneal dialysis cath   Musculoskeletal:         General: Normal " range of motion.      Cervical back: Normal range of motion and neck supple.   Skin:     General: Skin is warm and dry.   Neurological:      Mental Status: He is alert and oriented to person, place, and time.   Psychiatric:         Behavior: Behavior normal.         Thought Content: Thought content normal.         Judgment: Judgment normal.         Patient Active Problem List   Diagnosis    Controlled type 2 diabetes mellitus, with long-term current use of insulin    Benign essential HTN    Severe obesity (BMI 35.0-39.9) with comorbidity    Mixed hyperlipidemia    MUNGUIA (dyspnea on exertion)    Hypertensive urgency    Acute on chronic renal failure    Chronic renal impairment    ESRD (end stage renal disease) on dialysis    Chest pain    intractable thoracic back pain    Elevated troponin         ICD-10-CM ICD-9-CM   1. ESRD (end stage renal disease) on dialysis  N18.6 585.6    Z99.2 V45.11           PLAN: After thoroughly evaluating Mauro Patel, I believe the best course of action is to have him return with Dr. Armenta for removal.  I did attempt removal in office however was unsuccessful as PermCath has been and for 7 months.  I will have him return on Tuesday.  The patient is to continue taking their medications as previously discussed.   This was all discussed in full with complete understanding.  Thank you for allowing me to participate in the care of your patient.  Please do not hesitate to call with any questions or concerns.  We will keep you aware of any further encounters with Mauro Patel.      Sincerely Yours,      PAULO Flores

## 2024-09-09 ENCOUNTER — TELEPHONE (OUTPATIENT)
Dept: VASCULAR SURGERY | Facility: CLINIC | Age: 69
End: 2024-09-09
Payer: MEDICARE

## 2024-09-10 ENCOUNTER — OFFICE VISIT (OUTPATIENT)
Dept: VASCULAR SURGERY | Facility: CLINIC | Age: 69
End: 2024-09-10
Payer: MEDICARE

## 2024-09-10 VITALS
BODY MASS INDEX: 32.39 KG/M2 | DIASTOLIC BLOOD PRESSURE: 86 MMHG | OXYGEN SATURATION: 100 % | SYSTOLIC BLOOD PRESSURE: 136 MMHG | HEIGHT: 68 IN | HEART RATE: 77 BPM

## 2024-09-10 DIAGNOSIS — N18.6 ESRD (END STAGE RENAL DISEASE) ON DIALYSIS: Primary | ICD-10-CM

## 2024-09-10 DIAGNOSIS — Z99.2 ESRD (END STAGE RENAL DISEASE) ON DIALYSIS: Primary | ICD-10-CM

## 2024-09-11 NOTE — PROGRESS NOTES
Mauro TAY [facility]  [id number]  [id number]  09/10/24  Room/bed info not found      John Robertson MD    PREOPERATIVE DIAGNOSIS: Acute on chronic renal insufficiency    POSTOPERATIVE DIAGNOSIS: Acute on chronic renal insufficiency    PROCEDURE PERFORMED: Removal of right internal jugular vein Permcath    PERFORMED BY: Michael Armenta DO under the supervision of Dr. Michael Armenta    ANESTHESIA: NONE    ESTIMATED BLOOD LOSS: Less than 5 ml.    COMPLICATIONS: None    INDICATIONS: The patient is a 69 y.o. male who was previously hospitalized in February.  He was admitted for acute on chronic kidney disease and we were consulted for Permcath placement.  Permcath was placed on 2/19/24.  He had peritoneal dialysis catheter placed by Dr. Riojas at [facility] on 7/22/224.      DESCRIPTION OF PROCEDURE:  After the patient was correctly identified, the patient was placed in the supine position in her hospital bed.  The stitches of the catheter were removed.  The catheter was easily removed completely intact and direct pressure was held over the right internal jugular vein for hemostasis for 15 minutes.  Sterile dressings were applied.  The patient tolerated the procedure well.    Michael Armenta DO  [unfilled]  21:49 CDT

## 2025-04-11 NOTE — PROGRESS NOTES
Subjective    Mr. Patel is 70 y.o. male    Chief Complaint: Recurrent UTI    History of Present Illness  Patient referred to us as a new patient to [facility] urology from his PCP Dr. Robertson.  Patient has a number of urologic diagnoses he was referred primarily for recurrent urinary tract infections he also has history of elevated PSA and enlarged prostate and on tamsulosin.  No personal or family history of prostate cancer.  Most recent PSA is 5.5 there is a PSA March 10, 2025 that was 5.9.    His bladder scan revealed 471 mL.  Patient is on peritoneal dialysis.  And treatment our options are to get a kidney transplant.    He has also had several urinary tract infections and been treated with antibiotics.  He was not able to provide urine specimen today.  He has not had a recent digital rectal exam he had declined rectal exam when they attempted to do it at his primary care physician.  He is willing today to have this done        The following portions of the patient's history were reviewed and updated as appropriate: allergies, current medications, past family history, past medical history, past social history, past surgical history and problem list.    Review of Systems   Genitourinary:  Negative for dysuria, flank pain and hematuria.         Current Outpatient Medications:     amLODIPine (NORVASC) 10 MG tablet, Take 1 tablet by mouth Daily., Disp: , Rfl:     aspirin 81 MG EC tablet, Take 1 tablet by mouth Daily., Disp: , Rfl:     calcitriol (ROCALTROL) 0.25 MCG capsule, Take 1 capsule by mouth., Disp: , Rfl:     carvedilol (COREG) 25 MG tablet, Take 1 tablet by mouth 2 (Two) Times a Day With Meals. BID, Disp: , Rfl:     cloNIDine (CATAPRES) 0.1 MG tablet, Take 2 tablets by mouth 2 (Two) Times a Day., Disp: , Rfl:     cyclobenzaprine (FLEXERIL) 5 MG tablet, Take 1 tablet by mouth 3 (Three) Times a Day As Needed for Muscle Spasms., Disp: 30 tablet, Rfl: 0    Diclofenac Sodium (VOLTAREN) 1 % gel gel, Apply 2 g  topically to the appropriate area as directed 4 (Four) Times a Day., Disp: 350 g, Rfl: 0    ferrous gluconate (FERGON) 324 MG tablet, Take 1 tablet by mouth 2 (Two) Times a Day., Disp: , Rfl:     furosemide (LASIX) 40 MG tablet, Take 1 tablet by mouth Daily., Disp: , Rfl:     hydrALAZINE (APRESOLINE) 50 MG tablet, Take 1 tablet by mouth Every 8 (Eight) Hours., Disp: 90 tablet, Rfl: 0    insulin detemir (LEVEMIR) 100 UNIT/ML injection, Inject 5 Units under the skin into the appropriate area as directed Every 12 (Twelve) Hours., Disp: 10 mL, Rfl: 1    Insulin Lispro, 1 Unit Dial, (HumaLOG KwikPen) 100 UNIT/ML solution pen-injector, Inject 5 Units under the skin into the appropriate area as directed 2 (Two) Times a Day. At breakfast and supper, Disp: , Rfl:     losartan (COZAAR) 100 MG tablet, Take 1 tablet by mouth Daily., Disp: , Rfl:     magnesium oxide (MAG-OX) 400 MG tablet, Take 1 tablet by mouth Daily., Disp: , Rfl:     NIFEdipine XL (ADALAT CC) 60 MG 24 hr tablet, Take 1 tablet by mouth Daily., Disp: 30 tablet, Rfl: 0    rosuvastatin (CRESTOR) 20 MG tablet, Take 1 tablet by mouth Daily., Disp: , Rfl:     sodium bicarbonate 650 MG tablet, Take 2 tablets by mouth 3 (Three) Times a Day., Disp: , Rfl:     tamsulosin (FLOMAX) 0.4 MG capsule 24 hr capsule, Take 2 capsules by mouth Daily., Disp: , Rfl:     vitamin D (ERGOCALCIFEROL) 84926 units capsule capsule, Take 1 capsule by mouth Every 30 (Thirty) Days. Around the 15th, Disp: , Rfl:     tamsulosin (FLOMAX) 0.4 MG capsule 24 hr capsule, Take 2 capsules by mouth Every Night for 360 days., Disp: 60 capsule, Rfl: 11    Past Medical History:   Diagnosis Date    Chronic renal impairment 02/14/2024    Diabetes mellitus     Edema     Hyperlipidemia     Hypertension        Past Surgical History:   Procedure Laterality Date    CATARACT EXTRACTION      INSERTION HEMODIALYSIS CATHETER N/A 02/19/2024    Procedure: HEMODIALYSIS CATHETER INSERTION;  Surgeon: Michael Armenta,  "DO;  Location:  [facility];  Service: Vascular;  Laterality: N/A;    NO PAST SURGERIES         Social History     Socioeconomic History    Marital status:    Tobacco Use    Smoking status: Never    Smokeless tobacco: Never   Vaping Use    Vaping status: Never Used   Substance and Sexual Activity    Alcohol use: No    Drug use: No    Sexual activity: Defer       Family History   Problem Relation Age of Onset    Diabetes Mother     No Known Problems Father        Objective    Temp 97.1 °F (36.2 °C)   Ht 172.7 cm (68\")   Wt 108 kg (238 lb)   BMI 36.19 kg/m²     Physical Exam  Vitals reviewed.   Constitutional:       General: He is not in acute distress.     Appearance: Normal appearance. He is not toxic-appearing.   HENT:      Head: Normocephalic and atraumatic.   Pulmonary:      Effort: Pulmonary effort is normal.   Skin:     Coloration: Skin is not pale.   Neurological:      Mental Status: He is alert.   Psychiatric:         Mood and Affect: Mood normal.         Behavior: Behavior normal.           Estimation of residual urine via abdominal ultrasound  Residual Urine: 473 ml  Indication: Recurrent UTI  Position: Supine  Examination: Incremental scanning of the suprapubic area using 3 MHz transducer using copious amounts of acoustic gel.   Findings: An anechoic area was demonstrated which represented the bladder, with measurement of residual urine as noted. I inspected this myself. In that the residual urine was stable or insignificant, no treatment will be necessary at this time.       Assessment and Plan    Diagnoses and all orders for this visit:    1. Recurrent UTI (Primary)  -     Cancel: POC Urinalysis Dipstick, Multipro  -     CT Abdomen Pelvis Without Contrast; Future    2. Elevated prostate specific antigen (PSA)    3. BPH with obstruction/lower urinary tract symptoms  -     tamsulosin (FLOMAX) 0.4 MG capsule 24 hr capsule; Take 2 capsules by mouth Every Night for 360 days.  Dispense: 60 " capsule; Refill: 11    Patient with history of recurrent UTIs which could be related to his poor bladder emptying.  Want him to take 2 tamsulosin daily.    Digital rectal exam was benign most recent PSA was 5.5 which was down or decreased from previous.    Regarding the recurrent UTIs I would like to get a CT urogram to better evaluate for any obvious source or nidus of infection.    Patient will return in 2 weeks to discuss CT scan.

## 2025-04-22 ENCOUNTER — PATIENT ROUNDING (BHMG ONLY) (OUTPATIENT)
Dept: UROLOGY | Facility: CLINIC | Age: 70
End: 2025-04-22
Payer: MEDICARE

## 2025-04-22 ENCOUNTER — OFFICE VISIT (OUTPATIENT)
Dept: UROLOGY | Facility: CLINIC | Age: 70
End: 2025-04-22
Payer: MEDICARE

## 2025-04-22 VITALS — TEMPERATURE: 97.1 F | BODY MASS INDEX: 36.07 KG/M2 | WEIGHT: 238 LBS | HEIGHT: 68 IN

## 2025-04-22 DIAGNOSIS — N39.0 RECURRENT UTI: Primary | ICD-10-CM

## 2025-04-22 DIAGNOSIS — R97.20 ELEVATED PROSTATE SPECIFIC ANTIGEN (PSA): ICD-10-CM

## 2025-04-22 DIAGNOSIS — N40.1 BPH WITH OBSTRUCTION/LOWER URINARY TRACT SYMPTOMS: ICD-10-CM

## 2025-04-22 DIAGNOSIS — N13.8 BPH WITH OBSTRUCTION/LOWER URINARY TRACT SYMPTOMS: ICD-10-CM

## 2025-04-22 PROCEDURE — 1159F MED LIST DOCD IN RCRD: CPT | Performed by: PHYSICIAN ASSISTANT

## 2025-04-22 PROCEDURE — 51798 US URINE CAPACITY MEASURE: CPT | Performed by: PHYSICIAN ASSISTANT

## 2025-04-22 PROCEDURE — 1160F RVW MEDS BY RX/DR IN RCRD: CPT | Performed by: PHYSICIAN ASSISTANT

## 2025-04-22 PROCEDURE — 99204 OFFICE O/P NEW MOD 45 MIN: CPT | Performed by: PHYSICIAN ASSISTANT

## 2025-04-22 RX ORDER — AMLODIPINE BESYLATE 10 MG/1
1 TABLET ORAL DAILY
COMMUNITY
Start: 2025-03-19

## 2025-04-22 RX ORDER — LOSARTAN POTASSIUM 100 MG/1
1 TABLET ORAL DAILY
COMMUNITY

## 2025-04-22 RX ORDER — FERROUS GLUCONATE 324(38)MG
1 TABLET ORAL 2 TIMES DAILY
COMMUNITY

## 2025-04-22 RX ORDER — ROSUVASTATIN CALCIUM 20 MG/1
1 TABLET, COATED ORAL DAILY
COMMUNITY

## 2025-04-22 RX ORDER — TAMSULOSIN HYDROCHLORIDE 0.4 MG/1
2 CAPSULE ORAL NIGHTLY
Qty: 60 CAPSULE | Refills: 11 | Status: SHIPPED | OUTPATIENT
Start: 2025-04-22 | End: 2026-04-17

## 2025-04-22 RX ORDER — TAMSULOSIN HYDROCHLORIDE 0.4 MG/1
2 CAPSULE ORAL DAILY
COMMUNITY

## 2025-04-22 RX ORDER — CALCITRIOL 0.25 UG/1
1 CAPSULE, LIQUID FILLED ORAL
COMMUNITY
Start: 2024-08-22

## 2025-04-22 NOTE — PROGRESS NOTES
April 22, 2025    Hello, may I speak with Mauro Patel?    My name is Funmi    I am  with [facility]  [address], [address]  [place] [address] [phone].    Before we get started may I verify your date of birth? 1955    I am calling to officially welcome you to our practice and ask about your recent visit. Is this a good time to talk? yes    Tell me about your visit with us. What things went well?  It was a good visit       We're always looking for ways to make our patients' experiences even better. Do you have recommendations on ways we may improve?  no    Overall were you satisfied with your first visit to our practice? yes       I appreciate you taking the time to speak with me today. Is there anything else I can do for you? no      Thank you, and have a great day.

## 2025-04-23 DIAGNOSIS — N39.0 RECURRENT UTI: Primary | ICD-10-CM

## 2025-04-23 LAB
BILIRUB BLD-MCNC: ABNORMAL MG/DL
CLARITY, POC: CLEAR
COLOR UR: YELLOW
GLUCOSE UR STRIP-MCNC: NEGATIVE MG/DL
KETONES UR QL: ABNORMAL
LEUKOCYTE EST, POC: NEGATIVE
NITRITE UR-MCNC: NEGATIVE MG/ML
PH UR: 5.5 [PH] (ref 5–8)
PROT UR STRIP-MCNC: ABNORMAL MG/DL
RBC # UR STRIP: NEGATIVE /UL
SP GR UR: 1.03 (ref 1–1.03)
UROBILINOGEN UR QL: NORMAL

## 2025-04-29 ENCOUNTER — TELEPHONE (OUTPATIENT)
Dept: UROLOGY | Facility: CLINIC | Age: 70
End: 2025-04-29
Payer: MEDICARE

## 2025-04-29 NOTE — TELEPHONE ENCOUNTER
Attempted to call patient, no answer, and the voicemail was full.    Patient needs to be scheduled for a CT scan before his appointment with Mateo on 5/6/2025.    IF THE PATIENT CALLS BACK, IT IS OK FOR THE HUB TO TRANSFER CALL TO CENTRAL SCHEDULING SO THAT THE PATIENT CAN SCHEDULE HIS CT SCAN.    LET THE PATIENT KNOW WE WILL MOST LIKELY NEED TO MOVE HIS APPT WITH MATEO, TO ALLOW TIME FOR THE CT SCAN.

## 2025-04-29 NOTE — TELEPHONE ENCOUNTER
----- Message from Jessenia RAMIRES sent at 4/29/2025 10:48 AM CDT -----  Pt needs CT prior to appt and it is not scheduled and a week away

## 2025-05-01 ENCOUNTER — TELEPHONE (OUTPATIENT)
Dept: UROLOGY | Facility: CLINIC | Age: 70
End: 2025-05-01
Payer: MEDICARE

## 2025-05-01 NOTE — TELEPHONE ENCOUNTER
----- Message from Jessenia RAMIRES sent at 5/1/2025 10:57 AM CDT -----  Pt wants a CT prior to appt

## 2025-05-01 NOTE — TELEPHONE ENCOUNTER
I gave patient phone number to call for central scheduling. He said he would call to get it scheduled. I told him they can't get him in before the appointment, we can reschedule.    OK for HUB to confirm or reschedule.

## 2025-05-05 ENCOUNTER — TELEPHONE (OUTPATIENT)
Dept: UROLOGY | Facility: CLINIC | Age: 70
End: 2025-05-05
Payer: MEDICARE

## 2025-05-05 NOTE — TELEPHONE ENCOUNTER
Spoke with wife of patient and have rescheduled patient to 05/27/2025 at 0910 after CT is done on 05/23/2025, she verbalized understanding.

## 2025-05-05 NOTE — PROGRESS NOTES
Subjective    Mr. Patel is 70 y.o. male    Chief Complaint: Recurrent UTI    History of Present Illness  Patient presents for follow-up with CT scan done for evaluation of recurrent urinary tract infections.  History of elevated PSA and BPH he is on 2 tamsulosin daily which I started his last visit he thinks there has been improvement in symptoms although he still has bothersome symptoms at this time.  See discussion below regarding CT scan findings.  He has no evidence of urinary tract infection today.  Patient has peritoneal dialysis.    The following portions of the patient's history were reviewed and updated as appropriate: allergies, current medications, past family history, past medical history, past social history, past surgical history and problem list.    Review of Systems   Genitourinary:  Positive for urgency. Negative for flank pain and hematuria.         Current Outpatient Medications:     amLODIPine (NORVASC) 10 MG tablet, Take 1 tablet by mouth Daily., Disp: , Rfl:     aspirin 81 MG EC tablet, Take 1 tablet by mouth Daily., Disp: , Rfl:     calcitriol (ROCALTROL) 0.25 MCG capsule, Take 1 capsule by mouth., Disp: , Rfl:     carvedilol (COREG) 25 MG tablet, Take 1 tablet by mouth 2 (Two) Times a Day With Meals. BID, Disp: , Rfl:     cloNIDine (CATAPRES) 0.1 MG tablet, Take 2 tablets by mouth 2 (Two) Times a Day. (Patient not taking: Reported on 5/22/2025), Disp: , Rfl:     cyclobenzaprine (FLEXERIL) 5 MG tablet, Take 1 tablet by mouth 3 (Three) Times a Day As Needed for Muscle Spasms., Disp: 30 tablet, Rfl: 0    Diclofenac Sodium (VOLTAREN) 1 % gel gel, Apply 2 g topically to the appropriate area as directed 4 (Four) Times a Day., Disp: 350 g, Rfl: 0    ferrous gluconate (FERGON) 324 MG tablet, Take 1 tablet by mouth 2 (Two) Times a Day., Disp: , Rfl:     furosemide (LASIX) 40 MG tablet, Take 1 tablet by mouth Daily., Disp: , Rfl:     hydrALAZINE (APRESOLINE) 50 MG tablet, Take 1 tablet by mouth  Every 8 (Eight) Hours., Disp: 90 tablet, Rfl: 0    insulin detemir (LEVEMIR) 100 UNIT/ML injection, Inject 5 Units under the skin into the appropriate area as directed Every 12 (Twelve) Hours., Disp: 10 mL, Rfl: 1    Insulin Lispro, 1 Unit Dial, (HumaLOG KwikPen) 100 UNIT/ML solution pen-injector, Inject 5 Units under the skin into the appropriate area as directed 2 (Two) Times a Day. At breakfast and supper, Disp: , Rfl:     losartan (COZAAR) 100 MG tablet, Take 1 tablet by mouth Daily., Disp: , Rfl:     magnesium oxide (MAG-OX) 400 MG tablet, Take 1 tablet by mouth Daily., Disp: , Rfl:     NIFEdipine XL (ADALAT CC) 60 MG 24 hr tablet, Take 1 tablet by mouth Daily. (Patient not taking: Reported on 5/22/2025), Disp: 30 tablet, Rfl: 0    rosuvastatin (CRESTOR) 20 MG tablet, Take 1 tablet by mouth Daily., Disp: , Rfl:     sodium bicarbonate 650 MG tablet, Take 2 tablets by mouth 3 (Three) Times a Day., Disp: , Rfl:     tamsulosin (FLOMAX) 0.4 MG capsule 24 hr capsule, Take 2 capsules by mouth Daily., Disp: , Rfl:     tamsulosin (FLOMAX) 0.4 MG capsule 24 hr capsule, Take 2 capsules by mouth Every Night for 360 days., Disp: 60 capsule, Rfl: 11    vitamin D (ERGOCALCIFEROL) 35413 units capsule capsule, Take 1 capsule by mouth Every 30 (Thirty) Days. Around the 15th, Disp: , Rfl:     Past Medical History:   Diagnosis Date    Chronic renal impairment 02/14/2024    Diabetes mellitus     Edema     Hyperlipidemia     Hypertension        Past Surgical History:   Procedure Laterality Date    CATARACT EXTRACTION      INSERTION HEMODIALYSIS CATHETER N/A 02/19/2024    Procedure: HEMODIALYSIS CATHETER INSERTION;  Surgeon: Michael Armenta DO;  Location: [facility];  Service: Vascular;  Laterality: N/A;    NO PAST SURGERIES         Social History     Socioeconomic History    Marital status:    Tobacco Use    Smoking status: Never    Smokeless tobacco: Never   Vaping Use    Vaping status: Never Used   Substance and  "Sexual Activity    Alcohol use: No    Drug use: No    Sexual activity: Defer       Family History   Problem Relation Age of Onset    Diabetes Mother     No Known Problems Father     Colon cancer Neg Hx     Colon polyps Neg Hx     Esophageal cancer Neg Hx        Objective    Temp 98.2 °F (36.8 °C)   Ht 172.7 cm (68\")   Wt 108 kg (238 lb)   BMI 36.19 kg/m²     Physical Exam  Vitals reviewed.   Constitutional:       Appearance: Normal appearance.   HENT:      Head: Normocephalic and atraumatic.   Pulmonary:      Effort: Pulmonary effort is normal.   Neurological:      Mental Status: He is alert.   Psychiatric:         Mood and Affect: Mood normal.         Behavior: Behavior normal.             Results for orders placed or performed in visit on 04/23/25   POC Urinalysis Dipstick, Multipro    Collection Time: 04/23/25 10:23 AM    Specimen: Urine   Result Value Ref Range    Color Yellow Yellow, Straw, Dark Yellow, Taylor    Clarity, UA Clear Clear    Glucose, UA Negative Negative mg/dL    Bilirubin Small (1+) (A) Negative    Ketones, UA Trace (A) Negative    Specific Gravity  1.030 1.005 - 1.030    Blood, UA Negative Negative    pH, Urine 5.5 5.0 - 8.0    Protein,  mg/dL (A) Negative mg/dL    Urobilinogen, UA Normal Normal, 0.2 E.U./dL    Nitrite, UA Negative Negative    Leukocytes Negative Negative     Independent review of CT scan of the abdomen/pelvis The patient has undergone a CT scan of the abdomen and pelvis With and without contrast. The images are available for me to review as an independent interpretation for evaluation and management.  Assessment of the renal parenchyma with regards to thickness, scarring, symmetry in appearance and function, presence of masses both pre-and postcontrast, and calcifications are noted.  The collecting system with regard to dilatation, presence of calcifications, and masses were reviewed.  The course and caliber the ureters also noted.  The renal vessels and " retroperitoneum is inspected for pathology.  The solid viscera and bowel pattern are briefly reviewed, but will also be inspected by the radiologist. The renal pelvis is inspected.  This study shows appears to be thickened bladder wall with adjacent stranding no urolithiasis or hydronephrosis noted.  No Other urologic findings.        Assessment and Plan    Diagnoses and all orders for this visit:    1. Recurrent UTI (Primary)    2. BPH with obstruction/lower urinary tract symptoms      Voiding symptoms have improved though still with symptoms I feel that further rule out he is on tamsulosin this hopefully will get better over time.  So I recommend he continue on the 2 tamsulosin daily.    CT showed no obvious source or nidus of infection.    His urine today was clear.    Follow-up in 6 months.

## 2025-05-22 ENCOUNTER — OFFICE VISIT (OUTPATIENT)
Dept: GASTROENTEROLOGY | Facility: CLINIC | Age: 70
End: 2025-05-22
Payer: MEDICARE

## 2025-05-22 VITALS
OXYGEN SATURATION: 99 % | TEMPERATURE: 98 F | BODY MASS INDEX: 36.07 KG/M2 | HEIGHT: 68 IN | DIASTOLIC BLOOD PRESSURE: 84 MMHG | HEART RATE: 79 BPM | SYSTOLIC BLOOD PRESSURE: 130 MMHG | WEIGHT: 238 LBS

## 2025-05-22 DIAGNOSIS — Z01.818 ENCOUNTER FOR PRE-TRANSPLANT EVALUATION FOR KIDNEY TRANSPLANT: ICD-10-CM

## 2025-05-22 DIAGNOSIS — Z12.11 ENCOUNTER FOR SCREENING FOR MALIGNANT NEOPLASM OF COLON: Primary | ICD-10-CM

## 2025-05-22 PROCEDURE — 1160F RVW MEDS BY RX/DR IN RCRD: CPT | Performed by: NURSE PRACTITIONER

## 2025-05-22 PROCEDURE — S0260 H&P FOR SURGERY: HCPCS | Performed by: NURSE PRACTITIONER

## 2025-05-22 PROCEDURE — 3075F SYST BP GE 130 - 139MM HG: CPT | Performed by: NURSE PRACTITIONER

## 2025-05-22 PROCEDURE — 1159F MED LIST DOCD IN RCRD: CPT | Performed by: NURSE PRACTITIONER

## 2025-05-22 PROCEDURE — 3079F DIAST BP 80-89 MM HG: CPT | Performed by: NURSE PRACTITIONER

## 2025-05-22 NOTE — PROGRESS NOTES
"Chief Complaint   Patient presents with    Colonoscopy     Needs colon having kidney transplant       PCP: John Robertson MD  REFER: John Robertson MD    Subjective     HPI    Mauro Patel is a 70 y.o. male who presents to office for preventative maintenance.  There is not  a personal history of colon polyps.   He does not have complaints of nausea/vomiting, change in bowels, weight loss, no BRBPR, no melena.  There is not a family history of colon cancer in first degree relative.  There is not a family history of colon polyps in first degree relative.  Mauro Patel last colonoscopy-apx 10 years ago, [facility] ? .  Bowels do move on regular basis.  Anticipates kidney transplant    Lab Results - Last 18 Months   Lab Units 07/22/24  0740 04/26/24  0436 04/25/24  1356 02/26/24  0923 02/21/24  0327 02/20/24  0712 02/19/24  0437 02/18/24  0622 02/17/24  0432 02/16/24  0354   GLUCOSE mg/dL 140* 141* 199* 137* 137* 148* 110* 177* 133* 119*   SODIUM mmol/L 138 133* 134* 133* 131* 130* 128* 127* 134* 138   POTASSIUM mmol/L 4.7 3.8 3.9 3.6 3.7 4.4 4.0 4.4 4.0 3.9   CREATININE mg/dL 5.7* 3.51* 2.40* 3.90* 3.44* 4.61* 5.63* 5.35* 4.91* 4.14*   BUN mg/dL 61* 36* 21 31* 34* 59* 78* 76* 68* 61*   BUN / CREAT RATIO   --  10.3 8.8 7.9 9.9 12.8 13.9 14.2 13.8 14.7   ALK PHOS U/L  --   --  102 113  --   --  104 100 105 109   ALT (SGPT) U/L  --   --  14 8  --   --  11 8 5 5   AST (SGOT) U/L  --   --  29 24  --   --  22 17 12 12   BILIRUBIN mg/dL  --   --  0.4 0.3  --   --  <0.2 <0.2 0.2 0.3   ALBUMIN g/dL  --   --  3.7 3.4* 2.8* 3.2* 3.0* 3.1* 3.1* 3.1*       No results for input(s): \"EGFRIFNONA\", \"EGFRIFAFRI\", \"EGFRRESULT\" in the last 19180 hours.     Past Medical History:   Diagnosis Date    Chronic renal impairment 02/14/2024    Diabetes mellitus     Edema     Hyperlipidemia     Hypertension      Past Surgical History:   Procedure Laterality Date    CATARACT EXTRACTION      INSERTION HEMODIALYSIS CATHETER N/A " 02/19/2024    Procedure: HEMODIALYSIS CATHETER INSERTION;  Surgeon: Michael Armenta DO;  Location: [facility] HYBRID OR;  Service: Vascular;  Laterality: N/A;    NO PAST SURGERIES       Outpatient Medications Marked as Taking for the 5/22/25 encounter (Office Visit) with Yung Pollard APRN   Medication Sig Dispense Refill    amLODIPine (NORVASC) 10 MG tablet Take 1 tablet by mouth Daily.      aspirin 81 MG EC tablet Take 1 tablet by mouth Daily.      calcitriol (ROCALTROL) 0.25 MCG capsule Take 1 capsule by mouth.      carvedilol (COREG) 25 MG tablet Take 1 tablet by mouth 2 (Two) Times a Day With Meals. BID      cyclobenzaprine (FLEXERIL) 5 MG tablet Take 1 tablet by mouth 3 (Three) Times a Day As Needed for Muscle Spasms. 30 tablet 0    Diclofenac Sodium (VOLTAREN) 1 % gel gel Apply 2 g topically to the appropriate area as directed 4 (Four) Times a Day. 350 g 0    ferrous gluconate (FERGON) 324 MG tablet Take 1 tablet by mouth 2 (Two) Times a Day.      furosemide (LASIX) 40 MG tablet Take 1 tablet by mouth Daily.      hydrALAZINE (APRESOLINE) 50 MG tablet Take 1 tablet by mouth Every 8 (Eight) Hours. 90 tablet 0    insulin detemir (LEVEMIR) 100 UNIT/ML injection Inject 5 Units under the skin into the appropriate area as directed Every 12 (Twelve) Hours. 10 mL 1    Insulin Lispro, 1 Unit Dial, (HumaLOG KwikPen) 100 UNIT/ML solution pen-injector Inject 5 Units under the skin into the appropriate area as directed 2 (Two) Times a Day. At breakfast and supper      losartan (COZAAR) 100 MG tablet Take 1 tablet by mouth Daily.      magnesium oxide (MAG-OX) 400 MG tablet Take 1 tablet by mouth Daily.      rosuvastatin (CRESTOR) 20 MG tablet Take 1 tablet by mouth Daily.      sodium bicarbonate 650 MG tablet Take 2 tablets by mouth 3 (Three) Times a Day.      tamsulosin (FLOMAX) 0.4 MG capsule 24 hr capsule Take 2 capsules by mouth Daily.      tamsulosin (FLOMAX) 0.4 MG capsule 24 hr capsule Take 2 capsules by  mouth Every Night for 360 days. 60 capsule 11    vitamin D (ERGOCALCIFEROL) 29299 units capsule capsule Take 1 capsule by mouth Every 30 (Thirty) Days. Around the 15th       Allergies   Allergen Reactions    Duravent Dm  [Phenylephrine-Dm-Gg] Rash    Phenylephrine Rash     Social History     Socioeconomic History    Marital status:    Tobacco Use    Smoking status: Never    Smokeless tobacco: Never   Vaping Use    Vaping status: Never Used   Substance and Sexual Activity    Alcohol use: No    Drug use: No    Sexual activity: Defer       Review of Systems   Constitutional:  Negative for fever and unexpected weight change.   HENT:  Negative for trouble swallowing.    Respiratory:  Negative for shortness of breath.    Cardiovascular:  Negative for chest pain.   Gastrointestinal:  Negative for abdominal pain and anal bleeding.     Objective   Vitals:    05/22/25 0837   BP: 130/84   Pulse: 79   Temp: 98 °F (36.7 °C)   SpO2: 99%     Physical Exam  Constitutional:       Appearance: Normal appearance. He is well-developed.   Eyes:      General: No scleral icterus.  Cardiovascular:      Heart sounds: Normal heart sounds. No murmur heard.  Pulmonary:      Effort: Pulmonary effort is normal.   Abdominal:      General: Bowel sounds are normal. There is no distension.      Palpations: Abdomen is soft.      Tenderness: There is no abdominal tenderness. There is no guarding.   Skin:     General: Skin is warm and dry.      Coloration: Skin is not jaundiced.   Neurological:      Mental Status: He is alert.   Psychiatric:         Behavior: Behavior is cooperative.       Imaging Results (Most Recent)       None          Body mass index is 36.19 kg/m².    Assessment & Plan   Diagnoses and all orders for this visit:    1. Encounter for screening for malignant neoplasm of colon (Primary)  -     Case Request; Standing  -     Implement Anesthesia Orders Day of Procedure; Standing  -     Follow Anesthesia Guidelines / Protocol;  Future  -     Obtain Informed Consent; Standing  -     Case Request    2. Encounter for pre-transplant evaluation for kidney transplant    Other orders  -     polyethylene glycol (GoLYTELY) 236 g solution; Take 3,785 mL by mouth 1 (One) Time for 1 dose. Take as directed  Dispense: 3350 mL; Refill: 0      COLONOSCOPY WITH ANESTHESIA- (examination of colon) (N/A)      Patient is to continue all blood pressure and cardiac medications prior to procedure and has been advised to take medications morning of procedure   Pt verbalized understanding     Mauro Patel advised to go ahead and pick prep up from pharmacy if prescription to prevent medication from being placed back on the shelf.     All risks, benefits, alternatives, and indications of colonoscopy procedure have been discussed with the patient. Risks to include perforation of the colon requiring possible surgery or colostomy, risk of bleeding from biopsies or removal of colon tissue, possibility of missing a colon polyp or cancer, or adverse drug reaction.  Benefits to include the diagnosis and management of disease of the colon and rectum. Alternatives to include barium enema, radiographic evaluation, lab testing or no intervention. He verbalizes understanding and agrees.       I have explained having an adequate and complete prep is associated with success of colonoscopy.   I have explained to Mauro Patel that not having an adequate bowel prep can lead to decreased rate of  adenoma detection, longer procedure times, and higher chance the physician will not be able to successfully complete full colonoscopy (able to intubate cecum).   I have discussed bowel prep options miralax prep and Golytley.  Mauro Patel has elected to proceed with Golytley.   I have also discussed that there are a variety of prep options however prep that is prescribed is influenced on patient health history, how well bowels move on regular basis, and individualized insurance  plan.  I have explained that we are not able to know the amount of coverage each individual insurance plan provides for preps.  I asked Mauro Patel to please call our office if we need to send prescription/provide instructions for another prep due to costs/insurance coverage.        Yung Pollard, APRN  05/27/25        There are no Patient Instructions on file for this visit.

## 2025-05-23 ENCOUNTER — TELEPHONE (OUTPATIENT)
Dept: UROLOGY | Facility: CLINIC | Age: 70
End: 2025-05-23
Payer: MEDICARE

## 2025-05-23 ENCOUNTER — HOSPITAL ENCOUNTER (OUTPATIENT)
Dept: CT IMAGING | Facility: HOSPITAL | Age: 70
Discharge: HOME | End: 2025-05-23
Payer: MEDICARE

## 2025-05-23 PROCEDURE — 74176 CT ABD & PELVIS W/O CONTRAST: CPT

## 2025-05-23 NOTE — TELEPHONE ENCOUNTER
Called pt to let him know Brain said he will go over the CT results at the pt's upcoming appt. Pt expressed v/u.

## 2025-05-27 ENCOUNTER — OFFICE VISIT (OUTPATIENT)
Dept: UROLOGY | Facility: CLINIC | Age: 70
End: 2025-05-27
Payer: MEDICARE

## 2025-05-27 VITALS — HEIGHT: 68 IN | BODY MASS INDEX: 36.07 KG/M2 | TEMPERATURE: 98.2 F | WEIGHT: 238 LBS

## 2025-05-27 DIAGNOSIS — N39.0 RECURRENT UTI: Primary | ICD-10-CM

## 2025-05-27 DIAGNOSIS — N13.8 BPH WITH OBSTRUCTION/LOWER URINARY TRACT SYMPTOMS: ICD-10-CM

## 2025-05-27 DIAGNOSIS — N40.1 BPH WITH OBSTRUCTION/LOWER URINARY TRACT SYMPTOMS: ICD-10-CM

## 2025-05-27 PROBLEM — Z12.11 ENCOUNTER FOR SCREENING FOR MALIGNANT NEOPLASM OF COLON: Status: ACTIVE | Noted: 2025-05-22

## 2025-05-27 PROCEDURE — 1160F RVW MEDS BY RX/DR IN RCRD: CPT | Performed by: PHYSICIAN ASSISTANT

## 2025-05-27 PROCEDURE — 1159F MED LIST DOCD IN RCRD: CPT | Performed by: PHYSICIAN ASSISTANT

## 2025-05-27 PROCEDURE — 99214 OFFICE O/P EST MOD 30 MIN: CPT | Performed by: PHYSICIAN ASSISTANT

## 2025-07-07 ENCOUNTER — TELEPHONE (OUTPATIENT)
Dept: GASTROENTEROLOGY | Facility: CLINIC | Age: 70
End: 2025-07-07
Payer: MEDICARE

## 2025-07-21 ENCOUNTER — TRANSCRIBE ORDERS (OUTPATIENT)
Dept: ADMINISTRATIVE | Facility: HOSPITAL | Age: 70
End: 2025-07-21
Payer: MEDICARE

## 2025-07-21 DIAGNOSIS — R06.09 OTHER FORMS OF DYSPNEA: Primary | ICD-10-CM

## 2025-08-11 ENCOUNTER — HOSPITAL ENCOUNTER (OUTPATIENT)
Dept: CARDIOLOGY | Facility: HOSPITAL | Age: 70
Discharge: HOME | End: 2025-08-11
Admitting: FAMILY MEDICINE
Payer: MEDICARE

## 2025-08-11 VITALS
HEIGHT: 68 IN | BODY MASS INDEX: 36.98 KG/M2 | WEIGHT: 244 LBS | HEART RATE: 66 BPM | DIASTOLIC BLOOD PRESSURE: 62 MMHG | SYSTOLIC BLOOD PRESSURE: 138 MMHG

## 2025-08-11 DIAGNOSIS — R06.09 OTHER FORMS OF DYSPNEA: ICD-10-CM

## 2025-08-11 LAB
BH CV STRESS BP STAGE 1: NORMAL
BH CV STRESS BP STAGE 2: NORMAL
BH CV STRESS BP STAGE 3: NORMAL
BH CV STRESS BP STAGE 4: NORMAL
BH CV STRESS DURATION MIN STAGE 1: 3
BH CV STRESS DURATION MIN STAGE 2: 3
BH CV STRESS DURATION MIN STAGE 3: 3
BH CV STRESS DURATION MIN STAGE 4: 2
BH CV STRESS DURATION SEC STAGE 1: 0
BH CV STRESS DURATION SEC STAGE 2: 0
BH CV STRESS DURATION SEC STAGE 3: 0
BH CV STRESS DURATION SEC STAGE 4: 47
BH CV STRESS GRADE STAGE 1: 10
BH CV STRESS GRADE STAGE 2: 12
BH CV STRESS GRADE STAGE 3: 14
BH CV STRESS GRADE STAGE 4: 16
BH CV STRESS HR STAGE 1: 68
BH CV STRESS HR STAGE 2: 76
BH CV STRESS HR STAGE 3: 109
BH CV STRESS HR STAGE 4: 130
BH CV STRESS METS STAGE 1: 5
BH CV STRESS METS STAGE 2: 7.5
BH CV STRESS METS STAGE 3: 10
BH CV STRESS METS STAGE 4: 13.5
BH CV STRESS PROTOCOL 1: NORMAL
BH CV STRESS RECOVERY BP: NORMAL MMHG
BH CV STRESS RECOVERY HR: 93 BPM
BH CV STRESS SPEED STAGE 1: 1.7
BH CV STRESS SPEED STAGE 2: 2.5
BH CV STRESS SPEED STAGE 3: 3.4
BH CV STRESS SPEED STAGE 4: 4.2
BH CV STRESS STAGE 1: 1
BH CV STRESS STAGE 2: 2
BH CV STRESS STAGE 3: 3
BH CV STRESS STAGE 4: 4
MAXIMAL PREDICTED HEART RATE: 151 BPM
PERCENT MAX PREDICTED HR: 86.09 %
STRESS BASELINE BP: NORMAL MMHG
STRESS BASELINE HR: 66 BPM
STRESS PERCENT HR: 101 %
STRESS POST ESTIMATED WORKLOAD: 13.5 METS
STRESS POST EXERCISE DUR MIN: 11 MIN
STRESS POST EXERCISE DUR SEC: 47 SEC
STRESS POST PEAK BP: NORMAL MMHG
STRESS POST PEAK HR: 130 BPM
STRESS TARGET HR: 128 BPM

## 2025-08-11 PROCEDURE — 93352 ADMIN ECG CONTRAST AGENT: CPT | Performed by: INTERNAL MEDICINE

## 2025-08-11 PROCEDURE — 25510000001 PERFLUTREN 6.52 MG/ML SUSPENSION: Performed by: INTERNAL MEDICINE

## 2025-08-11 PROCEDURE — 25010000002 DOBUTAMINE PER 250 MG: Performed by: INTERNAL MEDICINE

## 2025-08-11 PROCEDURE — 93018 CV STRESS TEST I&R ONLY: CPT | Performed by: INTERNAL MEDICINE

## 2025-08-11 PROCEDURE — 93350 STRESS TTE ONLY: CPT

## 2025-08-11 PROCEDURE — 93350 STRESS TTE ONLY: CPT | Performed by: INTERNAL MEDICINE

## 2025-08-11 PROCEDURE — 93017 CV STRESS TEST TRACING ONLY: CPT

## 2025-08-11 RX ORDER — DOBUTAMINE HYDROCHLORIDE 100 MG/100ML
10 INJECTION INTRAVENOUS
Status: DISCONTINUED | OUTPATIENT
Start: 2025-08-11 | End: 2025-08-12 | Stop reason: HOSPADM

## 2025-08-11 RX ADMIN — PERFLUTREN 8.48 MG: 6.52 INJECTION, SUSPENSION INTRAVENOUS at 10:40

## 2025-08-11 RX ADMIN — DOBUTAMINE HYDROCHLORIDE 10 MCG/KG/MIN: 100 INJECTION INTRAVENOUS at 10:40

## (undated) DEVICE — SYR SLP TP 10ML DISP

## (undated) DEVICE — PAD MINOR UNIVERSAL: Brand: MEDLINE INDUSTRIES, INC.

## (undated) DEVICE — DRSNG SURESITE WNDW 4X4.5

## (undated) DEVICE — CURAVIEW LED LARYNGOSCOPE BLADE & HANDLE,DISPOSABLE,MAC 4: Brand: CURAPLEX

## (undated) DEVICE — ST MIC/INTRO ACC SHRP/NDL TUNG/TP NITNL 5F 45CM 7CM

## (undated) DEVICE — STAY.SAFE® CAP: Brand: STAY.SAFE®

## (undated) DEVICE — DRESSING TRNSPAR W5XL4.5IN FLM SHT SEMIPERMEABLE WIND

## (undated) DEVICE — SUTURE VICRYL + SZ 3-0 L27IN ABSRB UD L26MM SH 1/2 CIR VCP416H

## (undated) DEVICE — SUTURE PDS + SZ 0 L27IN ABSRB VLT L36MM CT 1 1 2 CIR PDP340H

## (undated) DEVICE — GAUZE,SPONGE,2"X2",8PLY,STERILE,LF,2'S: Brand: MEDLINE

## (undated) DEVICE — BNDG ADHS CURAD FLX/FABRC 1X3IN STRL LF

## (undated) DEVICE — TROCAR: Brand: KII FIOS FIRST ENTRY

## (undated) DEVICE — SOLUTION ANTIFOG VIS SYS CLEARIFY LAPSCP

## (undated) DEVICE — SUT MNCRYL 4/0 PS2 27IN UD MCP426H

## (undated) DEVICE — TROCAR: Brand: KII® SLEEVE

## (undated) DEVICE — SUTURE MONOCRYL + SZ 4-0 L18IN ABSRB UD L19MM PS-2 3/8 CIR MCP496G

## (undated) DEVICE — TOTAL TRAY, 16FR 10ML SIL FOLEY, URN: Brand: MEDLINE

## (undated) DEVICE — SNAP KOVER: Brand: UNBRANDED

## (undated) DEVICE — TUBE ET 7.5MM NSL ORAL BASIC CUF INTMED MURPHY EYE RADPQ

## (undated) DEVICE — MINOR CDS: Brand: MEDLINE INDUSTRIES, INC.

## (undated) DEVICE — STERILE (14X122CM) TELESCOPICALLY-FOLDED COVER: Brand: CIV-CLEAR™ TRANSDUCER COVER

## (undated) DEVICE — CONNECTOR CATH TWO PART AD FOR PERITONEAL DLYS FLX NK

## (undated) DEVICE — INTENDED FOR TISSUE SEPARATION, AND OTHER PROCEDURES THAT REQUIRE A SHARP SURGICAL BLADE TO PUNCTURE OR CUT.: Brand: BARD-PARKER ®  SAFETY SCALPED

## (undated) DEVICE — GLV SURG SENSICARE W/ALOE PF LF 7.5 STRL

## (undated) DEVICE — GENERAL LAP CDS

## (undated) DEVICE — PROXIMATE RH ROTATING HEAD SKIN STAPLERS (35 WIDE) CONTAINS 35 STAINLESS STEEL STAPLES: Brand: PROXIMATE

## (undated) DEVICE — GLOVE SURG SZ 7 CRM LTX FREE POLYISOPRENE POLYMER BEAD ANTI

## (undated) DEVICE — SYR LUERLOK 20CC BX/50

## (undated) DEVICE — LIQUIBAND RAPID ADHESIVE 36/CS 0.8ML: Brand: MEDLINE

## (undated) DEVICE — TIBURON LAPAROTOMY DRAPE: Brand: CONVERTORS

## (undated) DEVICE — SUT ETHLN 2/0 FS 18IN 664H

## (undated) DEVICE — SUTURE VICRYL CTD ANTBCTRL 54 IN TI PLU VLT

## (undated) DEVICE — CATHETER PERI DLYS AD L57CM DIA15FR DBL CUF COILED LIN

## (undated) DEVICE — Y-TYPE TUR/BLADDER IRRIGATION SET, REGULATING CLAMP

## (undated) DEVICE — COVER LT HNDL BLU PLAS

## (undated) DEVICE — Device

## (undated) DEVICE — INTENDED FOR TISSUE SEPARATION, AND OTHER PROCEDURES THAT REQUIRE A SHARP SURGICAL BLADE TO PUNCTURE OR CUT.: Brand: BARD-PARKER ® STAINLESS STEEL BLADES

## (undated) DEVICE — GAUZE,SPONGE,4"X4",8PLY,STRL,LF,10/TRAY: Brand: MEDLINE

## (undated) DEVICE — KT CATH TAL PALINDROME SAPPHIRE 14.5F 19CM

## (undated) DEVICE — ADAPTER,CATHETER/SYRINGE/LUER,STERILE: Brand: MEDLINE

## (undated) DEVICE — SYR PRECISIONGLIDE LL 5CC 20X1 1/2IN

## (undated) DEVICE — STRIP,CLOSURE,WOUND,MEDI-STRIP,1/2X4: Brand: MEDLINE